# Patient Record
Sex: FEMALE | Race: OTHER | HISPANIC OR LATINO | Employment: UNEMPLOYED | URBAN - METROPOLITAN AREA
[De-identification: names, ages, dates, MRNs, and addresses within clinical notes are randomized per-mention and may not be internally consistent; named-entity substitution may affect disease eponyms.]

---

## 2017-10-31 ENCOUNTER — ALLSCRIPTS OFFICE VISIT (OUTPATIENT)
Dept: OTHER | Facility: OTHER | Age: 52
End: 2017-10-31

## 2017-10-31 DIAGNOSIS — Z12.31 ENCOUNTER FOR SCREENING MAMMOGRAM FOR MALIGNANT NEOPLASM OF BREAST: ICD-10-CM

## 2017-10-31 DIAGNOSIS — R10.13 EPIGASTRIC PAIN: ICD-10-CM

## 2017-10-31 DIAGNOSIS — R19.00 INTRA-ABDOMINAL AND PELVIC SWELLING, MASS AND LUMP, UNSPECIFIED SITE: ICD-10-CM

## 2017-11-08 ENCOUNTER — GENERIC CONVERSION - ENCOUNTER (OUTPATIENT)
Dept: OTHER | Facility: OTHER | Age: 52
End: 2017-11-08

## 2017-11-08 LAB
ADEQUACY: (HISTORICAL): NORMAL
CLINICIAN PROVIDIED ICD 9 OR 10 (HISTORICAL): NORMAL
COMMENT (HISTORICAL): NORMAL
DIAGNOSIS (HISTORICAL): NORMAL
HPV HIGH RISK (HISTORICAL): NORMAL
HPV,LOW VOLUME (HISTORICAL): NEGATIVE
PERFORMED BY (HISTORICAL): NORMAL
TEST INFORMATION (HISTORICAL): NORMAL

## 2017-11-14 ENCOUNTER — GENERIC CONVERSION - ENCOUNTER (OUTPATIENT)
Dept: OTHER | Facility: OTHER | Age: 52
End: 2017-11-14

## 2017-11-21 ENCOUNTER — GENERIC CONVERSION - ENCOUNTER (OUTPATIENT)
Dept: OTHER | Facility: OTHER | Age: 52
End: 2017-11-21

## 2017-11-21 ENCOUNTER — TRANSCRIBE ORDERS (OUTPATIENT)
Dept: ADMINISTRATIVE | Facility: HOSPITAL | Age: 52
End: 2017-11-21

## 2017-11-21 ENCOUNTER — APPOINTMENT (OUTPATIENT)
Dept: LAB | Facility: HOSPITAL | Age: 52
End: 2017-11-21
Payer: COMMERCIAL

## 2017-11-21 DIAGNOSIS — L20.9 ATOPIC DERMATITIS, UNSPECIFIED TYPE: ICD-10-CM

## 2017-11-21 DIAGNOSIS — R10.13 ABDOMINAL PAIN, EPIGASTRIC: Primary | ICD-10-CM

## 2017-11-21 DIAGNOSIS — M54.9 DORSALGIA: ICD-10-CM

## 2017-11-21 DIAGNOSIS — R10.13 ABDOMINAL PAIN, EPIGASTRIC: ICD-10-CM

## 2017-11-21 DIAGNOSIS — I10 ESSENTIAL HYPERTENSION, MALIGNANT: ICD-10-CM

## 2017-11-21 DIAGNOSIS — R19.00 ABDOMINAL MASS, UNSPECIFIED ABDOMINAL LOCATION: ICD-10-CM

## 2017-11-21 DIAGNOSIS — I10 ESSENTIAL HYPERTENSION, MALIGNANT: Primary | ICD-10-CM

## 2017-11-21 LAB
ALBUMIN SERPL BCP-MCNC: 3.6 G/DL (ref 3.5–5)
ALP SERPL-CCNC: 97 U/L (ref 46–116)
ALT SERPL W P-5'-P-CCNC: 40 U/L (ref 12–78)
ANION GAP SERPL CALCULATED.3IONS-SCNC: 9 MMOL/L (ref 4–13)
AST SERPL W P-5'-P-CCNC: 26 U/L (ref 5–45)
BASOPHILS # BLD AUTO: 0.01 THOUSANDS/ΜL (ref 0–0.1)
BASOPHILS NFR BLD AUTO: 0 % (ref 0–1)
BILIRUB SERPL-MCNC: 0.34 MG/DL (ref 0.2–1)
BILIRUB UR QL STRIP: NEGATIVE
BUN SERPL-MCNC: 12 MG/DL (ref 5–25)
CALCIUM SERPL-MCNC: 8.4 MG/DL (ref 8.3–10.1)
CHLORIDE SERPL-SCNC: 105 MMOL/L (ref 100–108)
CHOLEST SERPL-MCNC: 163 MG/DL (ref 50–200)
CLARITY UR: CLEAR
CO2 SERPL-SCNC: 26 MMOL/L (ref 21–32)
COLOR UR: YELLOW
CREAT SERPL-MCNC: 0.69 MG/DL (ref 0.6–1.3)
EOSINOPHIL # BLD AUTO: 0.12 THOUSAND/ΜL (ref 0–0.61)
EOSINOPHIL NFR BLD AUTO: 2 % (ref 0–6)
ERYTHROCYTE [DISTWIDTH] IN BLOOD BY AUTOMATED COUNT: 13 % (ref 11.6–15.1)
EST. AVERAGE GLUCOSE BLD GHB EST-MCNC: 114 MG/DL
GFR SERPL CREATININE-BSD FRML MDRD: 100 ML/MIN/1.73SQ M
GGT SERPL-CCNC: 26 U/L (ref 5–85)
GLUCOSE P FAST SERPL-MCNC: 93 MG/DL (ref 65–99)
GLUCOSE UR STRIP-MCNC: NEGATIVE MG/DL
HBA1C MFR BLD: 5.6 % (ref 4.2–6.3)
HCT VFR BLD AUTO: 43.8 % (ref 34.8–46.1)
HDLC SERPL-MCNC: 51 MG/DL (ref 40–60)
HGB BLD-MCNC: 15 G/DL (ref 11.5–15.4)
HGB UR QL STRIP.AUTO: NEGATIVE
KETONES UR STRIP-MCNC: NEGATIVE MG/DL
LDLC SERPL CALC-MCNC: 78 MG/DL (ref 0–100)
LEUKOCYTE ESTERASE UR QL STRIP: NEGATIVE
LYMPHOCYTES # BLD AUTO: 1.66 THOUSANDS/ΜL (ref 0.6–4.47)
LYMPHOCYTES NFR BLD AUTO: 24 % (ref 14–44)
MAGNESIUM SERPL-MCNC: 2 MG/DL (ref 1.6–2.6)
MCH RBC QN AUTO: 31.8 PG (ref 26.8–34.3)
MCHC RBC AUTO-ENTMCNC: 34.2 G/DL (ref 31.4–37.4)
MCV RBC AUTO: 93 FL (ref 82–98)
MONOCYTES # BLD AUTO: 0.49 THOUSAND/ΜL (ref 0.17–1.22)
MONOCYTES NFR BLD AUTO: 7 % (ref 4–12)
NEUTROPHILS # BLD AUTO: 4.79 THOUSANDS/ΜL (ref 1.85–7.62)
NEUTS SEG NFR BLD AUTO: 67 % (ref 43–75)
NITRITE UR QL STRIP: NEGATIVE
NRBC BLD AUTO-RTO: 0 /100 WBCS
PH UR STRIP.AUTO: 6 [PH] (ref 4.5–8)
PLATELET # BLD AUTO: 212 THOUSANDS/UL (ref 149–390)
PMV BLD AUTO: 11.8 FL (ref 8.9–12.7)
POTASSIUM SERPL-SCNC: 4.1 MMOL/L (ref 3.5–5.3)
PROT SERPL-MCNC: 7.5 G/DL (ref 6.4–8.2)
PROT UR STRIP-MCNC: NEGATIVE MG/DL
RBC # BLD AUTO: 4.71 MILLION/UL (ref 3.81–5.12)
SODIUM SERPL-SCNC: 140 MMOL/L (ref 136–145)
SP GR UR STRIP.AUTO: <=1.005 (ref 1–1.03)
T4 FREE SERPL-MCNC: 1.09 NG/DL (ref 0.76–1.46)
TRIGL SERPL-MCNC: 168 MG/DL
TSH SERPL DL<=0.05 MIU/L-ACNC: 2.63 UIU/ML (ref 0.36–3.74)
UROBILINOGEN UR QL STRIP.AUTO: 0.2 E.U./DL
WBC # BLD AUTO: 7.07 THOUSAND/UL (ref 4.31–10.16)

## 2017-11-21 PROCEDURE — 80053 COMPREHEN METABOLIC PANEL: CPT

## 2017-11-21 PROCEDURE — 82977 ASSAY OF GGT: CPT

## 2017-11-21 PROCEDURE — 81003 URINALYSIS AUTO W/O SCOPE: CPT | Performed by: INTERNAL MEDICINE

## 2017-11-21 PROCEDURE — 84443 ASSAY THYROID STIM HORMONE: CPT

## 2017-11-21 PROCEDURE — 85025 COMPLETE CBC W/AUTO DIFF WBC: CPT

## 2017-11-21 PROCEDURE — 84439 ASSAY OF FREE THYROXINE: CPT

## 2017-11-21 PROCEDURE — 36415 COLL VENOUS BLD VENIPUNCTURE: CPT

## 2017-11-21 PROCEDURE — 80061 LIPID PANEL: CPT

## 2017-11-21 PROCEDURE — 83735 ASSAY OF MAGNESIUM: CPT

## 2017-11-21 PROCEDURE — 83036 HEMOGLOBIN GLYCOSYLATED A1C: CPT

## 2017-11-22 ENCOUNTER — GENERIC CONVERSION - ENCOUNTER (OUTPATIENT)
Dept: OTHER | Facility: OTHER | Age: 52
End: 2017-11-22

## 2017-11-28 ENCOUNTER — HOSPITAL ENCOUNTER (OUTPATIENT)
Dept: CT IMAGING | Facility: HOSPITAL | Age: 52
Discharge: HOME | End: 2017-11-28
Payer: COMMERCIAL

## 2017-11-28 DIAGNOSIS — R19.00 INTRA-ABDOMINAL AND PELVIC SWELLING, MASS AND LUMP, UNSPECIFIED SITE: ICD-10-CM

## 2017-11-28 DIAGNOSIS — R10.13 EPIGASTRIC PAIN: ICD-10-CM

## 2017-11-28 PROCEDURE — 74177 CT ABD & PELVIS W/CONTRAST: CPT

## 2017-11-28 RX ADMIN — IOHEXOL 100 ML: 350 INJECTION, SOLUTION INTRAVENOUS at 20:22

## 2017-12-01 ENCOUNTER — APPOINTMENT (OUTPATIENT)
Dept: LAB | Facility: HOSPITAL | Age: 52
End: 2017-12-01
Payer: COMMERCIAL

## 2017-12-01 ENCOUNTER — TRANSCRIBE ORDERS (OUTPATIENT)
Dept: ADMINISTRATIVE | Facility: HOSPITAL | Age: 52
End: 2017-12-01

## 2017-12-01 ENCOUNTER — ALLSCRIPTS OFFICE VISIT (OUTPATIENT)
Dept: OTHER | Facility: OTHER | Age: 52
End: 2017-12-01

## 2017-12-01 ENCOUNTER — GENERIC CONVERSION - ENCOUNTER (OUTPATIENT)
Dept: OTHER | Facility: OTHER | Age: 52
End: 2017-12-01

## 2017-12-01 DIAGNOSIS — I15.2 ADRENAL HYPERTENSION (HCC): Primary | ICD-10-CM

## 2017-12-01 DIAGNOSIS — I15.2 ADRENAL HYPERTENSION (HCC): ICD-10-CM

## 2017-12-01 DIAGNOSIS — M54.17 RADICULOPATHY OF LUMBOSACRAL REGION: ICD-10-CM

## 2017-12-01 DIAGNOSIS — E27.9 DISORDER OF ADRENAL GLAND (HCC): ICD-10-CM

## 2017-12-01 DIAGNOSIS — E27.9 ADRENAL HYPERTENSION (HCC): ICD-10-CM

## 2017-12-01 DIAGNOSIS — N94.9: Primary | ICD-10-CM

## 2017-12-01 DIAGNOSIS — N94.9 UNSPECIFIED CONDITION ASSOCIATED WITH FEMALE GENITAL ORGANS AND MENSTRUAL CYCLE (CODE): ICD-10-CM

## 2017-12-01 DIAGNOSIS — E27.9 ADRENAL HYPERTENSION (HCC): Primary | ICD-10-CM

## 2017-12-01 PROCEDURE — 36415 COLL VENOUS BLD VENIPUNCTURE: CPT

## 2017-12-01 PROCEDURE — 82626 DEHYDROEPIANDROSTERONE: CPT

## 2017-12-01 PROCEDURE — 82088 ASSAY OF ALDOSTERONE: CPT

## 2017-12-05 ENCOUNTER — APPOINTMENT (OUTPATIENT)
Dept: LAB | Facility: HOSPITAL | Age: 52
End: 2017-12-05
Payer: COMMERCIAL

## 2017-12-05 ENCOUNTER — TRANSCRIBE ORDERS (OUTPATIENT)
Dept: ADMINISTRATIVE | Facility: HOSPITAL | Age: 52
End: 2017-12-05

## 2017-12-05 DIAGNOSIS — E27.9 ADRENAL HYPERTENSION (HCC): ICD-10-CM

## 2017-12-05 DIAGNOSIS — I15.2 ADRENAL HYPERTENSION (HCC): Primary | ICD-10-CM

## 2017-12-05 DIAGNOSIS — I15.2 ADRENAL HYPERTENSION (HCC): ICD-10-CM

## 2017-12-05 DIAGNOSIS — E27.9 ADRENAL HYPERTENSION (HCC): Primary | ICD-10-CM

## 2017-12-05 LAB — DHEA SERPL-MCNC: 42 NG/DL (ref 31–701)

## 2017-12-05 PROCEDURE — 82530 CORTISOL FREE: CPT

## 2017-12-06 ENCOUNTER — HOSPITAL ENCOUNTER (OUTPATIENT)
Dept: ULTRASOUND IMAGING | Facility: HOSPITAL | Age: 52
Discharge: HOME | End: 2017-12-06
Payer: COMMERCIAL

## 2017-12-06 DIAGNOSIS — N94.9 UNSPECIFIED CONDITION ASSOCIATED WITH FEMALE GENITAL ORGANS AND MENSTRUAL CYCLE (CODE): ICD-10-CM

## 2017-12-06 LAB — ALDOST SERPL-MCNC: 2.2 NG/DL (ref 0–30)

## 2017-12-06 PROCEDURE — 76856 US EXAM PELVIC COMPLETE: CPT

## 2017-12-06 PROCEDURE — 76830 TRANSVAGINAL US NON-OB: CPT

## 2017-12-07 ENCOUNTER — HOSPITAL ENCOUNTER (OUTPATIENT)
Dept: CT IMAGING | Facility: HOSPITAL | Age: 52
Discharge: HOME | End: 2017-12-07
Payer: COMMERCIAL

## 2017-12-07 ENCOUNTER — HOSPITAL ENCOUNTER (OUTPATIENT)
Dept: MAMMOGRAPHY | Facility: MEDICAL CENTER | Age: 52
Discharge: HOME | End: 2017-12-07
Payer: COMMERCIAL

## 2017-12-07 DIAGNOSIS — Z12.31 ENCOUNTER FOR SCREENING MAMMOGRAM FOR MALIGNANT NEOPLASM OF BREAST: ICD-10-CM

## 2017-12-07 DIAGNOSIS — N94.9 UNSPECIFIED CONDITION ASSOCIATED WITH FEMALE GENITAL ORGANS AND MENSTRUAL CYCLE (CODE): ICD-10-CM

## 2017-12-07 DIAGNOSIS — E27.9 DISORDER OF ADRENAL GLAND (HCC): ICD-10-CM

## 2017-12-07 PROCEDURE — G0202 SCR MAMMO BI INCL CAD: HCPCS

## 2017-12-07 PROCEDURE — 74170 CT ABD WO CNTRST FLWD CNTRST: CPT

## 2017-12-07 PROCEDURE — 77063 BREAST TOMOSYNTHESIS BI: CPT

## 2017-12-07 RX ADMIN — IOHEXOL 100 ML: 350 INJECTION, SOLUTION INTRAVENOUS at 20:34

## 2017-12-09 LAB
CORTIS F 24H UR-MRATE: 19 UG/24 HR (ref 0–50)
CORTIS F UR-MCNC: 19 UG/L

## 2017-12-11 ENCOUNTER — GENERIC CONVERSION - ENCOUNTER (OUTPATIENT)
Dept: OTHER | Facility: OTHER | Age: 52
End: 2017-12-11

## 2017-12-13 ENCOUNTER — GENERIC CONVERSION - ENCOUNTER (OUTPATIENT)
Dept: OTHER | Facility: OTHER | Age: 52
End: 2017-12-13

## 2017-12-15 ENCOUNTER — ALLSCRIPTS OFFICE VISIT (OUTPATIENT)
Dept: OTHER | Facility: OTHER | Age: 52
End: 2017-12-15

## 2017-12-15 ENCOUNTER — TRANSCRIBE ORDERS (OUTPATIENT)
Dept: ADMINISTRATIVE | Facility: HOSPITAL | Age: 52
End: 2017-12-15

## 2017-12-15 ENCOUNTER — GENERIC CONVERSION - ENCOUNTER (OUTPATIENT)
Dept: OTHER | Facility: OTHER | Age: 52
End: 2017-12-15

## 2017-12-15 DIAGNOSIS — I15.2 ADRENAL HYPERTENSION (HCC): Primary | ICD-10-CM

## 2017-12-15 DIAGNOSIS — E27.9 ADRENAL HYPERTENSION (HCC): Primary | ICD-10-CM

## 2017-12-16 NOTE — CONSULTS
Assessment  1  Adrenal mass, left (255 9) (E27 9)    Plan  Adrenal mass, left    · (1) BUN; Status:Active; Requested PCA:[id number]; Perform:[facility]; HHA:[id number]; Last Updated By:Ann-Marie Hill; 12/15/2017 1:31:01 PM;Ordered; For:Adrenal mass, left; Ordered By:Lazaro Rivera;   · (1) CREATININE; Status:Active; Requested AGW:[id number]; Perform:[facility]; Due:15Jun2019;Ordered; For:Adrenal mass, left; Ordered By:Lazaro Rivera;   · * CT ABDOMEN PELVIS W CONTRAST; Status:Need Information - Financial Authorization; Requested [facility]:[id number]; Perform:[facility] Radiology; Order Comments:adrenal protocol ; Due:11Jun2019; Last Updated By:Ann-Marie Hill; 12/15/2017 1:30:27 PM;Ordered; For:Adrenal mass, left; Ordered By:Lazaro Rivera;   · Follow-up visit in 6 months Evaluation and Treatment  Follow-up  Status: Hold For -Scheduling  Requested for: [id number]   Ordered; For: Adrenal mass, left; Ordered By: Ema Krause Performed:  Due: 61RHC8246    Discussion/Summary  Discussion Summary:   34-year-old female with a 3 8 x 3 8 cm left a drain no mass  I did review the films with Dr Yamilex Quiñonez in Radiology  Outside of the slightly indeterminate Hounsfield unit, the mass has completely benign characteristics  I suspect based on its appearance that this is benign  This is under 5 cm in size  The risk of malignancy is low at under 4 cm  I am comfortable observing this  We will try to obtain the results of her metanephrines  If these are normal we will repeat her CT in 6 months  If these were not drawn, we will repeat her 24 hour urine for metanephrines and catecholamines  If this would be a functioning tumor then I would proceed with laparoscopic left adrenalectomy  If they are normal we will repeat her CT in 6 months  She and her  are agreeable to this  All their questions were answered  Goals and Barriers: The patient has the current Goals: Observation  The patent has the current Barriers: None     Patient's Capacity to Self-Care: Patient is able to Self-Care  Medication SE Review and Pt Understands Tx: The treatment plan was reviewed with the patient/guardian  The patient/guardian understands and agrees with the treatment plan   Self Referrals:   Self Referrals: No      Chief Complaint  Chief Complaint Free Text Note Form: Pt here for consult for left adrenal nodule  History of Present Illness  HPI: 79-year-old female recently had some epigastric pain and there was a question of a hernia  Patient had extensive right upper quadrant surgery after transection of a bile duct and what sounds to be a hepaticojejunostomy  This was apparently a very complex operation when this occurred initially in [facility] and then her 2nd operation in [facility]  CT from November 28, 2000 seventeen revealed a 3 8 x 3 6 cm left adrenal mass  There was a left adnexal cyst  Follow-up CT within adrenal protocol revealed a 3 8 x 3 8 cm left adrenal mass with indeterminate Hounsfield units  I personally reviewed the films  Patient denies any problems with hypertension  She is on lisinopril and her blood pressure is easily controlled  Her cortisol, DHEA, aldosterone, and potassium are all normal  She tells me metanephrines were taken as well, but we are still awaiting those results  She comes in for an opinion regarding further therapy since the mass could not be classified as benign  Review of Systems  Complete Female ROS SurgOnc:  Constitutional: The patient denies new or recent history of general fatigue, no recent weight loss, no change in appetite  Eyes: No complaints of visual problems, no scleral icterus  ENT: no complaints of ear pain, no hoarseness, no difficulty swallowing,-- no tinnitus-- and-- no new masses in head, oral cavity, or neck  Cardiovascular: No complaints of chest pain, no palpitations, no ankle edema  Respiratory: No complaints of shortness of breath, no cough    Gastrointestinal: No complaints of jaundice, no bloody stools, no pale stools  Genitourinary: No complaints of dysuria, no hematuria, no nocturia, no frequent urination, no urethral discharge  Musculoskeletal: No complaints of weakness, paralysis, joint stiffness or arthralgias,  Integumentary: No complaints of rash, no new lesions  Neurological: No complaints of convulsions, no seizures, no dizziness  Hematologic/Lymphatic: No complaints of easy bruising  ROS Reviewed:   ROS reviewed  Active Problems  1  Abdominal pain, epigastric (789 06) (R10 13)   2  Acid reflux (530 81) (K21 9)   3  Acute anxiety (300 00) (F41 9)   4  Adnexal cyst (625 8) (N94 9)   5  Adrenal mass, left (255 9) (E27 9)   6  Atopic dermatitis (691 8) (L20 9)   7  Encounter for routine gynecological examination with Papanicolaou smear of cervix (V72 31,V76 2) (Z01 419)   8  Encounter for screening mammogram for malignant neoplasm of breast (V76 12) (Z12 31)   9  Hypertension (401 9) (I10)   10  Need for prophylactic vaccination and inoculation against influenza (V04 81) (Z23)   11  Obesity (BMI 30-39 9) (278 00) (E66 9)   12  Radicular pain of lumbosacral region (724 4) (M54 17)   13  Vaginal irritation (623 9) (N89 8)    Past Medical History  1  History of Palpable abdominal mass (789 30) (R19 00)  Active Problems And Past Medical History Reviewed: The active problems and past medical history were reviewed and updated today  Surgical History  1  History of Abdominal Surgery   2  History of Breast Surgery Reduction Procedure   3  History of Cholecystectomy Laparoscopic   4  History of Liver Surgery  Surgical History Reviewed: The surgical history was reviewed and updated today  Family History  Mother    1  Family history of pancreatic cancer (V16 0) (Z80 0)  Father    2  Family history of malignant neoplasm of prostate (V16 42) (Z80 45)  Family History Reviewed: The family history was reviewed and updated today         Social History   · Former smoker (N15 84) (X00 979) · No alcohol use   · Sexually active  Social History Reviewed: The social history was reviewed and updated today  Current Meds   1  ALPRAZolam 0 25 MG Oral Tablet; TAKE 1 TABLET EVERY 12 HOURS AS NEEDED; Therapy: 30XAB0126 to (Evaluate:28Dec2017); Last Rx:13Dec2017 Ordered   2  Lansoprazole 30 MG Oral Capsule Delayed Release; TAKE 1 CAPSULE EVERY MORNING DAILY; Therapy: 76YQR7556 to (Evaluate:20Jan2018)  Requested for: 21Nov2017; Last Rx:21Nov2017 Ordered   3  Lisinopril 10 MG Oral Tablet; TAKE 1 TABLET DAILY  Requested for: 27BGO6593; Last Rx:71Rks1898 Ordered  Medication List Reviewed: The medication list was reviewed and updated today  Allergies  1  Latex Gloves MISC    Vitals  Vital Signs    Recorded: 90RDF2252 12:17PM   Temperature 98 4 F, Oral   Heart Rate 92, L Radial   Pulse Quality Normal, L Radial   Respiration Quality Normal   Respiration 16   Systolic 587, LUE, Sitting   Diastolic 80, LUE, Sitting   Height 5 ft 5 in   Weight 230 lb 6 oz   BMI Calculated 38 34   BSA Calculated 2 1     Physical Exam   Constitutional: General appearance: The Patient is well-developed, well-nourished female who appears her stated age in no acute distress  She is pleasant and talkative  HEENT: OSWALD, EOMI and the sclerae are anicteric  -- There is no oral pathology noted  -- There is no nasal pathology noted  -- The thyroid is normal to inspection and palpation  -- There is no appreciable cervical adenopathy   -- There are no carotid bruits  -- The Cranial Nerves II - XII are grossly intact  -- Neck is supple without adenopathy  Chest: The chest is normal to inspection  -- The lungs are clear to auscultation  -- There are bilaterally symmetrical breath sounds  -- There is a RRR, normal S1 and S2, without murmurs, rub or gallop  -- The pulses are normal at the radial and dorsalis pedis and symmetrical    Abdomen: The abdomen is normal to inspection and percussion  It is soft, non-tender   There are normal bowel sounds  There are no abnormal masses  -- There is no evidence of hepatosplenomegaly  -- She does not have an umbilical hernia  Extremities: There is no clubbing or cyanosis  -- There is no edema  -- Sensation is intact to light touch  Neuro: Grossly nonfocal    Lymphatic: no evidence of cervical adenopathy bilaterally  -- no evidence of axillary adenopathy bilaterally  -- no evidence of inguinal adenopathy bilaterally  Skin: Warm, anicteric  Results/Data  CT ABDOMEN W WO CONTRAST [id number] 07:34PM Jeffery Median   TW Order Number: [id number]  Performing Comments: ADRENAL PROTOCOL   - Patient Instructions: To schedule this appointment, please contact Central Scheduling at [phone]  Test Name Result Flag Reference   CT ABDOMEN W WO CONTRAST (Report)     CT ABDOMEN - ADRENAL PROTOCOL - WITH AND WITHOUT IV CONTRAST   INDICATION: Evaluate left adrenal lesion  COMPARISON: 11/28/2017   TECHNIQUE: Thin section noncontrast CT examination of the abdomen was performed per adrenal mass protocol  Scan were performed prior to IV contrast administration, in postcontrast portal venous phase, and at 10 minute delay  This examination, like all   CT scans performed in the [facility], was performed utilizing techniques to minimize radiation dose exposure, including the use of iterative reconstruction and automated exposure control  Axial, sagittal, and coronal reformatted images  were submitted for interpretation  Rad dose 1944 mGy-cm    IV Contrast: 100 mL of iohexol (OMNIPAQUE)    Enteric Contrast: Enteric contrast was administered  FINDINGS:   ADRENAL GLANDS:   There is a left adrenal nodule measuring 3 8 x 3 8 cm  The lesion demonstrates homogeneous attenuation and circumscribed margins  Unenhanced density is 17 Hounsfield units  Parenchymal phase density measures 57 Hounsfield units  Delayed phase density measures 31 Hounsfield units      Findings are indeterminate and do not represent benign adenoma  ABDOMEN   LOWER CHEST: No significant abnormality in the lung bases  LIVER/BILIARY TREE: Unremarkable  GALLBLADDER: Removed  SPLEEN: Unremarkable  PANCREAS: Unremarkable  KIDNEYS/URETERS: Unremarkable  No hydronephrosis  VISUALIZED STOMACH AND BOWEL: Unremarkable  ABDOMINAL CAVITY: No free fluid or free air  VESSELS:   No aneurysm  OSSEOUS STRUCTURES: No destructive osseous lesion  IMPRESSION:   Left adrenal nodule measuring 3 8 x 3 8 cm with indeterminate Hounsfield unit measurements both on the unenhanced as well as postcontrast imaging  Because this lesion cannot be characterized as a benign adenoma, surgical consultation recommended  Workstation performed: [id number]   Signed by: Samina Napoles MD  12/11/17     (1) CORTISOL FREE, URINE [id number] 08:19AM Sammy Rapp     Test Name Result Flag Reference   CORTISOL,F,UG/L,U 19 ug/L  Undefined   Total Volume: 0975 mL   CORTISOL 24H URINARY FREE 19 ug/24 hr  0 - 50   This test was developed and its performance characteristics determined by [facility]  It has not been cleared or approved by the Food and Drug Administration  This is a patient instruction: Obtain collection container from provider's office or a Laboratory  Patient needs to void at 8 AM and discard the specimen  Collect all urine for 24 hours including the final specimen voided at the end of the 24-hour collection period (ie, 8 AM the next morning)    Performed at:  [address]  [id number] : Marija Brown MD, Phone:  [phone] (1) [address] [facility] [id number] 03:29PM Sammy Rapp     Test Name Result Flag Reference   DHEA 42 ng/dL  31 - 701   Age                               1 -  5 years    0 -  79                               6 -  7 years    0 - 110                               6 - 10 years    0 - 80                              10 - 12 years    0 - 46                              13 - 15 years 0 - 318                              15 - 16 years   44 - 481                              16 - 19 years   36 - 491                                  >19 years   32 - 701 This test was developed and its performance characteristics determined by [facility]  It has not been cleared or approved by the Food and Drug Administration  Performed at:  [address]  [id number] : Ad Dumont MD, Phone:  [phone]     (1) ALDOSTERONE, BLOOD [id number] 03:29PM [facility]     Test Name Result Flag Reference   ALDOSTER, BLOOD 2 2 ng/dL  0 0 - 30 0   This test was developed and its performance characteristics determined by [facility]  It has not been cleared or approved by the Food and Drug Administration  Performed at:  [address]  [id number] : Ad Dumont MD, Phone:  [phone]     (1) COMPREHENSIVE METABOLIC PANEL [id number] 66:04RV [facility]     Test Name Result Flag Reference   SODIUM 140 mmol/L  136-145   POTASSIUM 4 1 mmol/L  3 5-5 3   CHLORIDE 105 mmol/L  100-108   CARBON DIOXIDE 26 mmol/L  21-32   ANION GAP (CALC) 9 mmol/L  4-13   BLOOD UREA NITROGEN 12 mg/dL  5-25   CREATININE 0 69 mg/dL  0 60-1 30   Standardized to IDMS reference method   CALCIUM 8 4 mg/dL  8 3-10 1   BILI, TOTAL 0 34 mg/dL  0 20-1 00   ALK PHOSPHATAS 97 U/L     ALT (SGPT) 40 U/L  12-78   Specimen collection should occur prior to Sulfasalazine administration due to the potential for falsely depressed results  AST(SGOT) 26 U/L  5-45   Specimen collection should occur prior to Sulfasalazine administration due to the potential for falsely depressed results     ALBUMIN 3 6 g/dL  3 5-5 0   TOTAL PROTEIN 7 5 g/dL  6 4-8 2   eGFR 100 ml/min/1 73sq m     National Kidney Disease Education Program recommendations are as follows: GFR calculation is accurate only with a steady state creatinine Chronic Kidney disease less than 60 ml/min/1 73 sq  meters Kidney failure less than 15 ml/min/1 73 sq  meters  GLUCOSE FASTING 93 mg/dL  65-99   Specimen collection should occur prior to Sulfasalazine administration due to the potential for falsely depressed results  Specimen collection should occur prior to Sulfapyridine administration due to the potential for falsely elevated results  Future Appointments    Date/Time Provider Specialty Site   02/09/2018 01:30 PM NABIL Marquis   Internal Medicine [facility]   12/22/2017 08:45 AM Tonio Smith DO Pain Management [address]     Signatures   Electronically signed by : NABIL Goddard ; Dec 15 2017  1:31PM EST                       (Author)

## 2017-12-18 ENCOUNTER — GENERIC CONVERSION - ENCOUNTER (OUTPATIENT)
Dept: OTHER | Facility: OTHER | Age: 52
End: 2017-12-18

## 2017-12-22 ENCOUNTER — TRANSCRIBE ORDERS (OUTPATIENT)
Dept: ADMINISTRATIVE | Facility: HOSPITAL | Age: 52
End: 2017-12-22

## 2017-12-22 ENCOUNTER — APPOINTMENT (OUTPATIENT)
Dept: LAB | Facility: HOSPITAL | Age: 52
End: 2017-12-22
Attending: SURGERY
Payer: COMMERCIAL

## 2017-12-22 DIAGNOSIS — I15.2 ADRENAL HYPERTENSION (HCC): Primary | ICD-10-CM

## 2017-12-22 DIAGNOSIS — I15.2 ADRENAL HYPERTENSION (HCC): ICD-10-CM

## 2017-12-22 DIAGNOSIS — E27.9 ADRENAL HYPERTENSION (HCC): ICD-10-CM

## 2017-12-22 DIAGNOSIS — E27.9 ADRENAL HYPERTENSION (HCC): Primary | ICD-10-CM

## 2017-12-22 PROCEDURE — 83835 ASSAY OF METANEPHRINES: CPT

## 2017-12-28 LAB
METANEPH 24H UR-MRATE: 70 UG/24 HR (ref 45–290)
METANEPHS 24H UR-MCNC: 28 UG/L
NORMETANEPHRINE 24H UR-MCNC: 186 UG/L
NORMETANEPHRINE 24H UR-MRATE: 465 UG/24 HR (ref 82–500)

## 2018-01-08 ENCOUNTER — EVALUATION (OUTPATIENT)
Dept: PHYSICAL THERAPY | Facility: MEDICAL CENTER | Age: 53
End: 2018-01-08

## 2018-01-11 NOTE — RESULT NOTES
Message   Recorded as Task   Date: 11/22/2017 09:55 AM, Created By: Valjean Fly   Task Name: Follow Up   Assigned To: Jc Chavez   Regarding Patient: EZEQUIEL Ervin, Status: Active   CommentLesvia Sol - 22 Nov 2017 9:55 AM     TASK CREATED  Bloodwork was all without any major abnormality   Myra Doss - 22 Nov 2017 10:34 AM     TASK EDITED  I spoke with Mary Olivares and she is aware of his results  Verified Results  (1) CBC/PLT/DIFF [id number] 12:58PM Valjean Fly     Test Name Result Flag Reference   WBC COUNT 7 07 Thousand/uL  4 31-10 16   RBC COUNT 4 71 Million/uL  3 81-5 12   HEMOGLOBIN 15 0 g/dL  11 5-15 4   HEMATOCRIT 43 8 %  34 8-46  1   MCV 93 fL  82-98   MCH 31 8 pg  26 8-34 3   MCHC 34 2 g/dL  31 4-37 4   RDW 13 0 %  11 6-15 1   MPV 11 8 fL  8 9-12 7   PLATELET COUNT 628 Thousands/uL  149-390   nRBC AUTOMATED 0 /100 WBCs     NEUTROPHILS RELATIVE PERCENT 67 %  43-75   LYMPHOCYTES RELATIVE PERCENT 24 %  14-44   MONOCYTES RELATIVE PERCENT 7 %  4-12   EOSINOPHILS RELATIVE PERCENT 2 %  0-6   BASOPHILS RELATIVE PERCENT 0 %  0-1   NEUTROPHILS ABSOLUTE COUNT 4 79 Thousands/? ??L  1 85-7 62   LYMPHOCYTES ABSOLUTE COUNT 1 66 Thousands/? ??L  0 60-4 47   MONOCYTES ABSOLUTE COUNT 0 49 Thousand/? ??L  0 17-1 22   EOSINOPHILS ABSOLUTE COUNT 0 12 Thousand/? ??L  0 00-0 61   BASOPHILS ABSOLUTE COUNT 0 01 Thousands/? ??L  0 00-0 10   This is a patient instruction: This test is non-fasting  Please drink two glasses of water morning of bloodwork       (1) MAGNESIUM [id number] 12:58PM Valjean Fly     Test Name Result Flag Reference   MAGNESIUM 2 0 mg/dL  1 6-2 6     (1) COMPREHENSIVE METABOLIC PANEL [id number] 24:84YD Valjean Fly     Test Name Result Flag Reference   SODIUM 140 mmol/L  136-145   POTASSIUM 4 1 mmol/L  3 5-5 3   CHLORIDE 105 mmol/L  100-108   CARBON DIOXIDE 26 mmol/L  21-32   ANION GAP (CALC) 9 mmol/L  4-13   BLOOD UREA NITROGEN 12 mg/dL  5-25   CREATININE 0 69 mg/dL  0 60-1 30 Standardized to IDMS reference method   CALCIUM 8 4 mg/dL  8 3-10 1   BILI, TOTAL 0 34 mg/dL  0 20-1 00   ALK PHOSPHATAS 97 U/L     ALT (SGPT) 40 U/L  12-78   Specimen collection should occur prior to Sulfasalazine administration due to the potential for falsely depressed results  AST(SGOT) 26 U/L  5-45   Specimen collection should occur prior to Sulfasalazine administration due to the potential for falsely depressed results  ALBUMIN 3 6 g/dL  3 5-5 0   TOTAL PROTEIN 7 5 g/dL  6 4-8 2   eGFR 100 ml/min/1 73sq m     National Kidney Disease Education Program recommendations are as follows:  GFR calculation is accurate only with a steady state creatinine  Chronic Kidney disease less than 60 ml/min/1 73 sq  meters  Kidney failure less than 15 ml/min/1 73 sq  meters  GLUCOSE FASTING 93 mg/dL  65-99   Specimen collection should occur prior to Sulfasalazine administration due to the potential for falsely depressed results  Specimen collection should occur prior to Sulfapyridine administration due to the potential for falsely elevated results  (1) LIPID PANEL, FASTING 21Nov2017 12:58PM Alexia Banegas     Test Name Result Flag Reference   CHOLESTEROL 163 mg/dL     HDL,DIRECT 51 mg/dL  40-60   Specimen collection should occur prior to Metamizole administration due to the potential for falsley depressed results  LDL CHOLESTEROL CALCULATED 78 mg/dL  0-100   This is a patient instruction: This is a fasting test  Water, black tea or black coffee only after 9:00pm the night before the test  Drink 2 glasses of water the morning of the test         Triglyceride:        Normal <150 mg/dl   Borderline High 150-199 mg/dl   High 200-499 mg/dl   Very High >499 mg/dl      Cholesterol:       Desirable <200 mg/dl    Borderline High 200-239 mg/dl    High >239 mg/dl      HDL Cholesterol:       High>59 mg/dL    Low <41 mg/dL      This screening LDL is a calculated result     It does not have the accuracy of the Direct Measured LDL in the monitoring of patients with hyperlipidemia and/or statin therapy  Direct Measure LDL (OBU725) must be ordered separately in these patients  TRIGLYCERIDES 168 mg/dL H <=150   Specimen collection should occur prior to N-Acetylcysteine or Metamizole administration due to the potential for falsely depressed results  (1) TSH [id number] 12:58PM [employer]     Test Name Result Flag Reference   TSH 2 632 uIU/mL  0 358-3 740   This is a patient instruction: This test is non-fasting  Please drink two glasses of water morning of bloodwork  Patients undergoing fluorescein dye angiography may retain small amounts of fluorescein in the body for 48-72 hours post procedure  Samples containing fluorescein can produce falsely depressed TSH values  If the patient had this procedure,a specimen should be resubmitted post fluorescein clearance  The recommended reference ranges for TSH during pregnancy are as follows:  First trimester 0 1 to 2 5 uIU/mL  Second trimester  0 2 to 3 0 uIU/mL  Third trimester 0 3 to 3 0 uIU/m     (1) URINALYSIS w URINE C/S REFLEX (will reflex a microscopy if leukocytes, occult blood, or nitrites are not within normal limits) [id number] 12:58PM [employer]     Test Name Result Flag Reference   COLOR Yellow     CLARITY Clear     PH UA 6 0  4 5-8 0   LEUKOCYTE ESTERASE UA Negative  Negative   NITRITE UA Negative  Negative   PROTEIN UA Negative mg/dl  Negative   GLUCOSE UA Negative mg/dl  Negative   KETONES UA Negative mg/dl  Negative   UROBILINOGEN UA 0 2 E U /dl  0 2, 1 0 E U /dl   BILIRUBIN UA Negative  Negative   BLOOD UA Negative  Negative, Trace-Intact   SPECIFIC GRAVITY UA <=1 005  1 003-1 030     (1) T4, FREE 21Nov2017 12:58PM [employer]     Test Name Result Flag Reference   T4,FREE 1 09 ng/dL  0 76-1 46   Specimen collection should occur prior to Sulfasalazine administration due to the potential for falsely elevated results       (1) GGT [id number] 12: 58PM [facility]SYLOBan Fly     Test Name Result Flag Reference   GGT 26 U/L  5-85     (1) HEMOGLOBIN A1C [id number] 12:58PM [facility]LO[facility]     Test Name Result Flag Reference   HEMOGLOBIN A1C 5 6 %  4 2-6 3   EST  AVG   GLUCOSE 114 mg/dl

## 2018-01-14 VITALS
BODY MASS INDEX: 37.73 KG/M2 | DIASTOLIC BLOOD PRESSURE: 82 MMHG | WEIGHT: 226.44 LBS | HEIGHT: 65 IN | SYSTOLIC BLOOD PRESSURE: 118 MMHG

## 2018-01-17 NOTE — RESULT NOTES
Verified Results  (LC) PapIG, HPV, rfx 16/18 [id number] 12:00AM Rich Avers     Test Name Result Flag Reference   DIAGNOSIS:      NEGATIVE FOR INTRAEPITHELIAL LESION AND MALIGNANCY  THE CYTOLOGY PROCESSING WAS PERFORMED AT THE [facility] LOCATED AT  [facility] [address] , [address] [place] [phone]  NEGATIVE FOR INTRAEPITHELIAL LESION AND MALIGNANCY  THE CYTOLOGY PROCESSING WAS PERFORMED AT THE [facility] LOCATED AT  [facility] [address] , [address] [place] [phone]  Specimen adequacy:      Satisfactory for evaluation  Endocervical and/or squamous metaplastic  cells (endocervical component) are present  Satisfactory for evaluation  Endocervical and/or squamous metaplastic  cells (endocervical component) are present  Clinician provided ICD10: U72 443     Z01 419   Performed by:      Mack Miguel Cytotechnologist (ASCP)   Mack Miguel Cytotechnologist (ASCP)   Comm   Note: (Report)     The Pap smear is a screening test designed to aid in the detection of  premalignant and malignant conditions of the uterine cervix  It is not a  diagnostic procedure and should not be used as the sole means of detecting  cervical cancer  Both false-positive and false-negative reports do occur  The Pap smear is a screening test designed to aid in the detection of  premalignant and malignant conditions of the uterine cervix  It is not a  diagnostic procedure and should not be used as the sole means of detecting  cervical cancer  Both false-positive and false-negative reports do occur  Test Methodology: This liquid based ThinPrep(R) pap test was screened with the  use of an image guided system  This liquid based ThinPrep(R) pap test was screened with the  use of an image guided system  HPV, high-risk SEELVR     The quantity of specimen remaining in the vial after Pap slide  preparation was less than the 4 mL minimum cell suspension required  Low sample cellularity may be the cause    See HPV, low volume rfx  test result  This high-risk HPV test detects thirteen high-risk types  (16/18/31/33/35/39/45/51/52/56/58/59/68) without differentiation  HPV, low volume rfx Negative  Negative   This test detects fourteen high-risk HPV types (16,18,31,33,35,39,45,  51,52,56,58,59,66,68) without differentiation

## 2018-01-22 VITALS
SYSTOLIC BLOOD PRESSURE: 122 MMHG | RESPIRATION RATE: 16 BRPM | HEIGHT: 65 IN | BODY MASS INDEX: 38.38 KG/M2 | HEART RATE: 92 BPM | TEMPERATURE: 98.4 F | DIASTOLIC BLOOD PRESSURE: 80 MMHG | WEIGHT: 230.38 LBS

## 2018-01-22 VITALS
WEIGHT: 229.13 LBS | TEMPERATURE: 98.1 F | DIASTOLIC BLOOD PRESSURE: 80 MMHG | SYSTOLIC BLOOD PRESSURE: 132 MMHG | BODY MASS INDEX: 38.17 KG/M2 | HEIGHT: 65 IN | RESPIRATION RATE: 12 BRPM | HEART RATE: 68 BPM

## 2018-01-23 NOTE — MISCELLANEOUS
Message   Recorded as Task   Date: 12/18/2017 08:31 AM, Created By: Isaiah Hernández   Task Name: Miscellaneous   Assigned To: Isaiah Hernández   Regarding Patient: EZEQUIEL Omalley, Status: Active   CommentPleas Trout Run - 18 Dec 2017 8:31 AM     TASK CREATED  Patient called the answering service to cancel her appt  12/22/17 @ 8:45 AM with Dr Brendon Martinez  No reason was given for the cancellation  I called the patient & her  stated that she will just take ibuprofen and does not want to reschedule  I told him to let the referring doctor know she will not be coming to [facility]  Active Problems    1  Abdominal pain, epigastric (789 06) (R10 13)   2  Acid reflux (530 81) (K21 9)   3  Acute anxiety (300 00) (F41 9)   4  Adnexal cyst (625 8) (N94 9)   5  Adrenal mass, left (255 9) (E27 9)   6  Atopic dermatitis (691 8) (L20 9)   7  Encounter for routine gynecological examination with Papanicolaou smear of cervix   (V72 31,V76 2) (Z01 419)   8  Encounter for screening mammogram for malignant neoplasm of breast (V76 12)   (Z12 31)   9  Hypertension (401 9) (I10)   10  Need for prophylactic vaccination and inoculation against influenza (V04 81) (Z23)   11  Obesity (BMI 30-39 9) (278 00) (E66 9)   12  Radicular pain of lumbosacral region (724 4) (M54 17)   13  Vaginal irritation (623 9) (N89 8)    Current Meds   1  ALPRAZolam 0 25 MG Oral Tablet; TAKE 1 TABLET EVERY 12 HOURS AS NEEDED; Therapy: 77GIL3634 to (Evaluate:82Bws4362); Last Rx:05Yep0921 Ordered   2  Lansoprazole 30 MG Oral Capsule Delayed Release; TAKE 1 CAPSULE EVERY   MORNING DAILY; Therapy: 98RZI5661 to (Evaluate:20Jan2018)  Requested for: 21Nov2017; Last   Rx:21Nov2017 Ordered   3  Lisinopril 10 MG Oral Tablet; TAKE 1 TABLET DAILY  Requested for: 45VMO2938; Last   Rx:35Cvf0513 Ordered    Allergies    1  Latex Gloves MISC    Signatures   Electronically signed by :  Suyapa Harris, ; Dec 18 2017  9:12AM EST                       (Author)

## 2018-01-23 NOTE — RESULT NOTES
Message   Recorded as Task   Date: 11/30/2017 03:16 PM, Created By: Silvano Reese   Task Name: Follow Up   Assigned To: Jesus Perry   Regarding Patient: EZEQUIEL Fay, Status: In Progress   Comment:    Crystal Dallas - 30 Nov 2017 3:16 PM     TASK CREATED  Her CT scan has some findings that need further follow up  Can you have her come in a bit sooner than the scheduled appt so i can explain everything and set up the tests   Val Raines - 01 Dec 2017 10:00 AM     TASK EDITED  I left a message for Guerda Renner to call back  Melissa Watson - 01 Dec 2017 10:00 AM     TASK IN PROGRESS   Melissa Watson - 01 Dec 2017 2:11 PM     TASK EDITED  I called Guerda Renner to schedule her for an earlier appointment, she didnt schedule she showed up in the office and was seen today

## 2018-01-23 NOTE — MISCELLANEOUS
Message   Recorded as Task   Date: 12/13/2017 10:46 AM, Created By: Dayami Ndiaye   Task Name: Follow Up   Assigned To: Dayami Ndiaye   Regarding Patient: EZEQUIEL Malaogn, Status: Active   CommentBuena Washburn - 13 Dec 2017 10:46 AM     TASK CREATED  Micheal Sole would like to continue taking the Alprazolam  Can we refill it? If so, how many days would you like to give her? Crystal Dallas - 13 Dec 2017 11:15 AM     TASK REPLIED TO: Previously Assigned To Crystal Dallas  You can refil the same prescription as before  Can you please put her in a month instead of 2, she will have her other appointments by then  if she continues to feel really anxious at that time, then we can discuss about something safer   Dayami Ndiaye - 13 Dec 2017 2:16 PM     TASK EDITED  I phoned in script to [facility] [employer]  Micheal Sole will call back on Friday to schedule a sooner appt, per Dr Sherly Austin request         Active Problems    1  Abdominal pain, epigastric (789 06) (R10 13)   2  Acid reflux (530 81) (K21 9)   3  Acute anxiety (300 00) (F41 9)   4  Adnexal cyst (625 8) (N94 9)   5  Adrenal mass (255 9) (E27 9)   6  Atopic dermatitis (691 8) (L20 9)   7  Encounter for routine gynecological examination with Papanicolaou smear of cervix   (V72 31,V76 2) (Z01 419)   8  Encounter for screening mammogram for malignant neoplasm of breast (V76 12)   (Z12 31)   9  Hypertension (401 9) (I10)   10  Need for prophylactic vaccination and inoculation against influenza (V04 81) (Z23)   11  Obesity (BMI 30-39 9) (278 00) (E66 9)   12  Radicular pain of lumbosacral region (724 4) (M54 17)   13  Vaginal irritation (623 9) (N89 8)    Current Meds   1  ALPRAZolam 0 25 MG Oral Tablet; TAKE 1 TABLET EVERY 12 HOURS AS NEEDED; Therapy: 44RRZ7689 to (Evaluate:41Yvl2271); Last Rx:38Yyt5434 Ordered   2  Clobetasol Propionate 0 05 % External Ointment; Apply as needed to affected area;    Therapy: 48NUS3725 to (Last Rx:38Opt6128)  Requested for: 06NYU9491 Ordered   3  Lansoprazole 30 MG Oral Capsule Delayed Release; TAKE 1 CAPSULE EVERY   MORNING DAILY; Therapy: 93ZGQ7590 to (Evaluate:20Jan2018)  Requested for: 21Nov2017; Last   Rx:21Nov2017 Ordered   4  Lisinopril 10 MG Oral Tablet; TAKE 1 TABLET DAILY  Requested for: 53PVT3727; Last   Rx:51Als5875 Ordered   5  Triamcinolone Acetonide 0 5 % External Cream; APPLY 1 APPLICATION TOPICALLY 2   (TWO) TIMES A DAY FOR 15 DAYS; Therapy: 72VPQ4536 to (Last Rx:26Ock5286)  Requested for: 16Kev1364 Ordered    Allergies    1   No Known Drug Allergies    Plan  Acute anxiety    · ALPRAZolam 0 25 MG Oral Tablet; TAKE 1 TABLET EVERY 12 HOURS AS  NEEDED    Signatures   Electronically signed by : NABIL Fishman ; Dec 13 2017  2:42PM EST                       (Author)

## 2018-01-23 NOTE — RESULT NOTES
Verified Results  MAMMO SCREENING BILATERAL W 3D & CAD 68Vsr3496 03:19PM Devon Mortimer Order Number: [id number]    - Patient Instructions: To schedule this appointment, please contact Central Scheduling at [phone]  Do not wear any perfume, powder, lotion or deodorant on breast or underarm area  Please bring your doctors order, referral (if needed) and insurance information with you on the day of the test  Failure to bring this information may result in this test being rescheduled  Arrive 15 minutes prior to your appointment time to register  On the day of your test, please bring any prior mammogram or breast studies with you that were not performed at a [facility]  Failure to bring prior exams may result in your test needing to be rescheduled  Test Name Result Flag Reference   MAMMO SCREENING BILATERAL W 3D & CAD (Report)     Patient History:   Patient is postmenopausal    Family history of pancreatic cancer at age 48 or over in mother,    prostate cancer at age 48 or over in father  Retro-pectoral saline implants in both breasts, 2007  No Hormone Replacement Therapy   Patient has never smoked  Patient's BMI is 36 5  Reason for exam: screening, asymptomatic  Mammo Screening Bilateral W DBT and CAD: December 7, 2017 - Check   In #: [de-identified]   2D/3D Procedure   3D views: Bilateral MLOID view(s) were taken  2D views: Bilateral MLO, CC, and CCID view(s) were taken  Technologist: AMANDA Sims (R)(M)   Prior study comparison: October 12, 2016, bilateral 3D sanjiv    mammo, performed at [facility]  There are scattered fibroglandular densities  A combination of mediolateral oblique 3-D tomographic slices as    well as standard two-dimensional orthogonal images were obtained  No dominant soft tissue mass, architectural distortion or    suspicious calcifications are noted  The skin and nipple    structures are within normal limits   Scattered benign appearing    calcifications are noted  No mammographic evidence of malignancy  No    significant changes when compared with prior studies  ACR BI-RADSï¾® Assessments: BiRad:2 - Benign     Recommendation:   Routine screening mammogram of both breasts in 1 year  A    reminder letter will be sent  The patient is scheduled in a reminder system for screening    mammography  8-10% of cancers will be missed on mammography  Management of a    palpable abnormality must be based on clinical grounds  Patients    will be notified of their results via letter from our facility  Accredited by [employer] of [employer] and FDA  Transcription Location: [facility] 98: [id number]     Risk Value(s):   Tyrer-Cuzick 10 Year: 3 300%, Tyrer-Cuzick Lifetime: 12 000%,    Myriad Table: 1 5%, LEVI 5 Year: 0 7%, NCI Lifetime: 5 9%   Signed by:    Myron Patterson MD   12/14/17

## 2018-01-24 VITALS
SYSTOLIC BLOOD PRESSURE: 118 MMHG | RESPIRATION RATE: 15 BRPM | WEIGHT: 227.25 LBS | DIASTOLIC BLOOD PRESSURE: 70 MMHG | BODY MASS INDEX: 37.86 KG/M2 | HEIGHT: 65 IN | TEMPERATURE: 98.1 F | HEART RATE: 72 BPM

## 2018-01-24 VITALS
SYSTOLIC BLOOD PRESSURE: 160 MMHG | HEART RATE: 72 BPM | TEMPERATURE: 96.5 F | RESPIRATION RATE: 12 BRPM | HEIGHT: 65 IN | DIASTOLIC BLOOD PRESSURE: 104 MMHG | WEIGHT: 225.38 LBS | BODY MASS INDEX: 37.55 KG/M2

## 2018-02-07 PROBLEM — E27.8 ADRENAL MASS, LEFT (HCC): Status: ACTIVE | Noted: 2017-12-01

## 2018-02-07 PROBLEM — M54.9 ACUTE BACK PAIN: Status: ACTIVE | Noted: 2017-11-21

## 2018-02-07 PROBLEM — E66.9 OBESITY (BMI 30-39.9): Status: ACTIVE | Noted: 2017-11-21

## 2018-02-07 PROBLEM — M54.17 RADICULAR PAIN OF LUMBOSACRAL REGION: Status: ACTIVE | Noted: 2017-12-11

## 2018-02-07 PROBLEM — I10 HYPERTENSION: Status: ACTIVE | Noted: 2017-10-31

## 2018-02-07 PROBLEM — L20.9 ATOPIC DERMATITIS: Status: ACTIVE | Noted: 2017-11-21

## 2018-02-12 DIAGNOSIS — K21.9 GASTROESOPHAGEAL REFLUX DISEASE, ESOPHAGITIS PRESENCE NOT SPECIFIED: Primary | ICD-10-CM

## 2018-02-12 RX ORDER — LANSOPRAZOLE 30 MG/1
1 CAPSULE, DELAYED RELEASE ORAL DAILY
COMMUNITY
Start: 2017-11-21 | End: 2018-02-12 | Stop reason: SDUPTHER

## 2018-02-12 RX ORDER — LANSOPRAZOLE 30 MG/1
30 CAPSULE, DELAYED RELEASE ORAL DAILY
Qty: 90 CAPSULE | Refills: 0 | Status: SHIPPED | OUTPATIENT
Start: 2018-02-12 | End: 2018-05-02 | Stop reason: SDUPTHER

## 2018-04-03 DIAGNOSIS — I10 HYPERTENSION, UNSPECIFIED TYPE: Primary | ICD-10-CM

## 2018-04-03 RX ORDER — LISINOPRIL 10 MG/1
10 TABLET ORAL DAILY
Qty: 30 TABLET | Refills: 0 | Status: SHIPPED | OUTPATIENT
Start: 2018-04-03 | End: 2018-05-02 | Stop reason: SDUPTHER

## 2018-04-16 DIAGNOSIS — I10 HYPERTENSION, UNSPECIFIED TYPE: ICD-10-CM

## 2018-04-16 DIAGNOSIS — K21.9 GASTROESOPHAGEAL REFLUX DISEASE, ESOPHAGITIS PRESENCE NOT SPECIFIED: ICD-10-CM

## 2018-04-16 RX ORDER — LISINOPRIL 10 MG/1
10 TABLET ORAL DAILY
Qty: 90 TABLET | Refills: 1 | OUTPATIENT
Start: 2018-04-16

## 2018-04-16 RX ORDER — LANSOPRAZOLE 30 MG/1
CAPSULE, DELAYED RELEASE ORAL
Qty: 90 CAPSULE | Refills: 1 | OUTPATIENT
Start: 2018-04-16

## 2018-04-16 NOTE — TELEPHONE ENCOUNTER
Patient was contacted last time when medication refils were requested, no contact by patient so far  Missed appointment with me and surgical oncology  Needs follow up appointment before further refils can be authorized

## 2018-04-24 DIAGNOSIS — E27.8 ADRENAL MASS, LEFT (HCC): Primary | ICD-10-CM

## 2018-05-02 ENCOUNTER — OFFICE VISIT (OUTPATIENT)
Dept: INTERNAL MEDICINE CLINIC | Facility: CLINIC | Age: 53
End: 2018-05-02
Payer: COMMERCIAL

## 2018-05-02 VITALS
WEIGHT: 230.8 LBS | HEART RATE: 82 BPM | RESPIRATION RATE: 14 BRPM | BODY MASS INDEX: 38.41 KG/M2 | TEMPERATURE: 98.2 F | DIASTOLIC BLOOD PRESSURE: 78 MMHG | SYSTOLIC BLOOD PRESSURE: 122 MMHG

## 2018-05-02 DIAGNOSIS — I10 ESSENTIAL HYPERTENSION: Primary | ICD-10-CM

## 2018-05-02 DIAGNOSIS — I10 HYPERTENSION, UNSPECIFIED TYPE: ICD-10-CM

## 2018-05-02 DIAGNOSIS — F41.1 GENERALIZED ANXIETY DISORDER: ICD-10-CM

## 2018-05-02 DIAGNOSIS — E27.8 ADRENAL MASS, LEFT (HCC): ICD-10-CM

## 2018-05-02 DIAGNOSIS — K21.9 GASTROESOPHAGEAL REFLUX DISEASE, ESOPHAGITIS PRESENCE NOT SPECIFIED: ICD-10-CM

## 2018-05-02 DIAGNOSIS — M25.561 ACUTE PAIN OF RIGHT KNEE: ICD-10-CM

## 2018-05-02 DIAGNOSIS — E66.9 OBESITY (BMI 30-39.9): ICD-10-CM

## 2018-05-02 PROCEDURE — 99214 OFFICE O/P EST MOD 30 MIN: CPT | Performed by: INTERNAL MEDICINE

## 2018-05-02 RX ORDER — LANSOPRAZOLE 30 MG/1
30 CAPSULE, DELAYED RELEASE ORAL DAILY
Qty: 90 CAPSULE | Refills: 0 | Status: SHIPPED | OUTPATIENT
Start: 2018-05-02 | End: 2018-07-28 | Stop reason: SDUPTHER

## 2018-05-02 RX ORDER — DULOXETIN HYDROCHLORIDE 30 MG/1
30 CAPSULE, DELAYED RELEASE ORAL DAILY
Qty: 30 CAPSULE | Refills: 1 | Status: SHIPPED | OUTPATIENT
Start: 2018-05-02 | End: 2018-06-01 | Stop reason: SDUPTHER

## 2018-05-02 RX ORDER — LISINOPRIL 10 MG/1
10 TABLET ORAL DAILY
Qty: 90 TABLET | Refills: 0 | Status: SHIPPED | OUTPATIENT
Start: 2018-05-02 | End: 2018-07-20 | Stop reason: SDUPTHER

## 2018-05-02 NOTE — PROGRESS NOTES
Assessment/Plan:    Essential hypertension  Well control, continue current dose of lisinopril  Adrenal mass, left ([address] )  Explained to her again that adrenal mass was incidental finding  At this time the imaging characteristics on the prior CT scan did not show any concerns of malignancy, blood work and urine test did not show any signs of hormonal abnormalities, follow-up CT scan in 6 months from the prior CT scan would help ensure stability in size and radiologic characteristics  At this time no indication for surgery which would be an invasive procedure  Gastroesophageal reflux disease  Continue current dose of Prevacid  Avoid oral NSAIDs    Generalized anxiety disorder  Start Cymbalta  Slowly increase the dose as needed       Diagnoses and all orders for this visit:    Essential hypertension  -     CBC and differential  -     Comprehensive metabolic panel  -     TSH, 3rd generation with T4 reflex    Adrenal mass, left (HCC)  -     CBC and differential  -     Comprehensive metabolic panel    Obesity (BMI 30-39 9)  -     CBC and differential  -     Comprehensive metabolic panel  -     TSH, 3rd generation with T4 reflex    Acute pain of right knee  -     XR knee 3 vw right non injury; Future  -     diclofenac sodium (VOLTAREN) 1 %; Apply 2 g topically 2 (two) times a day as needed (pain)    Generalized anxiety disorder  -     DULoxetine (CYMBALTA) 30 mg delayed release capsule; Take 1 capsule (30 mg total) by mouth daily    Hypertension, unspecified type  -     lisinopril (ZESTRIL) 10 mg tablet; Take 1 tablet (10 mg total) by mouth daily    Gastroesophageal reflux disease, esophagitis presence not specified  -     lansoprazole (PREVACID) 30 mg capsule; Take 1 capsule (30 mg total) by mouth daily          Subjective:   Chief Complaint   Patient presents with    Follow-up     Overdue follow up    Knee Pain     right        Patient ID: Van Lion is a 46 y o  female      She comes in for follow-up of hypertension, gastric good, generalized anxiety disorder, adrenal mass  Blood pressure is well control, abdominal symptoms are stable  She has occasional abdominal pain but acid reflux is well controlled  She has been really worried about the adrenal mass  Wants surgery to get it removed  He constantly worries about her health    She was seen at [facility] about a month and a half ago for knee pain  She was given prednisone which she took for 3 days but she felt like she was going to die because of shortness of breath  She notes that she saw an orthopedic surgeon in [facility] about 2 years ago gave her a steroid injection  She notes that the pain is only worse during the jones and is better during the summer time  She is concerned about constant weight gain and is worried about it being related to the adrenal mass  Knee Pain          The following portions of the patient's history were reviewed and updated as appropriate: current medications, past medical history, past social history and past surgical history  PHQ-9 Depression Screening    PHQ-9:    Frequency of the following problems over the past two weeks:                Current Outpatient Prescriptions:     lansoprazole (PREVACID) 30 mg capsule, Take 1 capsule (30 mg total) by mouth daily, Disp: 90 capsule, Rfl: 0    lisinopril (ZESTRIL) 10 mg tablet, Take 1 tablet (10 mg total) by mouth daily, Disp: 90 tablet, Rfl: 0    diclofenac sodium (VOLTAREN) 1 %, Apply 2 g topically 2 (two) times a day as needed (pain), Disp: 1 Tube, Rfl: 0    DULoxetine (CYMBALTA) 30 mg delayed release capsule, Take 1 capsule (30 mg total) by mouth daily, Disp: 30 capsule, Rfl: 1    Review of Systems   Constitutional: Negative for fatigue, fever and unexpected weight change  HENT: Negative for ear pain, hearing loss and sore throat  Eyes: Negative for pain and discharge  Respiratory: Negative for cough, chest tightness and shortness of breath  Cardiovascular: Negative for chest pain and palpitations  Gastrointestinal: Positive for abdominal pain  Negative for blood in stool, constipation, diarrhea and nausea  Genitourinary: Negative for dysuria, frequency and hematuria  Musculoskeletal: Positive for arthralgias  Negative for joint swelling  Skin: Negative for rash  Allergic/Immunologic: Negative for immunocompromised state  Neurological: Negative for dizziness and headaches  Hematological: Negative for adenopathy  Psychiatric/Behavioral: Negative for confusion and sleep disturbance  The patient is nervous/anxious  Objective:  /78 (BP Location: Left arm, Patient Position: Sitting, Cuff Size: Standard)   Pulse 82   Temp 98 2 °F (36 8 °C)   Resp 14   Wt 105 kg (230 lb 12 8 oz)   BMI 38 41 kg/m²      Physical Exam   Constitutional: She appears well-developed and well-nourished  HENT:   Head: Normocephalic and atraumatic  Right Ear: Tympanic membrane normal    Left Ear: Tympanic membrane normal    Nose: Nose normal    Mouth/Throat: Oropharynx is clear and moist  No posterior oropharyngeal edema or posterior oropharyngeal erythema  Eyes: Conjunctivae are normal  Pupils are equal, round, and reactive to light  Right eye exhibits no discharge  Neck: Normal range of motion  Neck supple  No thyromegaly present  Cardiovascular: Normal rate, regular rhythm, S1 normal, S2 normal and normal heart sounds  PMI is not displaced  No murmur heard  Pulmonary/Chest: Effort normal and breath sounds normal  No accessory muscle usage  No apnea  No respiratory distress  She has no rhonchi  She has no rales  Abdominal: Soft  Normal appearance and bowel sounds are normal  She exhibits no shifting dullness  There is no hepatosplenomegaly  There is no tenderness  There is no rebound and no CVA tenderness  Musculoskeletal: Normal range of motion  She exhibits no edema          Right knee: She exhibits no swelling, no erythema and no bony tenderness  Tenderness found  Lateral joint line tenderness noted  Lymphadenopathy:     She has no cervical adenopathy  Neurological: She is alert  Skin: Skin is warm and intact  No rash noted  Psychiatric: She has a normal mood and affect  Her speech is normal    Nursing note and vitals reviewed  No results found for this or any previous visit (from the past 1008 hour(s))  ]    No results found

## 2018-05-02 NOTE — ASSESSMENT & PLAN NOTE
Explained to her again that adrenal mass was incidental finding  At this time the imaging characteristics on the prior CT scan did not show any concerns of malignancy, blood work and urine test did not show any signs of hormonal abnormalities, follow-up CT scan in 6 months from the prior CT scan would help ensure stability in size and radiologic characteristics  At this time no indication for surgery which would be an invasive procedure

## 2018-05-31 ENCOUNTER — APPOINTMENT (OUTPATIENT)
Dept: LAB | Facility: HOSPITAL | Age: 53
End: 2018-05-31
Attending: SURGERY
Payer: COMMERCIAL

## 2018-05-31 LAB
ALBUMIN SERPL BCP-MCNC: 3.9 G/DL (ref 3.5–5)
ALP SERPL-CCNC: 113 U/L (ref 46–116)
ALT SERPL W P-5'-P-CCNC: 118 U/L (ref 12–78)
ANION GAP SERPL CALCULATED.3IONS-SCNC: 11 MMOL/L (ref 4–13)
AST SERPL W P-5'-P-CCNC: 52 U/L (ref 5–45)
BASOPHILS # BLD AUTO: 0.02 THOUSANDS/ΜL (ref 0–0.1)
BASOPHILS NFR BLD AUTO: 0 % (ref 0–1)
BILIRUB SERPL-MCNC: 0.49 MG/DL (ref 0.2–1)
BUN SERPL-MCNC: 19 MG/DL (ref 5–25)
CALCIUM SERPL-MCNC: 9.6 MG/DL (ref 8.3–10.1)
CHLORIDE SERPL-SCNC: 100 MMOL/L (ref 100–108)
CO2 SERPL-SCNC: 29 MMOL/L (ref 21–32)
CREAT SERPL-MCNC: 0.87 MG/DL (ref 0.6–1.3)
EOSINOPHIL # BLD AUTO: 0.18 THOUSAND/ΜL (ref 0–0.61)
EOSINOPHIL NFR BLD AUTO: 2 % (ref 0–6)
ERYTHROCYTE [DISTWIDTH] IN BLOOD BY AUTOMATED COUNT: 13.2 % (ref 11.6–15.1)
GFR SERPL CREATININE-BSD FRML MDRD: 76 ML/MIN/1.73SQ M
GLUCOSE P FAST SERPL-MCNC: 120 MG/DL (ref 65–99)
HCT VFR BLD AUTO: 45 % (ref 34.8–46.1)
HGB BLD-MCNC: 15.4 G/DL (ref 11.5–15.4)
LYMPHOCYTES # BLD AUTO: 2.12 THOUSANDS/ΜL (ref 0.6–4.47)
LYMPHOCYTES NFR BLD AUTO: 25 % (ref 14–44)
MCH RBC QN AUTO: 31.4 PG (ref 26.8–34.3)
MCHC RBC AUTO-ENTMCNC: 34.2 G/DL (ref 31.4–37.4)
MCV RBC AUTO: 92 FL (ref 82–98)
MONOCYTES # BLD AUTO: 0.47 THOUSAND/ΜL (ref 0.17–1.22)
MONOCYTES NFR BLD AUTO: 6 % (ref 4–12)
NEUTROPHILS # BLD AUTO: 5.78 THOUSANDS/ΜL (ref 1.85–7.62)
NEUTS SEG NFR BLD AUTO: 68 % (ref 43–75)
NRBC BLD AUTO-RTO: 0 /100 WBCS
PLATELET # BLD AUTO: 204 THOUSANDS/UL (ref 149–390)
PMV BLD AUTO: 11.9 FL (ref 8.9–12.7)
POTASSIUM SERPL-SCNC: 4 MMOL/L (ref 3.5–5.3)
PROT SERPL-MCNC: 8.1 G/DL (ref 6.4–8.2)
RBC # BLD AUTO: 4.9 MILLION/UL (ref 3.81–5.12)
SODIUM SERPL-SCNC: 140 MMOL/L (ref 136–145)
TSH SERPL DL<=0.05 MIU/L-ACNC: 2.6 UIU/ML (ref 0.36–3.74)
WBC # BLD AUTO: 8.57 THOUSAND/UL (ref 4.31–10.16)

## 2018-05-31 PROCEDURE — 85025 COMPLETE CBC W/AUTO DIFF WBC: CPT | Performed by: INTERNAL MEDICINE

## 2018-05-31 PROCEDURE — 36415 COLL VENOUS BLD VENIPUNCTURE: CPT | Performed by: INTERNAL MEDICINE

## 2018-05-31 PROCEDURE — 80053 COMPREHEN METABOLIC PANEL: CPT | Performed by: INTERNAL MEDICINE

## 2018-05-31 PROCEDURE — 84443 ASSAY THYROID STIM HORMONE: CPT | Performed by: INTERNAL MEDICINE

## 2018-06-01 ENCOUNTER — OFFICE VISIT (OUTPATIENT)
Dept: INTERNAL MEDICINE CLINIC | Facility: CLINIC | Age: 53
End: 2018-06-01
Payer: COMMERCIAL

## 2018-06-01 VITALS
BODY MASS INDEX: 37.94 KG/M2 | TEMPERATURE: 97.3 F | WEIGHT: 228 LBS | SYSTOLIC BLOOD PRESSURE: 124 MMHG | RESPIRATION RATE: 16 BRPM | DIASTOLIC BLOOD PRESSURE: 80 MMHG | HEART RATE: 79 BPM

## 2018-06-01 DIAGNOSIS — F41.1 GENERALIZED ANXIETY DISORDER: Primary | ICD-10-CM

## 2018-06-01 DIAGNOSIS — E27.8 ADRENAL MASS, LEFT (HCC): ICD-10-CM

## 2018-06-01 DIAGNOSIS — R74.8 ABNORMAL LIVER ENZYMES: ICD-10-CM

## 2018-06-01 DIAGNOSIS — I10 ESSENTIAL HYPERTENSION: ICD-10-CM

## 2018-06-01 DIAGNOSIS — K21.9 GASTROESOPHAGEAL REFLUX DISEASE, ESOPHAGITIS PRESENCE NOT SPECIFIED: ICD-10-CM

## 2018-06-01 PROCEDURE — 3074F SYST BP LT 130 MM HG: CPT | Performed by: INTERNAL MEDICINE

## 2018-06-01 PROCEDURE — 99214 OFFICE O/P EST MOD 30 MIN: CPT | Performed by: INTERNAL MEDICINE

## 2018-06-01 PROCEDURE — 3079F DIAST BP 80-89 MM HG: CPT | Performed by: INTERNAL MEDICINE

## 2018-06-01 RX ORDER — DULOXETIN HYDROCHLORIDE 60 MG/1
60 CAPSULE, DELAYED RELEASE ORAL DAILY
Qty: 30 CAPSULE | Refills: 2 | Status: SHIPPED | OUTPATIENT
Start: 2018-06-01 | End: 2018-07-09 | Stop reason: SDUPTHER

## 2018-06-01 NOTE — PROGRESS NOTES
Assessment/Plan:    Essential hypertension  Well controlled  Continue lisinopril      Adrenal mass, left (HCC)  Await repeat CT scan, if stable no further follow up needed  Generalized anxiety disorder  Improved, increase cymbalta dose for better therapeutic effect    Gastroesophageal reflux disease  Well control, continue current regimen  Up-to-date on mammography done in December 2017, once CT scan is stable, will refer to see Gastroenterology for a screening colonoscopy  Advised to take Claritin or Benadryl for allergic symptoms  Recheck liver function test with next blood work, CT scan should help as well  Diagnoses and all orders for this visit:    Generalized anxiety disorder  -     DULoxetine (CYMBALTA) 60 mg delayed release capsule; Take 1 capsule (60 mg total) by mouth daily    Adrenal mass, left (HCC)    Essential hypertension  -     Comprehensive metabolic panel    Abnormal liver enzymes  -     Comprehensive metabolic panel  -     Gamma GT  -     Chronic Hepatitis Panel    Gastroesophageal reflux disease, esophagitis presence not specified          Subjective:   Chief Complaint   Patient presents with    Follow-up     1 month        Patient ID: Ligia Velasquez is a 46 y o  female  She comes in for follow up of HTN, adrenal mass, knee pain, anxiety    Her knee feels a bit better  BP well controlled  Acid reflux is well controlled  Anxiety is improved as well,  mentions thusly she is sleeping better and feels less anxious  She had a rash few days ago which she thinks was secondary to room freshner that was used that day  The following portions of the patient's history were reviewed and updated as appropriate: current medications, past medical history, past social history and past surgical history      PHQ-9 Depression Screening    PHQ-9:    Frequency of the following problems over the past two weeks:                Current Outpatient Prescriptions:     diclofenac sodium (VOLTAREN) 1 %, Apply 2 g topically 2 (two) times a day as needed (pain), Disp: 1 Tube, Rfl: 0    DULoxetine (CYMBALTA) 60 mg delayed release capsule, Take 1 capsule (60 mg total) by mouth daily, Disp: 30 capsule, Rfl: 2    lansoprazole (PREVACID) 30 mg capsule, Take 1 capsule (30 mg total) by mouth daily, Disp: 90 capsule, Rfl: 0    lisinopril (ZESTRIL) 10 mg tablet, Take 1 tablet (10 mg total) by mouth daily, Disp: 90 tablet, Rfl: 0    Review of Systems   Constitutional: Negative for fatigue, fever and unexpected weight change  HENT: Negative for ear pain, hearing loss and sore throat  Eyes: Negative for pain and discharge  Respiratory: Negative for cough, chest tightness and shortness of breath  Cardiovascular: Negative for chest pain and palpitations  Gastrointestinal: Negative for abdominal pain, blood in stool, constipation, diarrhea and nausea  Genitourinary: Negative for dysuria, frequency and hematuria  Musculoskeletal: Negative for arthralgias and joint swelling  Skin: Negative for rash  Allergic/Immunologic: Negative for immunocompromised state  Neurological: Negative for dizziness and headaches  Hematological: Negative for adenopathy  Psychiatric/Behavioral: Negative for confusion and sleep disturbance  Objective:  /80 (BP Location: Left arm, Patient Position: Sitting, Cuff Size: Standard)   Pulse 79   Temp (!) 97 3 °F (36 3 °C)   Resp 16   Wt 103 kg (228 lb)   BMI 37 94 kg/m²      Physical Exam   Constitutional: She appears well-developed and well-nourished  HENT:   Head: Normocephalic and atraumatic  Right Ear: Tympanic membrane normal    Left Ear: Tympanic membrane normal    Nose: Nose normal    Mouth/Throat: Oropharynx is clear and moist  No posterior oropharyngeal edema or posterior oropharyngeal erythema  Eyes: Conjunctivae are normal  Pupils are equal, round, and reactive to light  Right eye exhibits no discharge     Neck: Normal range of motion  Neck supple  No thyromegaly present  Cardiovascular: Normal rate, regular rhythm, S1 normal, S2 normal and normal heart sounds  PMI is not displaced  No murmur heard  Pulmonary/Chest: Effort normal and breath sounds normal  No accessory muscle usage  No apnea  No respiratory distress  She has no rhonchi  She has no rales  Abdominal: Soft  Normal appearance and bowel sounds are normal  She exhibits no shifting dullness  There is no hepatosplenomegaly  There is no tenderness  There is no rebound and no CVA tenderness  Musculoskeletal: Normal range of motion  She exhibits no edema or tenderness  Lymphadenopathy:     She has no cervical adenopathy  Neurological: She is alert  Skin: Skin is warm and intact  No rash noted  Psychiatric: She has a normal mood and affect  Her speech is normal    Nursing note and vitals reviewed          Recent Results (from the past 1008 hour(s))   CBC and differential    Collection Time: 05/31/18  9:01 AM   Result Value Ref Range    WBC 8 57 4 31 - 10 16 Thousand/uL    RBC 4 90 3 81 - 5 12 Million/uL    Hemoglobin 15 4 11 5 - 15 4 g/dL    Hematocrit 45 0 34 8 - 46 1 %    MCV 92 82 - 98 fL    MCH 31 4 26 8 - 34 3 pg    MCHC 34 2 31 4 - 37 4 g/dL    RDW 13 2 11 6 - 15 1 %    MPV 11 9 8 9 - 12 7 fL    Platelets 541 400 - 888 Thousands/uL    nRBC 0 /100 WBCs    Neutrophils Relative 68 43 - 75 %    Lymphocytes Relative 25 14 - 44 %    Monocytes Relative 6 4 - 12 %    Eosinophils Relative 2 0 - 6 %    Basophils Relative 0 0 - 1 %    Neutrophils Absolute 5 78 1 85 - 7 62 Thousands/µL    Lymphocytes Absolute 2 12 0 60 - 4 47 Thousands/µL    Monocytes Absolute 0 47 0 17 - 1 22 Thousand/µL    Eosinophils Absolute 0 18 0 00 - 0 61 Thousand/µL    Basophils Absolute 0 02 0 00 - 0 10 Thousands/µL   Comprehensive metabolic panel    Collection Time: 05/31/18  9:01 AM   Result Value Ref Range    Sodium 140 136 - 145 mmol/L    Potassium 4 0 3 5 - 5 3 mmol/L    Chloride 100 100 - 108 mmol/L    CO2 29 21 - 32 mmol/L    Anion Gap 11 4 - 13 mmol/L    BUN 19 5 - 25 mg/dL    Creatinine 0 87 0 60 - 1 30 mg/dL    Glucose, Fasting 120 (H) 65 - 99 mg/dL    Calcium 9 6 8 3 - 10 1 mg/dL    AST 52 (H) 5 - 45 U/L     (H) 12 - 78 U/L    Alkaline Phosphatase 113 46 - 116 U/L    Total Protein 8 1 6 4 - 8 2 g/dL    Albumin 3 9 3 5 - 5 0 g/dL    Total Bilirubin 0 49 0 20 - 1 00 mg/dL    eGFR 76 ml/min/1 73sq m   TSH, 3rd generation with T4 reflex    Collection Time: 05/31/18  9:01 AM   Result Value Ref Range    TSH 3RD GENERATON 2 597 0 358 - 3 740 uIU/mL   ]    No results found

## 2018-06-11 ENCOUNTER — HOSPITAL ENCOUNTER (OUTPATIENT)
Dept: RADIOLOGY | Facility: HOSPITAL | Age: 53
Discharge: HOME | End: 2018-06-11
Payer: COMMERCIAL

## 2018-06-11 ENCOUNTER — HOSPITAL ENCOUNTER (OUTPATIENT)
Dept: CT IMAGING | Facility: HOSPITAL | Age: 53
Discharge: HOME | End: 2018-06-11
Attending: SURGERY
Payer: COMMERCIAL

## 2018-06-11 DIAGNOSIS — E27.9 ADRENAL HYPERTENSION (HCC): ICD-10-CM

## 2018-06-11 DIAGNOSIS — I15.2 ADRENAL HYPERTENSION (HCC): ICD-10-CM

## 2018-06-11 DIAGNOSIS — M25.561 ACUTE PAIN OF RIGHT KNEE: ICD-10-CM

## 2018-06-11 PROCEDURE — 74178 CT ABD&PLV WO CNTR FLWD CNTR: CPT

## 2018-06-11 PROCEDURE — 73562 X-RAY EXAM OF KNEE 3: CPT

## 2018-06-11 RX ADMIN — IOHEXOL 100 ML: 350 INJECTION, SOLUTION INTRAVENOUS at 09:28

## 2018-06-13 ENCOUNTER — TELEPHONE (OUTPATIENT)
Dept: SURGICAL ONCOLOGY | Facility: CLINIC | Age: 53
End: 2018-06-13

## 2018-06-13 ENCOUNTER — TELEPHONE (OUTPATIENT)
Dept: INTERNAL MEDICINE CLINIC | Facility: CLINIC | Age: 53
End: 2018-06-13

## 2018-06-13 NOTE — TELEPHONE ENCOUNTER
----- Message from Sherley Terrazas MD sent at 6/13/2018 12:47 PM EDT -----  X-ray showed arthritis, no acute problems

## 2018-06-28 RX ORDER — DULOXETIN HYDROCHLORIDE 30 MG/1
CAPSULE, DELAYED RELEASE ORAL
Qty: 30 CAPSULE | Refills: 1 | OUTPATIENT
Start: 2018-06-28

## 2018-07-09 DIAGNOSIS — F41.1 GENERALIZED ANXIETY DISORDER: ICD-10-CM

## 2018-07-09 RX ORDER — DULOXETIN HYDROCHLORIDE 60 MG/1
60 CAPSULE, DELAYED RELEASE ORAL DAILY
Qty: 90 CAPSULE | Refills: 0 | Status: SHIPPED | OUTPATIENT
Start: 2018-07-09 | End: 2018-11-01 | Stop reason: SDUPTHER

## 2018-07-20 ENCOUNTER — OFFICE VISIT (OUTPATIENT)
Dept: SURGICAL ONCOLOGY | Facility: CLINIC | Age: 53
End: 2018-07-20
Payer: COMMERCIAL

## 2018-07-20 VITALS
RESPIRATION RATE: 16 BRPM | SYSTOLIC BLOOD PRESSURE: 120 MMHG | BODY MASS INDEX: 38.32 KG/M2 | HEIGHT: 65 IN | DIASTOLIC BLOOD PRESSURE: 80 MMHG | WEIGHT: 230 LBS | HEART RATE: 104 BPM | TEMPERATURE: 98.6 F

## 2018-07-20 DIAGNOSIS — I10 HYPERTENSION, UNSPECIFIED TYPE: ICD-10-CM

## 2018-07-20 DIAGNOSIS — E27.8 ADRENAL MASS, LEFT (HCC): Primary | ICD-10-CM

## 2018-07-20 PROCEDURE — 99213 OFFICE O/P EST LOW 20 MIN: CPT | Performed by: NURSE PRACTITIONER

## 2018-07-20 NOTE — PROGRESS NOTES
Surgical Oncology Follow Up       [facility] SURGICAL ONCOLOGY [facility]    Tu Velez [facility]  1965  [id number]  [facility] SURGICAL ONCOLOGY [place] [address]    Chief Complaint   Patient presents with    Follow-up     Patient is here for a 6 month follow up with CT scan review       Assessment/Plan:  1  Adrenal mass, left (HCC)  - BUN; Future  - Creatinine, serum; Future  - CT abdomen pelvis w contrast; Future  - 6 mo f/u visit with Dr Miguel Angel Crane    Discussion/Summary: Patient is a 46year old female who presented to our office in December 2017 with a newly diagnosed left adrenal mass  Patient had extensive right upper quadrant surgery after transection of a bile duct and what sounds to be a hepaticojejunostomy  This was apparently a very complex operation when this occurred initially in [facility] and then her 2nd operation in [facility]  The mass has benign characteristics and Dr Miguel Angel Crane has recommended observation  Labs confirmed that this is nonfunctioning  She had a CT scan performed on June 11, 2018 which revealed no significant change in the left adrenal nodule, measuring 3 4 x 3 6 x 4 1, previously 3 6 x 3 6 x 4 3 cm  Pt with no complaints today, aside from weight gain of about 30 kg over a 4 year period  She also reports a more sedentary lifestyle, increased appetite  She states she feels well otherwise and lives a normal life  Since this remains stable in size, I've recommended she have a repeat CT scan in 6 mo and we will see her back at that time  She and her  are in agreement with this plan  All of their questions were answered  History of Present Illness:     -Interval History:  Patient presents today for a six-month follow-up visit for an adrenal mass    She had a CT scan performed on June 11, 2018 which revealed no significant change in the left adrenal nodule, measuring 3 4 x 3 6 x 4 1, previously 3 6 x 3 6 x 4 3 cm  Labs performed in December 2017 confirmed this is nonfunctioning  BP normal     Review of Systems:  Review of Systems   Constitutional: Negative for activity change, appetite change, chills, fatigue, fever and unexpected weight change (reports 30 kg weight gain over 4 years)  HENT: Negative for trouble swallowing  Eyes: Negative for pain, redness and visual disturbance  Respiratory: Negative for cough, shortness of breath and wheezing  Cardiovascular: Negative for chest pain, palpitations and leg swelling  Gastrointestinal: Negative for abdominal pain, constipation, diarrhea, nausea and vomiting  Endocrine: Negative for cold intolerance and heat intolerance  Musculoskeletal: Negative for arthralgias, back pain, gait problem and myalgias  Skin: Negative for color change and rash  Neurological: Negative for dizziness, syncope, light-headedness, numbness and headaches  Hematological: Negative for adenopathy  Psychiatric/Behavioral: Negative for agitation and confusion  All other systems reviewed and are negative  Patient Active Problem List   Diagnosis    Acute back pain    Adrenal mass, left (HCC)    Essential hypertension    Atopic dermatitis    Obesity (BMI 30-39  9)    Radicular pain of lumbosacral region    Gastroesophageal reflux disease    Generalized anxiety disorder     Past Medical History:   Diagnosis Date    Palpable abdominal mass     LAST ASSESSED: 12/11/17     Past Surgical History:   Procedure Laterality Date    ABDOMINAL SURGERY      BREAST SURGERY      REDUCTION PROCEDURE    CHOLECYSTECTOMY LAPAROSCOPIC      LIVER SURGERY       Family History   Problem Relation Age of Onset    Pancreatic cancer Mother     Prostate cancer Father      Social History     Social History    Marital status: /Civil Union     Spouse name: N/A    Number of children: N/A    Years of education: N/A     Occupational History    Not on file  Social History Main Topics    Smoking status: Former Smoker    Smokeless tobacco: Never Used    Alcohol use No    Drug use: No    Sexual activity: Yes     Other Topics Concern    Not on file     Social History Narrative    No narrative on file       Current Outpatient Prescriptions:     Biotin w/ Vitamins C & E (HAIR/SKIN/NAILS PO), Take by mouth, Disp: , Rfl:     DULoxetine (CYMBALTA) 60 mg delayed release capsule, Take 1 capsule (60 mg total) by mouth daily, Disp: 90 capsule, Rfl: 0    lansoprazole (PREVACID) 30 mg capsule, Take 1 capsule (30 mg total) by mouth daily, Disp: 90 capsule, Rfl: 0    lisinopril (ZESTRIL) 10 mg tablet, Take 1 tablet (10 mg total) by mouth daily, Disp: 90 tablet, Rfl: 0    Multiple Vitamins-Minerals (WOMENS MULTIVITAMIN) TABS, Take by mouth, Disp: , Rfl:     Omega-3 Fatty Acids (OMEGA 3 500 PO), Take by mouth, Disp: , Rfl:   Allergies   Allergen Reactions    Prednisone Shortness Of Breath    Latex Rash    Shrimp Flavor [Flavoring Agent] Rash     Vitals:    07/20/18 1404   BP: 120/80   Pulse: 104   Resp: 16   Temp: 98 6 °F (37 °C)       Physical Exam   Constitutional: She is oriented to person, place, and time  Vital signs are normal  She appears well-developed and well-nourished  No distress  HENT:   Head: Normocephalic and atraumatic  Neck: Normal range of motion  Cardiovascular: Normal rate, regular rhythm and normal heart sounds  Pulmonary/Chest: Effort normal and breath sounds normal    Abdominal: Soft  Normal appearance  She exhibits no mass  There is no hepatosplenomegaly  There is no tenderness  Multiple well healed incisions   Musculoskeletal: Normal range of motion  Lymphadenopathy:     She has no axillary adenopathy  Right: No supraclavicular adenopathy present  Left: No supraclavicular adenopathy present  Neurological: She is alert and oriented to person, place, and time     Skin: Skin is warm, dry and intact  No rash noted  She is not diaphoretic  Psychiatric: She has a normal mood and affect  Her speech is normal    Vitals reviewed  Results:    Imaging  CT abdomen pelvis w wo contrast   Status: Final result   PACS Images     Show images for CT abdomen pelvis w wo contrast   Order Report      Order Details   Study Result     CT ABDOMEN AND PELVIS WITH AND WITHOUT IV CONTRAST     INDICATION:   I15 2: Hypertension secondary to endocrine disorders  E27 9: Disorder of adrenal gland, unspecified      COMPARISON:  12/7/2017     TECHNIQUE: CT of the adrenal glands was performed without intravenous contrast   Dynamic postcontrast CT evaluation of the abdomen and pelvis was performed in both portal venous and 10 minute delayed  Phases after the administration of intravenous   contrast   Axial, sagittal, and coronal 2D reformatted images were created from the source data and submitted for interpretation       Radiation dose length product (DLP) for this visit:  1878 mGy-cm   This examination, like all CT scans performed in the [facility], was performed utilizing techniques to minimize radiation dose exposure, including the use of iterative   reconstruction and automated exposure control      IV Contrast:  100 mL of iohexol (OMNIPAQUE)  Enteric Contrast:  Enteric contrast was not administered      FINDINGS:     ABDOMEN     ADRENAL GLANDS:   There is a left adrenal nodule measuring 3 4 x 3 6 x 4 1 cm, previously 3 6 x 3 6 x 4 3 cm  The lesion demonstrates homogeneous attenuation and circumscribed margins  Unenhanced density is 21 Hounsfield units  Parenchymal phase density measures 51 Hounsfield units  Delayed phase density measures 33 Hounsfield units    This does not exceed 60% enhancement washout and is therefore indeterminate      LOWER CHEST:  No clinically significant abnormality identified in the visualized lower chest      LIVER/BILIARY TREE:  There is diffuse hepatic steatosis      GALLBLADDER:  Gallbladder is surgically absent      SPLEEN:  Unremarkable      PANCREAS:  Unremarkable      ADRENAL GLANDS:  Unremarkable      KIDNEYS: THERE are small nonobstructing left renal calculi  No hydronephrosis      STOMACH AND BOWEL:  Unremarkable      ABDOMINOPELVIC CAVITY:  No ascites  No free intraperitoneal air  No lymphadenopathy      VESSELS:  Unremarkable for patient's age      PELVIS     REPRODUCTIVE ORGANS:  Unremarkable for patient's age      BLADDER: Unremarkable      APPENDIX: No findings to suggest appendicitis      ABDOMINAL WALL/INGUINAL REGIONS:  Unremarkable      OSSEOUS STRUCTURES:  No acute fracture or destructive osseous lesion      IMPRESSION:     1  No significant change in left adrenal nodule measuring greater than 4 cm in craniocaudal dimension  Enhancement characteristics are borderline with approximately 60% enhancement washout though enhancement is heterogeneous  This lesion remains   indeterminate and while the lack of growth is reassuring of a benign etiology, lesions of this size may warrant surgical resection      2  Diffuse hepatic steatosis      3   Small nonobstructing left renal calculi               Advance Care Planning/Advance Directives:  Discussed disease status, cancer treatment plans and/or cancer treatment goals with the patient

## 2018-07-23 RX ORDER — LISINOPRIL 10 MG/1
TABLET ORAL
Qty: 90 TABLET | Refills: 1 | Status: SHIPPED | OUTPATIENT
Start: 2018-07-23 | End: 2019-01-30 | Stop reason: SDUPTHER

## 2018-07-28 DIAGNOSIS — K21.9 GASTROESOPHAGEAL REFLUX DISEASE, ESOPHAGITIS PRESENCE NOT SPECIFIED: ICD-10-CM

## 2018-07-30 RX ORDER — LANSOPRAZOLE 30 MG/1
CAPSULE, DELAYED RELEASE ORAL
Qty: 90 CAPSULE | Refills: 0 | Status: SHIPPED | OUTPATIENT
Start: 2018-07-30 | End: 2018-11-01 | Stop reason: SDUPTHER

## 2018-08-03 ENCOUNTER — APPOINTMENT (OUTPATIENT)
Dept: LAB | Facility: HOSPITAL | Age: 53
End: 2018-08-03
Payer: COMMERCIAL

## 2018-08-03 LAB
ALBUMIN SERPL BCP-MCNC: 3.7 G/DL (ref 3.5–5)
ALP SERPL-CCNC: 113 U/L (ref 46–116)
ALT SERPL W P-5'-P-CCNC: 77 U/L (ref 12–78)
ANION GAP SERPL CALCULATED.3IONS-SCNC: 6 MMOL/L (ref 4–13)
AST SERPL W P-5'-P-CCNC: 42 U/L (ref 5–45)
BILIRUB SERPL-MCNC: 0.21 MG/DL (ref 0.2–1)
BUN SERPL-MCNC: 21 MG/DL (ref 5–25)
CALCIUM SERPL-MCNC: 9.2 MG/DL (ref 8.3–10.1)
CHLORIDE SERPL-SCNC: 102 MMOL/L (ref 100–108)
CO2 SERPL-SCNC: 27 MMOL/L (ref 21–32)
CREAT SERPL-MCNC: 0.76 MG/DL (ref 0.6–1.3)
GFR SERPL CREATININE-BSD FRML MDRD: 89 ML/MIN/1.73SQ M
GGT SERPL-CCNC: 55 U/L (ref 5–85)
GLUCOSE P FAST SERPL-MCNC: 110 MG/DL (ref 65–99)
HBV CORE AB SER QL: NORMAL
HBV CORE IGM SER QL: NORMAL
HBV SURFACE AG SER QL: NORMAL
HCV AB SER QL: NORMAL
POTASSIUM SERPL-SCNC: 4 MMOL/L (ref 3.5–5.3)
PROT SERPL-MCNC: 7.7 G/DL (ref 6.4–8.2)
SODIUM SERPL-SCNC: 135 MMOL/L (ref 136–145)

## 2018-08-03 PROCEDURE — 86705 HEP B CORE ANTIBODY IGM: CPT | Performed by: INTERNAL MEDICINE

## 2018-08-03 PROCEDURE — 82977 ASSAY OF GGT: CPT | Performed by: INTERNAL MEDICINE

## 2018-08-03 PROCEDURE — 86704 HEP B CORE ANTIBODY TOTAL: CPT | Performed by: INTERNAL MEDICINE

## 2018-08-03 PROCEDURE — 87340 HEPATITIS B SURFACE AG IA: CPT | Performed by: INTERNAL MEDICINE

## 2018-08-03 PROCEDURE — 80053 COMPREHEN METABOLIC PANEL: CPT | Performed by: INTERNAL MEDICINE

## 2018-08-03 PROCEDURE — 36415 COLL VENOUS BLD VENIPUNCTURE: CPT | Performed by: INTERNAL MEDICINE

## 2018-08-03 PROCEDURE — 86803 HEPATITIS C AB TEST: CPT | Performed by: INTERNAL MEDICINE

## 2018-08-06 ENCOUNTER — OFFICE VISIT (OUTPATIENT)
Dept: INTERNAL MEDICINE CLINIC | Facility: CLINIC | Age: 53
End: 2018-08-06
Payer: COMMERCIAL

## 2018-08-06 VITALS
RESPIRATION RATE: 14 BRPM | WEIGHT: 229.8 LBS | HEART RATE: 92 BPM | DIASTOLIC BLOOD PRESSURE: 92 MMHG | HEIGHT: 65 IN | SYSTOLIC BLOOD PRESSURE: 138 MMHG | BODY MASS INDEX: 38.29 KG/M2 | TEMPERATURE: 98.9 F

## 2018-08-06 DIAGNOSIS — E27.8 ADRENAL MASS, LEFT (HCC): ICD-10-CM

## 2018-08-06 DIAGNOSIS — E66.9 OBESITY (BMI 30-39.9): ICD-10-CM

## 2018-08-06 DIAGNOSIS — K21.9 GASTROESOPHAGEAL REFLUX DISEASE, ESOPHAGITIS PRESENCE NOT SPECIFIED: ICD-10-CM

## 2018-08-06 DIAGNOSIS — K76.0 HEPATIC STEATOSIS: ICD-10-CM

## 2018-08-06 DIAGNOSIS — I10 ESSENTIAL HYPERTENSION: Primary | ICD-10-CM

## 2018-08-06 DIAGNOSIS — J30.9 ALLERGIC RHINITIS, UNSPECIFIED SEASONALITY, UNSPECIFIED TRIGGER: ICD-10-CM

## 2018-08-06 DIAGNOSIS — F41.1 GENERALIZED ANXIETY DISORDER: ICD-10-CM

## 2018-08-06 PROCEDURE — 99214 OFFICE O/P EST MOD 30 MIN: CPT | Performed by: INTERNAL MEDICINE

## 2018-08-06 RX ORDER — FLUTICASONE PROPIONATE 50 MCG
1 SPRAY, SUSPENSION (ML) NASAL DAILY
Qty: 1 BOTTLE | Refills: 0 | Status: SHIPPED | OUTPATIENT
Start: 2018-08-06 | End: 2018-09-04 | Stop reason: SDUPTHER

## 2018-08-06 NOTE — PROGRESS NOTES
Assessment/Plan:    Essential hypertension  Generally well control, diastolics 92 today, continue lisinopril  Gastroesophageal reflux disease  Well control on current dose of Prevacid    Adrenal mass, left (HCC)  Radiologically stable, blood work did not show any significant abnormalities on hormonal assessment    Obesity (BMI 30-39  9)  Discussed the importance of lifestyle modification  Cut down carbohydrate intake, start a regular exercise regimen  Generalized anxiety disorder  Improved with the addition of Cymbalta  Hepatic steatosis  Improved LFTs, no significant abnormal features on CT scan  Continue monitoring, monitor lipid panel and treat as needed  Discussed about lifestyle modification again    Up-to-date on mammo, will refer for colonoscopy at next visit  Diagnoses and all orders for this visit:    Essential hypertension  -     Comprehensive metabolic panel  -     CBC and differential    Adrenal mass, left (HCC)  -     Comprehensive metabolic panel  -     Lipid Panel with Direct LDL reflex  -     CBC and differential  -     Hemoglobin A1C    Obesity (BMI 30-39 9)  -     Lipid Panel with Direct LDL reflex  -     Hemoglobin A1C    Gastroesophageal reflux disease, esophagitis presence not specified    Generalized anxiety disorder    Allergic rhinitis, unspecified seasonality, unspecified trigger  -     fluticasone (FLONASE) 50 mcg/act nasal spray; 1 spray into each nostril daily    Hepatic steatosis          Subjective:   Chief Complaint   Patient presents with    Follow-up     2 month        Patient ID: Jay Koenig is a 46 y o  female  She comes in for follow-up of hypertension, acid reflux, hepatic steatosis, adrenal mass, anxiety    Blood pressure is well controlled, anxiety stable as well  Acid reflux is stable as well  Unable to lose weight  She accompanies her  about 2 or 3 times a week when they go out to eat, unable to exercise because it has been too hot    She has a rash on her body  She is allergic to the cleaning chemicals used and swimming pools and so cannot go swimming  Tries to cook fresh but recently has been eating more sweets since her  likes it  The following portions of the patient's history were reviewed and updated as appropriate: current medications, past medical history, past social history and past surgical history  PHQ-9 Depression Screening    PHQ-9:    Frequency of the following problems over the past two weeks:                Current Outpatient Prescriptions:     Biotin w/ Vitamins C & E (HAIR/SKIN/NAILS PO), Take by mouth, Disp: , Rfl:     DULoxetine (CYMBALTA) 60 mg delayed release capsule, Take 1 capsule (60 mg total) by mouth daily, Disp: 90 capsule, Rfl: 0    lansoprazole (PREVACID) 30 mg capsule, TAKE 1 CAPSULE BY MOUTH EVERY DAY, Disp: 90 capsule, Rfl: 0    lisinopril (ZESTRIL) 10 mg tablet, TAKE 1 TABLET BY MOUTH EVERY DAY, Disp: 90 tablet, Rfl: 1    Multiple Vitamins-Minerals (WOMENS MULTIVITAMIN) TABS, Take by mouth, Disp: , Rfl:     Omega-3 Fatty Acids (OMEGA 3 500 PO), Take by mouth, Disp: , Rfl:     fluticasone (FLONASE) 50 mcg/act nasal spray, 1 spray into each nostril daily, Disp: 1 Bottle, Rfl: 0    Review of Systems   Constitutional: Negative for fatigue, fever and unexpected weight change  HENT: Negative for ear pain, hearing loss and sore throat  Eyes: Negative for pain and discharge  Respiratory: Negative for cough, chest tightness and shortness of breath  Cardiovascular: Negative for chest pain and palpitations  Gastrointestinal: Negative for abdominal pain, blood in stool, constipation, diarrhea and nausea  Genitourinary: Negative for dysuria, frequency and hematuria  Musculoskeletal: Positive for arthralgias  Negative for joint swelling  Skin: Positive for rash  Allergic/Immunologic: Negative for immunocompromised state  Neurological: Negative for dizziness and headaches  Hematological: Negative for adenopathy  Psychiatric/Behavioral: Negative for confusion and sleep disturbance  Objective:  /92 (BP Location: Left arm, Patient Position: Sitting, Cuff Size: Standard)   Pulse 92   Temp 98 9 °F (37 2 °C)   Resp 14   Ht 5' 5" (1 651 m)   Wt 104 kg (229 lb 12 8 oz)   BMI 38 24 kg/m²      Physical Exam   Constitutional: She appears well-developed and well-nourished  HENT:   Head: Normocephalic and atraumatic  Right Ear: Tympanic membrane normal    Left Ear: Tympanic membrane normal    Nose: Nose normal    Mouth/Throat: Oropharynx is clear and moist  No posterior oropharyngeal edema or posterior oropharyngeal erythema  Eyes: Conjunctivae are normal  Pupils are equal, round, and reactive to light  Right eye exhibits no discharge  Neck: Normal range of motion  Neck supple  No thyromegaly present  Cardiovascular: Normal rate, regular rhythm, S1 normal, S2 normal and normal heart sounds  PMI is not displaced  No murmur heard  Pulmonary/Chest: Effort normal and breath sounds normal  No accessory muscle usage  No apnea  No respiratory distress  She has no rhonchi  She has no rales  Abdominal: Soft  Normal appearance and bowel sounds are normal  She exhibits no shifting dullness  There is no hepatosplenomegaly  There is no tenderness  There is no rebound and no CVA tenderness  Musculoskeletal: Normal range of motion  She exhibits no edema or tenderness  Lymphadenopathy:     She has no cervical adenopathy  Neurological: She is alert  Skin: Skin is warm and intact  Rash noted  Psychiatric: She has a normal mood and affect  Her speech is normal    Nursing note and vitals reviewed          Recent Results (from the past 1008 hour(s))   Comprehensive metabolic panel    Collection Time: 08/03/18  8:50 AM   Result Value Ref Range    Sodium 135 (L) 136 - 145 mmol/L    Potassium 4 0 3 5 - 5 3 mmol/L    Chloride 102 100 - 108 mmol/L    CO2 27 21 - 32 mmol/L    Anion Gap 6 4 - 13 mmol/L    BUN 21 5 - 25 mg/dL    Creatinine 0 76 0 60 - 1 30 mg/dL    Glucose, Fasting 110 (H) 65 - 99 mg/dL    Calcium 9 2 8 3 - 10 1 mg/dL    AST 42 5 - 45 U/L    ALT 77 12 - 78 U/L    Alkaline Phosphatase 113 46 - 116 U/L    Total Protein 7 7 6 4 - 8 2 g/dL    Albumin 3 7 3 5 - 5 0 g/dL    Total Bilirubin 0 21 0 20 - 1 00 mg/dL    eGFR 89 ml/min/1 73sq m   Gamma GT    Collection Time: 08/03/18  8:50 AM   Result Value Ref Range    GGT 55 5 - 85 U/L   Chronic Hepatitis Panel    Collection Time: 08/03/18  8:50 AM   Result Value Ref Range    Hepatitis B Surface Ag Non-reactive Non-reactive, NonReactive - Confirmed    Hepatitis C Ab Non-reactive Non-reactive    Hep B C IgM Non-reactive Non-reactive    Hep B Core Total Ab Non-reactive Non-reactive   ]    Procedure: Ct Abdomen Pelvis W Wo Contrast    Result Date: 6/13/2018  Narrative: CT ABDOMEN AND PELVIS WITH AND WITHOUT IV CONTRAST INDICATION:   I15 2: Hypertension secondary to endocrine disorders E27 9: Disorder of adrenal gland, unspecified  COMPARISON:  12/7/2017 TECHNIQUE: CT of the adrenal glands was performed without intravenous contrast   Dynamic postcontrast CT evaluation of the abdomen and pelvis was performed in both portal venous and 10 minute delayed  Phases after the administration of intravenous contrast   Axial, sagittal, and coronal 2D reformatted images were created from the source data and submitted for interpretation  Radiation dose length product (DLP) for this visit:  1878 mGy-cm   This examination, like all CT scans performed in the [facility], was performed utilizing techniques to minimize radiation dose exposure, including the use of iterative reconstruction and automated exposure control  IV Contrast:  100 mL of iohexol (OMNIPAQUE) Enteric Contrast:  Enteric contrast was not administered   FINDINGS: ABDOMEN ADRENAL GLANDS: There is a left adrenal nodule measuring 3 4 x 3 6 x 4 1 cm, previously 3 6 x 3 6 x 4 3 cm  The lesion demonstrates homogeneous attenuation and circumscribed margins  Unenhanced density is 21 Hounsfield units  Parenchymal phase density measures 51 Hounsfield units  Delayed phase density measures 33 Hounsfield units  This does not exceed 60% enhancement washout and is therefore indeterminate  LOWER CHEST:  No clinically significant abnormality identified in the visualized lower chest  LIVER/BILIARY TREE:  There is diffuse hepatic steatosis  GALLBLADDER:  Gallbladder is surgically absent  SPLEEN:  Unremarkable  PANCREAS:  Unremarkable  ADRENAL GLANDS:  Unremarkable  KIDNEYS: THERE are small nonobstructing left renal calculi  No hydronephrosis  STOMACH AND BOWEL:  Unremarkable  ABDOMINOPELVIC CAVITY:  No ascites  No free intraperitoneal air  No lymphadenopathy  VESSELS:  Unremarkable for patient's age  PELVIS REPRODUCTIVE ORGANS:  Unremarkable for patient's age  BLADDER: Unremarkable  APPENDIX: No findings to suggest appendicitis  ABDOMINAL WALL/INGUINAL REGIONS:  Unremarkable  OSSEOUS STRUCTURES:  No acute fracture or destructive osseous lesion  Impression: 1  No significant change in left adrenal nodule measuring greater than 4 cm in craniocaudal dimension  Enhancement characteristics are borderline with approximately 60% enhancement washout though enhancement is heterogeneous  This lesion remains indeterminate and while the lack of growth is reassuring of a benign etiology, lesions of this size may warrant surgical resection  2   Diffuse hepatic steatosis  3   Small nonobstructing left renal calculi  Workstation performed: [id number]     Procedure: Xr Knee 3 Vw Right Non Injury    Result Date: 6/13/2018  Narrative: RIGHT KNEE INDICATION:   M25 561: Pain in right knee  COMPARISON:  None VIEWS:  XR KNEE 3 VW RIGHT NON INJURY FINDINGS: There is no acute fracture or dislocation  There is no joint effusion   Slight narrowing of the medial and lateral tibiofemoral joint spaces suggested  Tiny osteophyte formation seen along the superior patella  No lytic or blastic lesions are seen  Soft tissues are unremarkable  Impression: No acute osseous abnormality  Mild degenerative changes   Workstation performed: [id number]

## 2018-08-06 NOTE — ASSESSMENT & PLAN NOTE
Discussed the importance of lifestyle modification  Cut down carbohydrate intake, start a regular exercise regimen

## 2018-08-06 NOTE — ASSESSMENT & PLAN NOTE
Improved LFTs, no significant abnormal features on CT scan  Continue monitoring, monitor lipid panel and treat as needed    Discussed about lifestyle modification again

## 2018-09-04 DIAGNOSIS — J30.9 ALLERGIC RHINITIS, UNSPECIFIED SEASONALITY, UNSPECIFIED TRIGGER: ICD-10-CM

## 2018-09-04 RX ORDER — FLUTICASONE PROPIONATE 50 MCG
1 SPRAY, SUSPENSION (ML) NASAL DAILY
Qty: 1 BOTTLE | Refills: 1 | Status: SHIPPED | OUTPATIENT
Start: 2018-09-04 | End: 2019-05-09

## 2018-11-01 DIAGNOSIS — F41.1 GENERALIZED ANXIETY DISORDER: ICD-10-CM

## 2018-11-01 DIAGNOSIS — K21.9 GASTROESOPHAGEAL REFLUX DISEASE, ESOPHAGITIS PRESENCE NOT SPECIFIED: ICD-10-CM

## 2018-11-01 RX ORDER — DULOXETIN HYDROCHLORIDE 60 MG/1
60 CAPSULE, DELAYED RELEASE ORAL DAILY
Qty: 90 CAPSULE | Refills: 0 | Status: SHIPPED | OUTPATIENT
Start: 2018-11-01 | End: 2019-01-11

## 2018-11-01 RX ORDER — LANSOPRAZOLE 30 MG/1
30 CAPSULE, DELAYED RELEASE ORAL DAILY
Qty: 90 CAPSULE | Refills: 0 | Status: SHIPPED | OUTPATIENT
Start: 2018-11-01 | End: 2019-02-04

## 2018-11-02 ENCOUNTER — APPOINTMENT (OUTPATIENT)
Dept: LAB | Facility: HOSPITAL | Age: 53
End: 2018-11-02
Payer: COMMERCIAL

## 2018-11-02 LAB
ALBUMIN SERPL BCP-MCNC: 3.7 G/DL (ref 3.5–5)
ALP SERPL-CCNC: 115 U/L (ref 46–116)
ALT SERPL W P-5'-P-CCNC: 101 U/L (ref 12–78)
ANION GAP SERPL CALCULATED.3IONS-SCNC: 7 MMOL/L (ref 4–13)
AST SERPL W P-5'-P-CCNC: 47 U/L (ref 5–45)
BASOPHILS # BLD AUTO: 0.02 THOUSANDS/ΜL (ref 0–0.1)
BASOPHILS NFR BLD AUTO: 0 % (ref 0–1)
BILIRUB SERPL-MCNC: 0.35 MG/DL (ref 0.2–1)
BUN SERPL-MCNC: 17 MG/DL (ref 5–25)
CALCIUM SERPL-MCNC: 8.8 MG/DL (ref 8.3–10.1)
CHLORIDE SERPL-SCNC: 102 MMOL/L (ref 100–108)
CHOLEST SERPL-MCNC: 201 MG/DL (ref 50–200)
CO2 SERPL-SCNC: 30 MMOL/L (ref 21–32)
CREAT SERPL-MCNC: 0.88 MG/DL (ref 0.6–1.3)
EOSINOPHIL # BLD AUTO: 0.2 THOUSAND/ΜL (ref 0–0.61)
EOSINOPHIL NFR BLD AUTO: 2 % (ref 0–6)
ERYTHROCYTE [DISTWIDTH] IN BLOOD BY AUTOMATED COUNT: 12.5 % (ref 11.6–15.1)
EST. AVERAGE GLUCOSE BLD GHB EST-MCNC: 128 MG/DL
GFR SERPL CREATININE-BSD FRML MDRD: 75 ML/MIN/1.73SQ M
GLUCOSE P FAST SERPL-MCNC: 100 MG/DL (ref 65–99)
HBA1C MFR BLD: 6.1 % (ref 4.2–6.3)
HCT VFR BLD AUTO: 45.9 % (ref 34.8–46.1)
HDLC SERPL-MCNC: 48 MG/DL (ref 40–60)
HGB BLD-MCNC: 15.2 G/DL (ref 11.5–15.4)
IMM GRANULOCYTES # BLD AUTO: 0.05 THOUSAND/UL (ref 0–0.2)
IMM GRANULOCYTES NFR BLD AUTO: 1 % (ref 0–2)
LDLC SERPL CALC-MCNC: 109 MG/DL (ref 0–100)
LYMPHOCYTES # BLD AUTO: 2.16 THOUSANDS/ΜL (ref 0.6–4.47)
LYMPHOCYTES NFR BLD AUTO: 23 % (ref 14–44)
MCH RBC QN AUTO: 31 PG (ref 26.8–34.3)
MCHC RBC AUTO-ENTMCNC: 33.1 G/DL (ref 31.4–37.4)
MCV RBC AUTO: 94 FL (ref 82–98)
MONOCYTES # BLD AUTO: 0.49 THOUSAND/ΜL (ref 0.17–1.22)
MONOCYTES NFR BLD AUTO: 5 % (ref 4–12)
NEUTROPHILS # BLD AUTO: 6.34 THOUSANDS/ΜL (ref 1.85–7.62)
NEUTS SEG NFR BLD AUTO: 69 % (ref 43–75)
NRBC BLD AUTO-RTO: 0 /100 WBCS
PLATELET # BLD AUTO: 234 THOUSANDS/UL (ref 149–390)
PMV BLD AUTO: 11.6 FL (ref 8.9–12.7)
POTASSIUM SERPL-SCNC: 4.2 MMOL/L (ref 3.5–5.3)
PROT SERPL-MCNC: 7.9 G/DL (ref 6.4–8.2)
RBC # BLD AUTO: 4.9 MILLION/UL (ref 3.81–5.12)
SODIUM SERPL-SCNC: 139 MMOL/L (ref 136–145)
TRIGL SERPL-MCNC: 222 MG/DL
WBC # BLD AUTO: 9.26 THOUSAND/UL (ref 4.31–10.16)

## 2018-11-02 PROCEDURE — 80053 COMPREHEN METABOLIC PANEL: CPT | Performed by: INTERNAL MEDICINE

## 2018-11-02 PROCEDURE — 36415 COLL VENOUS BLD VENIPUNCTURE: CPT | Performed by: INTERNAL MEDICINE

## 2018-11-02 PROCEDURE — 85025 COMPLETE CBC W/AUTO DIFF WBC: CPT | Performed by: INTERNAL MEDICINE

## 2018-11-02 PROCEDURE — 83036 HEMOGLOBIN GLYCOSYLATED A1C: CPT | Performed by: INTERNAL MEDICINE

## 2018-11-02 PROCEDURE — 80061 LIPID PANEL: CPT | Performed by: INTERNAL MEDICINE

## 2018-11-09 ENCOUNTER — OFFICE VISIT (OUTPATIENT)
Dept: INTERNAL MEDICINE CLINIC | Facility: CLINIC | Age: 53
End: 2018-11-09
Payer: COMMERCIAL

## 2018-11-09 VITALS
HEART RATE: 109 BPM | TEMPERATURE: 98.5 F | SYSTOLIC BLOOD PRESSURE: 147 MMHG | RESPIRATION RATE: 16 BRPM | BODY MASS INDEX: 38.79 KG/M2 | WEIGHT: 232.8 LBS | DIASTOLIC BLOOD PRESSURE: 89 MMHG | HEIGHT: 65 IN

## 2018-11-09 DIAGNOSIS — Z23 NEED FOR INFLUENZA VACCINATION: ICD-10-CM

## 2018-11-09 DIAGNOSIS — K76.0 HEPATIC STEATOSIS: ICD-10-CM

## 2018-11-09 DIAGNOSIS — R51.9 FREQUENT HEADACHES: ICD-10-CM

## 2018-11-09 DIAGNOSIS — Z12.11 SCREENING FOR COLON CANCER: ICD-10-CM

## 2018-11-09 DIAGNOSIS — Z12.39 SCREENING FOR MALIGNANT NEOPLASM OF BREAST: ICD-10-CM

## 2018-11-09 DIAGNOSIS — F41.1 GENERALIZED ANXIETY DISORDER: ICD-10-CM

## 2018-11-09 DIAGNOSIS — E27.8 ADRENAL MASS, LEFT (HCC): ICD-10-CM

## 2018-11-09 DIAGNOSIS — E66.9 OBESITY (BMI 30-39.9): ICD-10-CM

## 2018-11-09 DIAGNOSIS — I10 ESSENTIAL HYPERTENSION: Primary | ICD-10-CM

## 2018-11-09 PROCEDURE — 90682 RIV4 VACC RECOMBINANT DNA IM: CPT | Performed by: INTERNAL MEDICINE

## 2018-11-09 PROCEDURE — 90471 IMMUNIZATION ADMIN: CPT | Performed by: INTERNAL MEDICINE

## 2018-11-09 PROCEDURE — 99214 OFFICE O/P EST MOD 30 MIN: CPT | Performed by: INTERNAL MEDICINE

## 2018-11-09 PROCEDURE — 3008F BODY MASS INDEX DOCD: CPT | Performed by: INTERNAL MEDICINE

## 2018-11-09 NOTE — ASSESSMENT & PLAN NOTE
Better controlled, headaches can be due to erratic sleeping schedule, discussed about sleep hygeine  Try to improve daily schedule, trying to get a job  If headaches continue, call the office for sooner follow up

## 2018-11-09 NOTE — ASSESSMENT & PLAN NOTE
Elevated enzymes again, will refer to see GI, needs to lose weight  Refer to see weight management, start a more regular routine, start a exercise routine   Eating is very erratic,

## 2018-11-09 NOTE — PROGRESS NOTES
Assessment/Plan:    Hepatic steatosis  Elevated enzymes again, will refer to see GI, needs to lose weight  Refer to see weight management, start a more regular routine, start a exercise routine  Eating is very erratic,     Essential hypertension  Generally well controlled  Generalized anxiety disorder  Better controlled, headaches can be due to erratic sleeping schedule, discussed about sleep hygeine  Try to improve daily schedule, trying to get a job  If headaches continue, call the office for sooner follow up  Adrenal mass, left ([address] )    Will be getting her repeat CT scan in December  She received influenza vaccine today, screening mammography in December  Last Pap smears in October of 2017  GI should be able to discuss about a screening colonoscopy as well  Diagnoses and all orders for this visit:    Essential hypertension    Hepatic steatosis  -     Ambulatory referral to Weight Management; Future  -     Ambulatory referral to Gastroenterology; Future    Generalized anxiety disorder    Frequent headaches    Obesity (BMI 30-39 9)  -     Ambulatory referral to Weight Management; Future    Screening for colon cancer  -     Ambulatory referral to Gastroenterology; Future    Screening for malignant neoplasm of breast  -     Cancel: Mammo screening bilateral w cad; Future  -     Mammo screening bilateral w 3d & cad; Future    Need for influenza vaccination  -     influenza vaccine, 7208-1524, quadrivalent, recombinant, PF, 0 5 mL, for patients 18 yr+ (FLUBLOK)    Adrenal mass, left (HCC)          Subjective:   Chief Complaint   Patient presents with    Follow-up     3 month        Patient ID: Conrado Spence is a 46 y o  female  She comes in for follow up of hepatic steatosis, HTN, adrenal mass, obesity, GERD    Has been having headaches recently, feels like an electric shock on her body, then feels panicky  BP well controlled   Acid reflux is well controlled unless she eats something spicy or acidic  Unable to lose weight  Notes that her schedule is very erratic  She generally sleeps during the daytime since her  works night shift and while he is at work, she stays of and is working on the computer to  She  Generally does not go out or do any kind of exercise   notes that she likes to eat cheese a lot but sometimes eats only once a day and sometimes eats quite a bit  In the whole day  The following portions of the patient's history were reviewed and updated as appropriate: current medications, past medical history, past social history and past surgical history  PHQ-9 Depression Screening    PHQ-9:    Frequency of the following problems over the past two weeks:                Current Outpatient Prescriptions:     Biotin w/ Vitamins C & E (HAIR/SKIN/NAILS PO), Take by mouth, Disp: , Rfl:     DULoxetine (CYMBALTA) 60 mg delayed release capsule, Take 1 capsule (60 mg total) by mouth daily, Disp: 90 capsule, Rfl: 0    fluticasone (FLONASE) 50 mcg/act nasal spray, 1 spray into each nostril daily, Disp: 1 Bottle, Rfl: 1    lansoprazole (PREVACID) 30 mg capsule, Take 1 capsule (30 mg total) by mouth daily, Disp: 90 capsule, Rfl: 0    lisinopril (ZESTRIL) 10 mg tablet, TAKE 1 TABLET BY MOUTH EVERY DAY, Disp: 90 tablet, Rfl: 1    Multiple Vitamins-Minerals (WOMENS MULTIVITAMIN) TABS, Take by mouth, Disp: , Rfl:     Omega-3 Fatty Acids (OMEGA 3 500 PO), Take by mouth, Disp: , Rfl:     Review of Systems   Constitutional: Negative for fatigue, fever and unexpected weight change  HENT: Negative for ear pain, hearing loss and sore throat  Eyes: Negative for pain and discharge  Respiratory: Negative for cough, chest tightness and shortness of breath  Cardiovascular: Negative for chest pain and palpitations  Gastrointestinal: Positive for abdominal pain  Negative for blood in stool, constipation, diarrhea and nausea     Genitourinary: Negative for dysuria, frequency and hematuria  Musculoskeletal: Positive for arthralgias  Negative for joint swelling  Skin: Negative for rash  Allergic/Immunologic: Negative for immunocompromised state  Neurological: Positive for headaches  Negative for dizziness  Hematological: Negative for adenopathy  Psychiatric/Behavioral: Positive for sleep disturbance  Negative for confusion  Objective:  /89 (BP Location: Left arm, Patient Position: Sitting, Cuff Size: Standard)   Pulse (!) 109   Temp 98 5 °F (36 9 °C)   Resp 16   Ht 5' 5" (1 651 m)   Wt 106 kg (232 lb 12 8 oz)   BMI 38 74 kg/m²      Physical Exam   Constitutional: She appears well-developed and well-nourished  HENT:   Head: Normocephalic and atraumatic  Right Ear: Tympanic membrane normal    Left Ear: Tympanic membrane normal    Nose: Nose normal    Mouth/Throat: Oropharynx is clear and moist  No posterior oropharyngeal edema or posterior oropharyngeal erythema  Eyes: Pupils are equal, round, and reactive to light  Conjunctivae are normal  Right eye exhibits no discharge  Neck: Normal range of motion  Neck supple  No thyromegaly present  Cardiovascular: Normal rate, regular rhythm, S1 normal, S2 normal and normal heart sounds  PMI is not displaced  No murmur heard  Pulmonary/Chest: Effort normal and breath sounds normal  No accessory muscle usage  No apnea  No respiratory distress  She has no rhonchi  She has no rales  Abdominal: Soft  Normal appearance and bowel sounds are normal  She exhibits no shifting dullness  There is no hepatosplenomegaly  There is no tenderness  There is no rebound and no CVA tenderness  Musculoskeletal: Normal range of motion  She exhibits no edema or tenderness  Lymphadenopathy:     She has no cervical adenopathy  Neurological: She is alert  She has normal strength  No cranial nerve deficit  Skin: Skin is warm and intact  No rash noted  Psychiatric: Her speech is normal  Her mood appears anxious     Nursing note and vitals reviewed          Recent Results (from the past 1008 hour(s))   Comprehensive metabolic panel    Collection Time: 11/02/18  1:58 PM   Result Value Ref Range    Sodium 139 136 - 145 mmol/L    Potassium 4 2 3 5 - 5 3 mmol/L    Chloride 102 100 - 108 mmol/L    CO2 30 21 - 32 mmol/L    ANION GAP 7 4 - 13 mmol/L    BUN 17 5 - 25 mg/dL    Creatinine 0 88 0 60 - 1 30 mg/dL    Glucose, Fasting 100 (H) 65 - 99 mg/dL    Calcium 8 8 8 3 - 10 1 mg/dL    AST 47 (H) 5 - 45 U/L     (H) 12 - 78 U/L    Alkaline Phosphatase 115 46 - 116 U/L    Total Protein 7 9 6 4 - 8 2 g/dL    Albumin 3 7 3 5 - 5 0 g/dL    Total Bilirubin 0 35 0 20 - 1 00 mg/dL    eGFR 75 ml/min/1 73sq m   Lipid Panel with Direct LDL reflex    Collection Time: 11/02/18  1:58 PM   Result Value Ref Range    Cholesterol 201 (H) 50 - 200 mg/dL    Triglycerides 222 (H) <=150 mg/dL    HDL, Direct 48 40 - 60 mg/dL    LDL Calculated 109 (H) 0 - 100 mg/dL   CBC and differential    Collection Time: 11/02/18  1:58 PM   Result Value Ref Range    WBC 9 26 4 31 - 10 16 Thousand/uL    RBC 4 90 3 81 - 5 12 Million/uL    Hemoglobin 15 2 11 5 - 15 4 g/dL    Hematocrit 45 9 34 8 - 46 1 %    MCV 94 82 - 98 fL    MCH 31 0 26 8 - 34 3 pg    MCHC 33 1 31 4 - 37 4 g/dL    RDW 12 5 11 6 - 15 1 %    MPV 11 6 8 9 - 12 7 fL    Platelets 012 762 - 629 Thousands/uL    nRBC 0 /100 WBCs    Neutrophils Relative 69 43 - 75 %    Immat GRANS % 1 0 - 2 %    Lymphocytes Relative 23 14 - 44 %    Monocytes Relative 5 4 - 12 %    Eosinophils Relative 2 0 - 6 %    Basophils Relative 0 0 - 1 %    Neutrophils Absolute 6 34 1 85 - 7 62 Thousands/µL    Immature Grans Absolute 0 05 0 00 - 0 20 Thousand/uL    Lymphocytes Absolute 2 16 0 60 - 4 47 Thousands/µL    Monocytes Absolute 0 49 0 17 - 1 22 Thousand/µL    Eosinophils Absolute 0 20 0 00 - 0 61 Thousand/µL    Basophils Absolute 0 02 0 00 - 0 10 Thousands/µL   Hemoglobin A1C    Collection Time: 11/02/18  1:58 PM   Result Value Ref Range Hemoglobin A1C 6 1 4 2 - 6 3 %     mg/dl   ]    No results found

## 2018-12-03 ENCOUNTER — APPOINTMENT (OUTPATIENT)
Dept: LAB | Facility: HOSPITAL | Age: 53
End: 2018-12-03
Payer: COMMERCIAL

## 2018-12-03 DIAGNOSIS — E27.8 ADRENAL MASS, LEFT (HCC): ICD-10-CM

## 2018-12-03 LAB
BUN SERPL-MCNC: 14 MG/DL (ref 5–25)
CREAT SERPL-MCNC: 0.97 MG/DL (ref 0.6–1.3)
GFR SERPL CREATININE-BSD FRML MDRD: 66 ML/MIN/1.73SQ M

## 2018-12-03 PROCEDURE — 82565 ASSAY OF CREATININE: CPT

## 2018-12-03 PROCEDURE — 36415 COLL VENOUS BLD VENIPUNCTURE: CPT

## 2018-12-03 PROCEDURE — 84520 ASSAY OF UREA NITROGEN: CPT

## 2018-12-10 ENCOUNTER — HOSPITAL ENCOUNTER (OUTPATIENT)
Dept: CT IMAGING | Facility: HOSPITAL | Age: 53
Discharge: HOME | End: 2018-12-10
Payer: COMMERCIAL

## 2018-12-10 DIAGNOSIS — E27.8 ADRENAL MASS, LEFT (HCC): ICD-10-CM

## 2018-12-10 PROCEDURE — 74178 CT ABD&PLV WO CNTR FLWD CNTR: CPT

## 2018-12-10 RX ADMIN — IOHEXOL 100 ML: 350 INJECTION, SOLUTION INTRAVENOUS at 08:34

## 2019-01-03 ENCOUNTER — HOSPITAL ENCOUNTER (OUTPATIENT)
Dept: MAMMOGRAPHY | Facility: MEDICAL CENTER | Age: 54
Discharge: HOME | End: 2019-01-03
Payer: COMMERCIAL

## 2019-01-03 VITALS — HEIGHT: 65 IN | WEIGHT: 232 LBS | BODY MASS INDEX: 38.65 KG/M2

## 2019-01-03 DIAGNOSIS — Z12.39 SCREENING FOR MALIGNANT NEOPLASM OF BREAST: ICD-10-CM

## 2019-01-03 PROCEDURE — 77067 SCR MAMMO BI INCL CAD: CPT

## 2019-01-03 PROCEDURE — 77063 BREAST TOMOSYNTHESIS BI: CPT

## 2019-01-04 ENCOUNTER — OFFICE VISIT (OUTPATIENT)
Dept: SURGICAL ONCOLOGY | Facility: CLINIC | Age: 54
End: 2019-01-04
Payer: COMMERCIAL

## 2019-01-04 VITALS
HEIGHT: 65 IN | RESPIRATION RATE: 16 BRPM | HEART RATE: 105 BPM | WEIGHT: 232 LBS | BODY MASS INDEX: 38.65 KG/M2 | DIASTOLIC BLOOD PRESSURE: 84 MMHG | SYSTOLIC BLOOD PRESSURE: 132 MMHG | TEMPERATURE: 98.5 F

## 2019-01-04 DIAGNOSIS — E27.8 ADRENAL MASS, LEFT (HCC): Primary | ICD-10-CM

## 2019-01-04 PROCEDURE — 99213 OFFICE O/P EST LOW 20 MIN: CPT | Performed by: SURGERY

## 2019-01-04 NOTE — LETTER
January 4, 2019     Danny De Leon, [address] [facility]    Patient: Vanessa Verde   YOB: 1965   Date of Visit: 1/4/2019       Dear Dr Dixon Daughters:    Thank you for referring Vanessa Verde to me for evaluation  Below are my notes for this consultation  If you have questions, please do not hesitate to call me  I look forward to following your patient along with you  Sincerely,        Felicitas Hess MD        CC: No Recipients  Felicitas Hess MD  1/4/2019  2:22 PM  Sign at close encounter               Surgical Oncology Follow Up       [address]  [facility] [address] [facility] [address]  [address] [place] [address] Drive  1965  [id number]  [address] [facility] Joanie Records  [address]  [address] [address] [phone]    Diagnoses and all orders for this visit:    Adrenal mass, left ([address] )  -     CT abdomen pelvis w contrast; Future  -     BUN; Future  -     Creatinine, serum; Future        Chief Complaint   Patient presents with    Follow-up     Patient here for a 6 month follow up Adrenal Mass        Return in about 6 months (around 7/4/2019) for Office Visit, Imaging - See orders  No history exists  History of Present Illness: 62-year-old female who had extensive right upper quadrant surgery after transection of a bile duct and what sounds to be a hepaticojejunostomy  This was apparently a very complex operation when this occurred initially in [facility] and then her 2nd operation in [place]  A left adrenal mass has been followed  Her biochemical workup was negative  Follow-up CT from December 10, 2018 revealed a stable 3 9 x 3 7 x 3 3 cm mass  The washout characteristics show this to fall within the range of a adrenal adenoma  I personally reviewed the films    She is otherwise feeling well  No abdominal pain, nausea or vomiting  Her blood pressure is easily controlled  Review of Systems  Complete ROS Surg Onc:   Complete ROS Surg Onc:   Constitutional: The patient denies new or recent history of general fatigue, no recent weight loss, no change in appetite  Eyes: No complaints of visual problems, no scleral icterus  ENT: no complaints of ear pain, no hoarseness, no difficulty swallowing,  no tinnitus and no new masses in head, oral cavity, or neck  Cardiovascular: No complaints of chest pain, no palpitations, no ankle edema  Respiratory: No complaints of shortness of breath, no cough  Gastrointestinal: No complaints of jaundice, no bloody stools, no pale stools  Genitourinary: No complaints of dysuria, no hematuria, no nocturia, no frequent urination, no urethral discharge  Musculoskeletal: No complaints of weakness, paralysis, joint stiffness or arthralgias  Integumentary: No complaints of rash, no new lesions  Neurological: No complaints of convulsions, no seizures, no dizziness  Hematologic/Lymphatic: No complaints of easy bruising  Endocrine:  No hot or cold intolerance  No polydipsia, polyphagia, or polyuria  Allergy/immunology:  No environmental allergies  No food allergies  Not immunocompromised  Skin:  No pallor or rash  No wound  Patient Active Problem List   Diagnosis    Acute back pain    Adrenal mass, left (HCC)    Essential hypertension    Atopic dermatitis    Obesity (BMI 30-39  9)    Radicular pain of lumbosacral region    Gastroesophageal reflux disease    Generalized anxiety disorder    Hepatic steatosis     Past Medical History:   Diagnosis Date    Palpable abdominal mass     LAST ASSESSED: 12/11/17     Past Surgical History:   Procedure Laterality Date    ABDOMINAL SURGERY      AUGMENTATION MAMMAPLASTY Bilateral 2007    Bi retro pectoral saline    BREAST SURGERY      REDUCTION PROCEDURE    CHOLECYSTECTOMY LAPAROSCOPIC [employer] LIVER SURGERY       Family History   Problem Relation Age of Onset    Pancreatic cancer Mother     Prostate cancer Father      Social History     Social History    Marital status: /Civil Union     Spouse name: N/A    Number of children: N/A    Years of education: N/A     Occupational History    Not on file  Social History Main Topics    Smoking status: Former Smoker    Smokeless tobacco: Never Used    Alcohol use No    Drug use: No    Sexual activity: Yes     Other Topics Concern    Not on file     Social History Narrative    No narrative on file       Current Outpatient Prescriptions:     Biotin w/ Vitamins C & E (HAIR/SKIN/NAILS PO), Take by mouth, Disp: , Rfl:     DULoxetine (CYMBALTA) 60 mg delayed release capsule, Take 1 capsule (60 mg total) by mouth daily, Disp: 90 capsule, Rfl: 0    fluticasone (FLONASE) 50 mcg/act nasal spray, 1 spray into each nostril daily, Disp: 1 Bottle, Rfl: 1    lansoprazole (PREVACID) 30 mg capsule, Take 1 capsule (30 mg total) by mouth daily, Disp: 90 capsule, Rfl: 0    lisinopril (ZESTRIL) 10 mg tablet, TAKE 1 TABLET BY MOUTH EVERY DAY, Disp: 90 tablet, Rfl: 1    Multiple Vitamins-Minerals (WOMENS MULTIVITAMIN) TABS, Take by mouth, Disp: , Rfl:     Omega-3 Fatty Acids (OMEGA 3 500 PO), Take by mouth, Disp: , Rfl:   Allergies   Allergen Reactions    Prednisone Shortness Of Breath    Latex Rash    Shrimp Flavor [Flavoring Agent] Rash     Vitals:    01/04/19 1407   BP: 132/84   Pulse: 105   Resp: 16   Temp: 98 5 °F (36 9 °C)       Physical Exam  Constitutional: General appearance: The Patient is well-developed and well-nourished who appears the stated age in no acute distress  Patient is pleasant and talkative  HEENT:  Normocephalic  Sclerae are anicteric  Mucous membranes are moist  Neck is supple without adenopathy  No JVD  Chest: The lungs are clear to auscultation  Cardiac: Heart is regular rate       Abdomen: Abdomen is soft, non-tender, non-distended and without masses  Extremities: There is no clubbing or cyanosis  There is no edema  Symmetric  Neuro: Grossly nonfocal  Gait is normal      Lymphatic: No evidence of cervical adenopathy bilaterally  No evidence of axillary adenopathy bilaterally  No evidence of inguinal adenopathy bilaterally  Skin: Warm, anicteric  Psych:  Patient is pleasant and talkative  Breasts:        Pathology:  [unfilled]    Labs:      Imaging  Ct Abdomen Pelvis W Wo Contrast    Result Date: 12/12/2018  Narrative: CT ABDOMEN AND PELVIS WITH AND WITHOUT IV CONTRAST INDICATION:   E27 9: Disorder of adrenal gland, unspecified  Reassessment of left adrenal nodule COMPARISON:  6/11/2018 and prior studies dating back to 11/28/2017 TECHNIQUE: Initial CT of the abdomen and pelvis was performed without intravenous contrast   Subsequent dynamic CT evaluation of the abdomen and pelvis was performed after the administration of intravenous contrast in both initial and delayed phases after the administration of intravenous contrast   Axial, sagittal, and coronal 2D reformatted images were created from the source data and submitted for interpretation  Radiation dose length product (DLP) for this visit:  2243 mGy-cm   This examination, like all CT scans performed in the [facility], was performed utilizing techniques to minimize radiation dose exposure, including the use of iterative reconstruction and automated exposure control  IV Contrast:  100 mL of iohexol (OMNIPAQUE) Enteric Contrast:  Enteric contrast was administered  FINDINGS: ABDOMEN RIGHT KIDNEY AND URETER: Bilateral nephrolithiasis identified  No hydronephrosis or hydroureter  No mass identified  LEFT KIDNEY AND URETER: No solid renal mass  No detectable urothelial mass  Bilateral nephrolithiasis, individual largest calculus present within the left kidney interpolar cortex and measuring about 4 mm  No urinary tract calculi   No perinephric collection  URINARY BLADDER: No bladder wall mass  No calculi  LOWER CHEST:  No clinically significant abnormality identified in the visualized lower chest  LIVER/BILIARY TREE:  Hepatic steatosis without focal abnormality  GALLBLADDER:  Gallbladder is surgically absent  SPLEEN:  Unremarkable  PANCREAS:  Unremarkable  ADRENAL GLANDS:  Right adrenal gland remains unremarkable  Again seen is a heterogeneous left adrenal mass  Current size is 3 9 x 3 7 x 3 3 cm, therefore demonstrating no interval growth from the prior  In addition, on today's examination, the washout  images demonstrate a greater than 50% drop in density   (Unenhanced density 15 6, after contrast 73 1, on delayed imaging 31 7) STOMACH AND BOWEL:  Unremarkable  ABDOMINOPELVIC CAVITY:  No ascites  No free intraperitoneal air  No lymphadenopathy  VESSELS:  Unremarkable for patient's age  PELVIS REPRODUCTIVE ORGANS:  Unremarkable for patient's age  APPENDIX: No findings to suggest appendicitis  ABDOMINAL WALL/INGUINAL REGIONS:  Unremarkable  OSSEOUS STRUCTURES:  No acute fracture or destructive osseous lesion  Impression: 1  The left adrenal nodule has not increased in size since the previous examination  In addition, the washout characteristics of the lesion currently fall just within the diagnostic range of an adrenal adenoma  Given that the lesion is somewhat heterogeneous and demonstrates heterogeneous enhancement, intermittent surveillance may be considered  As it is unchanged in size since 11/28/2017, reassessment in approximately one year's time would be appropriate 2  Hepatic steatosis 3  Bilateral nephrolithiasis without hydronephrosis 4  No acute inflammatory changes within the abdomen or pelvis The examination demonstrates a finding requiring imaging follow-up and was logged as such in [address] [place] [address] [employer]   Workstation performed: [id number]     Mammo Screening Bilateral W 3d & Cad    Result Date: 1/4/2019  Narrative: DIAGNOSIS: Screening for malignant neoplasm of breast RELEVANT HISTORY: Family Breast Cancer History: No known family history of breast cancer  Family Medical history: No relevant family history has been documented for this patient  Personal History: No relevant hormone history has been documented for this patient  Surgical history includes breast enhancement  No relevant medical history has been documented for this patient  COMPARISONS: Compared to: 12/07/2017 Mammo screening bilateral w 3d & cad, and 10/12/2016 MAMMO SCREENING BILATERAL W CAD INDICATION: Dania Esteves is a 46 y o  female presenting for screening mammo  TECHNOLOGIST: Davion Velez VIEWS: 2D views: CC, CCID, MLO views of left, right breast(s) were taken  2D synth views: MLOID views of left, right breast(s) were taken  3D views: MLOID views of left, right breast(s) were taken  TECHNIQUE: The current study was evaluated with Computer Aided Detection  TISSUE DENSITY: There are scattered areas of fibroglandular density  The patient is scheduled in a reminder system for screening mammography  8-10% of cancers will be missed on mammography  Management of a palpable abnormality must be based on clinical grounds  Patients will be notified of their results via letter from our facility  Accredited by [employer] of Radiology and FDA  RISK ASSESSMENT: 5 Year Tyrer-Cuzick: 1 8 % 10 Year Tyrer-Cuzick: 3 92 % Lifetime Tyrer-Cuzick: 13 49 % FINDINGS: There are no suspicious masses, grouped microcalcifications or areas of architectural distortion  Scattered benign-appearing calcifications are seen in both breasts  Retropectoral  implants are in place  Impression: No mammographic evidence of malignancy  ASSESSMENT/BI-RADS CATEGORY: Left: 1 - Negative Right: 1 - Negative Overall: 1 - Negative RECOMMENDATION:      - Routine Screening Mammogram in 1 year for both breasts   Workstation ID: [id number]     I reviewed the above laboratory and imaging data     Discussion/Summary:  43-year-old female with a left adrenal mass  This is essentially stable  This is not functioning  I have recommended continued observation  I have recommended repeating her CT in 6 months  If this continues to be stable we could potentially go out to 1 year imaging  I will see her back in 6 months  She is agreeable to this  All her questions were answered

## 2019-01-04 NOTE — PROGRESS NOTES
Surgical Oncology Follow Up       [address] [facility]  [address]  [place] [address] [facility] [address]  [facility] [address]  1965  [phone]  [facility] Berenice Acosta  [address]  [facility] [address] [facility] [id number] [phone]    Diagnoses and all orders for this visit:    Adrenal mass, left ([address] )  -     CT abdomen pelvis w contrast; Future  -     BUN; Future  -     Creatinine, serum; Future        Chief Complaint   Patient presents with    Follow-up     Patient here for a 6 month follow up Adrenal Mass        Return in about 6 months (around 7/4/2019) for Office Visit, Imaging - See orders  No history exists  History of Present Illness: 54-year-old female who had extensive right upper quadrant surgery after transection of a bile duct and what sounds to be a hepaticojejunostomy  This was apparently a very complex operation when this occurred initially in [facility] and then her 2nd operation in [place]  A left adrenal mass has been followed  Her biochemical workup was negative  Follow-up CT from December 10, 2018 revealed a stable 3 9 x 3 7 x 3 3 cm mass  The washout characteristics show this to fall within the range of a adrenal adenoma  I personally reviewed the films  She is otherwise feeling well  No abdominal pain, nausea or vomiting  Her blood pressure is easily controlled  Review of Systems  Complete ROS Surg Onc:   Complete ROS Surg Onc:   Constitutional: The patient denies new or recent history of general fatigue, no recent weight loss, no change in appetite  Eyes: No complaints of visual problems, no scleral icterus  ENT: no complaints of ear pain, no hoarseness, no difficulty swallowing,  no tinnitus and no new masses in head, oral cavity, or neck     Cardiovascular: No complaints of chest pain, no palpitations, no ankle edema  Respiratory: No complaints of shortness of breath, no cough  Gastrointestinal: No complaints of jaundice, no bloody stools, no pale stools  Genitourinary: No complaints of dysuria, no hematuria, no nocturia, no frequent urination, no urethral discharge  Musculoskeletal: No complaints of weakness, paralysis, joint stiffness or arthralgias  Integumentary: No complaints of rash, no new lesions  Neurological: No complaints of convulsions, no seizures, no dizziness  Hematologic/Lymphatic: No complaints of easy bruising  Endocrine:  No hot or cold intolerance  No polydipsia, polyphagia, or polyuria  Allergy/immunology:  No environmental allergies  No food allergies  Not immunocompromised  Skin:  No pallor or rash  No wound  Patient Active Problem List   Diagnosis    Acute back pain    Adrenal mass, left (HCC)    Essential hypertension    Atopic dermatitis    Obesity (BMI 30-39  9)    Radicular pain of lumbosacral region    Gastroesophageal reflux disease    Generalized anxiety disorder    Hepatic steatosis     Past Medical History:   Diagnosis Date    Palpable abdominal mass     LAST ASSESSED: 12/11/17     Past Surgical History:   Procedure Laterality Date    ABDOMINAL SURGERY      AUGMENTATION MAMMAPLASTY Bilateral 2007    Bi retro pectoral saline    BREAST SURGERY      REDUCTION PROCEDURE    CHOLECYSTECTOMY LAPAROSCOPIC      LIVER SURGERY       Family History   Problem Relation Age of Onset    Pancreatic cancer Mother     Prostate cancer Father      Social History     Social History    Marital status: /Civil Union     Spouse name: N/A    Number of children: N/A    Years of education: N/A     Occupational History    Not on file       Social History Main Topics    Smoking status: Former Smoker    Smokeless tobacco: Never Used    Alcohol use No    Drug use: No    Sexual activity: Yes     Other Topics Concern    Not on file     Social History Narrative  No narrative on file       Current Outpatient Prescriptions:     Biotin w/ Vitamins C & E (HAIR/SKIN/NAILS PO), Take by mouth, Disp: , Rfl:     DULoxetine (CYMBALTA) 60 mg delayed release capsule, Take 1 capsule (60 mg total) by mouth daily, Disp: 90 capsule, Rfl: 0    fluticasone (FLONASE) 50 mcg/act nasal spray, 1 spray into each nostril daily, Disp: 1 Bottle, Rfl: 1    lansoprazole (PREVACID) 30 mg capsule, Take 1 capsule (30 mg total) by mouth daily, Disp: 90 capsule, Rfl: 0    lisinopril (ZESTRIL) 10 mg tablet, TAKE 1 TABLET BY MOUTH EVERY DAY, Disp: 90 tablet, Rfl: 1    Multiple Vitamins-Minerals (WOMENS MULTIVITAMIN) TABS, Take by mouth, Disp: , Rfl:     Omega-3 Fatty Acids (OMEGA 3 500 PO), Take by mouth, Disp: , Rfl:   Allergies   Allergen Reactions    Prednisone Shortness Of Breath    Latex Rash    Shrimp Flavor [Flavoring Agent] Rash     Vitals:    01/04/19 1407   BP: 132/84   Pulse: 105   Resp: 16   Temp: 98 5 °F (36 9 °C)       Physical Exam  Constitutional: General appearance: The Patient is well-developed and well-nourished who appears the stated age in no acute distress  Patient is pleasant and talkative  HEENT:  Normocephalic  Sclerae are anicteric  Mucous membranes are moist  Neck is supple without adenopathy  No JVD  Chest: The lungs are clear to auscultation  Cardiac: Heart is regular rate  Abdomen: Abdomen is soft, non-tender, non-distended and without masses  Extremities: There is no clubbing or cyanosis  There is no edema  Symmetric  Neuro: Grossly nonfocal  Gait is normal      Lymphatic: No evidence of cervical adenopathy bilaterally  No evidence of axillary adenopathy bilaterally  No evidence of inguinal adenopathy bilaterally  Skin: Warm, anicteric  Psych:  Patient is pleasant and talkative    Breasts:        Pathology:  [unfilled]    Labs:      Imaging  Ct Abdomen Pelvis W Wo Contrast    Result Date: 12/12/2018  Narrative: CT ABDOMEN AND PELVIS WITH AND WITHOUT IV CONTRAST INDICATION:   E27 9: Disorder of adrenal gland, unspecified  Reassessment of left adrenal nodule COMPARISON:  6/11/2018 and prior studies dating back to 11/28/2017 TECHNIQUE: Initial CT of the abdomen and pelvis was performed without intravenous contrast   Subsequent dynamic CT evaluation of the abdomen and pelvis was performed after the administration of intravenous contrast in both initial and delayed phases after the administration of intravenous contrast   Axial, sagittal, and coronal 2D reformatted images were created from the source data and submitted for interpretation  Radiation dose length product (DLP) for this visit:  2243 mGy-cm   This examination, like all CT scans performed in the [facility], was performed utilizing techniques to minimize radiation dose exposure, including the use of iterative reconstruction and automated exposure control  IV Contrast:  100 mL of iohexol (OMNIPAQUE) Enteric Contrast:  Enteric contrast was administered  FINDINGS: ABDOMEN RIGHT KIDNEY AND URETER: Bilateral nephrolithiasis identified  No hydronephrosis or hydroureter  No mass identified  LEFT KIDNEY AND URETER: No solid renal mass  No detectable urothelial mass  Bilateral nephrolithiasis, individual largest calculus present within the left kidney interpolar cortex and measuring about 4 mm  No urinary tract calculi  No perinephric collection  URINARY BLADDER: No bladder wall mass  No calculi  LOWER CHEST:  No clinically significant abnormality identified in the visualized lower chest  LIVER/BILIARY TREE:  Hepatic steatosis without focal abnormality  GALLBLADDER:  Gallbladder is surgically absent  SPLEEN:  Unremarkable  PANCREAS:  Unremarkable  ADRENAL GLANDS:  Right adrenal gland remains unremarkable  Again seen is a heterogeneous left adrenal mass  Current size is 3 9 x 3 7 x 3 3 cm, therefore demonstrating no interval growth from the prior    In addition, on today's examination, the washout  images demonstrate a greater than 50% drop in density   (Unenhanced density 15 6, after contrast 73 1, on delayed imaging 31 7) STOMACH AND BOWEL:  Unremarkable  ABDOMINOPELVIC CAVITY:  No ascites  No free intraperitoneal air  No lymphadenopathy  VESSELS:  Unremarkable for patient's age  PELVIS REPRODUCTIVE ORGANS:  Unremarkable for patient's age  APPENDIX: No findings to suggest appendicitis  ABDOMINAL WALL/INGUINAL REGIONS:  Unremarkable  OSSEOUS STRUCTURES:  No acute fracture or destructive osseous lesion  Impression: 1  The left adrenal nodule has not increased in size since the previous examination  In addition, the washout characteristics of the lesion currently fall just within the diagnostic range of an adrenal adenoma  Given that the lesion is somewhat heterogeneous and demonstrates heterogeneous enhancement, intermittent surveillance may be considered  As it is unchanged in size since 11/28/2017, reassessment in approximately one year's time would be appropriate 2  Hepatic steatosis 3  Bilateral nephrolithiasis without hydronephrosis 4  No acute inflammatory changes within the abdomen or pelvis The examination demonstrates a finding requiring imaging follow-up and was logged as such in [address] [place] [address] [employer]  Workstation performed: [id number]     Mammo Screening Bilateral W 3d & Cad    Result Date: 1/4/2019  Narrative: DIAGNOSIS: Screening for malignant neoplasm of breast RELEVANT HISTORY: Family Breast Cancer History: No known family history of breast cancer  Family Medical history: No relevant family history has been documented for this patient  Personal History: No relevant hormone history has been documented for this patient  Surgical history includes breast enhancement  No relevant medical history has been documented for this patient   COMPARISONS: Compared to: 12/07/2017 Mammo screening bilateral w 3d & cad, and 10/12/2016 MAMMO SCREENING BILATERAL W CAD INDICATION: Paramjit Mask Ciaran Zaman is a 46 y o  female presenting for screening mammo  TECHNOLOGIST: Criss Gustafson VIEWS: 2D views: CC, CCID, MLO views of left, right breast(s) were taken  2D synth views: MLOID views of left, right breast(s) were taken  3D views: MLOID views of left, right breast(s) were taken  TECHNIQUE: The current study was evaluated with Computer Aided Detection  TISSUE DENSITY: There are scattered areas of fibroglandular density  The patient is scheduled in a reminder system for screening mammography  8-10% of cancers will be missed on mammography  Management of a palpable abnormality must be based on clinical grounds  Patients will be notified of their results via letter from our facility  Accredited by [employer] and FDA  RISK ASSESSMENT: 5 Year Tyrer-Cuzick: 1 8 % 10 Year Tyrer-Cuzick: 3 92 % Lifetime Tyrer-Cuzick: 13 49 % FINDINGS: There are no suspicious masses, grouped microcalcifications or areas of architectural distortion  Scattered benign-appearing calcifications are seen in both breasts  Retropectoral  implants are in place  Impression: No mammographic evidence of malignancy  ASSESSMENT/BI-RADS CATEGORY: Left: 1 - Negative Right: 1 - Negative Overall: 1 - Negative RECOMMENDATION:      - Routine Screening Mammogram in 1 year for both breasts  Workstation ID: [id number]     I reviewed the above laboratory and imaging data  Discussion/Summary:  53-year-old female with a left adrenal mass  This is essentially stable  This is not functioning  I have recommended continued observation  I have recommended repeating her CT in 6 months  If this continues to be stable we could potentially go out to 1 year imaging  I will see her back in 6 months  She is agreeable to this  All her questions were answered

## 2019-01-11 ENCOUNTER — OFFICE VISIT (OUTPATIENT)
Dept: INTERNAL MEDICINE CLINIC | Facility: CLINIC | Age: 54
End: 2019-01-11
Payer: COMMERCIAL

## 2019-01-11 VITALS
BODY MASS INDEX: 38.49 KG/M2 | TEMPERATURE: 98.2 F | RESPIRATION RATE: 16 BRPM | SYSTOLIC BLOOD PRESSURE: 140 MMHG | HEART RATE: 96 BPM | DIASTOLIC BLOOD PRESSURE: 80 MMHG | HEIGHT: 65 IN | WEIGHT: 231 LBS

## 2019-01-11 DIAGNOSIS — K76.0 HEPATIC STEATOSIS: ICD-10-CM

## 2019-01-11 DIAGNOSIS — F41.1 GENERALIZED ANXIETY DISORDER: Primary | ICD-10-CM

## 2019-01-11 DIAGNOSIS — K21.9 GASTROESOPHAGEAL REFLUX DISEASE, ESOPHAGITIS PRESENCE NOT SPECIFIED: ICD-10-CM

## 2019-01-11 DIAGNOSIS — I10 ESSENTIAL HYPERTENSION: ICD-10-CM

## 2019-01-11 PROCEDURE — 99214 OFFICE O/P EST MOD 30 MIN: CPT | Performed by: INTERNAL MEDICINE

## 2019-01-11 RX ORDER — DULOXETIN HYDROCHLORIDE 30 MG/1
30 CAPSULE, DELAYED RELEASE ORAL DAILY
Qty: 30 CAPSULE | Refills: 0 | Status: SHIPPED | OUTPATIENT
Start: 2019-01-11 | End: 2019-03-11

## 2019-01-11 NOTE — PATIENT INSTRUCTIONS
Take 60mg cymbalta for a week  Then take 30mg cymbalta for 2 weeks  The take 30mg cymbalta every other day for 2 weeks, then stop

## 2019-01-11 NOTE — ASSESSMENT & PLAN NOTE
Acute symptoms are due to withdrawal, Discussed a wean off plan in next 1 mo, recheck after she is off cymbakta

## 2019-01-11 NOTE — PROGRESS NOTES
Assessment/Plan:    Essential hypertension  Well controlled    Hepatic steatosis  Await GI appt    Generalized anxiety disorder  Acute symptoms are due to withdrawal, Discussed a wean off plan in next 1 mo, recheck after she is off cymbakta       Diagnoses and all orders for this visit:    Generalized anxiety disorder  -     DULoxetine (CYMBALTA) 30 mg delayed release capsule; Take 1 capsule (30 mg total) by mouth daily For 2 weeks then every alternate day for 2 weeks, then stop    Essential hypertension    Gastroesophageal reflux disease, esophagitis presence not specified    Hepatic steatosis          Subjective:   Chief Complaint   Patient presents with    Follow-up     2 months        Patient ID: Asiya Marroquin is a 46 y o  female  She comes in for follow up of hepatic steatosis, HTN, adrenal mass, obesity, GERD     mentions that she had a panic attack in the waiting room  She notes that she has been worried about the side effects of Cymbalta and worries if her back pain is because of that  For the last 3 days she has not taken Cymbalta  She has been scared about her mammo results     Before that she was scared about her CT scan results  Acid reflux is well controlled  Her headaches have improved as well  The following portions of the patient's history were reviewed and updated as appropriate: current medications, past medical history, past social history and past surgical history      PHQ-9 Depression Screening    PHQ-9:    Frequency of the following problems over the past two weeks:                Current Outpatient Prescriptions:     Biotin w/ Vitamins C & E (HAIR/SKIN/NAILS PO), Take by mouth, Disp: , Rfl:     DULoxetine (CYMBALTA) 30 mg delayed release capsule, Take 1 capsule (30 mg total) by mouth daily For 2 weeks then every alternate day for 2 weeks, then stop, Disp: 30 capsule, Rfl: 0    fluticasone (FLONASE) 50 mcg/act nasal spray, 1 spray into each nostril daily, Disp: 1 Bottle, Rfl: 1    lansoprazole (PREVACID) 30 mg capsule, Take 1 capsule (30 mg total) by mouth daily, Disp: 90 capsule, Rfl: 0    lisinopril (ZESTRIL) 10 mg tablet, TAKE 1 TABLET BY MOUTH EVERY DAY, Disp: 90 tablet, Rfl: 1    Multiple Vitamins-Minerals (WOMENS MULTIVITAMIN) TABS, Take by mouth, Disp: , Rfl:     Omega-3 Fatty Acids (OMEGA 3 500 PO), Take by mouth, Disp: , Rfl:     Review of Systems   Constitutional: Positive for fatigue  Negative for fever and unexpected weight change  HENT: Negative for ear pain, hearing loss and sore throat  Eyes: Negative for pain and discharge  Respiratory: Negative for cough, chest tightness and shortness of breath  Cardiovascular: Negative for chest pain and palpitations  Gastrointestinal: Negative for abdominal pain, blood in stool, constipation, diarrhea and nausea  Genitourinary: Negative for dysuria, frequency and hematuria  Musculoskeletal: Negative for arthralgias and joint swelling  Skin: Negative for rash  Allergic/Immunologic: Negative for immunocompromised state  Neurological: Negative for dizziness and headaches  Hematological: Negative for adenopathy  Psychiatric/Behavioral: Positive for sleep disturbance  Negative for confusion  The patient is nervous/anxious  Objective:  /80 (BP Location: Left arm, Patient Position: Sitting, Cuff Size: Standard)   Pulse 96   Temp 98 2 °F (36 8 °C) (Tympanic)   Resp 16   Ht 5' 5" (1 651 m)   Wt 105 kg (231 lb)   BMI 38 44 kg/m²      Physical Exam   Constitutional: She appears well-developed and well-nourished  HENT:   Head: Normocephalic and atraumatic  Right Ear: Tympanic membrane normal    Left Ear: Tympanic membrane normal    Nose: Nose normal    Mouth/Throat: Oropharynx is clear and moist  No posterior oropharyngeal edema or posterior oropharyngeal erythema  Eyes: Pupils are equal, round, and reactive to light  Conjunctivae are normal  Right eye exhibits no discharge  Neck: Normal range of motion  Neck supple  No thyromegaly present  Cardiovascular: Normal rate, regular rhythm, S1 normal, S2 normal and normal heart sounds  PMI is not displaced  No murmur heard  Pulmonary/Chest: Effort normal and breath sounds normal  No accessory muscle usage  No apnea  No respiratory distress  She has no rhonchi  She has no rales  Abdominal: Soft  Normal appearance and bowel sounds are normal  She exhibits no shifting dullness  There is no hepatosplenomegaly  There is no tenderness  There is no rebound and no CVA tenderness  Musculoskeletal: Normal range of motion  She exhibits no edema or tenderness  Lymphadenopathy:     She has no cervical adenopathy  Neurological: She is alert  Skin: Skin is warm and intact  No rash noted  Psychiatric: Her mood appears anxious  Her speech is rapid and/or pressured  Nursing note and vitals reviewed  Recent Results (from the past 1008 hour(s))   BUN    Collection Time: 12/03/18  2:28 PM   Result Value Ref Range    BUN 14 5 - 25 mg/dL   Creatinine, serum    Collection Time: 12/03/18  2:28 PM   Result Value Ref Range    Creatinine 0 97 0 60 - 1 30 mg/dL    eGFR 66 ml/min/1 73sq m   ]    Procedure: Ct Abdomen Pelvis W Wo Contrast    Result Date: 12/12/2018  Narrative: CT ABDOMEN AND PELVIS WITH AND WITHOUT IV CONTRAST INDICATION:   E27 9: Disorder of adrenal gland, unspecified  Reassessment of left adrenal nodule COMPARISON:  6/11/2018 and prior studies dating back to 11/28/2017 TECHNIQUE: Initial CT of the abdomen and pelvis was performed without intravenous contrast   Subsequent dynamic CT evaluation of the abdomen and pelvis was performed after the administration of intravenous contrast in both initial and delayed phases after the administration of intravenous contrast   Axial, sagittal, and coronal 2D reformatted images were created from the source data and submitted for interpretation   Radiation dose length product (DLP) for this visit:  2243 mGy-cm   This examination, like all CT scans performed in the [facility], was performed utilizing techniques to minimize radiation dose exposure, including the use of iterative reconstruction and automated exposure control  IV Contrast:  100 mL of iohexol (OMNIPAQUE) Enteric Contrast:  Enteric contrast was administered  FINDINGS: ABDOMEN RIGHT KIDNEY AND URETER: Bilateral nephrolithiasis identified  No hydronephrosis or hydroureter  No mass identified  LEFT KIDNEY AND URETER: No solid renal mass  No detectable urothelial mass  Bilateral nephrolithiasis, individual largest calculus present within the left kidney interpolar cortex and measuring about 4 mm  No urinary tract calculi  No perinephric collection  URINARY BLADDER: No bladder wall mass  No calculi  LOWER CHEST:  No clinically significant abnormality identified in the visualized lower chest  LIVER/BILIARY TREE:  Hepatic steatosis without focal abnormality  GALLBLADDER:  Gallbladder is surgically absent  SPLEEN:  Unremarkable  PANCREAS:  Unremarkable  ADRENAL GLANDS:  Right adrenal gland remains unremarkable  Again seen is a heterogeneous left adrenal mass  Current size is 3 9 x 3 7 x 3 3 cm, therefore demonstrating no interval growth from the prior  In addition, on today's examination, the washout  images demonstrate a greater than 50% drop in density   (Unenhanced density 15 6, after contrast 73 1, on delayed imaging 31 7) STOMACH AND BOWEL:  Unremarkable  ABDOMINOPELVIC CAVITY:  No ascites  No free intraperitoneal air  No lymphadenopathy  VESSELS:  Unremarkable for patient's age  PELVIS REPRODUCTIVE ORGANS:  Unremarkable for patient's age  APPENDIX: No findings to suggest appendicitis  ABDOMINAL WALL/INGUINAL REGIONS:  Unremarkable  OSSEOUS STRUCTURES:  No acute fracture or destructive osseous lesion  Impression: 1  The left adrenal nodule has not increased in size since the previous examination    In addition, the washout characteristics of the lesion currently fall just within the diagnostic range of an adrenal adenoma  Given that the lesion is somewhat heterogeneous and demonstrates heterogeneous enhancement, intermittent surveillance may be considered  As it is unchanged in size since 11/28/2017, reassessment in approximately one year's time would be appropriate 2  Hepatic steatosis 3  Bilateral nephrolithiasis without hydronephrosis 4  No acute inflammatory changes within the abdomen or pelvis The examination demonstrates a finding requiring imaging follow-up and was logged as such in [address] [place] [address] Rd   Workstation performed: [id number]

## 2019-01-30 DIAGNOSIS — I10 HYPERTENSION, UNSPECIFIED TYPE: ICD-10-CM

## 2019-01-30 RX ORDER — LISINOPRIL 10 MG/1
10 TABLET ORAL DAILY
Qty: 90 TABLET | Refills: 1 | Status: SHIPPED | OUTPATIENT
Start: 2019-01-30 | End: 2019-07-11 | Stop reason: DRUGHIGH

## 2019-02-04 DIAGNOSIS — K21.9 GASTROESOPHAGEAL REFLUX DISEASE, ESOPHAGITIS PRESENCE NOT SPECIFIED: Primary | ICD-10-CM

## 2019-02-04 DIAGNOSIS — F41.1 GENERALIZED ANXIETY DISORDER: ICD-10-CM

## 2019-02-04 RX ORDER — OMEPRAZOLE 40 MG/1
40 CAPSULE, DELAYED RELEASE ORAL DAILY
Qty: 90 CAPSULE | Refills: 0 | Status: SHIPPED | OUTPATIENT
Start: 2019-02-04 | End: 2019-05-09

## 2019-02-04 RX ORDER — DULOXETIN HYDROCHLORIDE 30 MG/1
30 CAPSULE, DELAYED RELEASE ORAL DAILY
Qty: 90 CAPSULE | Refills: 0 | OUTPATIENT
Start: 2019-02-04

## 2019-03-11 ENCOUNTER — OFFICE VISIT (OUTPATIENT)
Dept: INTERNAL MEDICINE CLINIC | Facility: CLINIC | Age: 54
End: 2019-03-11
Payer: COMMERCIAL

## 2019-03-11 VITALS
RESPIRATION RATE: 16 BRPM | WEIGHT: 232 LBS | DIASTOLIC BLOOD PRESSURE: 100 MMHG | HEIGHT: 65 IN | HEART RATE: 83 BPM | BODY MASS INDEX: 38.65 KG/M2 | SYSTOLIC BLOOD PRESSURE: 140 MMHG | TEMPERATURE: 98.2 F

## 2019-03-11 DIAGNOSIS — K76.0 HEPATIC STEATOSIS: Primary | ICD-10-CM

## 2019-03-11 DIAGNOSIS — K21.9 GASTROESOPHAGEAL REFLUX DISEASE, ESOPHAGITIS PRESENCE NOT SPECIFIED: ICD-10-CM

## 2019-03-11 DIAGNOSIS — F41.1 GENERALIZED ANXIETY DISORDER: ICD-10-CM

## 2019-03-11 DIAGNOSIS — I10 ESSENTIAL HYPERTENSION: ICD-10-CM

## 2019-03-11 DIAGNOSIS — L20.9 ATOPIC DERMATITIS, UNSPECIFIED TYPE: ICD-10-CM

## 2019-03-11 PROCEDURE — 99214 OFFICE O/P EST MOD 30 MIN: CPT | Performed by: INTERNAL MEDICINE

## 2019-03-11 PROCEDURE — 3008F BODY MASS INDEX DOCD: CPT | Performed by: INTERNAL MEDICINE

## 2019-03-11 RX ORDER — ESCITALOPRAM OXALATE 5 MG/1
5 TABLET ORAL DAILY
Qty: 30 TABLET | Refills: 5 | Status: SHIPPED | OUTPATIENT
Start: 2019-03-11 | End: 2019-05-09

## 2019-03-11 RX ORDER — TRIAMCINOLONE ACETONIDE 5 MG/G
OINTMENT TOPICAL 2 TIMES DAILY
Qty: 30 G | Refills: 2 | Status: SHIPPED | OUTPATIENT
Start: 2019-03-11 | End: 2019-07-11

## 2019-03-11 NOTE — PROGRESS NOTES
Assessment/Plan:    Gastroesophageal reflux disease  Stable on current dose of omeprazole    Hepatic steatosis  Will be seeing Gastroenterology soon, discussed the importance of weight loss    Essential hypertension  Elevated today but generally well control, looks anxious, continue current dose of lisinopril  Generalized anxiety disorder  Did not tolerate Cymbalta, that was weaned off  Will plan to start low-dose of Lexapro  Atopic dermatitis behind the ears, continue topical steroid only as needed     Diagnoses and all orders for this visit:    Hepatic steatosis    Essential hypertension    Generalized anxiety disorder  -     escitalopram (LEXAPRO) 5 mg tablet; Take 1 tablet (5 mg total) by mouth daily    Atopic dermatitis, unspecified type  -     triamcinolone (KENALOG) 0 5 % ointment; Apply topically 2 (two) times a day    Gastroesophageal reflux disease, esophagitis presence not specified          Subjective:   Chief Complaint   Patient presents with    Follow-up     lump on right finger that has been there for a month  wants to stop Duloxetine, made her feel down  also dry skin on neck  Patient ID: Tanisha Carter is a 46 y o  female  She comes in for follow-up of hypertension, anxiety, acid reflux, hepatic steatosis    She is taking her blood pressure medications  She has been increasingly stressed due to the political situation when as well up  She worries about her family  She has had some panic episodes  The side effects that she contributed to Cymbalta have gone  Acid reflux is better  Unable to lose weight despite eating the same thing that she did in when as well and her BMI was much lower      The following portions of the patient's history were reviewed and updated as appropriate: current medications, past medical history, past social history and past surgical history      PHQ-9 Depression Screening    PHQ-9:    Frequency of the following problems over the past two weeks: Current Outpatient Medications:     Biotin w/ Vitamins C & E (HAIR/SKIN/NAILS PO), Take by mouth, Disp: , Rfl:     fluticasone (FLONASE) 50 mcg/act nasal spray, 1 spray into each nostril daily, Disp: 1 Bottle, Rfl: 1    lisinopril (ZESTRIL) 10 mg tablet, Take 1 tablet (10 mg total) by mouth daily, Disp: 90 tablet, Rfl: 1    Multiple Vitamins-Minerals (WOMENS MULTIVITAMIN) TABS, Take by mouth, Disp: , Rfl:     Omega-3 Fatty Acids (OMEGA 3 500 PO), Take by mouth, Disp: , Rfl:     omeprazole (PriLOSEC) 40 MG capsule, Take 1 capsule (40 mg total) by mouth daily, Disp: 90 capsule, Rfl: 0    escitalopram (LEXAPRO) 5 mg tablet, Take 1 tablet (5 mg total) by mouth daily, Disp: 30 tablet, Rfl: 5    triamcinolone (KENALOG) 0 5 % ointment, Apply topically 2 (two) times a day, Disp: 30 g, Rfl: 2    Review of Systems   Constitutional: Negative for fatigue, fever and unexpected weight change  HENT: Negative for ear pain, hearing loss and sore throat  Eyes: Negative for pain and discharge  Respiratory: Negative for cough, chest tightness and shortness of breath  Cardiovascular: Negative for chest pain and palpitations  Gastrointestinal: Negative for abdominal pain, blood in stool, constipation, diarrhea and nausea  Genitourinary: Negative for dysuria, frequency and hematuria  Musculoskeletal: Negative for arthralgias and joint swelling  Skin: Negative for rash  Allergic/Immunologic: Negative for immunocompromised state  Neurological: Negative for dizziness and headaches  Hematological: Negative for adenopathy  Psychiatric/Behavioral: Positive for dysphoric mood and sleep disturbance  Negative for confusion  The patient is nervous/anxious            Objective:  /100 (BP Location: Left arm, Patient Position: Sitting, Cuff Size: Large)   Pulse 83   Temp 98 2 °F (36 8 °C) (Tympanic)   Resp 16   Ht 5' 5" (1 651 m)   Wt 105 kg (232 lb)   BMI 38 61 kg/m²      Physical Exam Constitutional: She appears well-developed and well-nourished  HENT:   Head: Normocephalic and atraumatic  Right Ear: Tympanic membrane normal    Left Ear: Tympanic membrane normal    Nose: Nose normal    Mouth/Throat: Oropharynx is clear and moist  No posterior oropharyngeal edema or posterior oropharyngeal erythema  Eyes: Pupils are equal, round, and reactive to light  Conjunctivae are normal  Right eye exhibits no discharge  Neck: Normal range of motion  Neck supple  No thyromegaly present  Cardiovascular: Normal rate, regular rhythm, S1 normal, S2 normal and normal heart sounds  PMI is not displaced  No murmur heard  Pulmonary/Chest: Effort normal and breath sounds normal  No accessory muscle usage  No apnea  No respiratory distress  She has no rhonchi  She has no rales  Abdominal: Soft  Normal appearance and bowel sounds are normal  She exhibits no shifting dullness  There is no hepatosplenomegaly  There is no tenderness  There is no rebound and no CVA tenderness  Musculoskeletal: Normal range of motion  She exhibits no edema or tenderness  Lymphadenopathy:     She has no cervical adenopathy  Neurological: She is alert  Skin: Skin is warm and intact  No rash noted  Psychiatric: She has a normal mood and affect  Her speech is normal    Nursing note and vitals reviewed  No results found for this or any previous visit (from the past 1008 hour(s))  ]    No results found  BMI Counseling: Body mass index is 38 61 kg/m²  Discussed the patient's BMI with her  The BMI is above average  BMI counseling and education was provided to the patient  Nutrition recommendations include reducing portion sizes and increasing intake of lean protein  Exercise recommendations include exercising 3-5 times per week

## 2019-03-12 ENCOUNTER — TELEPHONE (OUTPATIENT)
Dept: INTERNAL MEDICINE CLINIC | Facility: CLINIC | Age: 54
End: 2019-03-12

## 2019-03-12 NOTE — TELEPHONE ENCOUNTER
Patients  called stated patient took Escitalopram 5mg 1 tablet at 3pm on 3/11/19 and he stated when he got him from work she was shaking and had hot and cold sweats  He stated that she is not going to take the medicine again

## 2019-04-11 ENCOUNTER — OFFICE VISIT (OUTPATIENT)
Dept: GASTROENTEROLOGY | Facility: MEDICAL CENTER | Age: 54
End: 2019-04-11
Payer: COMMERCIAL

## 2019-04-11 VITALS
TEMPERATURE: 97.8 F | WEIGHT: 229 LBS | DIASTOLIC BLOOD PRESSURE: 70 MMHG | HEIGHT: 65 IN | SYSTOLIC BLOOD PRESSURE: 120 MMHG | BODY MASS INDEX: 38.15 KG/M2 | HEART RATE: 68 BPM

## 2019-04-11 DIAGNOSIS — K21.9 GASTROESOPHAGEAL REFLUX DISEASE, ESOPHAGITIS PRESENCE NOT SPECIFIED: Primary | ICD-10-CM

## 2019-04-11 DIAGNOSIS — Z12.11 SCREENING FOR COLON CANCER: ICD-10-CM

## 2019-04-11 DIAGNOSIS — R79.89 LFT ELEVATION: ICD-10-CM

## 2019-04-11 DIAGNOSIS — K76.0 HEPATIC STEATOSIS: ICD-10-CM

## 2019-04-11 PROCEDURE — 99204 OFFICE O/P NEW MOD 45 MIN: CPT | Performed by: INTERNAL MEDICINE

## 2019-04-11 RX ORDER — FAMOTIDINE 40 MG/1
40 TABLET, FILM COATED ORAL DAILY
Qty: 30 TABLET | Refills: 5 | Status: SHIPPED | OUTPATIENT
Start: 2019-04-11 | End: 2019-07-11

## 2019-04-22 ENCOUNTER — APPOINTMENT (OUTPATIENT)
Dept: LAB | Facility: HOSPITAL | Age: 54
End: 2019-04-22
Attending: INTERNAL MEDICINE
Payer: COMMERCIAL

## 2019-04-22 DIAGNOSIS — K76.0 HEPATIC STEATOSIS: ICD-10-CM

## 2019-04-22 PROCEDURE — 86704 HEP B CORE ANTIBODY TOTAL: CPT

## 2019-04-22 PROCEDURE — 87340 HEPATITIS B SURFACE AG IA: CPT

## 2019-04-22 PROCEDURE — 86705 HEP B CORE ANTIBODY IGM: CPT

## 2019-04-22 PROCEDURE — 36415 COLL VENOUS BLD VENIPUNCTURE: CPT

## 2019-04-22 PROCEDURE — 86256 FLUORESCENT ANTIBODY TITER: CPT

## 2019-04-22 PROCEDURE — 86235 NUCLEAR ANTIGEN ANTIBODY: CPT

## 2019-04-22 PROCEDURE — 86803 HEPATITIS C AB TEST: CPT

## 2019-04-23 LAB
ACTIN IGG SERPL-ACNC: 7 UNITS (ref 0–19)
HBV CORE AB SER QL: NORMAL
HBV CORE IGM SER QL: NORMAL
HBV SURFACE AG SER QL: NORMAL
HCV AB SER QL: NORMAL
MITOCHONDRIA M2 IGG SER-ACNC: <20 UNITS (ref 0–20)

## 2019-05-09 ENCOUNTER — OFFICE VISIT (OUTPATIENT)
Dept: FAMILY MEDICINE CLINIC | Facility: CLINIC | Age: 54
End: 2019-05-09
Payer: COMMERCIAL

## 2019-05-09 ENCOUNTER — TELEPHONE (OUTPATIENT)
Dept: GASTROENTEROLOGY | Facility: CLINIC | Age: 54
End: 2019-05-09

## 2019-05-09 VITALS
WEIGHT: 226.2 LBS | SYSTOLIC BLOOD PRESSURE: 148 MMHG | HEIGHT: 63 IN | TEMPERATURE: 98.1 F | OXYGEN SATURATION: 98 % | DIASTOLIC BLOOD PRESSURE: 90 MMHG | HEART RATE: 102 BPM | BODY MASS INDEX: 40.08 KG/M2

## 2019-05-09 DIAGNOSIS — E66.9 OBESITY (BMI 30-39.9): ICD-10-CM

## 2019-05-09 DIAGNOSIS — Z12.11 SCREENING FOR COLON CANCER: ICD-10-CM

## 2019-05-09 DIAGNOSIS — K21.9 GASTROESOPHAGEAL REFLUX DISEASE, ESOPHAGITIS PRESENCE NOT SPECIFIED: ICD-10-CM

## 2019-05-09 DIAGNOSIS — I10 ESSENTIAL HYPERTENSION: Primary | ICD-10-CM

## 2019-05-09 DIAGNOSIS — E27.8 ADRENAL MASS, LEFT (HCC): ICD-10-CM

## 2019-05-09 PROBLEM — L98.9 SCALP LESION: Status: ACTIVE | Noted: 2019-05-09

## 2019-05-09 PROCEDURE — 99204 OFFICE O/P NEW MOD 45 MIN: CPT | Performed by: FAMILY MEDICINE

## 2019-05-09 PROCEDURE — 3008F BODY MASS INDEX DOCD: CPT | Performed by: FAMILY MEDICINE

## 2019-05-18 ENCOUNTER — APPOINTMENT (OUTPATIENT)
Dept: LAB | Facility: HOSPITAL | Age: 54
End: 2019-05-18
Attending: FAMILY MEDICINE
Payer: COMMERCIAL

## 2019-05-18 DIAGNOSIS — Z12.11 SCREENING FOR COLON CANCER: ICD-10-CM

## 2019-05-18 LAB — HEMOCCULT STL QL IA: POSITIVE

## 2019-05-18 PROCEDURE — G0328 FECAL BLOOD SCRN IMMUNOASSAY: HCPCS

## 2019-05-22 ENCOUNTER — TELEPHONE (OUTPATIENT)
Dept: FAMILY MEDICINE CLINIC | Facility: CLINIC | Age: 54
End: 2019-05-22

## 2019-05-22 DIAGNOSIS — R19.5 OCCULT BLOOD IN STOOLS: Primary | ICD-10-CM

## 2019-06-18 PROCEDURE — 88305 TISSUE EXAM BY PATHOLOGIST: CPT | Performed by: PATHOLOGY

## 2019-06-19 ENCOUNTER — LAB REQUISITION (OUTPATIENT)
Dept: LAB | Facility: HOSPITAL | Age: 54
End: 2019-06-19
Payer: COMMERCIAL

## 2019-06-19 DIAGNOSIS — Z12.11 ENCOUNTER FOR SCREENING FOR MALIGNANT NEOPLASM OF COLON: ICD-10-CM

## 2019-07-01 ENCOUNTER — APPOINTMENT (OUTPATIENT)
Dept: LAB | Age: 54
End: 2019-07-01
Payer: COMMERCIAL

## 2019-07-01 DIAGNOSIS — E27.8 ADRENAL MASS, LEFT (HCC): ICD-10-CM

## 2019-07-01 LAB
BUN SERPL-MCNC: 19 MG/DL (ref 5–25)
CREAT SERPL-MCNC: 0.75 MG/DL (ref 0.6–1.3)
GFR SERPL CREATININE-BSD FRML MDRD: 90 ML/MIN/1.73SQ M

## 2019-07-01 PROCEDURE — 36415 COLL VENOUS BLD VENIPUNCTURE: CPT

## 2019-07-01 PROCEDURE — 84520 ASSAY OF UREA NITROGEN: CPT

## 2019-07-01 PROCEDURE — 82565 ASSAY OF CREATININE: CPT

## 2019-07-05 ENCOUNTER — HOSPITAL ENCOUNTER (OUTPATIENT)
Dept: CT IMAGING | Facility: HOSPITAL | Age: 54
Discharge: HOME | End: 2019-07-05
Attending: SURGERY
Payer: COMMERCIAL

## 2019-07-05 DIAGNOSIS — E27.8 ADRENAL MASS, LEFT (HCC): ICD-10-CM

## 2019-07-05 PROCEDURE — 74178 CT ABD&PLV WO CNTR FLWD CNTR: CPT

## 2019-07-05 RX ADMIN — IOHEXOL 100 ML: 350 INJECTION, SOLUTION INTRAVENOUS at 13:10

## 2019-07-11 ENCOUNTER — OFFICE VISIT (OUTPATIENT)
Dept: FAMILY MEDICINE CLINIC | Facility: CLINIC | Age: 54
End: 2019-07-11
Payer: COMMERCIAL

## 2019-07-11 ENCOUNTER — TELEPHONE (OUTPATIENT)
Dept: FAMILY MEDICINE CLINIC | Facility: CLINIC | Age: 54
End: 2019-07-11

## 2019-07-11 VITALS
WEIGHT: 227.8 LBS | TEMPERATURE: 98.4 F | HEIGHT: 63 IN | HEART RATE: 112 BPM | DIASTOLIC BLOOD PRESSURE: 88 MMHG | SYSTOLIC BLOOD PRESSURE: 130 MMHG | OXYGEN SATURATION: 95 % | BODY MASS INDEX: 40.36 KG/M2

## 2019-07-11 DIAGNOSIS — Z12.39 BREAST CANCER SCREENING: Primary | ICD-10-CM

## 2019-07-11 DIAGNOSIS — L20.9 ATOPIC DERMATITIS, UNSPECIFIED TYPE: ICD-10-CM

## 2019-07-11 DIAGNOSIS — Z12.4 CERVICAL CANCER SCREENING: ICD-10-CM

## 2019-07-11 DIAGNOSIS — F41.1 GENERALIZED ANXIETY DISORDER: ICD-10-CM

## 2019-07-11 DIAGNOSIS — I10 HYPERTENSION, UNSPECIFIED TYPE: ICD-10-CM

## 2019-07-11 PROCEDURE — 99214 OFFICE O/P EST MOD 30 MIN: CPT | Performed by: FAMILY MEDICINE

## 2019-07-11 PROCEDURE — 3008F BODY MASS INDEX DOCD: CPT | Performed by: FAMILY MEDICINE

## 2019-07-11 PROCEDURE — 3075F SYST BP GE 130 - 139MM HG: CPT | Performed by: FAMILY MEDICINE

## 2019-07-11 RX ORDER — TRIAMCINOLONE ACETONIDE 5 MG/G
CREAM TOPICAL
Qty: 30 G | Refills: 0 | Status: SHIPPED | OUTPATIENT
Start: 2019-07-11 | End: 2021-04-16 | Stop reason: ALTCHOICE

## 2019-07-11 RX ORDER — CLOBETASOL PROPIONATE 0.5 MG/G
OINTMENT TOPICAL
COMMUNITY
Start: 2017-11-30 | End: 2019-08-26 | Stop reason: SDUPTHER

## 2019-07-11 RX ORDER — LISINOPRIL 10 MG/1
10 TABLET ORAL DAILY
Qty: 90 TABLET | Refills: 1 | Status: CANCELLED | OUTPATIENT
Start: 2019-07-11

## 2019-07-11 RX ORDER — LISINOPRIL 20 MG/1
20 TABLET ORAL DAILY
Qty: 90 TABLET | Refills: 3 | Status: SHIPPED | OUTPATIENT
Start: 2019-07-11 | End: 2019-10-22 | Stop reason: DRUGHIGH

## 2019-07-11 RX ORDER — TRIAMCINOLONE ACETONIDE 5 MG/G
CREAM TOPICAL
COMMUNITY
Start: 2017-11-21 | End: 2019-07-11

## 2019-07-11 NOTE — PROGRESS NOTES
Assessment/Plan:    Hypertension  BP is elevated  Will increase to lisinopril 20 mg daily    Generalized anxiety disorder  Gave some non medication therapies for anxiety  Diagnoses and all orders for this visit:    Breast cancer screening  -     Mammo screening bilateral w 3d & cad; Future    Cervical cancer screening  -     Ambulatory referral to Gynecology; Future    Hypertension, unspecified type  -     lisinopril (ZESTRIL) 10 mg tablet; Take 1 tablet (10 mg total) by mouth daily    Atopic dermatitis, unspecified type  -     triamcinolone (KENALOG) 0 5 % cream; Apply 1 application topically 2 times a day for 15 days    Generalized anxiety disorder    Other orders  -     clobetasol (TEMOVATE) 0 05 % ointment; Clobetasol Propionate 0 05 % External Ointment  Apply as needed to affected area  Quantity: 1;  Refills: 0      Cancel-Jerod Mendez ;  Start 30-Nov-2017  Active  30 GM Tube  -     triamcinolone (KENALOG) 0 5 % cream; Triamcinolone Acetonide 0 5 % External Cream  APPLY 1 APPLICATION TOPICALLY 2 (TWO) TIMES A DAY FOR 15 DAYS  Quantity: 1;  Refills: 1      Burt MAX [facility] 21-Nov-2017  Active  15 GM Tube          Subjective:      Patient ID: Rajni Mtz is a 53 y o  female  She is here for follow-up and she has some questions today  Her health maintenance colonoscopy was done in June and she had 3 polyps removed  She will be due again in 3 years  She was given the famotidine by GI for reflux, but it is not really helping so she went back to omeprazole  She has had trouble with weight gain ever since she quit smoking about 5 years ago  Vaginal dryness recently  Lots of anxiety recently  She had tried alprazolam in the past but it caused allergic problems        The following portions of the patient's history were reviewed and updated as appropriate: allergies, current medications, past family history, past medical history, past social history, past surgical history and problem list     Review of Systems   Constitutional: Negative for activity change, chills, fatigue and fever  HENT: Negative for congestion, ear pain, sinus pressure and sore throat  Eyes: Negative for pain and visual disturbance  Respiratory: Negative for cough, chest tightness, shortness of breath and wheezing  Cardiovascular: Negative for chest pain, palpitations and leg swelling  Gastrointestinal: Negative for abdominal pain, blood in stool, constipation, diarrhea, nausea and vomiting  Endocrine: Negative for polydipsia and polyuria  Genitourinary: Negative for difficulty urinating, dysuria, frequency and urgency  Musculoskeletal: Negative for arthralgias, joint swelling and myalgias  Skin: Negative for rash  Neurological: Negative for dizziness, weakness, numbness and headaches  Hematological: Negative for adenopathy  Does not bruise/bleed easily  Psychiatric/Behavioral: Negative for dysphoric mood  The patient is nervous/anxious  Objective:      /88   Pulse (!) 112   Temp 98 4 °F (36 9 °C) (Tympanic)   Ht 5' 3 2" (1 605 m)   Wt 103 kg (227 lb 12 8 oz)   SpO2 95%   BMI 40 10 kg/m²          Physical Exam   Constitutional: She is oriented to person, place, and time  She appears well-developed and well-nourished  obese   HENT:   Head: Normocephalic and atraumatic  Neck: Normal range of motion  Neck supple  Cardiovascular: Normal rate and regular rhythm  Pulmonary/Chest: Effort normal and breath sounds normal    Neurological: She is alert and oriented to person, place, and time  Skin: Skin is dry  Psychiatric: She has a normal mood and affect  Her behavior is normal    Nursing note and vitals reviewed

## 2019-07-11 NOTE — TELEPHONE ENCOUNTER
Pt called stating that her rx's that were prescribed today (Lisinopril and topical cream) were sent to wrong pharmacy  They would like them sent to [facility] on [address]  I called [facility] on [facility] and had them transferred from [facility] @ [facility]  Pt's  requested that [facility] be made primary as they are open 24 hrs  Change made in computer

## 2019-07-11 NOTE — PATIENT INSTRUCTIONS
FREE GUIDED MEDITATIONS  stressremedCloudShare/audio  [facility]/mindful-meditations      EMOTIONAL FREEDOM TECHNIQUES  Youtube videos demonstrate EFT tapping techniques      BOOKS  Relaxation on the Run by Dread Banuelos  The Anxiety and Phobia Workbook by Kaylin Vazquez and Help for Your Nerves by Alvarez Biswas  Don't Panic by Daniel Smith

## 2019-07-19 ENCOUNTER — OFFICE VISIT (OUTPATIENT)
Dept: SURGICAL ONCOLOGY | Facility: CLINIC | Age: 54
End: 2019-07-19
Payer: COMMERCIAL

## 2019-07-19 VITALS
HEART RATE: 118 BPM | TEMPERATURE: 98.3 F | BODY MASS INDEX: 40.75 KG/M2 | SYSTOLIC BLOOD PRESSURE: 126 MMHG | WEIGHT: 230 LBS | DIASTOLIC BLOOD PRESSURE: 78 MMHG | RESPIRATION RATE: 16 BRPM | HEIGHT: 63 IN

## 2019-07-19 DIAGNOSIS — E27.8 ADRENAL MASS (HCC): Primary | ICD-10-CM

## 2019-07-19 PROCEDURE — 99213 OFFICE O/P EST LOW 20 MIN: CPT | Performed by: SURGERY

## 2019-07-19 NOTE — PROGRESS NOTES
Surgical Oncology Follow Up       [address] [facility] CANCER [facility] SURGICAL ONCOLOGY [facility]  [id number] [place] [id number]  [address]  1965  [id number]  [address] [facility] SURGICAL ONCOLOGY [facility]  [id number] [place] [id number] [phone]    Diagnoses and all orders for this visit:    Adrenal mass ([address] )  -     CT abdomen pelvis w contrast; Future  -     BUN; Future  -     Creatinine, serum; Future        Chief Complaint   Patient presents with    Follow-up     6 mo Adrenal mass, left  f/u        Return in about 1 year (around 7/19/2020) for Office Visit, Imaging - See orders  No history exists  History of Present Illness:  Patient returns in follow-up of her right adrenal mass  She is status post hepaticojejunostomy for bile duct injury  Her biochemical workup for the adrenal mass was negative  CT from July 5, 2019 revealed a stable 4 x 3 8 x 3 6 cm left adrenal mass  This is unchanged going back to June 2018  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  There were stable nephrolithiasis  I personally reviewed the films  She denies any abdominal pain, nausea or vomiting  She did have a rash and hives after her CT  Review of Systems  Complete ROS Surg Onc:   Complete ROS Surg Onc:   Constitutional: The patient denies new or recent history of general fatigue, no recent weight loss, no change in appetite  Eyes: No complaints of visual problems, no scleral icterus  ENT: no complaints of ear pain, no hoarseness, no difficulty swallowing,  no tinnitus and no new masses in head, oral cavity, or neck  Cardiovascular: No complaints of chest pain, no palpitations, no ankle edema  Respiratory: No complaints of shortness of breath, no cough  Gastrointestinal: No complaints of jaundice, no bloody stools, no pale stools     Genitourinary: No complaints of dysuria, no hematuria, no nocturia, no frequent urination, no urethral discharge  Musculoskeletal: No complaints of weakness, paralysis, joint stiffness or arthralgias  Integumentary: No complaints of rash, no new lesions  Neurological: No complaints of convulsions, no seizures, no dizziness  Hematologic/Lymphatic: No complaints of easy bruising  Endocrine:  No hot or cold intolerance  No polydipsia, polyphagia, or polyuria  Allergy/immunology:  No environmental allergies  No food allergies  Not immunocompromised  Skin:  No pallor or rash  No wound  Patient Active Problem List   Diagnosis    Acute back pain    Adrenal mass (HCC)    Hypertension    Atopic dermatitis    Obesity (BMI 30-39  9)    Radicular pain of lumbosacral region    Acid reflux    Generalized anxiety disorder    Hepatic steatosis    Screening for colon cancer    LFT elevation    Scalp lesion     Past Medical History:   Diagnosis Date    Palpable abdominal mass     LAST ASSESSED: 17     Past Surgical History:   Procedure Laterality Date    ABDOMINAL SURGERY      AUGMENTATION MAMMAPLASTY Bilateral     Bi retro pectoral saline    BREAST SURGERY      REDUCTION PROCEDURE    CHOLECYSTECTOMY LAPAROSCOPIC      LIVER SURGERY       Family History   Problem Relation Age of Onset    Pancreatic cancer Mother     Prostate cancer Father      Social History     Socioeconomic History    Marital status: /Civil Union     Spouse name: Not on file    Number of children: Not on file    Years of education: Not on file    Highest education level: Not on file   Occupational History    Not on file   Social Needs    Financial resource strain: Not on file    Food insecurity:     Worry: Not on file     Inability: Not on file    Transportation needs:     Medical: Not on file     Non-medical: Not on file   Tobacco Use    Smoking status: Former Smoker     Last attempt to quit: 2014     Years since quittin 5    Smokeless tobacco: Never Used   Substance and Sexual Activity    Alcohol use: No    Drug use: No    Sexual activity: Yes   Lifestyle    Physical activity:     Days per week: Not on file     Minutes per session: Not on file    Stress: Not on file   Relationships    Social connections:     Talks on phone: Not on file     Gets together: Not on file     Attends [employer] service: Not on file     Active member of club or organization: Not on file     Attends meetings of clubs or organizations: Not on file     Relationship status: Not on file    Intimate partner violence:     Fear of current or ex partner: Not on file     Emotionally abused: Not on file     Physically abused: Not on file     Forced sexual activity: Not on file   Other Topics Concern    Not on file   Social History Narrative    Not on file       Current Outpatient Medications:     Biotin w/ Vitamins C & E (HAIR/SKIN/NAILS PO), Take by mouth, Disp: , Rfl:     clobetasol (TEMOVATE) 0 05 % ointment, Clobetasol Propionate 0 05 % External Ointment Apply as needed to affected area    Quantity: 1;  Refills: 0   Moni Remy ;  Start 30-Nov-2017 Active 30 GM Tube, Disp: , Rfl:     lisinopril (ZESTRIL) 20 mg tablet, Take 1 tablet (20 mg total) by mouth daily, Disp: 90 tablet, Rfl: 3    Multiple Vitamins-Minerals (WOMENS MULTIVITAMIN) TABS, Take by mouth, Disp: , Rfl:     Na Sulfate-K Sulfate-Mg Sulf (SUPREP BOWEL PREP KIT) 17 5-3 13-1 6 GM/177ML SOLN, Take 177 mL by mouth once for 1 dose, Disp: 2 Bottle, Rfl: 0    Omega-3 Fatty Acids (OMEGA 3 500 PO), Take by mouth, Disp: , Rfl:     triamcinolone (KENALOG) 0 5 % cream, Apply 1 application topically 2 times a day for 15 days, Disp: 30 g, Rfl: 0  Allergies   Allergen Reactions    Prednisone Shortness Of Breath    Alprazolam     Duloxetine     Escitalopram     Latex Rash    Omnipaque [Iohexol]      Pt erupted with rash all over body    Shrimp Flavor [Flavoring Agent] Rash Vitals:    07/19/19 1102   BP: 126/78   Pulse: (!) 118   Resp: 16   Temp: 98 3 °F (36 8 °C)       Physical Exam  Constitutional: General appearance: The Patient is well-developed and well-nourished who appears the stated age in no acute distress  Patient is pleasant and talkative  HEENT:  Normocephalic  Sclerae are anicteric  Mucous membranes are moist  Neck is supple without adenopathy  No JVD  Chest: The lungs are clear to auscultation  Cardiac: Heart is regular rate  Abdomen: Abdomen is soft, non-tender, non-distended and without masses  Extremities: There is no clubbing or cyanosis  There is no edema  Symmetric  Neuro: Grossly nonfocal  Gait is normal      Lymphatic: No evidence of cervical adenopathy bilaterally  No evidence of axillary adenopathy bilaterally  Skin: Warm, anicteric  Psych:  Patient is pleasant and talkative  Breasts:        Pathology:  [unfilled]    Labs:      Imaging  Ct Abdomen Pelvis W Wo Contrast    Result Date: 7/9/2019  Narrative: CT ABDOMEN AND PELVIS WITH AND WITHOUT IV CONTRAST INDICATION:   E27 9: Disorder of adrenal gland, unspecified  COMPARISON:  CT abdomen and pelvis 12/10/2018, 6/11/2018, 11/28/2017 TECHNIQUE: Initial CT of the abdomen performed without intravenous contrast   Subsequent dynamic CT evaluation of the abdomen and pelvis was performed after the administration of intravenous contrast in the nephrographic phase after the administration of  intravenous contrast   Pre and postcontrast delayed phase imaging obtained through the abdomen  Axial, sagittal, and coronal 2D reformatted images were created from the source data and submitted for interpretation  Radiation dose length product (DLP) for this visit:  2097 mGy-cm     This examination, like all CT scans performed in the [facility], was performed utilizing techniques to minimize radiation dose exposure, including the use of iterative reconstruction and automated exposure control  IV Contrast:  100 mL of iohexol (OMNIPAQUE)  350 Enteric Contrast:  Enteric contrast was administered  FINDINGS: ABDOMEN RIGHT KIDNEY AND URETER: No solid renal mass  No detectable urothelial mass  No hydronephrosis or hydroureter  2 mm renal calculus  No ureteral radiopaque calculi  No perinephric collection  LEFT KIDNEY AND URETER: No solid renal mass  No detectable urothelial mass  No hydronephrosis or hydroureter  4 mm renal calculi  No ureteral radiopaque calculi  No perinephric collection  URINARY BLADDER: No bladder wall mass  No calculi  LOWER CHEST:  No clinically significant abnormality identified in the visualized lower chest  LIVER/BILIARY TREE:  Mild stable hepatomegaly measuring up to 21 5 cm craniocaudal with slightly lobulated contour diffusely  Trace pneumobilia attributed to prior biliary intervention  Otherwise unremarkable  GALLBLADDER:  Gallbladder is surgically absent  SPLEEN:  Unremarkable  PANCREAS:  Unremarkable  ADRENAL GLANDS:  Left adrenal gland nodule measures 4 x 3 8 x 3 6 cm  No significant interval change when measured in the same fashion as far back as June 2018  Average precontrast attenuation of 20 Hounsfield units  Average postcontrast nephrographic  and delayed phase attenuation of 63 Hounsfield units and 36 Hounsfield units respectively  63% absolute enhancement washout  Unremarkable right adrenal gland  STOMACH AND BOWEL:  Proximal small bowel in close proximity to the cholecystomy clips again noted possibly due to enterobiliary anastomosis  Otherwise unremarkable  ABDOMINOPELVIC CAVITY:  No ascites  No free intraperitoneal air  No lymphadenopathy  VESSELS:  Aortoiliac calcification  No aneurysm  PELVIS REPRODUCTIVE ORGANS:  Unremarkable for patient's age  APPENDIX: A normal appendix was visualized  ABDOMINAL WALL/INGUINAL REGIONS:  Mild scarring in the ventral periumbilical abdominal wall    Punctate dystrophic subcutaneous calcification right lower quadrant abdominal wall  No bowel containing hernia  OSSEOUS STRUCTURES:  No acute fracture or destructive osseous lesion  Impression: Stable left adrenal gland measuring 4 x 3 8 x 3 6 cm  Washout characteristics and stability over at least 1 5 years attributed to lipid poor adenoma  Stable bilateral nephrolithiasis, measuring up to 4 mm on the left  Overall, no significant interval change  Workstation performed: [id number]     I reviewed the above laboratory and imaging data  Discussion/Summary: 76-year-old female with a left adrenal mass  This is essentially stable  This is not functioning  I have recommended continued observation  Since this has been stable for 1 and half years, we will repeat her imaging in 1 year  I will see her again at that time with a repeat CT  We will give her a steroid prep prior to the CT  She is agreeable to this  All her questions were answered

## 2019-07-19 NOTE — LETTER
July 19, 2019     Benoit Davila MD  [address]    Patient: Freya Blackwell   YOB: 1965   Date of Visit: 7/19/2019       Dear Dr Cory Yang: Thank you for referring Freya Blackwell to me for evaluation  Below are my notes for this consultation  If you have questions, please do not hesitate to call me  I look forward to following your patient along with you  Sincerely,        Lalo Avendaño MD        CC: No Recipients  Lalo Avendaño MD  7/19/2019 11:22 AM  Sign at close encounter               Surgical Oncology Follow Up       [address] [address] [address]  [address] [place] [address] Drive  1965  [id number]  [address] [facility] [address] [address]  [phone]    Diagnoses and all orders for this visit:    Adrenal mass ([address] )  -     CT abdomen pelvis w contrast; Future  -     BUN; Future  -     Creatinine, serum; Future        Chief Complaint   Patient presents with    Follow-up     6 mo Adrenal mass, left  f/u        Return in about 1 year (around 7/19/2020) for Office Visit, Imaging - See orders  No history exists  History of Present Illness:  Patient returns in follow-up of her right adrenal mass  She is status post hepaticojejunostomy for bile duct injury  Her biochemical workup for the adrenal mass was negative  CT from July 5, 2019 revealed a stable 4 x 3 8 x 3 6 cm left adrenal mass  This is unchanged going back to June 2018  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  There were stable nephrolithiasis  I personally reviewed the films  She denies any abdominal pain, nausea or vomiting  She did have a rash and hives after her CT      Review of Systems  Complete ROS Surg Onc:   Complete ROS Surg Onc:   Constitutional: The patient denies new or recent history of general fatigue, no recent weight loss, no change in appetite  Eyes: No complaints of visual problems, no scleral icterus  ENT: no complaints of ear pain, no hoarseness, no difficulty swallowing,  no tinnitus and no new masses in head, oral cavity, or neck  Cardiovascular: No complaints of chest pain, no palpitations, no ankle edema  Respiratory: No complaints of shortness of breath, no cough  Gastrointestinal: No complaints of jaundice, no bloody stools, no pale stools  Genitourinary: No complaints of dysuria, no hematuria, no nocturia, no frequent urination, no urethral discharge  Musculoskeletal: No complaints of weakness, paralysis, joint stiffness or arthralgias  Integumentary: No complaints of rash, no new lesions  Neurological: No complaints of convulsions, no seizures, no dizziness  Hematologic/Lymphatic: No complaints of easy bruising  Endocrine:  No hot or cold intolerance  No polydipsia, polyphagia, or polyuria  Allergy/immunology:  No environmental allergies  No food allergies  Not immunocompromised  Skin:  No pallor or rash  No wound  Patient Active Problem List   Diagnosis    Acute back pain    Adrenal mass (HCC)    Hypertension    Atopic dermatitis    Obesity (BMI 30-39  9)    Radicular pain of lumbosacral region    Acid reflux    Generalized anxiety disorder    Hepatic steatosis    Screening for colon cancer    LFT elevation    Scalp lesion     Past Medical History:   Diagnosis Date    Palpable abdominal mass     LAST ASSESSED: 12/11/17     Past Surgical History:   Procedure Laterality Date    ABDOMINAL SURGERY      AUGMENTATION MAMMAPLASTY Bilateral 2007    Bi retro pectoral saline    BREAST SURGERY      REDUCTION PROCEDURE    CHOLECYSTECTOMY LAPAROSCOPIC      LIVER SURGERY       Family History   Problem Relation Age of Onset    Pancreatic cancer Mother     Prostate cancer Father      Social History Socioeconomic History    Marital status: /Civil Union     Spouse name: Not on file    Number of children: Not on file    Years of education: Not on file    Highest education level: Not on file   Occupational History    Not on file   Social Needs    Financial resource strain: Not on file    Food insecurity:     Worry: Not on file     Inability: Not on file    Transportation needs:     Medical: Not on file     Non-medical: Not on file   Tobacco Use    Smoking status: Former Smoker     Last attempt to quit: 2014     Years since quittin 5    Smokeless tobacco: Never Used   Substance and Sexual Activity    Alcohol use: No    Drug use: No    Sexual activity: Yes   Lifestyle    Physical activity:     Days per week: Not on file     Minutes per session: Not on file    Stress: Not on file   Relationships    Social connections:     Talks on phone: Not on file     Gets together: Not on file     Attends [employer] service: Not on file     Active member of club or organization: Not on file     Attends meetings of clubs or organizations: Not on file     Relationship status: Not on file    Intimate partner violence:     Fear of current or ex partner: Not on file     Emotionally abused: Not on file     Physically abused: Not on file     Forced sexual activity: Not on file   Other Topics Concern    Not on file   Social History Narrative    Not on file       Current Outpatient Medications:     Biotin w/ Vitamins C & E (HAIR/SKIN/NAILS PO), Take by mouth, Disp: , Rfl:     clobetasol (TEMOVATE) 0 05 % ointment, Clobetasol Propionate 0 05 % External Ointment Apply as needed to affected area    Quantity: 1;  Refills: 0   Royal Mike Remy ;  Start 2017 Active 30 GM Tube, Disp: , Rfl:     lisinopril (ZESTRIL) 20 mg tablet, Take 1 tablet (20 mg total) by mouth daily, Disp: 90 tablet, Rfl: 3    Multiple Vitamins-Minerals (WOMENS MULTIVITAMIN) TABS, Take by mouth, Disp: , Rfl:     Na Sulfate-K Sulfate-Mg Sulf (SUPREP BOWEL PREP KIT) 17 5-3 13-1 6 GM/177ML SOLN, Take 177 mL by mouth once for 1 dose, Disp: 2 Bottle, Rfl: 0    Omega-3 Fatty Acids (OMEGA 3 500 PO), Take by mouth, Disp: , Rfl:     triamcinolone (KENALOG) 0 5 % cream, Apply 1 application topically 2 times a day for 15 days, Disp: 30 g, Rfl: 0  Allergies   Allergen Reactions    Prednisone Shortness Of Breath    Alprazolam     Duloxetine     Escitalopram     Latex Rash    Omnipaque [Iohexol]      Pt erupted with rash all over body    Shrimp Flavor [Flavoring Agent] Rash     Vitals:    07/19/19 1102   BP: 126/78   Pulse: (!) 118   Resp: 16   Temp: 98 3 °F (36 8 °C)       Physical Exam  Constitutional: General appearance: The Patient is well-developed and well-nourished who appears the stated age in no acute distress  Patient is pleasant and talkative  HEENT:  Normocephalic  Sclerae are anicteric  Mucous membranes are moist  Neck is supple without adenopathy  No JVD  Chest: The lungs are clear to auscultation  Cardiac: Heart is regular rate  Abdomen: Abdomen is soft, non-tender, non-distended and without masses  Extremities: There is no clubbing or cyanosis  There is no edema  Symmetric  Neuro: Grossly nonfocal  Gait is normal      Lymphatic: No evidence of cervical adenopathy bilaterally  No evidence of axillary adenopathy bilaterally  Skin: Warm, anicteric  Psych:  Patient is pleasant and talkative  Breasts:        Pathology:  [unfilled]    Labs:      Imaging  Ct Abdomen Pelvis W Wo Contrast    Result Date: 7/9/2019  Narrative: CT ABDOMEN AND PELVIS WITH AND WITHOUT IV CONTRAST INDICATION:   E27 9: Disorder of adrenal gland, unspecified   COMPARISON:  CT abdomen and pelvis 12/10/2018, 6/11/2018, 11/28/2017 TECHNIQUE: Initial CT of the abdomen performed without intravenous contrast   Subsequent dynamic CT evaluation of the abdomen and pelvis was performed after the administration of intravenous contrast in the nephrographic phase after the administration of  intravenous contrast   Pre and postcontrast delayed phase imaging obtained through the abdomen  Axial, sagittal, and coronal 2D reformatted images were created from the source data and submitted for interpretation  Radiation dose length product (DLP) for this visit:  2097 mGy-cm   This examination, like all CT scans performed in the [facility], was performed utilizing techniques to minimize radiation dose exposure, including the use of iterative reconstruction and automated exposure control  IV Contrast:  100 mL of iohexol (OMNIPAQUE)  350 Enteric Contrast:  Enteric contrast was administered  FINDINGS: ABDOMEN RIGHT KIDNEY AND URETER: No solid renal mass  No detectable urothelial mass  No hydronephrosis or hydroureter  2 mm renal calculus  No ureteral radiopaque calculi  No perinephric collection  LEFT KIDNEY AND URETER: No solid renal mass  No detectable urothelial mass  No hydronephrosis or hydroureter  4 mm renal calculi  No ureteral radiopaque calculi  No perinephric collection  URINARY BLADDER: No bladder wall mass  No calculi  LOWER CHEST:  No clinically significant abnormality identified in the visualized lower chest  LIVER/BILIARY TREE:  Mild stable hepatomegaly measuring up to 21 5 cm craniocaudal with slightly lobulated contour diffusely  Trace pneumobilia attributed to prior biliary intervention  Otherwise unremarkable  GALLBLADDER:  Gallbladder is surgically absent  SPLEEN:  Unremarkable  PANCREAS:  Unremarkable  ADRENAL GLANDS:  Left adrenal gland nodule measures 4 x 3 8 x 3 6 cm  No significant interval change when measured in the same fashion as far back as June 2018  Average precontrast attenuation of 20 Hounsfield units  Average postcontrast nephrographic  and delayed phase attenuation of 63 Hounsfield units and 36 Hounsfield units respectively  63% absolute enhancement washout   Unremarkable right adrenal gland  STOMACH AND BOWEL:  Proximal small bowel in close proximity to the cholecystomy clips again noted possibly due to enterobiliary anastomosis  Otherwise unremarkable  ABDOMINOPELVIC CAVITY:  No ascites  No free intraperitoneal air  No lymphadenopathy  VESSELS:  Aortoiliac calcification  No aneurysm  PELVIS REPRODUCTIVE ORGANS:  Unremarkable for patient's age  APPENDIX: A normal appendix was visualized  ABDOMINAL WALL/INGUINAL REGIONS:  Mild scarring in the ventral periumbilical abdominal wall  Punctate dystrophic subcutaneous calcification right lower quadrant abdominal wall  No bowel containing hernia  OSSEOUS STRUCTURES:  No acute fracture or destructive osseous lesion  Impression: Stable left adrenal gland measuring 4 x 3 8 x 3 6 cm  Washout characteristics and stability over at least 1 5 years attributed to lipid poor adenoma  Stable bilateral nephrolithiasis, measuring up to 4 mm on the left  Overall, no significant interval change  Workstation performed: [id number]     I reviewed the above laboratory and imaging data  Discussion/Summary: 76-year-old female with a left adrenal mass  This is essentially stable  This is not functioning  I have recommended continued observation  Since this has been stable for 1 and half years, we will repeat her imaging in 1 year  I will see her again at that time with a repeat CT  We will give her a steroid prep prior to the CT  She is agreeable to this  All her questions were answered

## 2019-08-09 ENCOUNTER — TELEPHONE (OUTPATIENT)
Dept: OTHER | Facility: OTHER | Age: 54
End: 2019-08-09

## 2019-08-09 NOTE — TELEPHONE ENCOUNTER
Alba Nuris 1965  CONFIDENTIALTY NOTICE: This fax transmission is intended only for the addressee  It contains information that is legally privileged,  confidential or otherwise protected from use or disclosure  If you are not the intended recipient, you are strictly prohibited from reviewing,  disclosing, copying using or disseminating any of this information or taking any action in reliance on or regarding this information  If you have  received this fax in error, please notify us immediately by telephone so that we can arrange for its return to us  Page: 1 of 3  Call Id: [id number]  Health Call  Standard Call Report  Health Call  Patient Name: Alba Lawler  Gender: Female  : 1965  Age: 53 Y 3 M 21 D  Return Phone  Number: [phone] (Cell)  Address: [address] [place] [address]  City/State/Zip: [facility] [address]  Practice Name: [facility] Charged:  Physician:  Carol Machado Name:  Relationship To  Patient: Self  Return Phone Number: [phone] (Cell)  Presenting Problem: "I am having a lot of back pain "  Service Type: Triage  Charged Service 1: Opal Sanchez U  38  Name and  Number:  Nurse Assessment  Nurse: Farrah Lewis RN, Valentín Foster Date/Time: 2019 3:00:11 AM  Type of assessment required:  ---General (Adult or Child)  Duration of Current S/S  ---About a week and a half  Location/Radiation  ---Lower R side of back  Temperature (F) and route:  ---None  Symptom Specific Meds (Dose/Time):  Uses a heating pads for about an hour each morning   ---None  Other S/S  ---"I have pain in my lower R back that is very severe in the morning when I wake up "  It takes a long time to be able to stand up straight  Pain Scale on scale of 1-10, 10 being the worst:  ---10 /10  Symptom progression:  ---same  Intake and Output  ---WNL / WNL  Alba Nuris 1965  CONFIDENTIALTY NOTICE: This fax transmission is intended only for the addressee   It contains information that is legally privileged,  confidential or otherwise protected from use or disclosure  If you are not the intended recipient, you are strictly prohibited from reviewing,  disclosing, copying using or disseminating any of this information or taking any action in reliance on or regarding this information  If you have  received this fax in error, please notify us immediately by telephone so that we can arrange for its return to us  Page: 2 of 3  Call Id: [id number]  Nurse Assessment  LMP/ Pregnancy:  ---Not discussed  Breastfeeding  ---No  Last Exam/Treatment:  ---May / Routine Evaluation  Protocols  Protocol Title Nurse Date/Time  Back Pain Rosario Johnson 8/9/2019 3:05:53 AM  Question Caller Affirmed  Disp  Time Disposition Final User  8/9/2019 3:19:55 AM See PCP When Office is Open (within 3  days)  Yes Rosario Johnson  8/9/2019 3:21:45 AM RN Triaged Katelyn Chaudhary, REINALDO, Jt 57 Advice Given Per Protocol  SEE PCP WITHIN 3 DAYS: * You need to be seen within 2 or 3 days  Call your doctor during regular office hours and make an  appointment  An urgent care center is often the best source of care if your doctor's office is closed or you can't get an appointment  NOTE: If office will be open tomorrow, tell caller to call then, not in 3 days  LOCAL HEAT: Apply a heating pad or hot water bottle  to the most painful area for 20 minutes whenever the pain flares up  (Caution about burns ) SLEEP: Sleep on your side with a pillow  between your knees  If you sleep on your back, place a pillow under your knees  Avoid sleeping on the abdomen  The mattress should be  firm or reinforced with a board  Avoid waterbeds  ACTIVITY: * Continue ordinary activities as much as your pain permits  Continued  activity is more healing for the back than rest  * Avoid any activities that cause severe pain  * Use caution and commonsense when  performing heavy lifting  Similarly, be careful during strenuous exercise   * Note: complete bed rest is unnecessary  PAIN MEDICINES:  * For pain relief, take acetaminophen, ibuprofen, or naproxen  * Use the lowest amount that makes your pain feel better  IBUPROFEN  (E G , MOTRIN, ADVIL): * Take 400 mg (two 200 mg pills) by mouth every 6 hours as needed  * Another choice is to take 600 mg  (three 200 mg pills) by mouth every 8 hours as needed  * The most you should take each day is 1,200 mg (six 200 mg pills a day),  unless your doctor has told you to take more  NAPROXEN (E G , ALEVE): * Take 220 mg (one 220 mg pill) by mouth every 8 hours  as needed  You may take 440 mg (two 220 mg pills) for your first dose  * The most you should take each day is 660 mg (three 220 mg  pills a day), unless your doctor has told you to take more  EXTRA NOTES: * Acetaminophen is thought to be safer than ibuprofen  or naproxen for people over 72years old  Acetaminophen is in many OTC and prescription medicines  It might be in more than one  medicine that you are taking  You need to be careful and not take an overdose  An acetaminophen overdose can hurt the liver  [employer],  the company that makes Tylenol, has different dosage instructions for Tylenol in [place] and the [place]  In [place] the maximum  recommended dose per day is 4,000 mg or twelve (12) Regular-Strength (325 mg) pills  In the [place], Pam recommends a  maximum dose of ten (10) Regular-Strength (325 mg) pills  * Before taking any medicine, read all the instructions on the package  CALL  BACK IF: * Numbness or weakness occurs, or bowel/bladder problems * There are any urine symptoms or fever * You become worse  CARE ADVICE given per Back Pain (Adult) guideline  Caller Understands: Yes  Caller Disagree/Comply: Jeremiah Martinez 1965  CONFIDENTIALTY NOTICE: This fax transmission is intended only for the addressee  It contains information that is legally privileged,  confidential or otherwise protected from use or disclosure   If you are not the intended recipient, you are strictly prohibited from reviewing,  disclosing, copying using or disseminating any of this information or taking any action in reliance on or regarding this information  If you have  received this fax in error, please notify us immediately by telephone so that we can arrange for its return to us  Page: 3 of 3  Call Id: [id number]  PreDisposition: Unsure  Comments  User: Genny Baker RN Date/Time: 8/9/2019 3:21:39 AM  An appointment was scheduled for -13  1030  The patient wants to be seen by Dr Becca Wyatt

## 2019-08-13 ENCOUNTER — OFFICE VISIT (OUTPATIENT)
Dept: FAMILY MEDICINE CLINIC | Facility: CLINIC | Age: 54
End: 2019-08-13
Payer: COMMERCIAL

## 2019-08-13 VITALS
HEART RATE: 90 BPM | SYSTOLIC BLOOD PRESSURE: 140 MMHG | OXYGEN SATURATION: 97 % | WEIGHT: 229 LBS | DIASTOLIC BLOOD PRESSURE: 84 MMHG | BODY MASS INDEX: 40.57 KG/M2 | TEMPERATURE: 98.2 F

## 2019-08-13 DIAGNOSIS — M54.50 ACUTE RIGHT-SIDED LOW BACK PAIN WITHOUT SCIATICA: Primary | ICD-10-CM

## 2019-08-13 PROBLEM — M54.41 ACUTE RIGHT-SIDED LOW BACK PAIN WITH RIGHT-SIDED SCIATICA: Status: ACTIVE | Noted: 2017-11-21

## 2019-08-13 PROCEDURE — 99213 OFFICE O/P EST LOW 20 MIN: CPT | Performed by: FAMILY MEDICINE

## 2019-08-13 NOTE — PROGRESS NOTES
Assessment/Plan:    She is having acute onset of right-sided low back pain over the past 2 weeks  I recommended continuation of heating pad and use of topical Fam-Malik patches  Also recommended Tylenol alternating with ibuprofen if needed for pain  Gave her a handout describing back exercises  She should return for follow-up if she is not improving  Discussed possibility physical therapy, but she does not have transportation  No problem-specific Assessment & Plan notes found for this encounter  Diagnoses and all orders for this visit:    Acute right-sided low back pain without sciatica          Subjective:      Patient ID: Ninfa Paniagua is a 53 y o  female  She is here with right sided low back pain which began about 2 weeks ago  The pain is localized right-sided and sometimes wraps around to the front of the thigh to the knee  No bladder or bowel incontinence  She gets help from heat and BenGay patches  No pain at night but pain starts up as soon as she wakes up and worsens with activity      The following portions of the patient's history were reviewed and updated as appropriate: allergies, current medications, past family history, past medical history, past social history, past surgical history and problem list     Review of Systems   Constitutional: Positive for activity change  Respiratory: Negative for shortness of breath  Cardiovascular: Negative for chest pain  Genitourinary: Negative for difficulty urinating  Musculoskeletal: Positive for back pain  Neurological: Negative for dizziness, weakness, numbness and headaches  Objective:      /84   Pulse 90   Temp 98 2 °F (36 8 °C) (Tympanic)   Wt 104 kg (229 lb)   SpO2 97%   BMI 40 57 kg/m²          Physical Exam   Constitutional: She appears well-developed and well-nourished  obesity   Neck: Normal range of motion  Cardiovascular: Normal rate, regular rhythm and normal heart sounds     Pulmonary/Chest: Effort normal and breath sounds normal    Musculoskeletal: She exhibits tenderness  She exhibits no edema  Tender LS spine and right paraspinal muscles  There was full range of motion  Increased pain with extension  Normal heel and toe walk  Neurological: She is alert  She displays normal reflexes  No cranial nerve deficit  Skin: Skin is warm and dry  Psychiatric: She has a normal mood and affect  Her behavior is normal    Nursing note and vitals reviewed

## 2019-08-13 NOTE — PATIENT INSTRUCTIONS
Ejercicios para la espalda baja   LO QUE NECESITA SABER:   Los ejercicios de la espalda baja ayudan a sanar y a fortalecer los músculos de ordaz espalda para evitar otra lesión  Pregúntele a ordaz médico si usted necesita acudir con un fisioterapeuta para que le indique ejercicios más avanzado  INSTRUCCIONES SOBRE EL [facility] HOSPITALARIA:   Regrese a la ino de emergencias si:   · Usted tiene dolor severo que le impide moverse  Pregúntele a ordaz Marilyn Byes vitaminas y minerales son adecuados para usted  · Ordaz dolor empeora  · Usted tiene un dolor nuevo  · Usted tiene preguntas o inquietudes acerca de ordaz condición o cuidado  Realice los ejercicios para la espalda baja de manera voss:   · Aixa michael ejercicios sobre amie colchoneta o superficie firme  (no en la cama) para alice soporte a la columna y evitar dolor en la parte baja de la espalda  · Muévase lenta y suavemente  Evite movimientos rápidos o bruscos  · Respire normalmente  No contenga la respiración  · Deténgase si siente dolor  Es normal que sienta cierta molestia al principio  Practicar los ejercicios con regularidad ayudará a disminuir ordaz incomodidad con el paso del Sarthak  Ejercicios para la espalda baja: Ordaz médico podría recomendarle que realice ejercicios para la espalda de 10 a 30 minutos cada día  También podría recomendarle que aixa ejercicios 1 a 3 veces cada día  Pregunte a ordaz médico cuáles ejercicios son los mejores para usted y con qué frecuencia hacerlos  · Bombeo del tobillo:  Acuéstese boca arriba  Levante ordaz pie (con michael dedos apuntando hacia ordaz geovanni)  Luego, baje ordaz pie (con los dedos apuntando lejos de usted)  Repita donna ejercicio 10 veces en cada lado  · Deslizamiento de talón:  Acuéstese boca arriba  Muy despacio doble amie pierna y luego enderécela  Luego, doble la otra pierna y enderécela  Repita 10 veces en cada lado             · Inclinación pélvica:  Acuéstese boca arriba con michael rodillas dobladas y michael pies planos sobre el piso  Coloque michael brazos en amie posición relajada junto a ordaz cuerpo  Contraiga los músculos de ordaz abdomen y aplane ordaz espalda contra el piso  Sostenga está posición por 5 segundos  Repita 5 veces  · Estiramiento de la espalda:  Acuéstese boca arriba con michael ankur detrás de ordaz geovanni  Doble michael rodillas y gire la mitad de ordaz cuerpo hacia un lado  Mantenga esta posición por 10 segundos  Repita 3 veces en cada lado  · Levantamiento de la pierna estirada:  Acuéstese boca [place] con amie pierna estirada  Doble la otra rodilla  Contraiga ordaz abdomen y luego levante lentamente la pierna estirada entre 6 a 12 pulgadas del piso  Mantenga esta posición por 1 a 5 segundos  Baje ordaz pierna lentamente  Repita 10 veces en cada pierna  · Rodillas al pecho:  Acuéstese boca arriba con michael rodillas dobladas y michael pies planos sobre el piso  [employer] [place] amie de las rodillas hacia ordaz pecho y sosténgala por 5 segundos  Regrese ordaz pierna a la posición inicial  Levante la otra rodilla hacia el pecho y sosténgala por 5 segundos  Jasmina esto 5 veces en cada lado  · Posición nicci camello:  Coloque michael ankur y [employer]  Arquee ordaz espalda Pecola Rashida, hacia el techo y [place]  Arquee ordaz christin dorsal lo más posible  Sostenga está posición por 5 segundos  Levante ordaz geovanni hacia arriba y baje ordaz pecho hacia el piso  Sostenga está posición por 5 segundos  Jasmina 3 series o raciel se le indique  · Posición de cuclillas contra la pared:  Párese con ordaz espalda contra la pared  Contraiga los músculos de ordaz abdomen  Lentamente deslice ordaz cuerpo hasta que michael rodillas queden dobladas en un ángulo de 45 grados  Mantenga esta posición por 5 segundos  Deslice lentamente ordaz espalda hacia arriba hasta quedar de pie  Repita 10 veces  · Posición de acurrucarse:  Acuéstese boca arriba con michael rodillas dobladas y michael pies planos sobre el piso   Coloque michael ankur con las sandra hacia abajo debajo de la curva de la parte baja de hair espalda  Después, con michael codos [employer], levante michael hombros y [place] 2 a 3 pulgadas  Mantenga hair geovanni a la misma altura de michael hombros  Mantenga esta posición por 5 segundos  Cuando usted pueda hacer donna ejercicio sin sentir dolor por 10 a 15 segundos, puede entonces añadir amie rotación  Mientras michael hombros y hair pecho estén levantados del piso, voltee levemente hacia la izquierda y [place]  Repita en el otro lado  · Ejercicio pájaro kelvin:  Coloque michael ankur y rodillas sobre el piso  Mantenga michael muñecas directamente debajo de michael hombros y michael rodillas directamente debajo de michael caderas  Contraiga hair ombligo hacia adentro en dirección a hair columna  No estire ni arquee hair espalda  Ponga tensos michael músculos abdominales  Levante un brazo extendido para que se alinee con hair geovanni  Luego, levante la pierna opuesta a hair brazo  Mantenga esta posición por 15 segundos  Baje hair Tanvir Gelineau y pierna lentamente y Ronny de lado  Jasmina 5 series  © 2017 [address]  Information is for End User's use only and may not be sold, redistributed or otherwise used for commercial purposes  All illustrations and images included in CareNotes® are the copyrighted property of A D A M , Inc  or Leonardo Crouch  Esta información es sólo para uso en educación  Hair intención no es darle un consejo médico sobre enfermedades o tratamientos  Colsulte con hair Bandar Sujatha farmacéutico antes de seguir cualquier régimen médico para saber si es seguro y efectivo para usted

## 2019-08-26 ENCOUNTER — ANNUAL EXAM (OUTPATIENT)
Dept: OBGYN CLINIC | Facility: MEDICAL CENTER | Age: 54
End: 2019-08-26
Payer: COMMERCIAL

## 2019-08-26 VITALS — BODY MASS INDEX: 40.65 KG/M2 | SYSTOLIC BLOOD PRESSURE: 132 MMHG | DIASTOLIC BLOOD PRESSURE: 90 MMHG | WEIGHT: 229.5 LBS

## 2019-08-26 DIAGNOSIS — Z01.419 ENCOUNTER FOR ANNUAL ROUTINE GYNECOLOGICAL EXAMINATION: Primary | ICD-10-CM

## 2019-08-26 DIAGNOSIS — Z12.31 ENCOUNTER FOR SCREENING MAMMOGRAM FOR MALIGNANT NEOPLASM OF BREAST: ICD-10-CM

## 2019-08-26 DIAGNOSIS — L29.2 VULVAR ITCHING: ICD-10-CM

## 2019-08-26 PROCEDURE — 99396 PREV VISIT EST AGE 40-64: CPT | Performed by: OBSTETRICS & GYNECOLOGY

## 2019-08-26 RX ORDER — CLOBETASOL PROPIONATE 0.5 MG/G
OINTMENT TOPICAL
Qty: 30 G | Refills: 2 | Status: SHIPPED | OUTPATIENT
Start: 2019-08-26 | End: 2021-04-16 | Stop reason: ALTCHOICE

## 2019-08-26 NOTE — PROGRESS NOTES
ASSESSMENT & PLAN: George Jackson is a 53 y o  [id number] with normal gynecologic exam     1   Routine well woman exam done today  2  Pap and HPV:  The patient's last pap and hpv was   It was normal     Pap and cotesting was not done today  Current ASCCP Guidelines reviewed  3   Mammogram ordered  4  Colonoscopy up to date  11  The following were reviewed in today's visit: breast self exam, mammography screening ordered, use and side effects of HRT, menopause, osteoporosis, adequate intake of calcium and vitamin D, exercise and healthy diet      CC:  Annual Gynecologic Examination    HPI: George Jackson is a 53 y o  [id number] who presents for annual gynecologic examination  She has the following concerns:  Vaginal dryness       Health Maintenance:    She wears her seatbelt routinely  She does perform regular monthly self breast exams  She feels safe at home  Past Medical History:   Diagnosis Date    Palpable abdominal mass     LAST ASSESSED: 17       Past Surgical History:   Procedure Laterality Date    ABDOMINAL SURGERY      AUGMENTATION MAMMAPLASTY Bilateral     Bi retro pectoral saline    BREAST SURGERY      REDUCTION PROCEDURE    CHOLECYSTECTOMY LAPAROSCOPIC      LIVER SURGERY         Past OB/Gyn History:  OB History        2    Para   2    Term   2            AB        Living   2       SAB        TAB        Ectopic        Multiple        Live Births                   Pt does not have menstrual issues  - postmenopausal    History of sexually transmitted infection: No   History of abnormal pap smears: No      Patient is currently sexually active  heterosexual   The current method of family planning is none      Family History   Problem Relation Age of Onset    Pancreatic cancer Mother     Prostate cancer Father        Social History:  Social History     Socioeconomic History    Marital status: /Civil Union     Spouse name: Not on file    Number of children: Not on file    Years of education: Not on file    Highest education level: Not on file   Occupational History    Not on file   Social Needs    Financial resource strain: Not on file    Food insecurity:     Worry: Not on file     Inability: Not on file    Transportation needs:     Medical: Not on file     Non-medical: Not on file   Tobacco Use    Smoking status: Former Smoker     Last attempt to quit: 2013     Years since quittin 6    Smokeless tobacco: Never Used   Substance and Sexual Activity    Alcohol use: No    Drug use: No    Sexual activity: Yes     Partners: Male     Birth control/protection: Post-menopausal   Lifestyle    Physical activity:     Days per week: Not on file     Minutes per session: Not on file    Stress: Not on file   Relationships    Social connections:     Talks on phone: Not on file     Gets together: Not on file     Attends Sikh service: Not on file     Active member of club or organization: Not on file     Attends meetings of clubs or organizations: Not on file     Relationship status: Not on file    Intimate partner violence:     Fear of current or ex partner: Not on file     Emotionally abused: Not on file     Physically abused: Not on file     Forced sexual activity: Not on file   Other Topics Concern    Not on file   Social History Narrative    Not on file       Allergies   Allergen Reactions    Prednisone Shortness Of Breath    Alprazolam     Duloxetine     Escitalopram     Latex Rash    Omnipaque [Iohexol]      Pt erupted with rash all over body    Shrimp Flavor [Flavoring Agent] Rash       Current Outpatient Medications:     Biotin w/ Vitamins C & E (HAIR/SKIN/NAILS PO), Take by mouth, Disp: , Rfl:     clobetasol (TEMOVATE) 0 05 % ointment, Apply as needed to affected area , Disp: 30 g, Rfl: 2    lisinopril (ZESTRIL) 20 mg tablet, Take 1 tablet (20 mg total) by mouth daily, Disp: 90 tablet, Rfl: 3    Multiple Vitamins-Minerals (WOMENS MULTIVITAMIN) TABS, Take by mouth, Disp: , Rfl:     Omega-3 Fatty Acids (OMEGA 3 500 PO), Take by mouth, Disp: , Rfl:     Na Sulfate-K Sulfate-Mg Sulf (SUPREP BOWEL PREP KIT) 17 5-3 13-1 6 GM/177ML SOLN, Take 177 mL by mouth once for 1 dose, Disp: 2 Bottle, Rfl: 0    triamcinolone (KENALOG) 0 5 % cream, Apply 1 application topically 2 times a day for 15 days (Patient not taking: Reported on 8/26/2019), Disp: 30 g, Rfl: 0      Review of Systems  Constitutional :no fever, feels well, no tiredness, no recent weight gain or loss  ENT: no ear ache, no loss of hearing, no nosebleeds or nasal discharge, no sore throat or hoarseness  Cardiovascular: no complaints of slow or fast heart beat, no chest pain, no palpitations, no leg claudication or lower extremity edema  Respiratory: no complaints of shortness of shortness of breath, no MARTINEZ  Breasts:no complaints of breast pain, breast lump, or nipple discharge  Gastrointestinal: no complaints of abdominal pain, constipation, nausea, vomiting, or diarrhea or bloody stools  Genitourinary : no complaints of dysuria, incontinence, pelvic pain, no dysmenorrhea, vaginal discharge or abnormal vaginal bleeding and as noted in HPI  Musculoskeletal: no complaints of arthralgia, no myalgia, no joint swelling or stiffness, no limb pain or swelling    Integumentary: no complaints of skin rash or lesion, itching or dry skin  Neurological: no complaints of headache, no confusion, no numbness or tingling, no dizziness or fainting    Objective      /90   Wt 104 kg (229 lb 8 oz)   LMP 08/03/2018 (Approximate)   BMI 40 65 kg/m²     General:   appears stated age, cooperative, alert normal mood and affect   Neck: normal, supple,trachea midline, no masses   Heart: regular rate and rhythm, S1, S2 normal, no murmur, click, rub or gallop   Lungs: clear to auscultation bilaterally   Breasts: normal appearance, no masses or tenderness   Abdomen: soft, non-tender, without masses or organomegaly   Vulva: normal   Vagina: normal vagina, no discharge, exudate, lesion, or erythema   Urethra: normal   Cervix: Normal, no discharge  Nontender  Uterus: normal size, contour, position, consistency, mobility, non-tender   Adnexa: no mass, fullness, tenderness   Lymphatic palpation of lymph nodes in neck, axilla, groin and/or other locations: no lymphadenopathy or masses noted   Skin normal skin turgor and no rashes     Psychiatric orientation to person, place, and time: normal  mood and affect: normal

## 2019-09-04 ENCOUNTER — TELEPHONE (OUTPATIENT)
Dept: OTHER | Facility: OTHER | Age: 54
End: 2019-09-04

## 2019-09-05 ENCOUNTER — TELEPHONE (OUTPATIENT)
Dept: FAMILY MEDICINE CLINIC | Facility: CLINIC | Age: 54
End: 2019-09-05

## 2019-09-05 DIAGNOSIS — K21.9 GASTROESOPHAGEAL REFLUX DISEASE, ESOPHAGITIS PRESENCE NOT SPECIFIED: Primary | ICD-10-CM

## 2019-09-05 RX ORDER — OMEPRAZOLE 40 MG/1
40 CAPSULE, DELAYED RELEASE ORAL DAILY
Qty: 90 CAPSULE | Refills: 3 | Status: SHIPPED | OUTPATIENT
Start: 2019-09-05 | End: 2020-09-08

## 2019-09-05 NOTE — TELEPHONE ENCOUNTER
Spoke with pt, and yes she is taking Omeprazole 40mg, Can you please sent a new refill  Note Paste   Subjective:       Patient ID: Mazin Cassidy is a 53 y o  female      She is here for follow-up and she has some questions today  Her health maintenance colonoscopy was done in June and she had 3 polyps removed  She will be due again in 3 years  She was given the famotidine by GI for reflux, but it is not really helping so she went back to omeprazole      Also pt would like to know if she should keep her appointment 09/12/2019 to recheck her blood pressure, pt has an appointment on 08/13/2019 and her blood pressure was good, please advise

## 2019-09-05 NOTE — TELEPHONE ENCOUNTER
Called pt back to verify this request , pt is requesting a refill for her Omeprazole and to reschedule her Appointment

## 2019-09-05 NOTE — TELEPHONE ENCOUNTER
----- Message from Waldo Jones sent at 9/5/2019  1:59 AM EDT -----  Regarding: Prescription Question  Contact: [phone]  I need to write in Ukrainian, saludos doctora, se love olvidado volver a recetarme el Omeprazol 40mg y el cambio de mi linn proxima pues ya fui la semana pasada, favor colocarmela para el mes de  [place] y [address]

## 2019-09-06 NOTE — TELEPHONE ENCOUNTER
Benoit Davila MD  Sent  9/5/2019  8:00 PM   ----- Message from Benoit Davila MD sent at 9/5/2019  8:00 PM EDT -----   I refilled her omeprazole 40 mg  She does not have to come back September 13th   She could make the appointment in a couple months in the future

## 2019-09-06 NOTE — TELEPHONE ENCOUNTER
Called pt, left message, to call us back to reschedule her appointment  I did cancel her appointment for September

## 2019-10-22 ENCOUNTER — OFFICE VISIT (OUTPATIENT)
Dept: FAMILY MEDICINE CLINIC | Facility: CLINIC | Age: 54
End: 2019-10-22
Payer: COMMERCIAL

## 2019-10-22 VITALS
BODY MASS INDEX: 40.92 KG/M2 | SYSTOLIC BLOOD PRESSURE: 134 MMHG | DIASTOLIC BLOOD PRESSURE: 100 MMHG | TEMPERATURE: 98.3 F | HEART RATE: 80 BPM | OXYGEN SATURATION: 98 % | WEIGHT: 231 LBS

## 2019-10-22 DIAGNOSIS — I10 ESSENTIAL HYPERTENSION: ICD-10-CM

## 2019-10-22 DIAGNOSIS — L20.9 ATOPIC DERMATITIS, UNSPECIFIED TYPE: ICD-10-CM

## 2019-10-22 DIAGNOSIS — Z23 FLU VACCINE NEED: Primary | ICD-10-CM

## 2019-10-22 PROCEDURE — 90682 RIV4 VACC RECOMBINANT DNA IM: CPT

## 2019-10-22 PROCEDURE — 99213 OFFICE O/P EST LOW 20 MIN: CPT | Performed by: FAMILY MEDICINE

## 2019-10-22 PROCEDURE — 90471 IMMUNIZATION ADMIN: CPT

## 2019-10-22 RX ORDER — LISINOPRIL 40 MG/1
40 TABLET ORAL DAILY
Qty: 30 TABLET | Refills: 3 | Status: SHIPPED | OUTPATIENT
Start: 2019-10-22 | End: 2020-02-23

## 2019-10-22 NOTE — ASSESSMENT & PLAN NOTE
Blood pressure remains elevated  We will increase her dose of lisinopril to 40 mg daily  She will finish up what she has by taking 2 per day

## 2019-10-22 NOTE — ASSESSMENT & PLAN NOTE
I recommended using triamcinolone cream to the left inner thigh and given her a referral to Dermatology

## 2019-10-22 NOTE — PROGRESS NOTES
Assessment/Plan:    Hypertension  Blood pressure remains elevated  We will increase her dose of lisinopril to 40 mg daily  She will finish up what she has by taking 2 per day  Atopic dermatitis  I recommended using triamcinolone cream to the left inner thigh and given her a referral to Dermatology  Diagnoses and all orders for this visit:    Essential hypertension  -     lisinopril (ZESTRIL) 40 mg tablet; Take 1 tablet (40 mg total) by mouth daily    Atopic dermatitis, unspecified type  -     Ambulatory referral to Dermatology; Future          Subjective:      Patient ID: Petra Gómez is a 53 y o  female  She is here for follow-up of blood pressure and she noticed some growths on the skin  She notes some little rash especially inner aspect of the thighs  Rash is mildly itchy, present for 4-5 months  BP has been better  Back has been better, LBP with occasional right sciatic        The following portions of the patient's history were reviewed and updated as appropriate: allergies, current medications, past family history, past medical history, past social history, past surgical history and problem list     Review of Systems   Constitutional: Negative for activity change, chills, fatigue and fever  Respiratory: Negative for cough and chest tightness  Cardiovascular: Negative for chest pain and palpitations  Skin: Positive for rash  Neurological: Negative for dizziness and headaches  Objective:      /100 (BP Location: Left arm, Patient Position: Sitting, Cuff Size: Standard)   Pulse 80   Temp 98 3 °F (36 8 °C) (Tympanic)   Wt 105 kg (231 lb)   LMP 08/03/2018 (Approximate)   SpO2 98%   BMI 40 92 kg/m²          Physical Exam   Constitutional: She appears well-developed and well-nourished  obese   HENT:   Head: Normocephalic and atraumatic  Neck: Normal range of motion  Neck supple  No thyromegaly present     Cardiovascular: Normal rate, regular rhythm and normal heart sounds  Pulmonary/Chest: Effort normal and breath sounds normal    Musculoskeletal: Normal range of motion  She exhibits no edema  Lymphadenopathy:     She has no cervical adenopathy  Skin: Skin is warm and dry  Rash noted  There is fine maculopapular eruption left inner thigh  Psychiatric: She has a normal mood and affect  Her behavior is normal    Nursing note and vitals reviewed

## 2020-01-22 ENCOUNTER — OFFICE VISIT (OUTPATIENT)
Dept: FAMILY MEDICINE CLINIC | Facility: CLINIC | Age: 55
End: 2020-01-22
Payer: COMMERCIAL

## 2020-01-22 VITALS
BODY MASS INDEX: 41.11 KG/M2 | SYSTOLIC BLOOD PRESSURE: 140 MMHG | TEMPERATURE: 97.6 F | OXYGEN SATURATION: 96 % | HEART RATE: 103 BPM | HEIGHT: 63 IN | DIASTOLIC BLOOD PRESSURE: 88 MMHG | WEIGHT: 232 LBS

## 2020-01-22 DIAGNOSIS — E66.9 OBESITY (BMI 30-39.9): ICD-10-CM

## 2020-01-22 DIAGNOSIS — L20.9 ATOPIC DERMATITIS, UNSPECIFIED TYPE: ICD-10-CM

## 2020-01-22 DIAGNOSIS — I10 ESSENTIAL HYPERTENSION: Primary | ICD-10-CM

## 2020-01-22 PROCEDURE — 99214 OFFICE O/P EST MOD 30 MIN: CPT | Performed by: FAMILY MEDICINE

## 2020-01-22 NOTE — ASSESSMENT & PLAN NOTE
BMI counseling given  Urged her to add some exercise to lose weight  Gave referral for medical weight management although she may have troubles with transportation

## 2020-01-22 NOTE — ASSESSMENT & PLAN NOTE
She is managing facial rash with hydrocortisone 2 and 0 5% cream and abdominal rash with triamcinolone

## 2020-01-22 NOTE — PROGRESS NOTES
Assessment/Plan:    Hypertension   Will continue the lisinopril 40 mg daily  She will continue to monitor her blood pressure at home  Her home readings have been [phone] systolic and 03B to low 19H for diastolic  Obesity (BMI 30-39  9)  BMI counseling given  Urged her to add some exercise to lose weight  Gave referral for medical weight management although she may have troubles with transportation  Atopic dermatitis  She is managing facial rash with hydrocortisone 2 and 0 5% cream and abdominal rash with triamcinolone  Diagnoses and all orders for this visit:    Essential hypertension    Obesity (BMI 30-39 9)  -     Ambulatory referral to Weight Management; Future    Atopic dermatitis, unspecified type    Other orders  -     hydrocortisone 2 5 % cream          Subjective:      Patient ID: Ismael Cortes is a 53 y o  female  She is here for blood pressure check  Unfortunately the last few weeks have been very stressful for her  Her mother in law was in the hospital over the holidays and then she    Then her father in [address] [place] [address] brother  the week after  Now she will be caring for her father in law  She denies CP, SOB or palpitations  Itchy skin rashes from eczema  The following portions of the patient's history were reviewed and updated as appropriate: allergies, current medications, past family history, past medical history, past social history, past surgical history and problem list     Review of Systems   Constitutional: Negative for chills, fatigue and fever  Respiratory: Negative for chest tightness and shortness of breath  Cardiovascular: Negative for chest pain  Neurological: Negative for dizziness and headaches           Objective:      /88   Pulse 103   Temp 97 6 °F (36 4 °C) (Tympanic)   Ht 5' 3 2" (1 605 m)   Wt 105 kg (232 lb)   LMP 2018 (Approximate)   SpO2 96%   BMI 40 84 kg/m²          Physical Exam   Constitutional: She is oriented to person, place, and time  She appears well-developed and well-nourished  HENT:   Head: Normocephalic and atraumatic  Cardiovascular: Normal rate and regular rhythm  Pulmonary/Chest: Effort normal and breath sounds normal    Musculoskeletal: Normal range of motion  She exhibits no edema  Neurological: She is alert and oriented to person, place, and time  Skin: Skin is warm and dry  Mildly erythematous rash periumbilical region  Nursing note and vitals reviewed

## 2020-01-22 NOTE — ASSESSMENT & PLAN NOTE
Will continue the lisinopril 40 mg daily  She will continue to monitor her blood pressure at home  Her home readings have been 212-315 systolic and 68K to low 71N for diastolic

## 2020-02-04 ENCOUNTER — TELEPHONE (OUTPATIENT)
Dept: GASTROENTEROLOGY | Facility: MEDICAL CENTER | Age: 55
End: 2020-02-04

## 2020-02-04 NOTE — TELEPHONE ENCOUNTER
Call dg to see if she would like to reschedule her colonoscopy  Patient states she had one done with another doctor

## 2020-02-13 ENCOUNTER — HOSPITAL ENCOUNTER (OUTPATIENT)
Dept: MAMMOGRAPHY | Facility: MEDICAL CENTER | Age: 55
Discharge: HOME | End: 2020-02-13
Payer: COMMERCIAL

## 2020-02-13 VITALS — BODY MASS INDEX: 41.11 KG/M2 | WEIGHT: 232 LBS | HEIGHT: 63 IN

## 2020-02-13 DIAGNOSIS — Z12.31 ENCOUNTER FOR SCREENING MAMMOGRAM FOR MALIGNANT NEOPLASM OF BREAST: ICD-10-CM

## 2020-02-13 PROCEDURE — 77067 SCR MAMMO BI INCL CAD: CPT

## 2020-02-13 PROCEDURE — 77063 BREAST TOMOSYNTHESIS BI: CPT

## 2020-02-14 NOTE — RESULT ENCOUNTER NOTE
Please inform patient  Mammogram showing area of focal assymetry of left breast   Make sure has further imaging scheduled thanks

## 2020-02-22 DIAGNOSIS — I10 ESSENTIAL HYPERTENSION: ICD-10-CM

## 2020-02-23 RX ORDER — LISINOPRIL 40 MG/1
TABLET ORAL
Qty: 90 TABLET | Refills: 2 | Status: SHIPPED | OUTPATIENT
Start: 2020-02-23 | End: 2020-12-14

## 2020-02-26 ENCOUNTER — HOSPITAL ENCOUNTER (OUTPATIENT)
Dept: ULTRASOUND IMAGING | Facility: CLINIC | Age: 55
Discharge: HOME | End: 2020-02-26
Payer: COMMERCIAL

## 2020-02-26 ENCOUNTER — HOSPITAL ENCOUNTER (OUTPATIENT)
Dept: MAMMOGRAPHY | Facility: CLINIC | Age: 55
Discharge: HOME | End: 2020-02-26
Payer: COMMERCIAL

## 2020-02-26 VITALS — HEIGHT: 63 IN | WEIGHT: 232 LBS | BODY MASS INDEX: 41.11 KG/M2

## 2020-02-26 DIAGNOSIS — R92.8 ABNORMAL SCREENING MAMMOGRAM: ICD-10-CM

## 2020-02-26 PROCEDURE — 76642 ULTRASOUND BREAST LIMITED: CPT

## 2020-02-26 PROCEDURE — 77065 DX MAMMO INCL CAD UNI: CPT

## 2020-02-26 PROCEDURE — G0279 TOMOSYNTHESIS, MAMMO: HCPCS

## 2020-02-28 ENCOUNTER — TELEPHONE (OUTPATIENT)
Dept: FAMILY MEDICINE CLINIC | Facility: CLINIC | Age: 55
End: 2020-02-28

## 2020-02-28 DIAGNOSIS — I10 ESSENTIAL HYPERTENSION: ICD-10-CM

## 2020-02-28 NOTE — TELEPHONE ENCOUNTER
----- Message from Vita Zhou sent at 2/27/2020  9:46 PM EST -----  Regarding: Prescription Question  Contact: [phone]  Good afternoon, I need to refilis the Lisinopril medicine of 40mg for three months, Qty 90, since the [facility] pharmacy in [facility] can not give me more medication without your approval, and thank you    Linda Orozco

## 2020-04-22 ENCOUNTER — OFFICE VISIT (OUTPATIENT)
Dept: FAMILY MEDICINE CLINIC | Facility: CLINIC | Age: 55
End: 2020-04-22
Payer: COMMERCIAL

## 2020-04-22 VITALS
DIASTOLIC BLOOD PRESSURE: 94 MMHG | OXYGEN SATURATION: 98 % | HEIGHT: 63 IN | SYSTOLIC BLOOD PRESSURE: 158 MMHG | HEART RATE: 88 BPM | BODY MASS INDEX: 40.93 KG/M2 | RESPIRATION RATE: 18 BRPM | WEIGHT: 231 LBS | TEMPERATURE: 98.5 F

## 2020-04-22 DIAGNOSIS — E66.9 OBESITY (BMI 30-39.9): ICD-10-CM

## 2020-04-22 DIAGNOSIS — I10 ESSENTIAL HYPERTENSION: Primary | ICD-10-CM

## 2020-04-22 DIAGNOSIS — F41.1 GENERALIZED ANXIETY DISORDER: ICD-10-CM

## 2020-04-22 PROCEDURE — 3008F BODY MASS INDEX DOCD: CPT | Performed by: FAMILY MEDICINE

## 2020-04-22 PROCEDURE — 3077F SYST BP >= 140 MM HG: CPT | Performed by: FAMILY MEDICINE

## 2020-04-22 PROCEDURE — 1036F TOBACCO NON-USER: CPT | Performed by: FAMILY MEDICINE

## 2020-04-22 PROCEDURE — 99214 OFFICE O/P EST MOD 30 MIN: CPT | Performed by: FAMILY MEDICINE

## 2020-04-22 PROCEDURE — 3080F DIAST BP >= 90 MM HG: CPT | Performed by: FAMILY MEDICINE

## 2020-04-22 RX ORDER — AMLODIPINE BESYLATE 5 MG/1
5 TABLET ORAL DAILY
Qty: 30 TABLET | Refills: 2 | Status: SHIPPED | OUTPATIENT
Start: 2020-04-22 | End: 2020-07-17

## 2020-05-26 ENCOUNTER — APPOINTMENT (EMERGENCY)
Dept: RADIOLOGY | Facility: HOSPITAL | Age: 55
End: 2020-05-26
Payer: COMMERCIAL

## 2020-05-26 ENCOUNTER — HOSPITAL ENCOUNTER (EMERGENCY)
Facility: HOSPITAL | Age: 55
Discharge: HOME | End: 2020-05-26
Attending: EMERGENCY MEDICINE | Admitting: EMERGENCY MEDICINE
Payer: COMMERCIAL

## 2020-05-26 VITALS
DIASTOLIC BLOOD PRESSURE: 82 MMHG | OXYGEN SATURATION: 96 % | TEMPERATURE: 98.2 F | HEART RATE: 67 BPM | SYSTOLIC BLOOD PRESSURE: 135 MMHG | RESPIRATION RATE: 20 BRPM

## 2020-05-26 DIAGNOSIS — N20.0 KIDNEY STONE: Primary | ICD-10-CM

## 2020-05-26 DIAGNOSIS — R10.9 FLANK PAIN: ICD-10-CM

## 2020-05-26 LAB
ALBUMIN SERPL BCP-MCNC: 4.3 G/DL (ref 3.5–5)
ALP SERPL-CCNC: 140 U/L (ref 46–116)
ALT SERPL W P-5'-P-CCNC: 123 U/L (ref 12–78)
ANION GAP SERPL CALCULATED.3IONS-SCNC: 6 MMOL/L (ref 4–13)
AST SERPL W P-5'-P-CCNC: 58 U/L (ref 5–45)
BACTERIA UR QL AUTO: ABNORMAL /HPF
BASOPHILS # BLD AUTO: 0.03 THOUSANDS/ΜL (ref 0–0.1)
BASOPHILS NFR BLD AUTO: 0 % (ref 0–1)
BILIRUB SERPL-MCNC: 0.59 MG/DL (ref 0.2–1)
BILIRUB UR QL STRIP: NEGATIVE
BUN SERPL-MCNC: 20 MG/DL (ref 5–25)
CALCIUM SERPL-MCNC: 9.6 MG/DL (ref 8.3–10.1)
CHLORIDE SERPL-SCNC: 102 MMOL/L (ref 100–108)
CLARITY UR: ABNORMAL
CO2 SERPL-SCNC: 28 MMOL/L (ref 21–32)
COLOR UR: ABNORMAL
COLOR, POC: NORMAL
CREAT SERPL-MCNC: 0.93 MG/DL (ref 0.6–1.3)
EOSINOPHIL # BLD AUTO: 0.01 THOUSAND/ΜL (ref 0–0.61)
EOSINOPHIL NFR BLD AUTO: 0 % (ref 0–6)
ERYTHROCYTE [DISTWIDTH] IN BLOOD BY AUTOMATED COUNT: 12.4 % (ref 11.6–15.1)
GFR SERPL CREATININE-BSD FRML MDRD: 69 ML/MIN/1.73SQ M
GLUCOSE SERPL-MCNC: 116 MG/DL (ref 65–140)
GLUCOSE UR STRIP-MCNC: NEGATIVE MG/DL
HCT VFR BLD AUTO: 46.4 % (ref 34.8–46.1)
HGB BLD-MCNC: 15.6 G/DL (ref 11.5–15.4)
HGB UR QL STRIP.AUTO: ABNORMAL
HYALINE CASTS #/AREA URNS LPF: ABNORMAL /LPF
IMM GRANULOCYTES # BLD AUTO: 0.08 THOUSAND/UL (ref 0–0.2)
IMM GRANULOCYTES NFR BLD AUTO: 1 % (ref 0–2)
KETONES UR STRIP-MCNC: NEGATIVE MG/DL
LEUKOCYTE ESTERASE UR QL STRIP: NEGATIVE
LYMPHOCYTES # BLD AUTO: 1.18 THOUSANDS/ΜL (ref 0.6–4.47)
LYMPHOCYTES NFR BLD AUTO: 9 % (ref 14–44)
MCH RBC QN AUTO: 30.4 PG (ref 26.8–34.3)
MCHC RBC AUTO-ENTMCNC: 33.6 G/DL (ref 31.4–37.4)
MCV RBC AUTO: 90 FL (ref 82–98)
MONOCYTES # BLD AUTO: 0.44 THOUSAND/ΜL (ref 0.17–1.22)
MONOCYTES NFR BLD AUTO: 3 % (ref 4–12)
NEUTROPHILS # BLD AUTO: 11.49 THOUSANDS/ΜL (ref 1.85–7.62)
NEUTS SEG NFR BLD AUTO: 87 % (ref 43–75)
NITRITE UR QL STRIP: NEGATIVE
NON-SQ EPI CELLS URNS QL MICRO: ABNORMAL /HPF
NRBC BLD AUTO-RTO: 0 /100 WBCS
PH UR STRIP.AUTO: 5.5 [PH] (ref 4.5–8)
PLATELET # BLD AUTO: 240 THOUSANDS/UL (ref 149–390)
PMV BLD AUTO: 11.5 FL (ref 8.9–12.7)
POTASSIUM SERPL-SCNC: 4.3 MMOL/L (ref 3.5–5.3)
PROT SERPL-MCNC: 8.8 G/DL (ref 6.4–8.2)
PROT UR STRIP-MCNC: ABNORMAL MG/DL
RBC # BLD AUTO: 5.13 MILLION/UL (ref 3.81–5.12)
RBC #/AREA URNS AUTO: ABNORMAL /HPF
SODIUM SERPL-SCNC: 136 MMOL/L (ref 136–145)
SP GR UR STRIP.AUTO: 1.02 (ref 1–1.03)
UROBILINOGEN UR QL STRIP.AUTO: 0.2 E.U./DL
WBC # BLD AUTO: 13.23 THOUSAND/UL (ref 4.31–10.16)
WBC #/AREA URNS AUTO: ABNORMAL /HPF

## 2020-05-26 PROCEDURE — 80053 COMPREHEN METABOLIC PANEL: CPT | Performed by: EMERGENCY MEDICINE

## 2020-05-26 PROCEDURE — 74176 CT ABD & PELVIS W/O CONTRAST: CPT

## 2020-05-26 PROCEDURE — 85025 COMPLETE CBC W/AUTO DIFF WBC: CPT | Performed by: EMERGENCY MEDICINE

## 2020-05-26 PROCEDURE — 96374 THER/PROPH/DIAG INJ IV PUSH: CPT

## 2020-05-26 PROCEDURE — 99284 EMERGENCY DEPT VISIT MOD MDM: CPT | Performed by: EMERGENCY MEDICINE

## 2020-05-26 PROCEDURE — 96361 HYDRATE IV INFUSION ADD-ON: CPT

## 2020-05-26 PROCEDURE — 81001 URINALYSIS AUTO W/SCOPE: CPT

## 2020-05-26 PROCEDURE — 99284 EMERGENCY DEPT VISIT MOD MDM: CPT

## 2020-05-26 PROCEDURE — 36415 COLL VENOUS BLD VENIPUNCTURE: CPT | Performed by: EMERGENCY MEDICINE

## 2020-05-26 RX ORDER — TAMSULOSIN HYDROCHLORIDE 0.4 MG/1
0.4 CAPSULE ORAL
Qty: 7 CAPSULE | Refills: 0 | Status: SHIPPED | OUTPATIENT
Start: 2020-05-26 | End: 2021-04-16 | Stop reason: ALTCHOICE

## 2020-05-26 RX ORDER — KETOROLAC TROMETHAMINE 30 MG/ML
15 INJECTION, SOLUTION INTRAMUSCULAR; INTRAVENOUS ONCE
Status: COMPLETED | OUTPATIENT
Start: 2020-05-26 | End: 2020-05-26

## 2020-05-26 RX ORDER — OXYCODONE HYDROCHLORIDE 5 MG/1
5 TABLET ORAL EVERY 4 HOURS PRN
Qty: 5 TABLET | Refills: 0 | Status: SHIPPED | OUTPATIENT
Start: 2020-05-26 | End: 2021-04-16 | Stop reason: ALTCHOICE

## 2020-05-26 RX ADMIN — KETOROLAC TROMETHAMINE 15 MG: 30 INJECTION, SOLUTION INTRAMUSCULAR at 21:45

## 2020-05-26 RX ADMIN — SODIUM CHLORIDE 500 ML: 0.9 INJECTION, SOLUTION INTRAVENOUS at 20:41

## 2020-05-27 ENCOUNTER — TELEPHONE (OUTPATIENT)
Dept: OTHER | Facility: OTHER | Age: 55
End: 2020-05-27

## 2020-07-17 DIAGNOSIS — I10 ESSENTIAL HYPERTENSION: ICD-10-CM

## 2020-07-17 RX ORDER — AMLODIPINE BESYLATE 5 MG/1
TABLET ORAL
Qty: 90 TABLET | Refills: 0 | Status: SHIPPED | OUTPATIENT
Start: 2020-07-17 | End: 2020-10-07 | Stop reason: SDUPTHER

## 2020-07-31 ENCOUNTER — OFFICE VISIT (OUTPATIENT)
Dept: SURGICAL ONCOLOGY | Facility: CLINIC | Age: 55
End: 2020-07-31
Payer: COMMERCIAL

## 2020-07-31 VITALS
HEIGHT: 63 IN | DIASTOLIC BLOOD PRESSURE: 96 MMHG | SYSTOLIC BLOOD PRESSURE: 138 MMHG | BODY MASS INDEX: 42.35 KG/M2 | HEART RATE: 90 BPM | WEIGHT: 239 LBS | TEMPERATURE: 98.3 F

## 2020-07-31 DIAGNOSIS — E27.8 ADRENAL MASS (HCC): Primary | ICD-10-CM

## 2020-07-31 PROCEDURE — 3075F SYST BP GE 130 - 139MM HG: CPT | Performed by: SURGERY

## 2020-07-31 PROCEDURE — 99213 OFFICE O/P EST LOW 20 MIN: CPT | Performed by: SURGERY

## 2020-07-31 PROCEDURE — 3008F BODY MASS INDEX DOCD: CPT | Performed by: SURGERY

## 2020-07-31 PROCEDURE — 1036F TOBACCO NON-USER: CPT | Performed by: SURGERY

## 2020-07-31 PROCEDURE — 3080F DIAST BP >= 90 MM HG: CPT | Performed by: SURGERY

## 2020-07-31 NOTE — PROGRESS NOTES
Surgical Oncology Follow Up       [address] [facility] [place]  [address] [facility] [address]  [place] [address]  [id number]  1965  [id number]  [address] [facility] [place] [address] [place] [id number] [phone]    Diagnoses and all orders for this visit:    Adrenal mass [facility])  -     CT abdomen wo contrast; Future        Chief Complaint   Patient presents with    Follow-up       Return in about 1 year (around 7/31/2021) for Office Visit, Imaging - See orders  No history exists  History of Present Illness:  Patient returns in follow-up of her adrenal mass  Her biochemical workup has been negative  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  CT from May 26, 2020 revealed a stable adrenal lesion  This was measured 4 3 cm  On my review this was unchanged from last year  I personally reviewed the films  Review of Systems  Complete ROS Surg Onc:   Complete ROS Surg Onc:   Constitutional: The patient denies new or recent history of general fatigue, no recent weight loss, no change in appetite  Eyes: No complaints of visual problems, no scleral icterus  ENT: no complaints of ear pain, no hoarseness, no difficulty swallowing,  no tinnitus and no new masses in head, oral cavity, or neck  Cardiovascular: No complaints of chest pain, no palpitations, no ankle edema  Respiratory: No complaints of shortness of breath, no cough  Gastrointestinal: No complaints of jaundice, no bloody stools, no pale stools  Genitourinary: No complaints of dysuria, no hematuria, no nocturia, no frequent urination, no urethral discharge  Musculoskeletal: No complaints of weakness, paralysis, joint stiffness or arthralgias  Integumentary: No complaints of rash, no new lesions     Neurological: No complaints of convulsions, no seizures, no dizziness  Hematologic/Lymphatic: No complaints of easy bruising  Endocrine:  No hot or cold intolerance  No polydipsia, polyphagia, or polyuria  Allergy/immunology:  No environmental allergies  No food allergies  Not immunocompromised  Skin:  No pallor or rash  No wound  Patient Active Problem List   Diagnosis    Acute right-sided low back pain with right-sided sciatica    Adrenal mass ([address] )    Hypertension    Atopic dermatitis    Obesity (BMI 30-39  9)    Radicular pain of lumbosacral region    Acid reflux    Generalized anxiety disorder    Hepatic steatosis    Screening for colon cancer    LFT elevation    Scalp lesion     Past Medical History:   Diagnosis Date    Palpable abdominal mass     LAST ASSESSED: 12/11/17     Past Surgical History:   Procedure Laterality Date    ABDOMINAL SURGERY      AUGMENTATION MAMMAPLASTY Bilateral 2007    Bi retro pectoral saline    BREAST SURGERY      REDUCTION PROCEDURE    CHOLECYSTECTOMY LAPAROSCOPIC      LIVER SURGERY       Family History   Problem Relation Age of Onset    Pancreatic cancer Mother 67    Prostate cancer Father 61   Aminata Mata No Known Problems Sister     No Known Problems Daughter     No Known Problems Maternal Grandmother     No Known Problems Maternal Grandfather     No Known Problems Paternal Grandmother     No Known Problems Paternal Grandfather     No Known Problems Sister     No Known Problems Daughter     No Known Problems Maternal Aunt     No Known Problems Maternal Aunt     No Known Problems Paternal Aunt     No Known Problems Paternal Aunt     No Known Problems Paternal Aunt      Social History     Socioeconomic History    Marital status: /Civil Union     Spouse name: Not on file    Number of children: Not on file    Years of education: Not on file    Highest education level: Not on file   Occupational History    Not on file   Social Needs    Financial resource strain: Not on file   Aminata Mata Food insecurity:     Worry: Not on file     Inability: Not on file    Transportation needs:     Medical: Not on file     Non-medical: Not on file   Tobacco Use    Smoking status: Former Smoker     Last attempt to quit: 2013     Years since quittin 5    Smokeless tobacco: Never Used   Substance and Sexual Activity    Alcohol use: No    Drug use: No    Sexual activity: Yes     Partners: Male     Birth control/protection: Post-menopausal   Lifestyle    Physical activity:     Days per week: Not on file     Minutes per session: Not on file    Stress: Not on file   Relationships    Social connections:     Talks on phone: Not on file     Gets together: Not on file     Attends Buddhism service: Not on file     Active member of club or organization: Not on file     Attends meetings of clubs or organizations: Not on file     Relationship status: Not on file    Intimate partner violence:     Fear of current or ex partner: Not on file     Emotionally abused: Not on file     Physically abused: Not on file     Forced sexual activity: Not on file   Other Topics Concern    Not on file   Social History Narrative    Not on file       Current Outpatient Medications:     amLODIPine (NORVASC) 5 mg tablet, TAKE 1 TABLET BY MOUTH EVERY DAY, Disp: 90 tablet, Rfl: 0    Biotin w/ Vitamins C & E (HAIR/SKIN/NAILS PO), Take by mouth, Disp: , Rfl:     Cholecalciferol (VITAMIN D3) 50 MCG (2000 UT) CHEW, , Disp: , Rfl:     clobetasol (TEMOVATE) 0 05 % ointment, Apply as needed to affected area , Disp: 30 g, Rfl: 2    hydrocortisone 2 5 % cream, , Disp: , Rfl:     lisinopril (ZESTRIL) 40 mg tablet, TAKE 1 TABLET BY MOUTH EVERY DAY, Disp: 90 tablet, Rfl: 2    Multiple Vitamins-Minerals (WOMENS MULTIVITAMIN) TABS, Take by mouth, Disp: , Rfl:     Omega-3 Fatty Acids (OMEGA 3 500 PO), Take by mouth, Disp: , Rfl:     omeprazole (PriLOSEC) 40 MG capsule, Take 1 capsule (40 mg total) by mouth daily, Disp: 90 capsule, Rfl: 3    oxyCODONE (ROXICODONE) 5 mg immediate release tablet, Take 1 tablet (5 mg total) by mouth every 4 (four) hours as needed for moderate pain for up to 5 dosesMax Daily Amount: 30 mg, Disp: 5 tablet, Rfl: 0    Pediatric Multiple Vitamins (CHEWABLE MULTIPLE VITAMINS/C PO), , Disp: , Rfl:     triamcinolone (KENALOG) 0 5 % cream, Apply 1 application topically 2 times a day for 15 days, Disp: 30 g, Rfl: 0    tamsulosin (FLOMAX) 0 4 mg, Take 1 capsule (0 4 mg total) by mouth daily with dinner for 7 doses, Disp: 7 capsule, Rfl: 0  Allergies   Allergen Reactions    Prednisone Shortness Of Breath    Alprazolam     Duloxetine     Escitalopram     Latex Rash    Omnipaque [Iohexol]      Pt erupted with rash all over body    Shrimp Flavor [Flavoring Agent] Rash     Vitals:    07/31/20 1249   BP: 138/96   Pulse: 90   Temp: 98 3 °F (36 8 °C)       Physical Exam  Constitutional: General appearance: The Patient is well-developed and well-nourished who appears the stated age in no acute distress  Patient is pleasant and talkative  HEENT:  Normocephalic  Sclerae are anicteric  Mucous membranes are moist  Neck is supple without adenopathy  No JVD  Chest: The lungs are clear to auscultation  Cardiac: Heart is regular rate  Abdomen: Abdomen is soft, non-tender, non-distended and without masses  Extremities: There is no clubbing or cyanosis  There is no edema  Symmetric  Neuro: Grossly nonfocal  Gait is normal      Lymphatic: No evidence of cervical adenopathy bilaterally  No evidence of axillary adenopathy bilaterally  No evidence of inguinal adenopathy bilaterally  Skin: Warm, anicteric  Psych:  Patient is pleasant and talkative  Breasts:        Pathology:  [unfilled]    Labs:      Imaging  No results found  I reviewed the above laboratory and imaging data      Discussion/Summary: 58-year-old female with a left adrenal mass   This is essentially stable   This is not functioning   I have recommended continued observation  Since this has been stable for over 2 years, we will repeat her imaging in 1 year  I will see her again at that time with a repeat CT  She is agreeable to this   All her questions were answered

## 2020-09-06 DIAGNOSIS — K21.9 GASTROESOPHAGEAL REFLUX DISEASE, ESOPHAGITIS PRESENCE NOT SPECIFIED: ICD-10-CM

## 2020-09-08 RX ORDER — OMEPRAZOLE 40 MG/1
CAPSULE, DELAYED RELEASE ORAL
Qty: 90 CAPSULE | Refills: 0 | Status: SHIPPED | OUTPATIENT
Start: 2020-09-08 | End: 2020-12-14

## 2020-10-07 ENCOUNTER — OFFICE VISIT (OUTPATIENT)
Dept: FAMILY MEDICINE CLINIC | Facility: CLINIC | Age: 55
End: 2020-10-07
Payer: COMMERCIAL

## 2020-10-07 VITALS
RESPIRATION RATE: 18 BRPM | HEART RATE: 83 BPM | SYSTOLIC BLOOD PRESSURE: 120 MMHG | HEIGHT: 63 IN | BODY MASS INDEX: 41.11 KG/M2 | OXYGEN SATURATION: 96 % | TEMPERATURE: 98 F | WEIGHT: 232 LBS | DIASTOLIC BLOOD PRESSURE: 90 MMHG

## 2020-10-07 DIAGNOSIS — I10 ESSENTIAL HYPERTENSION: ICD-10-CM

## 2020-10-07 DIAGNOSIS — E66.9 OBESITY (BMI 30-39.9): ICD-10-CM

## 2020-10-07 DIAGNOSIS — E78.5 HYPERLIPIDEMIA, UNSPECIFIED HYPERLIPIDEMIA TYPE: ICD-10-CM

## 2020-10-07 DIAGNOSIS — Z11.4 SCREENING FOR HIV (HUMAN IMMUNODEFICIENCY VIRUS): ICD-10-CM

## 2020-10-07 DIAGNOSIS — R53.83 FATIGUE, UNSPECIFIED TYPE: ICD-10-CM

## 2020-10-07 DIAGNOSIS — Z00.00 ANNUAL PHYSICAL EXAM: Primary | ICD-10-CM

## 2020-10-07 DIAGNOSIS — Z23 NEED FOR IMMUNIZATION AGAINST INFLUENZA: ICD-10-CM

## 2020-10-07 DIAGNOSIS — E27.8 ADRENAL MASS (HCC): ICD-10-CM

## 2020-10-07 DIAGNOSIS — R79.89 LFT ELEVATION: ICD-10-CM

## 2020-10-07 PROCEDURE — 99396 PREV VISIT EST AGE 40-64: CPT | Performed by: FAMILY MEDICINE

## 2020-10-07 PROCEDURE — 90471 IMMUNIZATION ADMIN: CPT

## 2020-10-07 PROCEDURE — 90682 RIV4 VACC RECOMBINANT DNA IM: CPT

## 2020-10-07 RX ORDER — AMLODIPINE BESYLATE 5 MG/1
5 TABLET ORAL DAILY
Qty: 90 TABLET | Refills: 1 | Status: SHIPPED | OUTPATIENT
Start: 2020-10-07 | End: 2021-03-28

## 2020-10-07 RX ORDER — UREA 10 %
10 LOTION (ML) TOPICAL
COMMUNITY
Start: 2020-04-06

## 2020-10-22 ENCOUNTER — TELEPHONE (OUTPATIENT)
Dept: BARIATRICS | Facility: CLINIC | Age: 55
End: 2020-10-22

## 2020-10-27 ENCOUNTER — LAB (OUTPATIENT)
Dept: LAB | Facility: HOSPITAL | Age: 55
End: 2020-10-27
Attending: FAMILY MEDICINE
Payer: COMMERCIAL

## 2020-10-27 DIAGNOSIS — Z11.4 SCREENING FOR HIV (HUMAN IMMUNODEFICIENCY VIRUS): ICD-10-CM

## 2020-10-27 DIAGNOSIS — I10 ESSENTIAL HYPERTENSION: ICD-10-CM

## 2020-10-27 DIAGNOSIS — E78.5 HYPERLIPIDEMIA, UNSPECIFIED HYPERLIPIDEMIA TYPE: ICD-10-CM

## 2020-10-27 DIAGNOSIS — R53.83 FATIGUE, UNSPECIFIED TYPE: ICD-10-CM

## 2020-10-27 DIAGNOSIS — R79.89 LFT ELEVATION: ICD-10-CM

## 2020-10-27 LAB
ALBUMIN SERPL BCP-MCNC: 3.9 G/DL (ref 3.5–5)
ALP SERPL-CCNC: 139 U/L (ref 46–116)
ALT SERPL W P-5'-P-CCNC: 65 U/L (ref 12–78)
ANION GAP SERPL CALCULATED.3IONS-SCNC: 8 MMOL/L (ref 4–13)
AST SERPL W P-5'-P-CCNC: 34 U/L (ref 5–45)
BASOPHILS # BLD AUTO: 0.03 THOUSANDS/ΜL (ref 0–0.1)
BASOPHILS NFR BLD AUTO: 0 % (ref 0–1)
BILIRUB SERPL-MCNC: 0.47 MG/DL (ref 0.2–1)
BUN SERPL-MCNC: 23 MG/DL (ref 5–25)
CALCIUM SERPL-MCNC: 9.6 MG/DL (ref 8.3–10.1)
CHLORIDE SERPL-SCNC: 101 MMOL/L (ref 100–108)
CHOLEST SERPL-MCNC: 214 MG/DL (ref 50–200)
CO2 SERPL-SCNC: 28 MMOL/L (ref 21–32)
CREAT SERPL-MCNC: 0.97 MG/DL (ref 0.6–1.3)
EOSINOPHIL # BLD AUTO: 0.23 THOUSAND/ΜL (ref 0–0.61)
EOSINOPHIL NFR BLD AUTO: 2 % (ref 0–6)
ERYTHROCYTE [DISTWIDTH] IN BLOOD BY AUTOMATED COUNT: 12.1 % (ref 11.6–15.1)
GFR SERPL CREATININE-BSD FRML MDRD: 65 ML/MIN/1.73SQ M
GLUCOSE P FAST SERPL-MCNC: 113 MG/DL (ref 65–99)
HCT VFR BLD AUTO: 46 % (ref 34.8–46.1)
HDLC SERPL-MCNC: 51 MG/DL
HGB BLD-MCNC: 14.9 G/DL (ref 11.5–15.4)
IMM GRANULOCYTES # BLD AUTO: 0.05 THOUSAND/UL (ref 0–0.2)
IMM GRANULOCYTES NFR BLD AUTO: 1 % (ref 0–2)
LDLC SERPL CALC-MCNC: 113 MG/DL (ref 0–100)
LYMPHOCYTES # BLD AUTO: 2.45 THOUSANDS/ΜL (ref 0.6–4.47)
LYMPHOCYTES NFR BLD AUTO: 26 % (ref 14–44)
MCH RBC QN AUTO: 30 PG (ref 26.8–34.3)
MCHC RBC AUTO-ENTMCNC: 32.4 G/DL (ref 31.4–37.4)
MCV RBC AUTO: 93 FL (ref 82–98)
MONOCYTES # BLD AUTO: 0.62 THOUSAND/ΜL (ref 0.17–1.22)
MONOCYTES NFR BLD AUTO: 7 % (ref 4–12)
NEUTROPHILS # BLD AUTO: 6.07 THOUSANDS/ΜL (ref 1.85–7.62)
NEUTS SEG NFR BLD AUTO: 64 % (ref 43–75)
NONHDLC SERPL-MCNC: 163 MG/DL
NRBC BLD AUTO-RTO: 0 /100 WBCS
PLATELET # BLD AUTO: 222 THOUSANDS/UL (ref 149–390)
PMV BLD AUTO: 11.8 FL (ref 8.9–12.7)
POTASSIUM SERPL-SCNC: 4.3 MMOL/L (ref 3.5–5.3)
PROT SERPL-MCNC: 8.3 G/DL (ref 6.4–8.2)
RBC # BLD AUTO: 4.96 MILLION/UL (ref 3.81–5.12)
SODIUM SERPL-SCNC: 137 MMOL/L (ref 136–145)
TRIGL SERPL-MCNC: 251 MG/DL
TSH SERPL DL<=0.05 MIU/L-ACNC: 2.19 UIU/ML (ref 0.36–3.74)
WBC # BLD AUTO: 9.45 THOUSAND/UL (ref 4.31–10.16)

## 2020-10-27 PROCEDURE — 80061 LIPID PANEL: CPT

## 2020-10-27 PROCEDURE — 85025 COMPLETE CBC W/AUTO DIFF WBC: CPT

## 2020-10-27 PROCEDURE — 87389 HIV-1 AG W/HIV-1&-2 AB AG IA: CPT

## 2020-10-27 PROCEDURE — 84443 ASSAY THYROID STIM HORMONE: CPT

## 2020-10-27 PROCEDURE — 80053 COMPREHEN METABOLIC PANEL: CPT

## 2020-10-27 PROCEDURE — 36415 COLL VENOUS BLD VENIPUNCTURE: CPT

## 2020-10-28 DIAGNOSIS — R73.9 HYPERGLYCEMIA: Primary | ICD-10-CM

## 2020-10-28 LAB — HIV 1+2 AB+HIV1 P24 AG SERPL QL IA: NORMAL

## 2020-11-04 ENCOUNTER — OFFICE VISIT (OUTPATIENT)
Dept: BARIATRICS | Facility: CLINIC | Age: 55
End: 2020-11-04

## 2020-11-04 VITALS
DIASTOLIC BLOOD PRESSURE: 84 MMHG | RESPIRATION RATE: 16 BRPM | HEART RATE: 86 BPM | TEMPERATURE: 97.9 F | BODY MASS INDEX: 40.36 KG/M2 | HEIGHT: 64 IN | WEIGHT: 236.4 LBS | SYSTOLIC BLOOD PRESSURE: 132 MMHG

## 2020-11-04 DIAGNOSIS — E66.9 OBESITY (BMI 30-39.9): Primary | ICD-10-CM

## 2020-11-04 DIAGNOSIS — E66.01 OBESITY, CLASS III, BMI 40-49.9 (MORBID OBESITY) (HCC): Primary | ICD-10-CM

## 2020-11-04 DIAGNOSIS — Z01.810 PREOP CARDIOVASCULAR EXAM: ICD-10-CM

## 2020-11-04 PROCEDURE — RECHECK

## 2020-11-06 ENCOUNTER — ANNUAL EXAM (OUTPATIENT)
Dept: OBGYN CLINIC | Facility: MEDICAL CENTER | Age: 55
End: 2020-11-06
Payer: COMMERCIAL

## 2020-11-06 VITALS — SYSTOLIC BLOOD PRESSURE: 125 MMHG | BODY MASS INDEX: 41.9 KG/M2 | DIASTOLIC BLOOD PRESSURE: 80 MMHG | WEIGHT: 240.3 LBS

## 2020-11-06 DIAGNOSIS — Z01.419 ENCOUNTER FOR GYNECOLOGICAL EXAMINATION: Primary | ICD-10-CM

## 2020-11-06 DIAGNOSIS — Z12.31 ENCOUNTER FOR SCREENING MAMMOGRAM FOR MALIGNANT NEOPLASM OF BREAST: ICD-10-CM

## 2020-11-06 PROCEDURE — S0612 ANNUAL GYNECOLOGICAL EXAMINA: HCPCS | Performed by: OBSTETRICS & GYNECOLOGY

## 2020-11-09 ENCOUNTER — TELEPHONE (OUTPATIENT)
Dept: BARIATRICS | Facility: CLINIC | Age: 55
End: 2020-11-09

## 2020-11-12 ENCOUNTER — OFFICE VISIT (OUTPATIENT)
Dept: BARIATRICS | Facility: CLINIC | Age: 55
End: 2020-11-12

## 2020-11-12 VITALS — WEIGHT: 234.7 LBS | TEMPERATURE: 96.3 F | HEIGHT: 64 IN | BODY MASS INDEX: 40.07 KG/M2

## 2020-11-12 DIAGNOSIS — E66.01 OBESITY, CLASS III, BMI 40-49.9 (MORBID OBESITY) (HCC): Primary | ICD-10-CM

## 2020-11-12 PROCEDURE — RECHECK

## 2020-11-18 ENCOUNTER — TELEPHONE (OUTPATIENT)
Dept: FAMILY MEDICINE CLINIC | Facility: CLINIC | Age: 55
End: 2020-11-18

## 2020-11-19 ENCOUNTER — HOSPITAL ENCOUNTER (EMERGENCY)
Facility: HOSPITAL | Age: 55
Discharge: HOME | End: 2020-11-19
Attending: EMERGENCY MEDICINE | Admitting: EMERGENCY MEDICINE
Payer: COMMERCIAL

## 2020-11-19 ENCOUNTER — OFFICE VISIT (OUTPATIENT)
Dept: FAMILY MEDICINE CLINIC | Facility: CLINIC | Age: 55
End: 2020-11-19
Payer: COMMERCIAL

## 2020-11-19 VITALS
HEART RATE: 86 BPM | TEMPERATURE: 98.8 F | SYSTOLIC BLOOD PRESSURE: 152 MMHG | OXYGEN SATURATION: 96 % | RESPIRATION RATE: 16 BRPM | DIASTOLIC BLOOD PRESSURE: 68 MMHG

## 2020-11-19 VITALS
HEART RATE: 75 BPM | HEIGHT: 64 IN | DIASTOLIC BLOOD PRESSURE: 84 MMHG | OXYGEN SATURATION: 97 % | BODY MASS INDEX: 39.75 KG/M2 | WEIGHT: 232.8 LBS | SYSTOLIC BLOOD PRESSURE: 150 MMHG | TEMPERATURE: 97.3 F | RESPIRATION RATE: 16 BRPM

## 2020-11-19 DIAGNOSIS — R79.89 LFT ELEVATION: ICD-10-CM

## 2020-11-19 DIAGNOSIS — R10.12 LEFT UPPER QUADRANT ABDOMINAL PAIN: Primary | ICD-10-CM

## 2020-11-19 DIAGNOSIS — K21.9 GASTROESOPHAGEAL REFLUX DISEASE, UNSPECIFIED WHETHER ESOPHAGITIS PRESENT: ICD-10-CM

## 2020-11-19 DIAGNOSIS — R10.10 PAIN OF UPPER ABDOMEN: Primary | ICD-10-CM

## 2020-11-19 LAB
ALBUMIN SERPL BCP-MCNC: 4 G/DL (ref 3.5–5)
ALP SERPL-CCNC: 133 U/L (ref 46–116)
ALT SERPL W P-5'-P-CCNC: 74 U/L (ref 12–78)
ANION GAP SERPL CALCULATED.3IONS-SCNC: 11 MMOL/L (ref 4–13)
AST SERPL W P-5'-P-CCNC: 51 U/L (ref 5–45)
BASOPHILS # BLD AUTO: 0.03 THOUSANDS/ΜL (ref 0–0.1)
BASOPHILS NFR BLD AUTO: 0 % (ref 0–1)
BILIRUB SERPL-MCNC: 0.58 MG/DL (ref 0.2–1)
BILIRUB UR QL STRIP: NEGATIVE
BUN SERPL-MCNC: 22 MG/DL (ref 5–25)
CALCIUM SERPL-MCNC: 9.7 MG/DL (ref 8.3–10.1)
CHLORIDE SERPL-SCNC: 100 MMOL/L (ref 100–108)
CLARITY UR: CLEAR
CLARITY, POC: CLEAR
CO2 SERPL-SCNC: 26 MMOL/L (ref 21–32)
COLOR UR: YELLOW
COLOR, POC: YELLOW
CREAT SERPL-MCNC: 0.66 MG/DL (ref 0.6–1.3)
EOSINOPHIL # BLD AUTO: 0.12 THOUSAND/ΜL (ref 0–0.61)
EOSINOPHIL NFR BLD AUTO: 1 % (ref 0–6)
ERYTHROCYTE [DISTWIDTH] IN BLOOD BY AUTOMATED COUNT: 12.2 % (ref 11.6–15.1)
GFR SERPL CREATININE-BSD FRML MDRD: 99 ML/MIN/1.73SQ M
GLUCOSE SERPL-MCNC: 114 MG/DL (ref 65–140)
GLUCOSE UR STRIP-MCNC: NEGATIVE MG/DL
HCT VFR BLD AUTO: 46.3 % (ref 34.8–46.1)
HGB BLD-MCNC: 15.4 G/DL (ref 11.5–15.4)
HGB UR QL STRIP.AUTO: NEGATIVE
IMM GRANULOCYTES # BLD AUTO: 0.05 THOUSAND/UL (ref 0–0.2)
IMM GRANULOCYTES NFR BLD AUTO: 1 % (ref 0–2)
KETONES UR STRIP-MCNC: NEGATIVE MG/DL
LEUKOCYTE ESTERASE UR QL STRIP: NEGATIVE
LIPASE SERPL-CCNC: 183 U/L (ref 73–393)
LYMPHOCYTES # BLD AUTO: 1.42 THOUSANDS/ΜL (ref 0.6–4.47)
LYMPHOCYTES NFR BLD AUTO: 13 % (ref 14–44)
MCH RBC QN AUTO: 30.1 PG (ref 26.8–34.3)
MCHC RBC AUTO-ENTMCNC: 33.3 G/DL (ref 31.4–37.4)
MCV RBC AUTO: 91 FL (ref 82–98)
MONOCYTES # BLD AUTO: 0.56 THOUSAND/ΜL (ref 0.17–1.22)
MONOCYTES NFR BLD AUTO: 5 % (ref 4–12)
NEUTROPHILS # BLD AUTO: 8.55 THOUSANDS/ΜL (ref 1.85–7.62)
NEUTS SEG NFR BLD AUTO: 80 % (ref 43–75)
NITRITE UR QL STRIP: NEGATIVE
NRBC BLD AUTO-RTO: 0 /100 WBCS
PH UR STRIP.AUTO: 5.5 [PH] (ref 4.5–8)
PLATELET # BLD AUTO: 240 THOUSANDS/UL (ref 149–390)
PMV BLD AUTO: 11.3 FL (ref 8.9–12.7)
POTASSIUM SERPL-SCNC: 5.4 MMOL/L (ref 3.5–5.3)
PROT SERPL-MCNC: 8.3 G/DL (ref 6.4–8.2)
PROT UR STRIP-MCNC: NEGATIVE MG/DL
RBC # BLD AUTO: 5.11 MILLION/UL (ref 3.81–5.12)
SODIUM SERPL-SCNC: 137 MMOL/L (ref 136–145)
SP GR UR STRIP.AUTO: >=1.03 (ref 1–1.03)
UROBILINOGEN UR QL STRIP.AUTO: 0.2 E.U./DL
WBC # BLD AUTO: 10.73 THOUSAND/UL (ref 4.31–10.16)

## 2020-11-19 PROCEDURE — 80053 COMPREHEN METABOLIC PANEL: CPT | Performed by: EMERGENCY MEDICINE

## 2020-11-19 PROCEDURE — 99284 EMERGENCY DEPT VISIT MOD MDM: CPT | Performed by: EMERGENCY MEDICINE

## 2020-11-19 PROCEDURE — 99214 OFFICE O/P EST MOD 30 MIN: CPT | Performed by: FAMILY MEDICINE

## 2020-11-19 PROCEDURE — 85025 COMPLETE CBC W/AUTO DIFF WBC: CPT | Performed by: EMERGENCY MEDICINE

## 2020-11-19 PROCEDURE — 36415 COLL VENOUS BLD VENIPUNCTURE: CPT | Performed by: EMERGENCY MEDICINE

## 2020-11-19 PROCEDURE — 81003 URINALYSIS AUTO W/O SCOPE: CPT

## 2020-11-19 PROCEDURE — 83690 ASSAY OF LIPASE: CPT | Performed by: EMERGENCY MEDICINE

## 2020-11-19 PROCEDURE — 96361 HYDRATE IV INFUSION ADD-ON: CPT

## 2020-11-19 PROCEDURE — 96374 THER/PROPH/DIAG INJ IV PUSH: CPT

## 2020-11-19 PROCEDURE — 99284 EMERGENCY DEPT VISIT MOD MDM: CPT

## 2020-11-19 RX ORDER — SUCRALFATE 1 G/1
1 TABLET ORAL ONCE
Status: COMPLETED | OUTPATIENT
Start: 2020-11-19 | End: 2020-11-19

## 2020-11-19 RX ORDER — PANTOPRAZOLE SODIUM 20 MG/1
40 TABLET, DELAYED RELEASE ORAL DAILY
Qty: 20 TABLET | Refills: 0 | Status: SHIPPED | OUTPATIENT
Start: 2020-11-19 | End: 2020-12-15

## 2020-11-19 RX ORDER — SUCRALFATE ORAL 1 G/10ML
1 SUSPENSION ORAL 4 TIMES DAILY
Qty: 420 ML | Refills: 0 | Status: SHIPPED | OUTPATIENT
Start: 2020-11-19 | End: 2020-12-01 | Stop reason: SDUPTHER

## 2020-11-19 RX ORDER — MAGNESIUM HYDROXIDE/ALUMINUM HYDROXICE/SIMETHICONE 120; 1200; 1200 MG/30ML; MG/30ML; MG/30ML
30 SUSPENSION ORAL ONCE
Status: COMPLETED | OUTPATIENT
Start: 2020-11-19 | End: 2020-11-19

## 2020-11-19 RX ADMIN — FAMOTIDINE 20 MG: 10 INJECTION INTRAVENOUS at 04:12

## 2020-11-19 RX ADMIN — SODIUM CHLORIDE 1000 ML: 0.9 INJECTION, SOLUTION INTRAVENOUS at 04:12

## 2020-11-19 RX ADMIN — ALUMINUM HYDROXIDE, MAGNESIUM HYDROXIDE, AND SIMETHICONE 30 ML: 200; 200; 20 SUSPENSION ORAL at 04:13

## 2020-11-19 RX ADMIN — SUCRALFATE 1 G: 1 TABLET ORAL at 04:12

## 2020-11-24 ENCOUNTER — OFFICE VISIT (OUTPATIENT)
Dept: SLEEP CENTER | Facility: CLINIC | Age: 55
End: 2020-11-24
Payer: COMMERCIAL

## 2020-11-24 VITALS
DIASTOLIC BLOOD PRESSURE: 84 MMHG | SYSTOLIC BLOOD PRESSURE: 122 MMHG | BODY MASS INDEX: 39.27 KG/M2 | WEIGHT: 230 LBS | HEIGHT: 64 IN

## 2020-11-24 DIAGNOSIS — K21.9 GASTROESOPHAGEAL REFLUX DISEASE, UNSPECIFIED WHETHER ESOPHAGITIS PRESENT: ICD-10-CM

## 2020-11-24 DIAGNOSIS — F41.1 GENERALIZED ANXIETY DISORDER: ICD-10-CM

## 2020-11-24 DIAGNOSIS — Z01.810 PREOP CARDIOVASCULAR EXAM: ICD-10-CM

## 2020-11-24 DIAGNOSIS — I10 ESSENTIAL HYPERTENSION: ICD-10-CM

## 2020-11-24 DIAGNOSIS — G47.33 OSA (OBSTRUCTIVE SLEEP APNEA): Primary | ICD-10-CM

## 2020-11-24 DIAGNOSIS — E66.2 OBESITY HYPOVENTILATION SYNDROME (HCC): ICD-10-CM

## 2020-11-24 DIAGNOSIS — E66.9 OBESITY (BMI 30-39.9): ICD-10-CM

## 2020-11-24 PROCEDURE — 99244 OFF/OP CNSLTJ NEW/EST MOD 40: CPT | Performed by: INTERNAL MEDICINE

## 2020-12-01 ENCOUNTER — TELEPHONE (OUTPATIENT)
Dept: FAMILY MEDICINE CLINIC | Facility: CLINIC | Age: 55
End: 2020-12-01

## 2020-12-01 DIAGNOSIS — R10.10 PAIN OF UPPER ABDOMEN: ICD-10-CM

## 2020-12-01 RX ORDER — SUCRALFATE ORAL 1 G/10ML
1 SUSPENSION ORAL 4 TIMES DAILY
Qty: 420 ML | Refills: 0 | Status: SHIPPED | OUTPATIENT
Start: 2020-12-01 | End: 2020-12-01 | Stop reason: SDUPTHER

## 2020-12-01 RX ORDER — SUCRALFATE ORAL 1 G/10ML
1 SUSPENSION ORAL 4 TIMES DAILY
Qty: 420 ML | Refills: 0 | Status: SHIPPED | OUTPATIENT
Start: 2020-12-01 | End: 2020-12-15 | Stop reason: SDUPTHER

## 2020-12-13 ENCOUNTER — TELEPHONE (OUTPATIENT)
Dept: OTHER | Facility: OTHER | Age: 55
End: 2020-12-13

## 2020-12-13 DIAGNOSIS — K21.9 GASTROESOPHAGEAL REFLUX DISEASE: ICD-10-CM

## 2020-12-13 DIAGNOSIS — I10 ESSENTIAL HYPERTENSION: ICD-10-CM

## 2020-12-14 ENCOUNTER — TELEPHONE (OUTPATIENT)
Dept: BARIATRICS | Facility: CLINIC | Age: 55
End: 2020-12-14

## 2020-12-14 ENCOUNTER — TELEMEDICINE (OUTPATIENT)
Dept: FAMILY MEDICINE CLINIC | Facility: CLINIC | Age: 55
End: 2020-12-14
Payer: COMMERCIAL

## 2020-12-14 DIAGNOSIS — Z20.822 EXPOSURE TO COVID-19 VIRUS: Primary | ICD-10-CM

## 2020-12-14 DIAGNOSIS — Z20.822 EXPOSURE TO COVID-19 VIRUS: ICD-10-CM

## 2020-12-14 DIAGNOSIS — R10.10 PAIN OF UPPER ABDOMEN: ICD-10-CM

## 2020-12-14 PROCEDURE — U0003 INFECTIOUS AGENT DETECTION BY NUCLEIC ACID (DNA OR RNA); SEVERE ACUTE RESPIRATORY SYNDROME CORONAVIRUS 2 (SARS-COV-2) (CORONAVIRUS DISEASE [COVID-19]), AMPLIFIED PROBE TECHNIQUE, MAKING USE OF HIGH THROUGHPUT TECHNOLOGIES AS DESCRIBED BY CMS-2020-01-R: HCPCS | Performed by: INTERNAL MEDICINE

## 2020-12-14 PROCEDURE — 99212 OFFICE O/P EST SF 10 MIN: CPT | Performed by: INTERNAL MEDICINE

## 2020-12-14 RX ORDER — OMEPRAZOLE 40 MG/1
CAPSULE, DELAYED RELEASE ORAL
Qty: 90 CAPSULE | Refills: 0 | Status: SHIPPED | OUTPATIENT
Start: 2020-12-14 | End: 2021-03-11

## 2020-12-14 RX ORDER — LISINOPRIL 40 MG/1
TABLET ORAL
Qty: 90 TABLET | Refills: 3 | Status: SHIPPED | OUTPATIENT
Start: 2020-12-14 | End: 2021-08-18 | Stop reason: SDUPTHER

## 2020-12-15 ENCOUNTER — TELEPHONE (OUTPATIENT)
Dept: GASTROENTEROLOGY | Facility: MEDICAL CENTER | Age: 55
End: 2020-12-15

## 2020-12-15 ENCOUNTER — TELEMEDICINE (OUTPATIENT)
Dept: GASTROENTEROLOGY | Facility: MEDICAL CENTER | Age: 55
End: 2020-12-15
Attending: FAMILY MEDICINE
Payer: COMMERCIAL

## 2020-12-15 DIAGNOSIS — R79.89 LFT ELEVATION: ICD-10-CM

## 2020-12-15 DIAGNOSIS — R10.10 PAIN OF UPPER ABDOMEN: ICD-10-CM

## 2020-12-15 DIAGNOSIS — R10.12 LEFT UPPER QUADRANT ABDOMINAL PAIN: ICD-10-CM

## 2020-12-15 DIAGNOSIS — K21.9 GASTROESOPHAGEAL REFLUX DISEASE, UNSPECIFIED WHETHER ESOPHAGITIS PRESENT: ICD-10-CM

## 2020-12-15 PROCEDURE — 99214 OFFICE O/P EST MOD 30 MIN: CPT | Performed by: INTERNAL MEDICINE

## 2020-12-15 RX ORDER — SUCRALFATE ORAL 1 G/10ML
1 SUSPENSION ORAL 4 TIMES DAILY
Qty: 420 ML | Refills: 0 | Status: SHIPPED | OUTPATIENT
Start: 2020-12-15 | End: 2020-12-18

## 2020-12-16 ENCOUNTER — DOCUMENTATION (OUTPATIENT)
Dept: GASTROENTEROLOGY | Facility: MEDICAL CENTER | Age: 55
End: 2020-12-16

## 2020-12-16 ENCOUNTER — TELEPHONE (OUTPATIENT)
Dept: OTHER | Facility: OTHER | Age: 55
End: 2020-12-16

## 2020-12-16 LAB — SARS-COV-2 RNA SPEC QL NAA+PROBE: NOT DETECTED

## 2020-12-18 ENCOUNTER — TELEPHONE (OUTPATIENT)
Dept: GASTROENTEROLOGY | Facility: MEDICAL CENTER | Age: 55
End: 2020-12-18

## 2020-12-18 DIAGNOSIS — K21.9 GASTROESOPHAGEAL REFLUX DISEASE, UNSPECIFIED WHETHER ESOPHAGITIS PRESENT: Primary | ICD-10-CM

## 2020-12-18 RX ORDER — SUCRALFATE 1 G/1
1 TABLET ORAL 4 TIMES DAILY
Qty: 120 TABLET | Refills: 2 | Status: SHIPPED | OUTPATIENT
Start: 2020-12-18 | End: 2021-03-05 | Stop reason: SDUPTHER

## 2020-12-22 ENCOUNTER — TELEPHONE (OUTPATIENT)
Dept: GASTROENTEROLOGY | Facility: MEDICAL CENTER | Age: 55
End: 2020-12-22

## 2020-12-23 ENCOUNTER — DOCUMENTATION (OUTPATIENT)
Dept: FAMILY MEDICINE CLINIC | Facility: CLINIC | Age: 55
End: 2020-12-23

## 2020-12-28 ENCOUNTER — TELEPHONE (OUTPATIENT)
Dept: FAMILY MEDICINE CLINIC | Facility: CLINIC | Age: 55
End: 2020-12-28

## 2020-12-28 DIAGNOSIS — Z20.822 CLOSE EXPOSURE TO COVID-19 VIRUS: ICD-10-CM

## 2020-12-28 DIAGNOSIS — Z20.822 CLOSE EXPOSURE TO COVID-19 VIRUS: Primary | ICD-10-CM

## 2020-12-28 PROCEDURE — U0003 INFECTIOUS AGENT DETECTION BY NUCLEIC ACID (DNA OR RNA); SEVERE ACUTE RESPIRATORY SYNDROME CORONAVIRUS 2 (SARS-COV-2) (CORONAVIRUS DISEASE [COVID-19]), AMPLIFIED PROBE TECHNIQUE, MAKING USE OF HIGH THROUGHPUT TECHNOLOGIES AS DESCRIBED BY CMS-2020-01-R: HCPCS | Performed by: FAMILY MEDICINE

## 2020-12-29 ENCOUNTER — TELEPHONE (OUTPATIENT)
Dept: GASTROENTEROLOGY | Facility: MEDICAL CENTER | Age: 55
End: 2020-12-29

## 2020-12-29 LAB — SARS-COV-2 RNA SPEC QL NAA+PROBE: NOT DETECTED

## 2020-12-30 ENCOUNTER — ANESTHESIA EVENT (OUTPATIENT)
Dept: GASTROENTEROLOGY | Facility: HOSPITAL | Age: 55
End: 2020-12-30

## 2020-12-31 ENCOUNTER — TELEPHONE (OUTPATIENT)
Dept: GASTROENTEROLOGY | Facility: MEDICAL CENTER | Age: 55
End: 2020-12-31

## 2020-12-31 ENCOUNTER — HOSPITAL ENCOUNTER (OUTPATIENT)
Dept: GASTROENTEROLOGY | Facility: HOSPITAL | Age: 55
Setting detail: OUTPATIENT SURGERY
Discharge: HOME | End: 2020-12-31
Attending: INTERNAL MEDICINE
Payer: COMMERCIAL

## 2020-12-31 ENCOUNTER — ANESTHESIA (OUTPATIENT)
Dept: GASTROENTEROLOGY | Facility: HOSPITAL | Age: 55
End: 2020-12-31

## 2020-12-31 VITALS
HEIGHT: 64 IN | HEART RATE: 82 BPM | TEMPERATURE: 98.4 F | BODY MASS INDEX: 39.27 KG/M2 | DIASTOLIC BLOOD PRESSURE: 61 MMHG | SYSTOLIC BLOOD PRESSURE: 107 MMHG | RESPIRATION RATE: 22 BRPM | OXYGEN SATURATION: 95 % | WEIGHT: 230 LBS

## 2020-12-31 VITALS — HEART RATE: 88 BPM

## 2020-12-31 DIAGNOSIS — K21.9 GASTROESOPHAGEAL REFLUX DISEASE, UNSPECIFIED WHETHER ESOPHAGITIS PRESENT: ICD-10-CM

## 2020-12-31 DIAGNOSIS — R10.10 PAIN OF UPPER ABDOMEN: ICD-10-CM

## 2020-12-31 PROCEDURE — 88305 TISSUE EXAM BY PATHOLOGIST: CPT | Performed by: PATHOLOGY

## 2020-12-31 PROCEDURE — 43239 EGD BIOPSY SINGLE/MULTIPLE: CPT | Performed by: INTERNAL MEDICINE

## 2020-12-31 PROCEDURE — 43251 EGD REMOVE LESION SNARE: CPT | Performed by: INTERNAL MEDICINE

## 2020-12-31 RX ORDER — PROPOFOL 10 MG/ML
INJECTION, EMULSION INTRAVENOUS AS NEEDED
Status: DISCONTINUED | OUTPATIENT
Start: 2020-12-31 | End: 2020-12-31

## 2020-12-31 RX ORDER — SODIUM CHLORIDE 9 MG/ML
125 INJECTION, SOLUTION INTRAVENOUS CONTINUOUS
Status: DISCONTINUED | OUTPATIENT
Start: 2020-12-31 | End: 2021-01-04 | Stop reason: HOSPADM

## 2020-12-31 RX ORDER — LIDOCAINE HYDROCHLORIDE 20 MG/ML
INJECTION, SOLUTION EPIDURAL; INFILTRATION; INTRACAUDAL; PERINEURAL AS NEEDED
Status: DISCONTINUED | OUTPATIENT
Start: 2020-12-31 | End: 2020-12-31

## 2020-12-31 RX ADMIN — PROPOFOL 50 MG: 10 INJECTION, EMULSION INTRAVENOUS at 10:56

## 2020-12-31 RX ADMIN — PROPOFOL 150 MG: 10 INJECTION, EMULSION INTRAVENOUS at 10:44

## 2020-12-31 RX ADMIN — PROPOFOL 50 MG: 10 INJECTION, EMULSION INTRAVENOUS at 10:46

## 2020-12-31 RX ADMIN — PROPOFOL 50 MG: 10 INJECTION, EMULSION INTRAVENOUS at 10:53

## 2020-12-31 RX ADMIN — PROPOFOL 50 MG: 10 INJECTION, EMULSION INTRAVENOUS at 10:49

## 2020-12-31 RX ADMIN — LIDOCAINE HYDROCHLORIDE 50 MG: 20 INJECTION, SOLUTION EPIDURAL; INFILTRATION; INTRACAUDAL; PERINEURAL at 10:44

## 2020-12-31 RX ADMIN — PROPOFOL 50 MG: 10 INJECTION, EMULSION INTRAVENOUS at 10:58

## 2020-12-31 RX ADMIN — SODIUM CHLORIDE 125 ML/HR: 0.9 INJECTION, SOLUTION INTRAVENOUS at 10:34

## 2020-12-31 NOTE — DISCHARGE INSTRUCTIONS
Endoscopia superior   LO QUE NECESITA SABER:   Grace endoscopia superior también se conoce raciel endoscopia gastrointestinal (GI) superior o esofagogastroduodenoscopia (EGD)  Usted podría sentirse inflamado, con gases o sentir molestia abdominal después de ordaz procedimiento  Ordaz garganta podría estar adolorida por 24 a 36 horas  Usted podría eructar o expulsar gas debido al aire que todavía está en ordaz cuerpo  INSTRUCCIONES SOBRE EL JOSE HOSPITALARIA:   Llame al 911 en manny de presentar lo siguiente:   · Tiene dolor en el pecho o dificultad para respirar de forma repentina  Busque atención médica de inmediato si:   · Está mareado o siente que se va a desmayar  · Usted tiene dificultad para tragar  · Marycruz evacuaciones intestinales son Shirlean Resides o negras  · Ordaz abdomen está elba y firme y usted siente dolor intenso  · Usted vomita jazlyn  Pregúntele a ordaz [place] vitaminas y minerales son adecuados para usted  · Usted se siente lleno o hinchado y no puede eructar o expulsar gas  · Usted no ha tenido grace evacuación intestinal después de 3 días de ordaz procedimiento  · Usted tiene dolor de clarissa  · Usted tiene fiebre o escalofríos  · Usted tiene náuseas o está vomitando  · Usted tiene salpullido o urticaria  · Usted tiene preguntas o inquietudes acerca de ordaz endoscopia  Alivie el dolor de garganta:  Chupe pastillas para la garganta o hielo triturado  Jasmina gárgaras con grace pequeña cantidad de agua tibia con sal  Mezcle 1 cucharadita de sal y 1 taza de agua tibia para hacer agua salada  Alivie el gas y la molestia de la inflamación:  Acuéstese sobre ordaz costado derecho con grace almohada térmica sobre ordaz abdomen  Camine un poco para ayudar a expulsar el gas  Coma comidas pequeñas hasta que se alivie de la inflamación  Descanse después de ordaz procedimiento:  A usted le love administrado medicamento para relajarse   No  maneje o tome decisiones importantes hasta el día siguiente de ordaz procedimiento  Regrese a michael actividades normales según le indiquen  Usted generalmente puede regresar al [place] al día siguiente de ordaz procedimiento  Acuda a michael consultas de control con ordaz médico según le indicaron  Anote michael preguntas para que se acuerde de hacerlas leta michael visitas  © 2017 dick Price  Information is for End User's use only and may not be sold, redistributed or otherwise used for commercial purposes  All illustrations and images included in CareNotes® are the copyrighted property of [employer]  or Leonardo Crouch  Esta información es sólo para uso en educación  Ordaz intención no es darle un consejo médico sobre enfermedades o tratamientos  Colsulte con ordaz Katharine Mauricio farmacéutico antes de seguir cualquier régimen médico para saber si es seguro y efectivo para usted  Pólipos Gástricos   LO QUE NECESITA SABER:   ¿Qué son los pólipos gástricos? Los pólipos gástricos son crecimientos que se laura en el revestimiento de ordaz estómago  No son cancerosos, valentino ciertos tipos de pólipos pueden convertirse en cáncer  ¿Qué me pone en riesgo de pólipos gástricos? · La gastritis crónica a causa del uso de AINEs o de úlceras    · El uso a melvin plazo de los medicamentos inhibidores de la bomba de protones (utilizado para disminuir la acidez estomacal)    · Amie infección en ordaz estómago a causa de la bacteria H  pylori    ¿Cuáles son los síntomas de los pólipos gástricos? Es posible que usted no presente síntomas  Los pólipos grandes podrían provocar lo siguiente:  · Dolor abdominal    · Indigestión    · Vómitos después de las comidas o vómito con jazlyn    · Movimientos intestinales oscuros o con jazlyn    ¿Cómo se diagnostican los pólipos gástricos? Los pólipos gástricos usualmente son encontrados leta amie endoscopía por alguna otra razón  Todos los pólipos o amie parte de ellos se removerán leta el examen   Ordaz médico también podría remover tejido de [employer]  Los pólipos y el tejido se mandan al laboratorio para ordaz examinación  ¿Cómo se tratan los pólipos gástricos? Algunos tipos de pólipos desaparecen por sí solos  Otros tipos podrían ser removidos si son grandes, si tiene síntomas o si se encuentran células anormales  Los pólipos grandes y las células anormales aumentan ordaz riesgo de cáncer  Es posible que también necesite antibióticos si tiene amie infección a causa de la bacteria H pylori  Amie parte de ordaz estómago podría ser removida si los pólipos no pueden ser removidos y si se encuentran células anormales  ¿Cuándo emmanuelle buscar atención inmediata? · Ordaz vómito tiene [place]  · Usted tiene evacuaciones intestinales oscuras o con jazlyn  · Usted tiene dolor severo en ordaz abdomen que no desaparece después de linda medicamento  ¿Cuándo emmanuelle comunicarme con mi médico?  · Usted tiene indigestión que no desaparece con tratamiento  · [place] comidas  · Usted tiene preguntas o inquietudes acerca de ordaz condición o cuidado  ACUERDOS SOBRE ORDAZ CUIDADO:   Usted tiene el derecho de ayudar a planear ordaz cuidado  Aprenda todo lo que pueda sobre ordaz condición y raciel darle tratamiento  Discuta michael opciones de tratamiento con michael médicos para decidir el cuidado que usted desea recibir  Usted siempre tiene el derecho de rechazar el tratamiento  Esta información es sólo para uso en educación  Ordaz intención no es darle un consejo médico sobre enfermedades o tratamientos  Colsulte con ordaz Jef Older farmacéutico antes de seguir cualquier régimen médico para saber si es seguro y efectivo para usted  © Copyright [address] Information is for End User's use only and may not be sold, redistributed or otherwise used for commercial purposes   All illustrations and images included in CareNotes® are the copyrighted property of A D A M , Inc  or [facility]      Hernia hiatal   LO QUE NECESITA SABER:   ¿Qué es amie hernia hiatal? Amie hernia hiatal es amie condición que provoca que parte de ordaz estómago se abulte a través del hiato (apertura pequeña) en ordaz diafragma  La parte del estómago podría moverse hacia arriba y [place], o podría quedarse atrapado en el diafragma  ¿Qué aumenta mi riesgo de tener amie hernia hiatal? La causa exacta de amie hernia hiatal es desconocida  Usted pudo yoselyn nacido con un hiato oleg  Lo siguiente podría aumentar ordaz riesgo de amie hernia hiatal:  · Obesidad    · Edad avanzada    · Condiciones médicas raciel diverticulosis o esofagitis    · Cirugías del esófago o del estómago previas o trauma raciel de un accidente automovilístico    ¿Cuáles son los tipos de hernia hiatal?  · Tipo I (hernia hiatal por deslizamiento): Amie porción del estómago se desliza dentro y fuera del hiato  Sofía tipo es el más común y generalmente provoca la enfermedad por reflujo gastroesofágico ([place])  El ERGE ocurre cuando el esfínter esofágico no dianna apropiadamente y provoca reflujo de ácido  El esfínter esofágico es el músculo más bajo del esófago  · Tipo II (hernia hiatal paraesofágica): El tipo II de hernia hiatal se forma cuando amie parte del estómago pasa por el hiato y se coloca junto al esófago  · Tipo III (combinada): El tipo III de hernia hiatal es amie combinación de amie hernia hiatal por deslizamiento y [place] paraesofágica  · Tipo IV (hernia hiatal compleja paraesofágica): El [address], los intestinos mercedes y grueso, el bazo, el páncreas o el hígado son empujados hacia arriba dentro de ordaz pecho  ¿Cuáles son los signos y síntomas de amie hernia hiatal? La acidez es el síntoma más común de amie hernia hiatal  [place] ocurre generalmente después de las comidas y se extiende al clarissa, a la mandíbula o al hombro   Es posible que no presente ningún signo ni síntoma o que tenga alguno de los siguientes:  · Dolor abdominal, especialmente en el área mat arriba del ombligo    · Sabor amargo o ácido en ordaz boca    · Dificultad para tragar    · Tos o ronquera    · Dolor en el pecho o falta de aliento que ocurre después de comer    · Eructar o tener hipo con frecuencia    · Sensación de incomodidad o de estar lleno después de comer    ¿Cómo se diagnostica amie hernia hiatal?  · Examen de las series gastrointestinales (GI) superiores incluye radiografías de ordaz esófago, estómago y de michael intestinos delgados  También se conoce raciel examen de deglución de bario  A usted le darán bario (líquido calcáreo) para linda antes de que le tomen las imágenes  Sofía líquido [place] a que ordaz estómago e intestinos se vean mejor en las radiografías  Un examen de las series GI superiores puede mostrar si usted tiene Gian, obstrucción en un intestinos u otros problemas  · Amie endoscopia o endoscopia esofagogastroduodenal utiliza un endoscopio para observar en el interior de ordaz tracto digestivo  Un endoscopio es un tubo melvin y flexible con amie dl en el extremo  Es posible que se conecte amie cámara al endoscopio para linda imágenes  ¿Cómo se trata amie hernia hiatal? El tratamiento depende del tipo de hernia hiatal que usted tenga y de michael síntomas  Es posible que no necesite ningún tratamiento  Es posible que usted necesite alguno de los siguientes:  · Los medicamentos podrían administrase para aliviar los síntomas de la [place]  Estos medicamentos ayudan a disminuir u obstruir el ácido estomacal  Es posible que también le den medicamentos ayudan a hacer más estrecho el esfínter esofágico    · La cirugía podría realizarse cuando los medicamentos no puedan controlar michael síntomas u otros problemas están presentes  Ordaz médico también podría sugerir la cirugía dependiendo del tipo de hernia que usted tenga  Ordaz médico puede poner ordaz estómago en la posición normal  Él podría hacer el hiato (agujero) más pequeño y atar ordaz estómago a ordaz abdomen   La funduplicatura es amie cirugía en donde la parte superior del estómago se enrolla alrededor del esfínter esofágico para reforzarlo  ¿Cómo puedo controlar mis síntomas? La siguiente nutrición y cambios en el estilo de kathy podrían se recomendados para aliviar marycruz síntomas de [place]  · Evite los alimentos que empeoran marycruz síntomas  Estos podrían [employer], jugos de fruta, alcohol, cafeína, chocolate y [place]  · Coma porciones pequeñas leta el día  Las porciones [employer] dan a ordaz estómago menos alimentos que digerir  · Evite acostarse e inclinarse después de comer  No consuma alimentos entre 2 y 3 horas antes de [place]  [place] disminuye ordaz riesgo de reflujo  · Mantenga un peso saludable  Si usted tiene sobrepeso, la pérdida de peso podría ayudarle a [employer]  · Duerma con ordaz geovanni elevada al menos 6 pulgadas  · No fume  El fumar puede aumentar marycruz síntomas de [place]  ¿Cuándo emmanuelle buscar atención inmediata? · Usted tiene dolor abdominal intenso  · Usted trata de vomitar valentino no sale nada  · Usted siente dolor deshawn en el pecho y dificultad repentina para respirar  · Marycruz heces son negras o tienen jazlyn  · Ordaz vómito parece raciel café molido o contiene jazlyn  ¿Cuándo emmanuelle comunicarme con mi médico?  · Marycruz síntomas están empeorando  · Usted tiene náusea y está vomitando  · Usted pierde peso sin proponérselo  · Usted tiene preguntas o inquietudes acerca de ordaz condición o cuidado  ACUERDOS SOBRE ORDAZ CUIDADO:   Usted tiene el derecho de ayudar a planear ordaz cuidado  Aprenda todo lo que pueda sobre ordaz condición y raciel darle tratamiento  Discuta marycruz opciones de tratamiento con marycruz médicos para decidir el cuidado que usted desea recibir  Usted siempre tiene el derecho de rechazar el tratamiento  Esta información es sólo para uso en educación  Ordaz intención no es darle un consejo médico sobre enfermedades o tratamientos   Colsulte con ordaz Abbey Adi farmacéutico antes de seguir cualquier régimen médico para saber si es seguro y efectivo para usted  © Copyright [address] Information is for End User's use only and may not be sold, redistributed or otherwise used for commercial purposes   All illustrations and images included in CareNotes® are the copyrighted property of A D A M , Inc  or [address] [place] [address]

## 2020-12-31 NOTE — H&P
H&P EXAM - Outpatient Endoscopy   Gloria Fairbanks 63 y o  female MRN: [id number]    [facility] GI LAB PRE/POST   Encounter: [id number]        History and Physical - [facility]  Gloria Fairbanks 63 y o  female MRN: [id number]                  HPI: Fransisca Foley is a 63y o  year old female who presents for gerd, abdominal pain      REVIEW OF SYSTEMS: Per the HPI, and otherwise unremarkable      Historical Information   Past Medical History:   Diagnosis Date    Acid reflux     Anxiety     Hypertension     Obesity     Palpable abdominal mass     LAST ASSESSED: 17     Past Surgical History:   Procedure Laterality Date    ABDOMINAL SURGERY      AUGMENTATION MAMMAPLASTY Bilateral     Bi retro pectoral saline    BREAST SURGERY      REDUCTION PROCEDURE    CHOLECYSTECTOMY LAPAROSCOPIC      LIVER SURGERY       Social History   Social History     Substance and Sexual Activity   Alcohol Use No     Social History     Substance and Sexual Activity   Drug Use No     Social History     Tobacco Use   Smoking Status Former Smoker    Quit date: 2013    Years since quittin 9   Smokeless Tobacco Never Used     Family History   Problem Relation Age of Onset    Pancreatic cancer Mother 67    Prostate cancer Father 61    No Known Problems Sister     No Known Problems Daughter     No Known Problems Maternal Grandmother     No Known Problems Maternal Grandfather     No Known Problems Paternal Grandmother     No Known Problems Paternal Grandfather     No Known Problems Sister     No Known Problems Daughter     No Known Problems Maternal Aunt     No Known Problems Maternal Aunt     No Known Problems Paternal Aunt     No Known Problems Paternal Aunt     No Known Problems Paternal Aunt        Meds/Allergies     (Not in a hospital admission)      Allergies   Allergen Reactions    Prednisone Shortness Of Breath    Alprazolam     Duloxetine     Escitalopram     Latex Rash    Omnipaque [Iohexol]      Pt erupted with rash all over body    Shrimp Flavor [Flavoring Agent] Rash       Objective     LMP 08/03/2018 (Approximate)       PHYSICAL EXAM    Gen: NAD  CV: RRR  CHEST: Clear  ABD: soft, NT/ND  EXT: no edema      ASSESSMENT/PLAN:  This is a 63y o  year old female here for egd, and she is stable and optimized for her procedure

## 2021-01-06 ENCOUNTER — TELEPHONE (OUTPATIENT)
Dept: GASTROENTEROLOGY | Facility: CLINIC | Age: 56
End: 2021-01-06

## 2021-01-06 NOTE — TELEPHONE ENCOUNTER
----- Message from Cm Woodward MA sent at 1/5/2021  6:14 PM EST -----    ----- Message -----  From: Evander Najjar, MD  Sent: 1/5/2021   2:35 PM EST  To: , #    Results were reviewed and other than mild gastritis the results are normal, the polyps in the stomach are not precancerous, please make patient aware, thanks

## 2021-01-08 ENCOUNTER — OFFICE VISIT (OUTPATIENT)
Dept: BARIATRICS | Facility: CLINIC | Age: 56
End: 2021-01-08
Payer: COMMERCIAL

## 2021-01-08 VITALS
DIASTOLIC BLOOD PRESSURE: 98 MMHG | SYSTOLIC BLOOD PRESSURE: 140 MMHG | WEIGHT: 233.5 LBS | HEART RATE: 72 BPM | TEMPERATURE: 98.4 F | BODY MASS INDEX: 39.86 KG/M2 | RESPIRATION RATE: 20 BRPM | HEIGHT: 64 IN

## 2021-01-08 DIAGNOSIS — K21.9 GASTROESOPHAGEAL REFLUX DISEASE, UNSPECIFIED WHETHER ESOPHAGITIS PRESENT: ICD-10-CM

## 2021-01-08 DIAGNOSIS — K76.0 HEPATIC STEATOSIS: ICD-10-CM

## 2021-01-08 DIAGNOSIS — F41.1 GENERALIZED ANXIETY DISORDER: ICD-10-CM

## 2021-01-08 DIAGNOSIS — I10 ESSENTIAL HYPERTENSION: ICD-10-CM

## 2021-01-08 DIAGNOSIS — M54.17 RADICULAR PAIN OF LUMBOSACRAL REGION: ICD-10-CM

## 2021-01-08 DIAGNOSIS — E66.01 OBESITY, CLASS III, BMI 40-49.9 (MORBID OBESITY) (HCC): Primary | ICD-10-CM

## 2021-01-08 DIAGNOSIS — R79.89 LFT ELEVATION: ICD-10-CM

## 2021-01-08 PROBLEM — E66.813 OBESITY, CLASS III, BMI 40-49.9 (MORBID OBESITY): Status: ACTIVE | Noted: 2021-01-08

## 2021-01-08 PROCEDURE — 99204 OFFICE O/P NEW MOD 45 MIN: CPT | Performed by: SURGERY
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
## 2021-01-18 ENCOUNTER — HOSPITAL ENCOUNTER (OUTPATIENT)
Dept: RADIOLOGY | Facility: HOSPITAL | Age: 56
Discharge: HOME | End: 2021-01-18
Attending: SURGERY
Payer: COMMERCIAL

## 2021-01-18 DIAGNOSIS — R79.89 LFT ELEVATION: ICD-10-CM

## 2021-01-18 DIAGNOSIS — K76.0 HEPATIC STEATOSIS: ICD-10-CM

## 2021-01-18 DIAGNOSIS — I10 ESSENTIAL HYPERTENSION: ICD-10-CM

## 2021-01-18 DIAGNOSIS — K21.9 GASTROESOPHAGEAL REFLUX DISEASE, UNSPECIFIED WHETHER ESOPHAGITIS PRESENT: ICD-10-CM

## 2021-01-18 DIAGNOSIS — E66.01 OBESITY, CLASS III, BMI 40-49.9 (MORBID OBESITY) (HCC): ICD-10-CM

## 2021-01-18 PROCEDURE — 74240 X-RAY XM UPR GI TRC 1CNTRST: CPT

## 2021-01-19 ENCOUNTER — TELEPHONE (OUTPATIENT)
Dept: SLEEP CENTER | Facility: CLINIC | Age: 56
End: 2021-01-19

## 2021-01-19 DIAGNOSIS — G47.33 OSA (OBSTRUCTIVE SLEEP APNEA): Primary | ICD-10-CM

## 2021-01-19 NOTE — TELEPHONE ENCOUNTER
Patient insurance denied the in lab sleep study, do you want a home sleep study or a peer to peer? ? Thank you!

## 2021-01-20 ENCOUNTER — TRANSCRIBE ORDERS (OUTPATIENT)
Dept: SLEEP CENTER | Facility: CLINIC | Age: 56
End: 2021-01-20

## 2021-02-10 ENCOUNTER — TELEPHONE (OUTPATIENT)
Dept: BARIATRICS | Facility: CLINIC | Age: 56
End: 2021-02-10

## 2021-02-10 NOTE — TELEPHONE ENCOUNTER
RIKKIOM in [place], her 's voice was on the voicemail prompt  I advised that Raiza Reddy is set-up for tomorrow's visit as a virtual-visit  If any problems, questions or concerns, he or she may call me back   nw

## 2021-02-11 ENCOUNTER — OFFICE VISIT (OUTPATIENT)
Dept: BARIATRICS | Facility: CLINIC | Age: 56
End: 2021-02-11

## 2021-02-11 VITALS — WEIGHT: 232.3 LBS | HEIGHT: 64 IN | BODY MASS INDEX: 39.66 KG/M2

## 2021-02-11 DIAGNOSIS — E66.9 OBESITY, CLASS I, BMI 30-34.9: Primary | ICD-10-CM

## 2021-02-11 DIAGNOSIS — E66.01 OBESITY, CLASS III, BMI 40-49.9 (MORBID OBESITY) (HCC): ICD-10-CM

## 2021-02-11 PROCEDURE — RECHECK: Performed by: SOCIAL WORKER
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
## 2021-02-12 ENCOUNTER — TELEPHONE (OUTPATIENT)
Dept: BARIATRICS | Facility: CLINIC | Age: 56
End: 2021-02-12

## 2021-02-12 ENCOUNTER — HOSPITAL ENCOUNTER (OUTPATIENT)
Dept: SLEEP CENTER | Facility: CLINIC | Age: 56
Discharge: HOME | End: 2021-02-12
Payer: COMMERCIAL

## 2021-02-12 DIAGNOSIS — G47.33 OSA (OBSTRUCTIVE SLEEP APNEA): ICD-10-CM

## 2021-02-12 PROCEDURE — G0399 HOME SLEEP TEST/TYPE 3 PORTA: HCPCS

## 2021-02-12 NOTE — TELEPHONE ENCOUNTER
I spoke with patient  She was quite concerned that she had not gotten any results to the UGI Dr Janes Marion had ordered and was hoping she could still have the surgery  I gave her the results as listed in the impression of a small hiatal hernia, otherwise unremarkable findings  She wasn't sure why she was coming in again in two weeks to see Macy Patel, I told her I didn't know either but reminded her of the date and told her that would still be weight check #3  I scheduled her for 3-4-20 for 4/6 [place]-Weight Check with Oxana Rodriguez  She goes to [place] March 10-25th  I told her to call me when back from her trip to schedule her next appt    She agreed

## 2021-02-13 NOTE — PROGRESS NOTES
Home Sleep Study Documentation    Pre-Sleep Home Study:    Set-up and instructions performed by: TONA Nelson    Technician performed demonstration for Patient: yes    Return demonstration performed by Patient: yes    Written instructions provided to Patient: yes    Patient signed consent form: yes        Post-Sleep Home Study:    Additional comments by Patient: none    Home Sleep Study Failed:no:    Failure reason: N/A    Reported or Detected: N/A    Scored by: DANIEL Tellez RPSGT

## 2021-02-17 DIAGNOSIS — E66.2 OBESITY HYPOVENTILATION SYNDROME (HCC): Primary | ICD-10-CM

## 2021-02-17 DIAGNOSIS — G47.33 OSA (OBSTRUCTIVE SLEEP APNEA): ICD-10-CM

## 2021-02-18 ENCOUNTER — TELEPHONE (OUTPATIENT)
Dept: SLEEP CENTER | Facility: CLINIC | Age: 56
End: 2021-02-18

## 2021-02-18 DIAGNOSIS — Z20.822 ENCOUNTER FOR PREPROCEDURE SCREENING LABORATORY TESTING FOR COVID-19: Primary | ICD-10-CM

## 2021-02-18 DIAGNOSIS — Z01.812 ENCOUNTER FOR PREPROCEDURE SCREENING LABORATORY TESTING FOR COVID-19: Primary | ICD-10-CM

## 2021-02-18 NOTE — TELEPHONE ENCOUNTER
Via  # [id number]  Message left for the patient  That sleep study results are available and she is scheduled for CPAP titration study on 4/8/2021  Appointment details left for the patient  Letter sent with COVID test instructions    Sleep study shows severe NITISH     Patient is scheduled for CPAP titration study on 4/8/2021  New COVID order placed, to be completed on 3/29/2021 for sleep study on 4/8/2021

## 2021-02-25 ENCOUNTER — TELEMEDICINE (OUTPATIENT)
Dept: BARIATRICS | Facility: CLINIC | Age: 56
End: 2021-02-25

## 2021-02-25 DIAGNOSIS — E66.9 OBESITY, CLASS I, BMI 30-34.9: Primary | ICD-10-CM

## 2021-02-25 PROCEDURE — RECHECK: Performed by: SOCIAL WORKER

## 2021-02-25 NOTE — PROGRESS NOTES
This appointment was done by phone  is a weight check 4 of 6  Pt  Did not weigh herself today  Nuha Murillo  is a 63 y o    female    :  1965   Patient presented for behavioral health follow up for the bariatric program   We reviewed the workflow  Last time patient was coping with helping her spouse understand the benefits and risks of surgery  Patient has been working on making behavioral changes to prepare for surgery  Currently patient is having success with cutting out sweets  Patient is struggling with drinking water  She does not like it  Discussion around drinking more water instead of juices and Gatorade She is going to [facility] on 3/10 and we made plan for eating htere by eating slower  Plan for positive outcomes includes comping back from her trip with no weight gain  Patient is making progress toward surgery and next appointment is scheduled for 3/4/21 with RD  Goals 1  Water with lemon  2 Eat slowly      Macarena Jason, MSW, LCSW  _____________________________

## 2021-03-03 ENCOUNTER — TELEPHONE (OUTPATIENT)
Dept: FAMILY MEDICINE CLINIC | Facility: CLINIC | Age: 56
End: 2021-03-03

## 2021-03-03 DIAGNOSIS — Z11.52 ENCOUNTER FOR SCREENING FOR COVID-19: Primary | ICD-10-CM

## 2021-03-03 NOTE — TELEPHONE ENCOUNTER
Patient need all 3 covid test results  for herself Alexander Clifford 6/27/1948 and for Sasha Robles 8[birth year]1976 send to her email address [email or web address]  on 3/9/2021 before they travel   They leave on 3/10/2021 at 5pm

## 2021-03-04 ENCOUNTER — OFFICE VISIT (OUTPATIENT)
Dept: BARIATRICS | Facility: CLINIC | Age: 56
End: 2021-03-04

## 2021-03-04 VITALS — BODY MASS INDEX: 39.63 KG/M2 | WEIGHT: 232.1 LBS | HEIGHT: 64 IN

## 2021-03-04 DIAGNOSIS — E66.01 OBESITY, CLASS III, BMI 40-49.9 (MORBID OBESITY) (HCC): Primary | ICD-10-CM

## 2021-03-04 PROCEDURE — RECHECK

## 2021-03-05 DIAGNOSIS — K21.9 GASTROESOPHAGEAL REFLUX DISEASE, UNSPECIFIED WHETHER ESOPHAGITIS PRESENT: ICD-10-CM

## 2021-03-05 RX ORDER — SUCRALFATE 1 G/1
1 TABLET ORAL 4 TIMES DAILY
Qty: 120 TABLET | Refills: 2 | Status: SHIPPED | OUTPATIENT
Start: 2021-03-05 | End: 2021-08-03 | Stop reason: ALTCHOICE

## 2021-03-08 ENCOUNTER — CLINICAL SUPPORT (OUTPATIENT)
Dept: FAMILY MEDICINE CLINIC | Facility: CLINIC | Age: 56
End: 2021-03-08

## 2021-03-08 DIAGNOSIS — Z11.52 ENCOUNTER FOR SCREENING FOR COVID-19: ICD-10-CM

## 2021-03-08 PROCEDURE — U0005 INFEC AGEN DETEC AMPLI PROBE: HCPCS | Performed by: FAMILY MEDICINE

## 2021-03-08 PROCEDURE — U0003 INFECTIOUS AGENT DETECTION BY NUCLEIC ACID (DNA OR RNA); SEVERE ACUTE RESPIRATORY SYNDROME CORONAVIRUS 2 (SARS-COV-2) (CORONAVIRUS DISEASE [COVID-19]), AMPLIFIED PROBE TECHNIQUE, MAKING USE OF HIGH THROUGHPUT TECHNOLOGIES AS DESCRIBED BY CMS-2020-01-R: HCPCS | Performed by: FAMILY MEDICINE

## 2021-03-09 LAB — SARS-COV-2 RNA RESP QL NAA+PROBE: NEGATIVE

## 2021-03-10 DIAGNOSIS — Z23 ENCOUNTER FOR IMMUNIZATION: ICD-10-CM

## 2021-03-11 ENCOUNTER — PATIENT MESSAGE (OUTPATIENT)
Dept: BARIATRICS | Facility: CLINIC | Age: 56
End: 2021-03-11

## 2021-03-11 DIAGNOSIS — K21.9 GASTROESOPHAGEAL REFLUX DISEASE: ICD-10-CM

## 2021-03-11 RX ORDER — OMEPRAZOLE 40 MG/1
CAPSULE, DELAYED RELEASE ORAL
Qty: 90 CAPSULE | Refills: 0 | Status: SHIPPED | OUTPATIENT
Start: 2021-03-11 | End: 2021-04-30

## 2021-03-15 DIAGNOSIS — I10 ESSENTIAL HYPERTENSION: ICD-10-CM

## 2021-03-15 DIAGNOSIS — Z01.810 PREOP CARDIOVASCULAR EXAM: ICD-10-CM

## 2021-03-15 DIAGNOSIS — G47.33 OSA (OBSTRUCTIVE SLEEP APNEA): ICD-10-CM

## 2021-03-15 DIAGNOSIS — E66.01 OBESITY, CLASS III, BMI 40-49.9 (MORBID OBESITY) (HCC): Primary | ICD-10-CM

## 2021-03-27 DIAGNOSIS — I10 ESSENTIAL HYPERTENSION: ICD-10-CM

## 2021-03-28 RX ORDER — AMLODIPINE BESYLATE 5 MG/1
TABLET ORAL
Qty: 90 TABLET | Refills: 1 | Status: SHIPPED | OUTPATIENT
Start: 2021-03-28 | End: 2021-04-16 | Stop reason: DRUGHIGH

## 2021-03-29 DIAGNOSIS — Z20.822 ENCOUNTER FOR PREPROCEDURE SCREENING LABORATORY TESTING FOR COVID-19: ICD-10-CM

## 2021-03-29 DIAGNOSIS — Z01.812 ENCOUNTER FOR PREPROCEDURE SCREENING LABORATORY TESTING FOR COVID-19: ICD-10-CM

## 2021-03-29 PROCEDURE — U0005 INFEC AGEN DETEC AMPLI PROBE: HCPCS | Performed by: INTERNAL MEDICINE

## 2021-03-29 PROCEDURE — U0003 INFECTIOUS AGENT DETECTION BY NUCLEIC ACID (DNA OR RNA); SEVERE ACUTE RESPIRATORY SYNDROME CORONAVIRUS 2 (SARS-COV-2) (CORONAVIRUS DISEASE [COVID-19]), AMPLIFIED PROBE TECHNIQUE, MAKING USE OF HIGH THROUGHPUT TECHNOLOGIES AS DESCRIBED BY CMS-2020-01-R: HCPCS | Performed by: INTERNAL MEDICINE

## 2021-03-30 LAB — SARS-COV-2 RNA RESP QL NAA+PROBE: NEGATIVE

## 2021-04-07 ENCOUNTER — OFFICE VISIT (OUTPATIENT)
Dept: BARIATRICS | Facility: CLINIC | Age: 56
End: 2021-04-07

## 2021-04-07 VITALS — WEIGHT: 228.7 LBS | BODY MASS INDEX: 39.88 KG/M2

## 2021-04-07 DIAGNOSIS — E66.01 MORBID (SEVERE) OBESITY DUE TO EXCESS CALORIES (HCC): Primary | ICD-10-CM

## 2021-04-07 PROCEDURE — RECHECK: Performed by: SOCIAL WORKER

## 2021-04-07 NOTE — PROGRESS NOTES
This appointment is a weight check 6 of 6  Andres Ruffin  is a 63 y o    female    :  1965   Patient presented for behavioral health follow up for the bariatric program     Currently patient is having success with reducing portions and changing food choices  We reviewed the workflow      Last time patient was coping with Drinking water  Patient's goal for this session was eating slowly  Patient is struggling with her love of bread  Discussion around work flow and her return from trip to [place]  Plan for positive outcomes includes goal of scheduling surgery before she returns to work in May  Patient is making progress toward surgery and next appointment will be arranged      COREY Sotelo LCSW  _____________________________    COREY Sotelo LCSW  _____________________________

## 2021-04-08 ENCOUNTER — HOSPITAL ENCOUNTER (OUTPATIENT)
Dept: SLEEP CENTER | Facility: CLINIC | Age: 56
Discharge: HOME | End: 2021-04-08
Payer: COMMERCIAL

## 2021-04-08 DIAGNOSIS — G47.33 OSA (OBSTRUCTIVE SLEEP APNEA): ICD-10-CM

## 2021-04-08 DIAGNOSIS — E66.2 OBESITY HYPOVENTILATION SYNDROME (HCC): ICD-10-CM

## 2021-04-08 PROCEDURE — 95811 POLYSOM 6/>YRS CPAP 4/> PARM: CPT

## 2021-04-09 DIAGNOSIS — G47.33 OSA (OBSTRUCTIVE SLEEP APNEA): Primary | ICD-10-CM

## 2021-04-09 NOTE — PROGRESS NOTES
Sleep Study Documentation    Pre-Sleep Study       Sleep testing procedure explained to patient:YES    Patient napped prior to study:NO    Caffeine:Dayshift worker after 12PM   Caffeine use:YES- coffee  6 ounces and chocolate  6 ounces    Alcohol:Dayshift workers after 5PM: Alcohol use:NO    Typical day for patient:YES       Study Documentation    Sleep Study Indications: titration study due to a recent home study showing severe obstructive sleep apnea  Sleep Study: Treatment   Optimal PAP pressure: 12cm  Leak:None  Snore:Eliminated  REM Obtained:yes  Supplemental O2: no    Minimum SaO2 88  Baseline SaO2 93  PAP mask choice small [employer]  PAP mask type:nasal  PAP pressure at which snoring was eliminated 10cm  Mode of Therapy:CPAP  ETCO2:No  CPAP changed to BiPAP:No      EKG abnormalities: no     EEG abnormalities: no    Sleep Study Recorded < 2 hours: N/A    Sleep Study Recorded > 2 hours but incomplete study: N/A    Sleep Study Recorded 6 hours but no sleep obtained: NO        Post-Sleep Study    Medication used at bedtime or during sleep study:YES over the counter sleep aid    Patient reports time it took to fall asleep:greater than 60 minutes    Patient reports waking up during study:3 or more times  Patient reports returning to sleep in greater than 30 minutes  Patient reports sleeping 2 to 4 hours without dreaming  Patient reports sleep during study:worse than usual    Patient rated sleepiness: Not sleepy or tired    PAP treatment:yes: Post PAP treatment patient reports feeling unchanged and would not wear PAP mask at home

## 2021-04-13 ENCOUNTER — TELEPHONE (OUTPATIENT)
Dept: SLEEP CENTER | Facility: CLINIC | Age: 56
End: 2021-04-13

## 2021-04-13 NOTE — TELEPHONE ENCOUNTER
Called patient and advised sleep study resulted  CPAP ordered  Scheduled DME set up 4/27/21 in [facility]  Appointment information emailed to Kristen cain  Scheduled compliance follow up 7/9/21

## 2021-04-16 ENCOUNTER — CONSULT (OUTPATIENT)
Dept: CARDIOLOGY CLINIC | Facility: CLINIC | Age: 56
End: 2021-04-16
Payer: COMMERCIAL

## 2021-04-16 VITALS
BODY MASS INDEX: 39.34 KG/M2 | DIASTOLIC BLOOD PRESSURE: 82 MMHG | SYSTOLIC BLOOD PRESSURE: 118 MMHG | HEART RATE: 92 BPM | HEIGHT: 64 IN | WEIGHT: 230.4 LBS

## 2021-04-16 DIAGNOSIS — E66.9 OBESITY (BMI 30-39.9): Primary | ICD-10-CM

## 2021-04-16 DIAGNOSIS — G47.33 OSA (OBSTRUCTIVE SLEEP APNEA): ICD-10-CM

## 2021-04-16 DIAGNOSIS — E66.01 OBESITY, CLASS III, BMI 40-49.9 (MORBID OBESITY) (HCC): ICD-10-CM

## 2021-04-16 DIAGNOSIS — Z01.810 PREOP CARDIOVASCULAR EXAM: ICD-10-CM

## 2021-04-16 DIAGNOSIS — I10 ESSENTIAL HYPERTENSION: ICD-10-CM

## 2021-04-16 PROCEDURE — 99244 OFF/OP CNSLTJ NEW/EST MOD 40: CPT | Performed by: INTERNAL MEDICINE

## 2021-04-16 PROCEDURE — 93000 ELECTROCARDIOGRAM COMPLETE: CPT | Performed by: INTERNAL MEDICINE

## 2021-04-16 NOTE — PROGRESS NOTES
Patient ID: Sabine Fletcher is a 63 y o  female  Plan:      Preop cardiovascular exam  OK to proceed with surgery at reasonable risk  Hypertension  Too well controlled  Will hold amlodipine  Obesity, Class III, BMI 40-49 9 (morbid obesity) ([address] )  Mainly gained weight after getting re   She is looking forward to bariatric surgery  Follow up Plan/Other summary comments:  As there is mild relative tachycardia and relative low blood pressure I am going to stop amlodipine  Relative low voltage on her EKG is explained by her weight  No evidence for structural heart disease or by symptoms coronary disease  Therefore okay to proceed with surgery as planned without any further testing  HPI:  Patient is seen today in consultation from Dr Saw Ramos regarding preop evaluation  Patient is quite a character  She is from [place] but has lived all over the [place]  Since getting remarried approximately 7 years ago she gained a fair amount of weight and is anxious to lose it  She denies chest pain or chest pressure  No shortness of breath  No diabetes  No prior heart attack or stroke  There has never been syncope or near syncope  Results for orders placed or performed in visit on 04/16/21   POCT ECG    Impression    NSR at 92/minute  Low voltage   Otherwise normal          Most recent or relevant cardiac/vascular testing:    None      Past Surgical History:   Procedure Laterality Date    ABDOMINAL SURGERY      AUGMENTATION MAMMAPLASTY Bilateral 2007    Bi retro pectoral saline    BREAST SURGERY      REDUCTION PROCEDURE    CHOLECYSTECTOMY      CHOLECYSTECTOMY LAPAROSCOPIC      LIVER SURGERY       CMP:   Lab Results   Component Value Date    K 5 4 (H) 11/19/2020     11/19/2020    CO2 26 11/19/2020    BUN 22 11/19/2020    CREATININE 0 66 11/19/2020    EGFR 99 11/19/2020       Lipid Profile:   Lab Results   Component Value Date    TRIG 251 (H) 10/27/2020    HDL 51 10/27/2020 Review of Systems   10  point ROS  was otherwise non pertinent or negative except as per HPI or as below  Gait: Normal          Objective:     /82 (BP Location: Left arm, Patient Position: Sitting, Cuff Size: Large)   Pulse 92   Ht 5' 3 6" (1 615 m)   Wt 105 kg (230 lb 6 4 oz)   LMP 08/03/2018 (Approximate)   BMI 40 05 kg/m²     PHYSICAL EXAM:    General:  Normal appearance in no distress  Eyes:  Anicteric  Oral mucosa:  Moist   Neck:  No JVD  Carotid upstrokes are brisk without bruits  No masses  Chest:  Clear to auscultation and percussion  Cardiac:  No palpable PMI  Normal S1 and S2  No murmur gallop or rub  Abdomen:  Soft and nontender  No palpable organomegaly or aortic enlargement  Extremities:  No peripheral edema  Musculoskeletal:  Symmetric  Vascular:  Femoral pulses are brisk without bruits  Popliteal pulses are intact bilaterally  Pedal pulses are intact  Neuro:  Grossly symmetric  Psych:  Alert and oriented x3          Current Outpatient Medications:     Biotin w/ Vitamins C & E (HAIR/SKIN/NAILS PO), Take by mouth, Disp: , Rfl:     Cholecalciferol (VITAMIN D3) 50 MCG (2000 UT) CHEW, , Disp: , Rfl:     lisinopril (ZESTRIL) 40 mg tablet, TAKE 1 TABLET BY MOUTH EVERY DAY, Disp: 90 tablet, Rfl: 3    melatonin 1 mg, Take 1 tablet by mouth daily at bedtime, Disp: , Rfl:     Multiple Vitamins-Minerals (WOMENS MULTIVITAMIN) TABS, Take by mouth, Disp: , Rfl:     Omega-3 Fatty Acids (OMEGA 3 500 PO), Take by mouth, Disp: , Rfl:     omeprazole (PriLOSEC) 40 MG capsule, TAKE 1 CAPSULE BY MOUTH EVERY DAY, Disp: 90 capsule, Rfl: 0    sucralfate (CARAFATE) 1 g tablet, Take 1 tablet (1 g total) by mouth 4 (four) times a day (Patient not taking: Reported on 4/16/2021), Disp: 120 tablet, Rfl: 2  Allergies   Allergen Reactions    Prednisone Shortness Of Breath    Alprazolam     Duloxetine     Escitalopram     Latex Rash    Omnipaque [Iohexol]      Pt erupted with rash all over body    Shrimp Flavor [Flavoring Agent] Rash     Past Medical History:   Diagnosis Date    Acid reflux     Anxiety     Hypertension     Obesity     Palpable abdominal mass     LAST ASSESSED: 17           Social History     Tobacco Use   Smoking Status Former Smoker    Quit date: 2013    Years since quittin 2   Smokeless Tobacco Never Used

## 2021-04-17 ENCOUNTER — HOSPITAL ENCOUNTER (OUTPATIENT)
Dept: MAMMOGRAPHY | Facility: MEDICAL CENTER | Age: 56
Discharge: HOME | End: 2021-04-17
Payer: COMMERCIAL

## 2021-04-17 VITALS — WEIGHT: 230 LBS | HEIGHT: 64 IN | BODY MASS INDEX: 39.27 KG/M2

## 2021-04-17 DIAGNOSIS — Z12.31 ENCOUNTER FOR SCREENING MAMMOGRAM FOR MALIGNANT NEOPLASM OF BREAST: ICD-10-CM

## 2021-04-17 PROCEDURE — 77067 SCR MAMMO BI INCL CAD: CPT

## 2021-04-17 PROCEDURE — 77063 BREAST TOMOSYNTHESIS BI: CPT

## 2021-04-19 ENCOUNTER — APPOINTMENT (OUTPATIENT)
Dept: LAB | Age: 56
End: 2021-04-19
Payer: COMMERCIAL

## 2021-04-19 DIAGNOSIS — R73.9 HYPERGLYCEMIA: ICD-10-CM

## 2021-04-19 LAB
EST. AVERAGE GLUCOSE BLD GHB EST-MCNC: 114 MG/DL
HBA1C MFR BLD: 5.6 %

## 2021-04-19 PROCEDURE — 83036 HEMOGLOBIN GLYCOSYLATED A1C: CPT

## 2021-04-19 PROCEDURE — 36415 COLL VENOUS BLD VENIPUNCTURE: CPT

## 2021-04-21 ENCOUNTER — PREP FOR PROCEDURE (OUTPATIENT)
Dept: BARIATRICS | Facility: CLINIC | Age: 56
End: 2021-04-21

## 2021-04-21 ENCOUNTER — OFFICE VISIT (OUTPATIENT)
Dept: FAMILY MEDICINE CLINIC | Facility: CLINIC | Age: 56
End: 2021-04-21
Payer: COMMERCIAL

## 2021-04-21 VITALS
BODY MASS INDEX: 39.78 KG/M2 | DIASTOLIC BLOOD PRESSURE: 88 MMHG | WEIGHT: 233 LBS | OXYGEN SATURATION: 97 % | HEART RATE: 108 BPM | SYSTOLIC BLOOD PRESSURE: 120 MMHG | HEIGHT: 64 IN | RESPIRATION RATE: 18 BRPM

## 2021-04-21 DIAGNOSIS — E66.01 OBESITY, CLASS III, BMI 40-49.9 (MORBID OBESITY) (HCC): ICD-10-CM

## 2021-04-21 DIAGNOSIS — Z99.89 OSA ON CPAP: ICD-10-CM

## 2021-04-21 DIAGNOSIS — G47.33 OSA (OBSTRUCTIVE SLEEP APNEA): ICD-10-CM

## 2021-04-21 DIAGNOSIS — I10 ESSENTIAL HYPERTENSION: Primary | ICD-10-CM

## 2021-04-21 DIAGNOSIS — E66.01 MORBID OBESITY WITH BMI OF 40.0-44.9, ADULT (HCC): Primary | ICD-10-CM

## 2021-04-21 DIAGNOSIS — E27.8 ADRENAL MASS (HCC): ICD-10-CM

## 2021-04-21 DIAGNOSIS — K21.9 GASTROESOPHAGEAL REFLUX DISEASE, UNSPECIFIED WHETHER ESOPHAGITIS PRESENT: ICD-10-CM

## 2021-04-21 DIAGNOSIS — I87.8 VENOUS STASIS: ICD-10-CM

## 2021-04-21 DIAGNOSIS — G47.33 OSA ON CPAP: ICD-10-CM

## 2021-04-21 DIAGNOSIS — I10 BENIGN HYPERTENSION: ICD-10-CM

## 2021-04-21 PROCEDURE — 99214 OFFICE O/P EST MOD 30 MIN: CPT | Performed by: FAMILY MEDICINE

## 2021-04-21 NOTE — ASSESSMENT & PLAN NOTE
She had positive sleep study but the CPAP mask causes claustrophobia    Rec discuss other options when she discusses machine

## 2021-04-21 NOTE — PROGRESS NOTES
Assessment/Plan:    Hypertension  BP well controlled on lisinopril 40 mg daily    NITISH (obstructive sleep apnea)  She had positive sleep study but the CPAP mask causes claustrophobia  Rec discuss other options when she discusses machine    Obesity, Class III, BMI 40-49 9 (morbid obesity) ([address] )  She is scheduled for bariatric surgery 5/11/2021    Adrenal mass [facility])  She will continue follow-up with Surgical Oncology  She has CT scan scheduled in July and f/u with Dr Josr Bajwa       Diagnoses and all orders for this visit:    Essential hypertension    Obesity, Class III, BMI 40-49 9 (morbid obesity) (HCC)    NITISH (obstructive sleep apnea)    Venous stasis  -     Compression Stocking    Adrenal mass (HCC)          Subjective:      Patient ID: Jose Robert is a 61 y o  female  She is here for follow up  Lots of stress recently  She had trip to [place] in March to see family  She states there was panic about the coronavirus  She has not gotten vaccine because she is scared of it  She is prepared for bariatric surgery May 11th  She passed all her preop tests  The following portions of the patient's history were reviewed and updated as appropriate: allergies, current medications, past family history, past medical history, past social history, past surgical history and problem list     Review of Systems   Constitutional: Negative for activity change, appetite change, chills, fatigue and fever  HENT: Negative for congestion, ear pain, sinus pressure and sore throat  Eyes: Negative for pain, itching and visual disturbance  Right eye irritated outer lid   Respiratory: Negative for cough, chest tightness, shortness of breath and wheezing  Cardiovascular: Negative for chest pain, palpitations and leg swelling  Gastrointestinal: Negative for abdominal pain, blood in stool, constipation, diarrhea, nausea and vomiting  Endocrine: Negative for polydipsia and polyuria     Genitourinary: Negative for difficulty urinating, dysuria, frequency and urgency  Musculoskeletal: Negative for arthralgias, joint swelling and myalgias  Skin: Negative for rash  Neurological: Negative for dizziness, weakness, numbness and headaches  Hematological: Negative for adenopathy  Does not bruise/bleed easily  Psychiatric/Behavioral: Negative for dysphoric mood  The patient is nervous/anxious  Objective:      /88 (BP Location: Left arm, Patient Position: Sitting, Cuff Size: Large)   Pulse (!) 108   Resp 18   Ht 5' 3 5" (1 613 m)   Wt 106 kg (233 lb)   LMP 08/03/2018 (Approximate)   SpO2 97%   BMI 40 63 kg/m²          Physical Exam  Vitals signs and nursing note reviewed  Constitutional:       Appearance: She is obese  HENT:      Head: Normocephalic and atraumatic  Mouth/Throat:      Mouth: Mucous membranes are moist    Neck:      Musculoskeletal: Normal range of motion and neck supple  Cardiovascular:      Rate and Rhythm: Normal rate and regular rhythm  Pulses: Normal pulses  Heart sounds: Normal heart sounds  Pulmonary:      Effort: Pulmonary effort is normal       Breath sounds: Normal breath sounds  Musculoskeletal:      Right lower leg: No edema  Left lower leg: No edema  Skin:     General: Skin is warm and dry  Neurological:      Mental Status: She is alert     Psychiatric:         Mood and Affect: Mood normal          Behavior: Behavior normal

## 2021-04-21 NOTE — ASSESSMENT & PLAN NOTE
She will continue follow-up with Surgical Oncology    She has CT scan scheduled in July and f/u with Dr Josr Bajwa

## 2021-04-28 ENCOUNTER — TELEPHONE (OUTPATIENT)
Dept: SLEEP CENTER | Facility: CLINIC | Age: 56
End: 2021-04-28

## 2021-04-29 ENCOUNTER — OFFICE VISIT (OUTPATIENT)
Dept: BARIATRICS | Facility: CLINIC | Age: 56
End: 2021-04-29
Payer: COMMERCIAL

## 2021-04-29 ENCOUNTER — OFFICE VISIT (OUTPATIENT)
Dept: BARIATRICS | Facility: CLINIC | Age: 56
End: 2021-04-29

## 2021-04-29 VITALS
SYSTOLIC BLOOD PRESSURE: 138 MMHG | RESPIRATION RATE: 20 BRPM | WEIGHT: 229 LBS | DIASTOLIC BLOOD PRESSURE: 80 MMHG | HEIGHT: 64 IN | TEMPERATURE: 98 F | BODY MASS INDEX: 39.09 KG/M2 | HEART RATE: 93 BPM

## 2021-04-29 DIAGNOSIS — E66.9 OBESITY (BMI 30-39.9): Primary | ICD-10-CM

## 2021-04-29 DIAGNOSIS — G47.33 OSA (OBSTRUCTIVE SLEEP APNEA): ICD-10-CM

## 2021-04-29 DIAGNOSIS — K76.0 HEPATIC STEATOSIS: ICD-10-CM

## 2021-04-29 DIAGNOSIS — F41.1 GENERALIZED ANXIETY DISORDER: ICD-10-CM

## 2021-04-29 DIAGNOSIS — E66.01 OBESITY, CLASS III, BMI 40-49.9 (MORBID OBESITY) (HCC): Primary | ICD-10-CM

## 2021-04-29 DIAGNOSIS — R79.89 LFT ELEVATION: ICD-10-CM

## 2021-04-29 DIAGNOSIS — I10 ESSENTIAL HYPERTENSION: ICD-10-CM

## 2021-04-29 PROCEDURE — RECHECK: Performed by: DIETITIAN, REGISTERED

## 2021-04-29 PROCEDURE — 99213 OFFICE O/P EST LOW 20 MIN: CPT | Performed by: SURGERY

## 2021-04-29 RX ORDER — ACETAMINOPHEN 325 MG/1
975 TABLET ORAL ONCE
Status: CANCELLED | OUTPATIENT
Start: 2021-05-11 | End: 2021-04-29

## 2021-04-29 RX ORDER — CEFAZOLIN SODIUM 2 G/50ML
2000 SOLUTION INTRAVENOUS ONCE
Status: CANCELLED | OUTPATIENT
Start: 2021-05-11 | End: 2021-04-29

## 2021-04-29 RX ORDER — SCOLOPAMINE TRANSDERMAL SYSTEM 1 MG/1
1 PATCH, EXTENDED RELEASE TRANSDERMAL ONCE
Status: CANCELLED | OUTPATIENT
Start: 2021-05-11 | End: 2021-04-29

## 2021-04-29 RX ORDER — GABAPENTIN 300 MG/1
600 CAPSULE ORAL ONCE
Status: CANCELLED | OUTPATIENT
Start: 2021-05-11 | End: 2021-04-29

## 2021-04-29 RX ORDER — CELECOXIB 200 MG/1
200 CAPSULE ORAL ONCE
Status: CANCELLED | OUTPATIENT
Start: 2021-05-11 | End: 2021-04-29

## 2021-04-29 NOTE — H&P
BARIATRIC H&P - BARIATRIC SURGERY  Bernardino  Magdi 61 y o  female MRN: [id number]  Unit/Bed#:  Encounter: [id number]      HPI:  Jose Robert is a 61 y o  female who presents with a long-standing history of morbid obesity  She was found to be a good candidate to undergo a bariatric operation [facility]  She is here today to discuss details of her surgery  Review of Systems   All other systems reviewed and are negative        Historical Information   Past Medical History:   Diagnosis Date    Acid reflux     Anxiety     Hypertension     Obesity     Palpable abdominal mass     LAST ASSESSED: 17     Past Surgical History:   Procedure Laterality Date    ABDOMINAL SURGERY      AUGMENTATION MAMMAPLASTY Bilateral     Bi retro pectoral saline    BREAST SURGERY      REDUCTION PROCEDURE    CHOLECYSTECTOMY      CHOLECYSTECTOMY LAPAROSCOPIC      LIVER SURGERY       Social History   Social History     Substance and Sexual Activity   Alcohol Use No     Social History     Substance and Sexual Activity   Drug Use No     Social History     Tobacco Use   Smoking Status Former Smoker    Quit date: 2013    Years since quittin 3   Smokeless Tobacco Never Used     Family History: non-contributory    Meds/Allergies   all medications and allergies reviewed  Allergies   Allergen Reactions    Prednisone Shortness Of Breath    Alprazolam     Duloxetine     Escitalopram     Latex Rash    Omnipaque [Iohexol]      Pt erupted with rash all over body    Shrimp Flavor [Flavoring Agent] Rash       Objective     Current Vitals:   Blood Pressure: 138/80 (21 1342)  Pulse: 93 (21 1342)  Temperature: 98 °F (36 7 °C) (21 1342)  Temp Source: Tympanic (21 1342)  Respirations: 20 (21 1342)  Height: 5' 3 5" (161 3 cm) (21 1342)  Weight - Scale: 104 kg (229 lb) (21 1342)      Invasive Devices     None Physical Exam  Vitals signs and nursing note reviewed  Constitutional:       General: She is not in acute distress  Appearance: Normal appearance  She is well-developed  She is not diaphoretic  HENT:      Head: Normocephalic and atraumatic  Nose: Nose normal    Eyes:      General: No scleral icterus  Right eye: No discharge  Left eye: No discharge  Conjunctiva/sclera: Conjunctivae normal    Neck:      Musculoskeletal: Normal range of motion and neck supple  Cardiovascular:      Rate and Rhythm: Normal rate and regular rhythm  Heart sounds: Normal heart sounds  Pulmonary:      Effort: Pulmonary effort is normal  No respiratory distress  Breath sounds: Normal breath sounds  No stridor  No wheezing or rales  Chest:      Chest wall: No tenderness  Abdominal:      General: Bowel sounds are normal       Palpations: Abdomen is soft  Tenderness: There is no abdominal tenderness  There is no guarding or rebound  Comments: Abdomen is obese, soft and benign  Well-healed Subcostal Kocher incision from previous operation (most likely a choledoco-jejunostomy)   Musculoskeletal: Normal range of motion  General: No deformity  Lymphadenopathy:      Cervical: No cervical adenopathy  Skin:     General: Skin is warm and dry  Findings: No erythema or rash  Neurological:      Mental Status: She is alert and oriented to person, place, and time  Psychiatric:         Behavior: Behavior normal          Thought Content: Thought content normal          Judgment: Judgment normal          Lab Results: I have personally reviewed pertinent lab results  Imaging: I have personally reviewed pertinent reports  EKG, Pathology, and Other Studies: I have personally reviewed pertinent reports  The endoscopy showed   Small hiatal hernia (Hill 2), no esophagitis seen  3 gastric polyps in the body, removed  Otherwise normal stomach   Random biopsies from the stomach to rule out H pylori  Normal duodenum  random biopsies from the duodenum to rule out celiac    The biopsies revealed  Final Diagnosis   A  Duodenum, biopsy:  - Fragments of duodenal mucosa without significant histopathologic changes  - There is no blunting of the intestinal villi or increased number of intraepithelial lymphocytes to suggest the presence of gluten sensitive enteropathy      B  Stomach, biopsy:  - Fragments of gastric antral mucosa with morphologic features compatible with reactive gastropathy   - Fragments of gastric oxyntic mucosa with mild chronic nonspecific inflammation   - No Helicobacter pylori organisms are identified with routine H&E stain      C  Polyp, Stomach, biopsy x2:  - Gastric fundic gland polyp x 2   - Negative for dysplasia or carcinoma       Assessment/PLAN:    61 y o  female morbidly obese found to be a good candidate to undergo a weight loss operation upon being enrolled here at the [facility]     Patient has a long history of morbid obesity and is presenting to discuss the surgical weight loss options  Despite the patient best efforts patient was unable to lose any meaningful or sustainable weight using nonsurgical means  We had a long discussion regarding all the surgical weight-loss options at our disposal at this point and reviewed the risks and benefits of each procedure in details as it relates to her age, BMI and medical conditions  She has a history of what appears to be a 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 that had to be repaired with a choledoco jejunostomy  She is telling me all this from recollection and I am piecing all this from what I think happened as we have no formal records  This procedure was performed 26 years ago in [facility] and she was transferred to [facility]     She has been pre certified to undergo a Laparoscopic  Sleeve gastrectomy      Given her very extensive surgical history and open procedures I have explained to her that if I find too many adhesions and it is not safe to proceed with the surgery we will abort the operation  We have discussed the 14%-20% incidence of post op heartburn after the sleeve gastrectomy and the fact that if this cannot be controlled with medication or conservative measures it might need to be converted to a Mayra-en-Y gastric bypass  We have also discussed the fact that the patient needs to be followed up postoperatively and potentially get screening endoscopies in the future to rule out any development of pathology as a result of the sleeve gastrectomy  Here today to review her pre op test results  Has been medically cleared for the procedure   ++++++++++++++++++++++++++++  She has NITISH and wears a CPAP machine  She has a history of what appears to be a 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 that had to be repaired with a choledoco jejunostomy  She is telling me all this from recollection and I am piecing all this from what I think happened as we have no formal records  This procedure was performed 26 years ago in [facility] and she was transferred to [facility]   ++++++++++++++++++++++++++++    I have discussed with her at length the risks and benefits of the operation and reiterated the components of our multidisciplinary program and the importance of compliance and follow up in the post operative period  Although there is a great statistical chance of improvement or even resolution of most of her associated comorbidities, the results vary from patient to patient and they largely depend on her commitment  The patient was also instructed with regards to the importance of behavior modification, nutritional counseling, support meeting attendance and lifestyle changes that are important to ensure success  She was given the opportunity to ask questions and I have answered all of them       I have addressed with the patient the level of CODE STATUS for this hospital stay and after explaining the different options currently she wishes to be a Level I  She understands and wishes to proceed  She  still needs to lose weight prior to the surgery      Calvin Bowden MD  4/29/2021  1:58 PM

## 2021-04-29 NOTE — H&P (VIEW-ONLY)
BARIATRIC H&P - BARIATRIC SURGERY  Karuna Fairbanks 61 y o  female MRN: [id number]  Unit/Bed#:  Encounter: [id number]      HPI:  Sita Ordaz is a 61 y o  female who presents with a long-standing history of morbid obesity  She was found to be a good candidate to undergo a bariatric operation upon [facility]  She is here today to discuss details of her surgery  Review of Systems   All other systems reviewed and are negative        Historical Information   Past Medical History:   Diagnosis Date    Acid reflux     Anxiety     Hypertension     Obesity     Palpable abdominal mass     LAST ASSESSED: 17     Past Surgical History:   Procedure Laterality Date    ABDOMINAL SURGERY      AUGMENTATION MAMMAPLASTY Bilateral     Bi retro pectoral saline    BREAST SURGERY      REDUCTION PROCEDURE    CHOLECYSTECTOMY      CHOLECYSTECTOMY LAPAROSCOPIC      LIVER SURGERY       Social History   Social History     Substance and Sexual Activity   Alcohol Use No     Social History     Substance and Sexual Activity   Drug Use No     Social History     Tobacco Use   Smoking Status Former Smoker    Quit date: 2013    Years since quittin 3   Smokeless Tobacco Never Used     Family History: non-contributory    Meds/Allergies   all medications and allergies reviewed  Allergies   Allergen Reactions    Prednisone Shortness Of Breath    Alprazolam     Duloxetine     Escitalopram     Latex Rash    Omnipaque [Iohexol]      Pt erupted with rash all over body    Shrimp Flavor [Flavoring Agent] Rash       Objective     Current Vitals:   Blood Pressure: 138/80 (21 1342)  Pulse: 93 (21 1342)  Temperature: 98 °F (36 7 °C) (21 1342)  Temp Source: Tympanic (21 1342)  Respirations: 20 (21 1342)  Height: 5' 3 5" (161 3 cm) (21 1342)  Weight - Scale: 104 kg (229 lb) (21 1342)      Invasive Devices     None Physical Exam  Vitals signs and nursing note reviewed  Constitutional:       General: She is not in acute distress  Appearance: Normal appearance  She is well-developed  She is not diaphoretic  HENT:      Head: Normocephalic and atraumatic  Nose: Nose normal    Eyes:      General: No scleral icterus  Right eye: No discharge  Left eye: No discharge  Conjunctiva/sclera: Conjunctivae normal    Neck:      Musculoskeletal: Normal range of motion and neck supple  Cardiovascular:      Rate and Rhythm: Normal rate and regular rhythm  Heart sounds: Normal heart sounds  Pulmonary:      Effort: Pulmonary effort is normal  No respiratory distress  Breath sounds: Normal breath sounds  No stridor  No wheezing or rales  Chest:      Chest wall: No tenderness  Abdominal:      General: Bowel sounds are normal       Palpations: Abdomen is soft  Tenderness: There is no abdominal tenderness  There is no guarding or rebound  Comments: Abdomen is obese, soft and benign  Well-healed Subcostal Kocher incision from previous operation (most likely a choledoco-jejunostomy)   Musculoskeletal: Normal range of motion  General: No deformity  Lymphadenopathy:      Cervical: No cervical adenopathy  Skin:     General: Skin is warm and dry  Findings: No erythema or rash  Neurological:      Mental Status: She is alert and oriented to person, place, and time  Psychiatric:         Behavior: Behavior normal          Thought Content: Thought content normal          Judgment: Judgment normal          Lab Results: I have personally reviewed pertinent lab results  Imaging: I have personally reviewed pertinent reports  EKG, Pathology, and Other Studies: I have personally reviewed pertinent reports  The endoscopy showed   Small hiatal hernia (Hill 2), no esophagitis seen  3 gastric polyps in the body, removed  Otherwise normal stomach   Random biopsies from the stomach to rule out H pylori  Normal duodenum  random biopsies from the duodenum to rule out celiac    The biopsies revealed  Final Diagnosis   A  Duodenum, biopsy:  - Fragments of duodenal mucosa without significant histopathologic changes  - There is no blunting of the intestinal villi or increased number of intraepithelial lymphocytes to suggest the presence of gluten sensitive enteropathy      B  Stomach, biopsy:  - Fragments of gastric antral mucosa with morphologic features compatible with reactive gastropathy   - Fragments of gastric oxyntic mucosa with mild chronic nonspecific inflammation   - No Helicobacter pylori organisms are identified with routine H&E stain      C  Polyp, Stomach, biopsy x2:  - Gastric fundic gland polyp x 2   - Negative for dysplasia or carcinoma       Assessment/PLAN:    61 y o  female morbidly obese found to be a good candidate to undergo a weight loss operation upon being enrolled here at the [facility]     Patient has a long history of morbid obesity and is presenting to discuss the surgical weight loss options  Despite the patient best efforts patient was unable to lose any meaningful or sustainable weight using nonsurgical means  We had a long discussion regarding all the surgical weight-loss options at our disposal at this point and reviewed the risks and benefits of each procedure in details as it relates to her age, BMI and medical conditions  She has a history of what appears to be a 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 that had to be repaired with a choledoco jejunostomy  She is telling me all this from recollection and I am piecing all this from what I think happened as we have no formal records  This procedure was performed 26 years ago in [facility] and she was transferred to [facility]     She has been pre certified to undergo a Laparoscopic  Sleeve gastrectomy      Given her very extensive surgical history and open procedures I have explained to her that if I find too many adhesions and it is not safe to proceed with the surgery we will abort the operation  We have discussed the 14%-20% incidence of post op heartburn after the sleeve gastrectomy and the fact that if this cannot be controlled with medication or conservative measures it might need to be converted to a Mayra-en-Y gastric bypass  We have also discussed the fact that the patient needs to be followed up postoperatively and potentially get screening endoscopies in the future to rule out any development of pathology as a result of the sleeve gastrectomy  Here today to review her pre op test results  Has been medically cleared for the procedure   ++++++++++++++++++++++++++++  She has NITISH and wears a CPAP machine  She has a history of what appears to be a 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 that had to be repaired with a choledoco jejunostomy  She is telling me all this from recollection and I am piecing all this from what I think happened as we have no formal records  This procedure was performed 26 years ago in [facility] and she was transferred to [facility]   ++++++++++++++++++++++++++++    I have discussed with her at length the risks and benefits of the operation and reiterated the components of our multidisciplinary program and the importance of compliance and follow up in the post operative period  Although there is a great statistical chance of improvement or even resolution of most of her associated comorbidities, the results vary from patient to patient and they largely depend on her commitment  The patient was also instructed with regards to the importance of behavior modification, nutritional counseling, support meeting attendance and lifestyle changes that are important to ensure success  She was given the opportunity to ask questions and I have answered all of them       I have addressed with the patient the level of CODE STATUS for this hospital stay and after explaining the different options currently she wishes to be a Level I  She understands and wishes to proceed  She  still needs to lose weight prior to the surgery      Nilo Mcpherson MD  4/29/2021  1:58 PM

## 2021-04-29 NOTE — PROGRESS NOTES
Weight Management Nutrition Class         Bariatric Surgeon: Dr Corinne Rudd    Surgery: pre op bypass     Class: Pre Op Class     Topics discussed today include:     Pt attended VIRTUAL pre-op education session  Standardized packet of information for bariatric surgery was sent via email and was reviewed with pt  Importance of lifestyle change and development of regular exercise routine stressed  Pt given the opportunity to ask questions  Ensure pre-surgery drink instructions were given  Questions were answered  Pt verbalized understanding of all information provided  Pt appeared prepared for upcoming surgery  Pt  educated on two-week pre operative liver shrinking diet  Pt understands that the diet needs to be followed for 2 weeks prior to surgery  Handout reviewed  Emphasized the need to drink 80 ounces of fluid per day while on the diet  Reviewed pre-op ERAS drink, post-operative clear liquid, full liquid, and pureed diet, post-operative nutrition rules and facts, and post-operative bariatric multivitamin/mineral recommendations and brand comparison  Contact information provided for any questions/concerns  Patient was able to verbalize basic diet (protein, fluid, vitamin and mineral) recommendations and possible nutrition-related complications   Yes

## 2021-04-30 DIAGNOSIS — E66.9 OBESITY (BMI 30-39.9): Primary | ICD-10-CM

## 2021-04-30 RX ORDER — OMEPRAZOLE 20 MG/1
20 CAPSULE, DELAYED RELEASE ORAL DAILY
Qty: 30 CAPSULE | Refills: 0 | Status: SHIPPED | OUTPATIENT
Start: 2021-04-30 | End: 2021-05-23 | Stop reason: SDUPTHER

## 2021-04-30 RX ORDER — OXYCODONE HYDROCHLORIDE 5 MG/1
5 TABLET ORAL EVERY 4 HOURS PRN
Qty: 10 TABLET | Refills: 0 | Status: SHIPPED | OUTPATIENT
Start: 2021-05-11 | End: 2021-08-03 | Stop reason: ALTCHOICE

## 2021-05-01 ENCOUNTER — PATIENT MESSAGE (OUTPATIENT)
Dept: FAMILY MEDICINE CLINIC | Facility: CLINIC | Age: 56
End: 2021-05-01

## 2021-05-03 NOTE — TELEPHONE ENCOUNTER
From: Linda Honeycutt  To: Jessa Douglas MD  Sent: 5/1/2021 9:58 PM EDT  Subject: Pre-Op/Post-Op Question    Saludos Dr Devin Soulier, el miércoles pasado Charli Rivera mi linn con el cirujano [address] que me hará la Manga Cronin día 11 de [place], en ordaz nota, aquí en Mychat, escribió algo, que a pesar de traducirlo en español, aun no lo entiendo    Podría, por favor, explicármelo y disculpe la Bowers    carlos eduardo el texto: I have addressed with the patient the level of CODE STATUS for this hospital stay and after explaining the different options currently she wishes to be a Level I  She understands and wishes to proceed  Muchas tray por responder  Kandis Gave

## 2021-05-04 DIAGNOSIS — Z99.89 OSA ON CPAP: ICD-10-CM

## 2021-05-04 DIAGNOSIS — K21.9 GASTROESOPHAGEAL REFLUX DISEASE, UNSPECIFIED WHETHER ESOPHAGITIS PRESENT: ICD-10-CM

## 2021-05-04 DIAGNOSIS — I10 BENIGN HYPERTENSION: ICD-10-CM

## 2021-05-04 DIAGNOSIS — G47.33 OSA ON CPAP: ICD-10-CM

## 2021-05-04 DIAGNOSIS — E66.01 MORBID OBESITY WITH BMI OF 40.0-44.9, ADULT (HCC): ICD-10-CM

## 2021-05-04 DIAGNOSIS — U07.1 COVID-19: Primary | ICD-10-CM

## 2021-05-04 PROCEDURE — U0005 INFEC AGEN DETEC AMPLI PROBE: HCPCS

## 2021-05-04 PROCEDURE — U0003 INFECTIOUS AGENT DETECTION BY NUCLEIC ACID (DNA OR RNA); SEVERE ACUTE RESPIRATORY SYNDROME CORONAVIRUS 2 (SARS-COV-2) (CORONAVIRUS DISEASE [COVID-19]), AMPLIFIED PROBE TECHNIQUE, MAKING USE OF HIGH THROUGHPUT TECHNOLOGIES AS DESCRIBED BY CMS-2020-01-R: HCPCS

## 2021-05-05 LAB — SARS-COV-2 RNA RESP QL NAA+PROBE: NEGATIVE

## 2021-05-06 ENCOUNTER — TELEPHONE (OUTPATIENT)
Dept: BARIATRICS | Facility: CLINIC | Age: 56
End: 2021-05-06

## 2021-05-06 NOTE — PRE-PROCEDURE INSTRUCTIONS
Pre-Surgery Instructions:    Have you had / have a sore throat? no  have you had / have a cough less than 1 week? no  Have you had / have a fever greater than 100 0 - 100  4? no  Are you experiencing any shortness of breath?no    Pre Procedure instructions given and verbalized understanding  reviewed on 5/6 meds, bathing and hospital instructions pt confirms that she took the johnny class all questions answered at this time     Medication Instructions    Biotin w/ Vitamins C & E (HAIR/SKIN/NAILS PO) Instructed patient per Anesthesia Guidelines  taking on 5/10    Cholecalciferol (VITAMIN D3) 50 MCG (2000 UT) CHEW Instructed patient per Anesthesia Guidelines  taking on 5/10    lisinopril (ZESTRIL) 40 mg tablet Instructed patient per Anesthesia Guidelines  taking on 5/10    melatonin 1 mg Instructed patient per Anesthesia Guidelines  taking on 5/10    Multiple Vitamins-Minerals (WOMENS MULTIVITAMIN) TABS Instructed patient per Anesthesia Guidelines  taking on 5/10    omeprazole (PriLOSEC) 20 mg delayed release capsule Instructed patient per Anesthesia Guidelines  may take on 5/11

## 2021-05-09 ENCOUNTER — ANESTHESIA EVENT (OUTPATIENT)
Dept: PERIOP | Facility: HOSPITAL | Age: 56
DRG: 621 | End: 2021-05-09
Payer: COMMERCIAL

## 2021-05-11 ENCOUNTER — ANESTHESIA (OUTPATIENT)
Dept: PERIOP | Facility: HOSPITAL | Age: 56
DRG: 621 | End: 2021-05-11
Payer: COMMERCIAL

## 2021-05-11 ENCOUNTER — HOSPITAL ENCOUNTER (INPATIENT)
Facility: HOSPITAL | Age: 56
LOS: 1 days | Discharge: HOME | DRG: 621 | End: 2021-05-12
Attending: SURGERY | Admitting: SURGERY
Payer: COMMERCIAL

## 2021-05-11 DIAGNOSIS — I10 ESSENTIAL HYPERTENSION: Primary | ICD-10-CM

## 2021-05-11 DIAGNOSIS — E66.9 OBESITY (BMI 30-39.9): ICD-10-CM

## 2021-05-11 PROCEDURE — 0DB64Z3 EXCISION OF STOMACH, PERCUTANEOUS ENDOSCOPIC APPROACH, VERTICAL: ICD-10-PCS | Performed by: SURGERY

## 2021-05-11 PROCEDURE — C9290 INJ, BUPIVACAINE LIPOSOME: HCPCS | Performed by: SURGERY

## 2021-05-11 PROCEDURE — 44180 LAP ENTEROLYSIS: CPT | Performed by: STUDENT IN AN ORGANIZED HEALTH CARE EDUCATION/TRAINING PROGRAM

## 2021-05-11 PROCEDURE — 44180 LAP ENTEROLYSIS: CPT | Performed by: SURGERY

## 2021-05-11 PROCEDURE — 0DNW4ZZ RELEASE PERITONEUM, PERCUTANEOUS ENDOSCOPIC APPROACH: ICD-10-PCS | Performed by: SURGERY

## 2021-05-11 RX ORDER — HYDROMORPHONE HCL/PF 1 MG/ML
0.5 SYRINGE (ML) INJECTION
Status: DISCONTINUED | OUTPATIENT
Start: 2021-05-11 | End: 2021-05-11 | Stop reason: HOSPADM

## 2021-05-11 RX ORDER — MAGNESIUM HYDROXIDE 1200 MG/15ML
LIQUID ORAL AS NEEDED
Status: DISCONTINUED | OUTPATIENT
Start: 2021-05-11 | End: 2021-05-11 | Stop reason: HOSPADM

## 2021-05-11 RX ORDER — ONDANSETRON 2 MG/ML
INJECTION INTRAMUSCULAR; INTRAVENOUS AS NEEDED
Status: DISCONTINUED | OUTPATIENT
Start: 2021-05-11 | End: 2021-05-11

## 2021-05-11 RX ORDER — PROMETHAZINE HYDROCHLORIDE 25 MG/ML
25 INJECTION, SOLUTION INTRAMUSCULAR; INTRAVENOUS EVERY 6 HOURS PRN
Status: DISCONTINUED | OUTPATIENT
Start: 2021-05-11 | End: 2021-05-12 | Stop reason: HOSPADM

## 2021-05-11 RX ORDER — PROPOFOL 10 MG/ML
INJECTION, EMULSION INTRAVENOUS AS NEEDED
Status: DISCONTINUED | OUTPATIENT
Start: 2021-05-11 | End: 2021-05-11

## 2021-05-11 RX ORDER — SODIUM CHLORIDE, SODIUM LACTATE, POTASSIUM CHLORIDE, CALCIUM CHLORIDE 600; 310; 30; 20 MG/100ML; MG/100ML; MG/100ML; MG/100ML
100 INJECTION, SOLUTION INTRAVENOUS CONTINUOUS
Status: DISCONTINUED | OUTPATIENT
Start: 2021-05-11 | End: 2021-05-12 | Stop reason: HOSPADM

## 2021-05-11 RX ORDER — MIDAZOLAM HYDROCHLORIDE 2 MG/2ML
INJECTION, SOLUTION INTRAMUSCULAR; INTRAVENOUS AS NEEDED
Status: DISCONTINUED | OUTPATIENT
Start: 2021-05-11 | End: 2021-05-11

## 2021-05-11 RX ORDER — ACETAMINOPHEN 160 MG/5ML
975 SUSPENSION, ORAL (FINAL DOSE FORM) ORAL EVERY 8 HOURS
Status: DISCONTINUED | OUTPATIENT
Start: 2021-05-11 | End: 2021-05-12 | Stop reason: HOSPADM

## 2021-05-11 RX ORDER — BUPIVACAINE HYDROCHLORIDE AND EPINEPHRINE 5; 5 MG/ML; UG/ML
INJECTION, SOLUTION PERINEURAL AS NEEDED
Status: DISCONTINUED | OUTPATIENT
Start: 2021-05-11 | End: 2021-05-11 | Stop reason: HOSPADM

## 2021-05-11 RX ORDER — SODIUM CHLORIDE 9 MG/ML
125 INJECTION, SOLUTION INTRAVENOUS CONTINUOUS
Status: DISCONTINUED | OUTPATIENT
Start: 2021-05-11 | End: 2021-05-12 | Stop reason: HOSPADM

## 2021-05-11 RX ORDER — LIDOCAINE HYDROCHLORIDE 10 MG/ML
INJECTION, SOLUTION EPIDURAL; INFILTRATION; INTRACAUDAL; PERINEURAL AS NEEDED
Status: DISCONTINUED | OUTPATIENT
Start: 2021-05-11 | End: 2021-05-11

## 2021-05-11 RX ORDER — SIMETHICONE 80 MG
80 TABLET,CHEWABLE ORAL 4 TIMES DAILY PRN
Status: DISCONTINUED | OUTPATIENT
Start: 2021-05-11 | End: 2021-05-12 | Stop reason: HOSPADM

## 2021-05-11 RX ORDER — CEFAZOLIN SODIUM 2 G/50ML
2000 SOLUTION INTRAVENOUS ONCE
Status: COMPLETED | OUTPATIENT
Start: 2021-05-11 | End: 2021-05-11

## 2021-05-11 RX ORDER — ONDANSETRON 2 MG/ML
4 INJECTION INTRAMUSCULAR; INTRAVENOUS EVERY 6 HOURS PRN
Status: DISCONTINUED | OUTPATIENT
Start: 2021-05-11 | End: 2021-05-12 | Stop reason: HOSPADM

## 2021-05-11 RX ORDER — OXYCODONE HCL 5 MG/5 ML
5 SOLUTION, ORAL ORAL EVERY 4 HOURS PRN
Status: DISCONTINUED | OUTPATIENT
Start: 2021-05-11 | End: 2021-05-12 | Stop reason: HOSPADM

## 2021-05-11 RX ORDER — GABAPENTIN 300 MG/1
600 CAPSULE ORAL ONCE
Status: COMPLETED | OUTPATIENT
Start: 2021-05-11 | End: 2021-05-11

## 2021-05-11 RX ORDER — HYDROMORPHONE HCL 110MG/55ML
PATIENT CONTROLLED ANALGESIA SYRINGE INTRAVENOUS AS NEEDED
Status: DISCONTINUED | OUTPATIENT
Start: 2021-05-11 | End: 2021-05-11

## 2021-05-11 RX ORDER — SODIUM CHLORIDE 9 MG/ML
INJECTION INTRAVENOUS AS NEEDED
Status: DISCONTINUED | OUTPATIENT
Start: 2021-05-11 | End: 2021-05-11 | Stop reason: HOSPADM

## 2021-05-11 RX ORDER — ONDANSETRON 2 MG/ML
4 INJECTION INTRAMUSCULAR; INTRAVENOUS ONCE AS NEEDED
Status: DISCONTINUED | OUTPATIENT
Start: 2021-05-11 | End: 2021-05-11 | Stop reason: HOSPADM

## 2021-05-11 RX ORDER — NEOSTIGMINE METHYLSULFATE 1 MG/ML
INJECTION INTRAVENOUS AS NEEDED
Status: DISCONTINUED | OUTPATIENT
Start: 2021-05-11 | End: 2021-05-11

## 2021-05-11 RX ORDER — GLYCOPYRROLATE 0.2 MG/ML
INJECTION INTRAMUSCULAR; INTRAVENOUS AS NEEDED
Status: DISCONTINUED | OUTPATIENT
Start: 2021-05-11 | End: 2021-05-11

## 2021-05-11 RX ORDER — DIPHENHYDRAMINE HCL 25 MG
25 TABLET ORAL EVERY 6 HOURS PRN
Status: DISCONTINUED | OUTPATIENT
Start: 2021-05-11 | End: 2021-05-12 | Stop reason: HOSPADM

## 2021-05-11 RX ORDER — FENTANYL CITRATE/PF 50 MCG/ML
25 SYRINGE (ML) INJECTION
Status: DISCONTINUED | OUTPATIENT
Start: 2021-05-11 | End: 2021-05-11 | Stop reason: HOSPADM

## 2021-05-11 RX ORDER — METOCLOPRAMIDE HYDROCHLORIDE 5 MG/ML
10 INJECTION INTRAMUSCULAR; INTRAVENOUS EVERY 6 HOURS PRN
Status: DISCONTINUED | OUTPATIENT
Start: 2021-05-11 | End: 2021-05-12 | Stop reason: HOSPADM

## 2021-05-11 RX ORDER — CELECOXIB 200 MG/1
200 CAPSULE ORAL ONCE
Status: COMPLETED | OUTPATIENT
Start: 2021-05-11 | End: 2021-05-11

## 2021-05-11 RX ORDER — FENTANYL CITRATE 50 UG/ML
INJECTION, SOLUTION INTRAMUSCULAR; INTRAVENOUS AS NEEDED
Status: DISCONTINUED | OUTPATIENT
Start: 2021-05-11 | End: 2021-05-11

## 2021-05-11 RX ORDER — OXYCODONE HCL 5 MG/5 ML
10 SOLUTION, ORAL ORAL EVERY 4 HOURS PRN
Status: DISCONTINUED | OUTPATIENT
Start: 2021-05-11 | End: 2021-05-12 | Stop reason: HOSPADM

## 2021-05-11 RX ORDER — ACETAMINOPHEN 325 MG/1
975 TABLET ORAL ONCE
Status: COMPLETED | OUTPATIENT
Start: 2021-05-11 | End: 2021-05-11

## 2021-05-11 RX ORDER — MORPHINE SULFATE 4 MG/ML
4 INJECTION, SOLUTION INTRAMUSCULAR; INTRAVENOUS EVERY 4 HOURS PRN
Status: DISCONTINUED | OUTPATIENT
Start: 2021-05-11 | End: 2021-05-12 | Stop reason: HOSPADM

## 2021-05-11 RX ORDER — SCOLOPAMINE TRANSDERMAL SYSTEM 1 MG/1
1 PATCH, EXTENDED RELEASE TRANSDERMAL ONCE
Status: DISCONTINUED | OUTPATIENT
Start: 2021-05-11 | End: 2021-05-12 | Stop reason: HOSPADM

## 2021-05-11 RX ORDER — ROCURONIUM BROMIDE 10 MG/ML
INJECTION, SOLUTION INTRAVENOUS AS NEEDED
Status: DISCONTINUED | OUTPATIENT
Start: 2021-05-11 | End: 2021-05-11

## 2021-05-11 RX ORDER — EPHEDRINE SULFATE 50 MG/ML
INJECTION INTRAVENOUS AS NEEDED
Status: DISCONTINUED | OUTPATIENT
Start: 2021-05-11 | End: 2021-05-11

## 2021-05-11 RX ADMIN — SODIUM CHLORIDE, SODIUM LACTATE, POTASSIUM CHLORIDE, AND CALCIUM CHLORIDE 100 ML/HR: .6; .31; .03; .02 INJECTION, SOLUTION INTRAVENOUS at 23:53

## 2021-05-11 RX ADMIN — HYDROMORPHONE HYDROCHLORIDE 0.5 MG: 2 INJECTION INTRAMUSCULAR; INTRAVENOUS; SUBCUTANEOUS at 13:37

## 2021-05-11 RX ADMIN — PROPOFOL 200 MG: 10 INJECTION, EMULSION INTRAVENOUS at 13:13

## 2021-05-11 RX ADMIN — MIDAZOLAM 2 MG: 1 INJECTION INTRAMUSCULAR; INTRAVENOUS at 13:07

## 2021-05-11 RX ADMIN — ONDANSETRON 8 MG: 2 INJECTION INTRAMUSCULAR; INTRAVENOUS at 13:24

## 2021-05-11 RX ADMIN — EPHEDRINE SULFATE 7.5 MG: 50 INJECTION, SOLUTION INTRAVENOUS at 13:40

## 2021-05-11 RX ADMIN — ROCURONIUM BROMIDE 50 MG: 10 INJECTION, SOLUTION INTRAVENOUS at 13:13

## 2021-05-11 RX ADMIN — EPHEDRINE SULFATE 7.5 MG: 50 INJECTION, SOLUTION INTRAVENOUS at 14:11

## 2021-05-11 RX ADMIN — FENTANYL CITRATE 50 MCG: 50 INJECTION INTRAMUSCULAR; INTRAVENOUS at 13:13

## 2021-05-11 RX ADMIN — LIDOCAINE HYDROCHLORIDE 60 MG: 10 INJECTION, SOLUTION EPIDURAL; INFILTRATION; INTRACAUDAL; PERINEURAL at 13:13

## 2021-05-11 RX ADMIN — FAMOTIDINE 20 MG: 10 INJECTION INTRAVENOUS at 17:47

## 2021-05-11 RX ADMIN — CEFAZOLIN SODIUM 2000 MG: 2 SOLUTION INTRAVENOUS at 12:53

## 2021-05-11 RX ADMIN — SODIUM CHLORIDE 125 ML/HR: 0.9 INJECTION, SOLUTION INTRAVENOUS at 16:36

## 2021-05-11 RX ADMIN — NEOSTIGMINE METHYLSULFATE 3 MG: 1 INJECTION INTRAVENOUS at 14:03

## 2021-05-11 RX ADMIN — METOCLOPRAMIDE 10 MG: 5 INJECTION, SOLUTION INTRAMUSCULAR; INTRAVENOUS at 17:47

## 2021-05-11 RX ADMIN — GLYCOPYRROLATE 0.4 MG: 0.2 INJECTION, SOLUTION INTRAMUSCULAR; INTRAVENOUS at 14:03

## 2021-05-11 RX ADMIN — SUGAMMADEX 199 MG: 100 INJECTION, SOLUTION INTRAVENOUS at 14:13

## 2021-05-11 RX ADMIN — ACETAMINOPHEN 975 MG: 325 TABLET, FILM COATED ORAL at 11:33

## 2021-05-11 RX ADMIN — SCOPALAMINE 1 PATCH: 1 PATCH, EXTENDED RELEASE TRANSDERMAL at 11:36

## 2021-05-11 RX ADMIN — GABAPENTIN 600 MG: 300 CAPSULE ORAL at 11:33

## 2021-05-11 RX ADMIN — SODIUM CHLORIDE: 0.9 INJECTION, SOLUTION INTRAVENOUS at 13:35

## 2021-05-11 RX ADMIN — FENTANYL CITRATE 50 MCG: 50 INJECTION INTRAMUSCULAR; INTRAVENOUS at 13:18

## 2021-05-11 RX ADMIN — FENTANYL CITRATE 50 MCG: 50 INJECTION INTRAMUSCULAR; INTRAVENOUS at 13:22

## 2021-05-11 RX ADMIN — SODIUM CHLORIDE 125 ML/HR: 0.9 INJECTION, SOLUTION INTRAVENOUS at 11:54

## 2021-05-11 RX ADMIN — SODIUM CHLORIDE: 0.9 INJECTION, SOLUTION INTRAVENOUS at 14:04

## 2021-05-11 RX ADMIN — CELECOXIB 200 MG: 200 CAPSULE ORAL at 11:33

## 2021-05-11 RX ADMIN — FENTANYL CITRATE 50 MCG: 50 INJECTION INTRAMUSCULAR; INTRAVENOUS at 13:24

## 2021-05-11 RX ADMIN — ENOXAPARIN SODIUM 40 MG: 40 INJECTION SUBCUTANEOUS at 12:04

## 2021-05-11 NOTE — ANESTHESIA POSTPROCEDURE EVALUATION
Post-Op Assessment Note    CV Status:  Stable  Pain Score: 2    Pain management: adequate     Mental Status:  Alert and awake   Hydration Status:  Euvolemic   PONV Controlled:  Controlled   Airway Patency:  Patent      Post Op Vitals Reviewed: Yes      Staff: Anesthesiologist         No complications documented      /53 (05/11/21 1441)    Temp      Pulse 86 (05/11/21 1441)   Resp 18 (05/11/21 1441)    SpO2 95 % (05/11/21 1441)

## 2021-05-11 NOTE — PLAN OF CARE
Problem: Potential for Falls  Goal: Patient will remain free of falls  Description: INTERVENTIONS:  - Assess patient frequently for physical needs  -  Identify cognitive and physical deficits and behaviors that affect risk of falls    -  [place] fall precautions as indicated by assessment   - Educate patient/family on patient safety including physical limitations  - Instruct patient to call for assistance with activity based on assessment  - Modify environment to reduce risk of injury  - Consider OT/PT consult to assist with strengthening/mobility  Outcome: Progressing     Problem: PAIN - ADULT  Goal: Verbalizes/displays adequate comfort level or baseline comfort level  Description: Interventions:  - Encourage patient to monitor pain and request assistance  - Assess pain using appropriate pain scale  - Administer analgesics based on type and severity of pain and evaluate response  - Implement non-pharmacological measures as appropriate and evaluate response  - Consider cultural and social influences on pain and pain management  - Notify physician/advanced practitioner if interventions unsuccessful or patient reports new pain  Outcome: Progressing     Problem: INFECTION - ADULT  Goal: Absence or prevention of progression during hospitalization  Description: INTERVENTIONS:  - Assess and monitor for signs and symptoms of infection  - Monitor lab/diagnostic results  - Monitor all insertion sites, i e  indwelling lines, tubes, and drains  - Monitor endotracheal if appropriate and nasal secretions for changes in amount and color  - [place] appropriate cooling/warming therapies per order  - Administer medications as ordered  - Instruct and encourage patient and family to use good hand hygiene technique  - Identify and instruct in appropriate isolation precautions for identified infection/condition  Outcome: Progressing  Goal: Absence of fever/infection during neutropenic period  Description: INTERVENTIONS:  - Monitor WBC    Outcome: Progressing     Problem: SAFETY ADULT  Goal: Patient will remain free of falls  Description: INTERVENTIONS:  - Assess patient frequently for physical needs  -  Identify cognitive and physical deficits and behaviors that affect risk of falls    -  [place] fall precautions as indicated by assessment   - Educate patient/family on patient safety including physical limitations  - Instruct patient to call for assistance with activity based on assessment  - Modify environment to reduce risk of injury  - Consider OT/PT consult to assist with strengthening/mobility  Outcome: Progressing  Goal: Maintain or return to baseline ADL function  Description: INTERVENTIONS:  -  Assess patient's ability to carry out ADLs; assess patient's baseline for ADL function and identify physical deficits which impact ability to perform ADLs (bathing, care of mouth/teeth, toileting, grooming, dressing, etc )  - Assess/evaluate cause of self-care deficits   - Assess range of motion  - Assess patient's mobility; develop plan if impaired  - Assess patient's need for assistive devices and provide as appropriate  - Encourage maximum independence but intervene and supervise when necessary  - Involve family in performance of ADLs  - Assess for home care needs following discharge   - Consider OT consult to assist with ADL evaluation and planning for discharge  - Provide patient education as appropriate  Outcome: Progressing  Goal: Maintain or return mobility status to optimal level  Description: INTERVENTIONS:  - Assess patient's baseline mobility status (ambulation, transfers, stairs, etc )    - Identify cognitive and physical deficits and behaviors that affect mobility  - Identify mobility aids required to assist with transfers and/or ambulation (gait belt, sit-to-stand, lift, walker, cane, etc )  - [place] fall precautions as indicated by assessment  - Record patient progress and toleration of activity level on Mobility SBAR; progress patient to next Phase/Stage  - Instruct patient to call for assistance with activity based on assessment  - Consider rehabilitation consult to assist with strengthening/weightbearing, etc   Outcome: Progressing     Problem: DISCHARGE PLANNING  Goal: Discharge to home or other facility with appropriate resources  Description: INTERVENTIONS:  - Identify barriers to discharge w/patient and caregiver  - Arrange for needed discharge resources and transportation as appropriate  - Identify discharge learning needs (meds, wound care, etc )  - Arrange for interpretive services to assist at discharge as needed  - Refer to Case Management Department for coordinating discharge planning if the patient needs post-hospital services based on physician/advanced practitioner order or complex needs related to functional status, cognitive ability, or social support system  Outcome: Progressing     Problem: Knowledge Deficit  Goal: Patient/family/caregiver demonstrates understanding of disease process, treatment plan, medications, and discharge instructions  Description: Complete learning assessment and assess knowledge base    Interventions:  - Provide teaching at level of understanding  - Provide teaching via preferred learning methods  Outcome: Progressing     Problem: GASTROINTESTINAL - ADULT  Goal: Minimal or absence of nausea and/or vomiting  Description: INTERVENTIONS:  - Administer IV fluids if ordered to ensure adequate hydration  - Maintain NPO status until nausea and vomiting are resolved  - Nasogastric tube if ordered  - Administer ordered antiemetic medications as needed  - Provide nonpharmacologic comfort measures as appropriate  - Advance diet as tolerated, if ordered  - Consider nutrition services referral to assist patient with adequate nutrition and appropriate food choices  Outcome: Progressing  Goal: Maintains or returns to baseline bowel function  Description: INTERVENTIONS:  - Assess bowel function  - Encourage oral fluids to ensure adequate hydration  - Administer IV fluids if ordered to ensure adequate hydration  - Administer ordered medications as needed  - Encourage mobilization and activity  - Consider nutritional services referral to assist patient with adequate nutrition and appropriate food choices  Outcome: Progressing  Goal: Maintains adequate nutritional intake  Description: INTERVENTIONS:  - Monitor percentage of each meal consumed  - Identify factors contributing to decreased intake, treat as appropriate  - Assist with meals as needed  - Monitor I&O, weight, and lab values if indicated  - Obtain nutrition services referral as needed  Outcome: Progressing

## 2021-05-11 NOTE — OP NOTE
[facility]   [address] [facility] [place], [address]  [phone] (Fax)      Operative Report  Diagnostic Lap, Extensive Lysis of Adhesions     Patient Name: Kimberly Dunn    :  1965  MRN: [id number]  Patient Location: [facility] OR ROOM 07  Surgery Date : 2021  Surgeons:  Surgeon(s) and Role:     * Matt Porter MD - Primary     * Sue Watts MD - Assisting    Diagnosis:    Pre-Op Diagnosis Codes: Morbid obesity with BMI of 40 0-44 9, adult ([facility]) [E66 01, Z68 41]  Benign hypertension [I10]  NITISH on CPAP [G47 33, Z99 89]  Gastroesophageal reflux disease, unspecified whether esophagitis present [K21 9]    Post-Op Diagnosis Codes:     * Morbid obesity with BMI of 40 0-44 9, adult ([address] ) [E66 01, Z68 41]     * Benign hypertension [I10]     * NITISH on CPAP [G47 33, Z99 89]     * Gastroesophageal reflux disease, unspecified whether esophagitis present [K21 9]    Procedure  1  Diagnostic Laparoscopy  2  Extensive Lysis of Adhesions    Specimen(s):  * No specimens in log *    Estimated Blood Loss:    20 mL   Closed/Suction Drain Right Abdomen Bulb 19 Fr  (Active)   Number of days: 0       Anesthesia Type:     General    Operative Indications: Morbid obesity with BMI of 40 0-44 9, adult ([facility]) [E66 01, Z68 41]  Benign hypertension [I10]  NITISH on CPAP [G47 33, Z99 89]  Gastroesophageal reflux disease, unspecified whether esophagitis present [K21 9]      Operative Findings:    Excessive amount of dense adhesions involving the omentum and loops of small bowel to the right upper quadrant under the Kocher incision  Complications:     None    Indications:    61 y o  female morbidly obese found to be a good candidate to undergo a weight loss operation upon being enrolled here at the [facility]      Patient has a long history of morbid obesity and is presenting to discuss the surgical weight loss options   Despite the patient best efforts patient was unable to lose any meaningful or sustainable weight using nonsurgical means  We had a long discussion regarding all the surgical weight-loss options at our disposal at this point and reviewed the risks and benefits of each procedure in details as it relates to her age, BMI and medical conditions      She has a history of what appears to be a 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 that had to be repaired with a choledoco jejunostomy  She is telling me all this from recollection and I am piecing all this from what I think happened as we have no formal records  This procedure was performed 26 years ago in [facility] and she was transferred to [facility]      She has been pre certified to undergo a Laparoscopic  Sleeve gastrectomy  Given her very extensive surgical history and open procedures I have explained to her that if I find too many adhesions and it is not safe to proceed with the surgery we will abort the operation  Procedure and Technique:    The patient was taken to the operating room and placed in a supine position  A dose of IV antibiotic prophylaxis that consisted of Ancef 2g was given  Sequential compression devices were placed on both lower extremities  After satisfactory general anesthesia induction and endotracheal intubation was achieved, the extremities were secured to prevent neurovascular and musculoskeletal injuries as best as possible  Subsequently, the abdominal wall was prepped and draped in a surgical standard sterile fashion  After a timeout was done and the patient was properly identified and the type of procedure was confirmed a supra-umbilical transverse skin incision was made, and the subcutaneous tissues dissected  Access to the peritoneal cavity was gained with a Veress needle technique at mehta's point  Upon obtaining pneumoperitoneum we used a 12 mm Optiview trocar on the left flank away from the [address] subcostal incision   With this device, we were able to visualize the layers of the abdominal wall, and enter the peritoneal cavity under direct visualization  Upon confirming there was no injury to the underlying structures either with the Veress needle or the trocar, under direct laparoscopic visualization, An additional trocar was placed: a 5 mm in the left upper quadrant subcostal position in the anterior axillary line  An excessive amount of adhesions were found obscuring the view of the stomach involving the omentum and loops of small bowel to the anterior abdomen and right upper quadrant  We proceeded to perform lysis of adhesions with a combination of blunt, sharp and energy dissection making sure that no injuries to underlying structures was being done  We were able to mobilize most of the omentum until we reached a point where everything was frozen and densely adhered to the right upper quadrant underneath the Kocher incision and the undersurface of the liver  This process was long and tedious and took over 40 minutes accomplish  We noticed a small defect on the peritoneal surface of the abdominal wall with a structured that appeared to be either the falciform ligament or an adhesive extension from it  We stopped and then inspected the area carefully and we determined that there was no bowel close to this from different angles  An additional 12 mm trocar was placed close to the midline to the left of the umbilicus to better assess this area  We approximated this peritoneal defect with interrupted stitches of 2-0 Vicryl and we sprayed with Vistaseal     At this point given the extensive amount of adhesions that were obscuring our view for the sleeve gastrectomy and also our trocar placement on the right side, I decided to abort the procedure as I deemed it was not safe to continue      We could not expose the duodenal area and we could not really make or identify the anatomy of this surgery that was performed for her more than 26 years ago  We left a drain under this area of the peritoneal reflection just in case as a precautionary measure  A four quadrant transversus abdominis plane block was performed under direct laparoscopic vision  The sponge, needle and instrument count was reported complete  The remaining ports were then also removed under laparoscopic visualization  The skin incisions were all closed with 4-0 absorbable subcuticular suture  The patient tolerated the procedure well, was extubated uneventfully and was transferred to the recovery room in stable condition  I was present for the entire length of the procedure as the attending of record  No qualified resident was available to assist   The presence of an assistant was necessary for camera holding, traction and counter traction and for help with suturing      Patient Disposition:    PACU     Signature: Camilla Madison MD  Date: May 11, 2021  Time: 2:11 PM

## 2021-05-11 NOTE — INTERVAL H&P NOTE
H&P reviewed  After examining the patient I find no changes in the patients condition since the H&P had been written      Vitals:    05/11/21 1109   BP: 128/63   Pulse: 91   Resp: 16   Temp: 98 1 °F (36 7 °C)   SpO2: 93%

## 2021-05-11 NOTE — PERIOPERATIVE NURSING NOTE
Patient's vital signs stable at this time  Patient having 0/10 pain in abdomen  No nausea at this time  Currently resting comfortably  Awaiting bed to open on E5  Will place in holding area and will continue to monitor patient

## 2021-05-11 NOTE — ANESTHESIA PREPROCEDURE EVALUATION
Procedure:  LAP SLEEVE GASTRECTOMY WITH ROBOTICS AND INTRAOPERATIVE EGD (N/A Abdomen)    Relevant Problems   ANESTHESIA (within normal limits)      CARDIO   (+) Hypertension      GI/HEPATIC   (+) Acid reflux   (+) Hepatic steatosis      /RENAL (within normal limits)      HEMATOLOGY (within normal limits)      MUSCULOSKELETAL   (+) Acute right-sided low back pain with right-sided sciatica   (+) Radicular pain of lumbosacral region      NEURO/PSYCH   (+) Generalized anxiety disorder      PULMONARY   (+) NITISH (obstructive sleep apnea)        Physical Exam    Airway    Mallampati score: III  TM Distance: >3 FB  Neck ROM: full     Dental   No notable dental hx     Cardiovascular  Rhythm: regular, Rate: normal, Cardiovascular exam normal    Pulmonary  Pulmonary exam normal Breath sounds clear to auscultation,     Other Findings        Anesthesia Plan  ASA Score- 3     Anesthesia Type- general with ASA Monitors  Additional Monitors:   Airway Plan: ETT  Plan Factors-    Chart reviewed  EKG reviewed  Existing labs reviewed  Patient summary reviewed  Patient instructed to abstain from smoking on day of procedure  Patient did not smoke on day of surgery  Induction- intravenous  Postoperative Plan- Plan for postoperative opioid use  Planned trial extubation    Informed Consent- Anesthetic plan and risks discussed with patient and spouse

## 2021-05-12 ENCOUNTER — TRANSITIONAL CARE MANAGEMENT (OUTPATIENT)
Dept: FAMILY MEDICINE CLINIC | Facility: CLINIC | Age: 56
End: 2021-05-12

## 2021-05-12 VITALS
HEIGHT: 64 IN | BODY MASS INDEX: 37.49 KG/M2 | TEMPERATURE: 98.6 F | RESPIRATION RATE: 18 BRPM | OXYGEN SATURATION: 94 % | HEART RATE: 47 BPM | DIASTOLIC BLOOD PRESSURE: 64 MMHG | SYSTOLIC BLOOD PRESSURE: 112 MMHG | WEIGHT: 219.58 LBS

## 2021-05-12 LAB
ANION GAP SERPL CALCULATED.3IONS-SCNC: 8 MMOL/L (ref 4–13)
BUN SERPL-MCNC: 11 MG/DL (ref 5–25)
CALCIUM SERPL-MCNC: 8.3 MG/DL (ref 8.3–10.1)
CHLORIDE SERPL-SCNC: 109 MMOL/L (ref 100–108)
CO2 SERPL-SCNC: 26 MMOL/L (ref 21–32)
CREAT SERPL-MCNC: 0.88 MG/DL (ref 0.6–1.3)
ERYTHROCYTE [DISTWIDTH] IN BLOOD BY AUTOMATED COUNT: 12.7 % (ref 11.6–15.1)
GFR SERPL CREATININE-BSD FRML MDRD: 73 ML/MIN/1.73SQ M
GLUCOSE SERPL-MCNC: 87 MG/DL (ref 65–140)
HCT VFR BLD AUTO: 38.5 % (ref 34.8–46.1)
HGB BLD-MCNC: 12.3 G/DL (ref 11.5–15.4)
MCH RBC QN AUTO: 30.8 PG (ref 26.8–34.3)
MCHC RBC AUTO-ENTMCNC: 31.9 G/DL (ref 31.4–37.4)
MCV RBC AUTO: 96 FL (ref 82–98)
PLATELET # BLD AUTO: 170 THOUSANDS/UL (ref 149–390)
PMV BLD AUTO: 12 FL (ref 8.9–12.7)
POTASSIUM SERPL-SCNC: 4.4 MMOL/L (ref 3.5–5.3)
RBC # BLD AUTO: 4 MILLION/UL (ref 3.81–5.12)
SODIUM SERPL-SCNC: 143 MMOL/L (ref 136–145)
WBC # BLD AUTO: 7.3 THOUSAND/UL (ref 4.31–10.16)

## 2021-05-12 PROCEDURE — 80048 BASIC METABOLIC PNL TOTAL CA: CPT | Performed by: PHYSICIAN ASSISTANT

## 2021-05-12 PROCEDURE — 85027 COMPLETE CBC AUTOMATED: CPT | Performed by: PHYSICIAN ASSISTANT

## 2021-05-12 PROCEDURE — NC001 PR NO CHARGE: Performed by: SURGERY

## 2021-05-12 PROCEDURE — 99024 POSTOP FOLLOW-UP VISIT: CPT | Performed by: SURGERY

## 2021-05-12 RX ORDER — ACETAMINOPHEN 160 MG/5ML
975 SUSPENSION, ORAL (FINAL DOSE FORM) ORAL EVERY 8 HOURS
Qty: 638.4 ML | Refills: 0 | Status: SHIPPED | OUTPATIENT
Start: 2021-05-12 | End: 2021-05-19

## 2021-05-12 NOTE — DISCHARGE SUMMARY
Discharge Summary - Lorena Fairbanks 61 y o  female MRN: [id number]    Unit/Bed#: E5 -01 Encounter: [id number]      Pre-Operative Diagnosis: Pre-Op Diagnosis Codes:     * Morbid obesity with BMI of 40 0-44 9, adult ([facility] ) [E66 01, Z68 41]     * Benign hypertension [I10]     * NITISH on CPAP [G47 33, Z99 89]     * Gastroesophageal reflux disease, unspecified whether esophagitis present [K21 9]    Post-Operative Diagnosis: Post-Op Diagnosis Codes:     * Morbid obesity with BMI of 40 0-44 9, adult ([facility] ) [E66 01, Z68 41]     * Benign hypertension [I10]     * NITISH on CPAP [G47 33, Z99 89]     * Gastroesophageal reflux disease, unspecified whether esophagitis present [K21 9]    Procedures Performed:  Procedure(s):  Diagnostic Lap, Extensive Lysis of Adhesions    Surgeon: June Love MD    See H & P for full details of admission and Operative Note for full details of operations performed  Patient tolerated surgery well without complications  In the morning postoperative Day 1, the patient had no nausea or abdominal pain  Tolerated a full liquid diet without vomiting  Able to ambulate and voiding independently  Patient was deemed ready for discharge home with drain in place  Patient was seen and examined prior to discharge  Provisions for Follow-Up Care:  See After Visit Summary/Discharge Instructions for information related to follow-up care and home orders  Disposition: Home, in stable condition  Planned Readmission: No    Discharge Medications:  See After Visit Summary/Discharge Instructions for reconciled discharge medications provided to patient and family  Post Operative instructions: Reviewed with patient and/or family      Signature:   Diana Miguel PA-C  Date: 5/12/2021 Time: 8:52 AM

## 2021-05-12 NOTE — DISCHARGE INSTRUCTIONS
Bariatric/Weight Loss Surgery  Hospital Discharge Instructions  1  ACTIVITY:  a  Progress as feels comfortable - a good rule is:  if you are doing something and it begins to hurt, stop doing the activity  Walk every hour while at home  b  Ely Antonio may walk stairs if you do so slowly  c  You may shower 48 hours after surgery  d  Use your incentive spirometer 10 times per hour while awake for 1 week  e  Do NOT drive for 48 hours after surgery  No driving 24 hours after taking certain prescription pain medications   Examples of such medication are Percocet, Darvocet, Oxycodone, Tylenol #3, and Tylenol with Codeine  2  DIET  a  Stay on a full liquid diet for 7 days after your surgery date, sipping slowly  Remember to keep your liquids sugar free or low calorie  You may have protein drinks  Make sure to drink 48 to 64 ounces per day of fluids  3  MEDICATIONS:  a  The abdominal nerve block will wear off during the first 1-2 days that you are home, and you may become sore  Continue to take your Tylenol and your pain medication as instructed  b  Start vitamins and minerals when you get home  c  Anti-acid Medication as per prescription  d  Other medications as indicated on the Physician Patient Discharge Instructions form given to you at the time of discharge  e  Make sure that you are splitting your pill or tablet medications in halves or fourths or even crushing them before you take them  Capsules should be opened and mixed with water or jello  You need to do this for at least 4 weeks after surgery  Eventually you will be able to take your medications the regular way as they were prescribed  Dee De Leon will need to consult with your Family Doctor in regards to all your prescribed medication, particularly those for blood pressure and diabetes  As you lose weight, medical conditions may change, requiring an alteration or elimination of the drug dose     g  DO NOT TAKE BIRTH CONTROL(BC) MEDICATIONS, INSERT BC VAGINAL RINGS, OR PLACE IUD OR ANY OTHER BC METHODS UNTIL 31 DAYS FROM DAY OF DISCHARGE FROM HOSPITAL  THIS PLACES YOU AT HIGH RISK FOR A POTENTIALLY LIFE THREATENING BLOOD CLOT  Remember to always use barrier methods for birth control and speak to your GYN about using two forms of birth control to start 31 days after surgery  It is very important to avoid pregnancy until at least 18-24 months after surgery  4  INCISION CARE  a  You may shower and get incisions wet 2 days after surgery  No soaking tub baths or swimming for 30 days after surgery  Keep abdominal area and incisions clean  Use soap and water to create a good lather and rinse off  Do not scrub incisions  b  If you have a drain, empty the drain as the nurses instructed  5  FOLLOW-UP APPOINTMENT should be made for one week after discharge  Call surgeons office at [phone] to schedule an appointment      6  CALL YOUR DOCTOR FOR:  pain not controlled by pain medications, a temperature greater than 101 5° F, any increase or change in drainage or redness from any incision, any vomiting or inability to keep liquids down, shortness of breath, shoulder pain, or bleeding

## 2021-05-12 NOTE — PROGRESS NOTES
Progress Note - Bariatric Surgery   Bernardino  Magdi 61 y o  female MRN: [id number]  Unit/Bed#: E5 -01 Encounter: [id number]      Subjective/Objective     Subjective:  Patient with morbid obesity POD1 s/p Diagnostic Laparoscopy with extensive lysis of adhesions  Patient denies fevers, chills, sweats, SOB, CP, calf pain  Pain adequately controlled on oral pain medication  Ambulating without assistance, voiding well, and using incentive spirometer  Patient currently NPO  Denies nausea or vomiting today  Vital signs stable  CBC today shows Hgb at 12 3  BMP obtained today WNL  Uses CPAP  Feeling great  Objective:    /64   Pulse (!) 47   Temp 98 6 °F (37 °C)   Resp 18   Ht 5' 3 5" (1 613 m)   Wt 99 6 kg (219 lb 9 3 oz)   LMP 08/03/2018 (Approximate)   SpO2 94%   BMI 38 29 kg/m²       Intake/Output Summary (Last 24 hours) at 5/12/2021 0841  Last data filed at 5/12/2021 0701  Gross per 24 hour   Intake 3200 ml   Output 790 ml   Net 2410 ml       Invasive Devices     Peripheral Intravenous Line            Peripheral IV 05/11/21 Left Forearm less than 1 day          Drain            Closed/Suction Drain Right Abdomen Bulb 19 Fr  less than 1 day                ROS: 10-point system completed  All negative except see HPI  Physical Exam    General Appearance:    Alert, cooperative, no distress, appears stated age   Head:    Normocephalic, without obvious abnormality, atraumatic   Lungs:     Respirations unlabored   Heart:    Regular rate and rhythm   Abdomen:     Soft, appropriate tenderness, no masses, no organomegaly, non-distended   Extremities:   Extremities normal, atraumatic, no cyanosis or edema   Neurologic:  Incision:  Psych:   Normal strength and sensation    Clean, dry, and intact, no bleeding, Gato drain in place on right flank with moderate serosanguineous drainage  Drain site clean      Normal mood and affect       Lab, Imaging and other studies:  CBC:   Lab Results Component Value Date    WBC 7 30 05/12/2021    HGB 12 3 05/12/2021    HCT 38 5 05/12/2021    MCV 96 05/12/2021     05/12/2021    MCH 30 8 05/12/2021    MCHC 31 9 05/12/2021    RDW 12 7 05/12/2021    MPV 12 0 05/12/2021   , CMP:   Lab Results   Component Value Date    SODIUM 143 05/12/2021    K 4 4 05/12/2021     (H) 05/12/2021    CO2 26 05/12/2021    BUN 11 05/12/2021    CREATININE 0 88 05/12/2021    CALCIUM 8 3 05/12/2021    EGFR 73 05/12/2021        VTE Mechanical Prophylaxis: sequential compression device    Assessment/Plan  1)  Patient with morbid Obesity s/p Diagnostic Laparoscopy with extensive lysis of adhesions with stable post op course  Patient afebrile and hemodynamically stable  Patient feeling great  - Recommend ambulation, use of SCDs when not ambulating, and incentive spirometry     - No need repeat labs  - Advance to full liquid diet then D/C if tolerated  - SLIM consult cancelled, no need as vitals currently stable and patient doing well with only diag lap performed  - D/C patient home today     Plan of care was discussed with patient  Care plan discussed with RADHA Acosta-C  Bariatric Surgery  5/12/2021  8:41 AM

## 2021-05-12 NOTE — UTILIZATION REVIEW
Initial Clinical Review    PROCEDURE CHANGED from what was initially scheduled  Was due to have sleeve gastrectomy  Due to intraabdominal findings, the procedure was aborted and only diagnostic laparoscopy and lysis of adhesions was performed  Initial auth note:   [id number] APPROVED AUTH# [id number] VALID FROM 5/11/21-5/14/21 [facility]  CALL REFERENCE [id number] 5/6/21 2:14PM    Elective inpatient surgical procedure  Age/Sex: 61 y o  female  Surgery Date: 5/11/21  Procedure: Diagnostic Laparoscopy   Extensive Lysis of Adhesions  From the op report:  An excessive amount of adhesions were found obscuring the view of the stomach involving the omentum and loops of small bowel to the anterior abdomen and right upper quadrant    We proceeded to perform lysis of adhesions with a combination of blunt, sharp and energy dissection making sure that no injuries to underlying structures was being done    We were able to mobilize most of the omentum until we reached a point where everything was frozen and densely adhered to the right upper quadrant underneath the Kocher incision and the undersurface of the liver    This process was long and tedious and took over 40 minutes accomplish    We noticed a small defect on the peritoneal surface of the abdominal wall with a structured that appeared to be either the falciform ligament or an adhesive extension from it    We stopped and then inspected the area carefully and we determined that there was no bowel close to this from different angles    An additional 12 mm trocar was placed close to the midline to the left of the umbilicus to better assess this area    We approximated this peritoneal defect with interrupted stitches of 2-0 Vicryl and we sprayed with Vistaseal    At this point given the extensive amount of adhesions that were obscuring our view for the sleeve gastrectomy and also our trocar placement on the right side, I decided to abort the procedure as I deemed it was not safe to continue    We could not expose the duodenal area and we could not really make or identify the anatomy of this surgery that was performed for her more than 26 years ago    We left a drain under this area of the peritoneal reflection just in case as a precautionary measure    A four quadrant transversus abdominis plane block was performed under direct laparoscopic vision      Anesthesia: general  Operative Findings: Excessive amount of dense adhesions involving the omentum and loops of small bowel to the right upper quadrant under the [address] incision  POD#1 Progress Note:  Patient denies fevers, chills, sweats, SOB, CP, calf pain  Pain adequately controlled on oral pain medication  Ambulating without assistance, voiding well, and using incentive spirometer  Patient currently NPO  Denies nausea or vomiting today  Vital signs stable  CBC today shows Hgb at 12 3  BMP obtained today WNL  Uses CPAP  Feeling great     Abdomen:     Soft, appropriate tenderness, no masses, no organomegaly, non-distended   Extremities:   Extremities normal, atraumatic, no cyanosis or edema   Neurologic:  Incision:     Normal strength and sensation    Clean, dry, and intact, no bleeding, drain in place with serosang fluid     Abdomen:     Soft, appropriate tenderness, no masses, no organomegaly, non-distended   Extremities:   Extremities normal, atraumatic, no cyanosis or edema   Neurologic:  Incision:  Psych:   Normal strength and sensation    Clean, dry, and intact, no bleeding, drain in place with serosang fluid     Admission Orders: Date/Time/Statement:   Admission Orders (From admission, onward)     Ordered        05/11/21 1408  Inpatient Admission  Once                   Orders Placed This Encounter   Procedures    Inpatient Admission     Standing Status:   Standing     Number of Occurrences:   1     Order Specific Question:   Level of Care     Answer:   Med Surg [16]     Order Specific Question:   Estimated length of stay     Answer:   Inpatient Only Surgery     Vital Signs: /64   Pulse (!) 47   Temp 98 6 °F (37 °C)   Resp 18   Ht 5' 3 5" (1 613 m)   Wt 99 6 kg (219 lb 9 3 oz)   LMP 08/03/2018 (Approximate)   SpO2 94%   BMI 38 29 kg/m²     Pertinent Labs/Diagnostic Test Results:       Results from last 7 days   Lab Units 05/12/21  0450   WBC Thousand/uL 7 30   HEMOGLOBIN g/dL 12 3   HEMATOCRIT % 38 5   PLATELETS Thousands/uL 170         Results from last 7 days   Lab Units 05/12/21  0450   SODIUM mmol/L 143   POTASSIUM mmol/L 4 4   CHLORIDE mmol/L 109*   CO2 mmol/L 26   ANION GAP mmol/L 8   BUN mg/dL 11   CREATININE mg/dL 0 88   EGFR ml/min/1 73sq m 73   CALCIUM mg/dL 8 3             Results from last 7 days   Lab Units 05/12/21  0450   GLUCOSE RANDOM mg/dL 87     Diet: bariatric liquid diet  Mobility: up as tolerated  DVT Prophylaxis: scd, SQ lovenox  Tele    Medications/Pain Control:   Scheduled Medications:  acetaminophen, 975 mg, Oral, Q8H  famotidine, 20 mg, Intravenous, Q12H [address]  scopolamine, 1 patch, Transdermal, Once    ceFAZolin (ANCEF) IVPB (premix in dextrose) 2,000 mg 50 mL   Dose: 2,000 mg  Freq: Once Route: IV  Start: 05/11/21 1115 End: 05/11/21 1253  celecoxib (CeleBREX) capsule 200 mg   Dose: 200 mg  Freq: Once Route: PO  Start: 05/11/21 1115 End: 05/11/21 1133  enoxaparin (LOVENOX) subcutaneous injection 40 mg   Dose: 40 mg  Freq:  On call to O R  Route: SC  Start: 05/11/21 1115 End: 05/11/21 1204    Continuous IV Infusions:  lactated ringers, 100 mL/hr, Intravenous, Continuous  sodium chloride, 125 mL/hr, Intravenous, Continuous      PRN Meds:  diphenhydrAMINE, 25 mg, Oral, Q6H PRN  metoclopramide, 10 mg, Intravenous, Q6H PRN  morphine injection, 4 mg, Intravenous, Q4H PRN  ondansetron, 4 mg, Intravenous, Q6H PRN  oxyCODONE, 10 mg, Oral, Q4H PRN  oxyCODONE, 5 mg, Oral, Q4H PRN  phenol, 2 spray, Mouth/Throat, Q2H PRN  promethazine, 25 mg, Intravenous, Q6H PRN  simethicone, 80 mg, Oral, 4x Daily PRN        Network Utilization Review Department  ATTENTION: Please call with any questions or concerns to [phone] and carefully listen to the prompts so that you are directed to the right person  All voicemails are confidential   Jordan Paz all requests for admission clinical reviews, approved or denied determinations and any other requests to dedicated fax number below belonging to the campus where the patient is receiving treatment   List of dedicated fax numbers for the Facilities:  [address] DENIALS (Administrative/Medical Necessity) [phone]   [address] (Maternity/NICU/Pediatrics) [phone]   [address] [place] [phone] [address] Alexia Cantu 14[address] [facility] [facility] [phone]

## 2021-05-12 NOTE — PLAN OF CARE
Problem: Potential for Falls  Goal: Patient will remain free of falls  Description: INTERVENTIONS:  - Assess patient frequently for physical needs  -  Identify cognitive and physical deficits and behaviors that affect risk of falls    -  [place] fall precautions as indicated by assessment   - Educate patient/family on patient safety including physical limitations  - Instruct patient to call for assistance with activity based on assessment  - Modify environment to reduce risk of injury  - Consider OT/PT consult to assist with strengthening/mobility  Outcome: Progressing     Problem: PAIN - ADULT  Goal: Verbalizes/displays adequate comfort level or baseline comfort level  Description: Interventions:  - Encourage patient to monitor pain and request assistance  - Assess pain using appropriate pain scale  - Administer analgesics based on type and severity of pain and evaluate response  - Implement non-pharmacological measures as appropriate and evaluate response  - Consider cultural and social influences on pain and pain management  - Notify physician/advanced practitioner if interventions unsuccessful or patient reports new pain  Outcome: Progressing     Problem: INFECTION - ADULT  Goal: Absence or prevention of progression during hospitalization  Description: INTERVENTIONS:  - Assess and monitor for signs and symptoms of infection  - Monitor lab/diagnostic results  - Monitor all insertion sites, i e  indwelling lines, tubes, and drains  - Monitor endotracheal if appropriate and nasal secretions for changes in amount and color  - [place] appropriate cooling/warming therapies per order  - Administer medications as ordered  - Instruct and encourage patient and family to use good hand hygiene technique  - Identify and instruct in appropriate isolation precautions for identified infection/condition  Outcome: Progressing  Goal: Absence of fever/infection during neutropenic period  Description: INTERVENTIONS:  - Monitor WBC    Outcome: Progressing     Problem: SAFETY ADULT  Goal: Patient will remain free of falls  Description: INTERVENTIONS:  - Assess patient frequently for physical needs  -  Identify cognitive and physical deficits and behaviors that affect risk of falls    -  [place] fall precautions as indicated by assessment   - Educate patient/family on patient safety including physical limitations  - Instruct patient to call for assistance with activity based on assessment  - Modify environment to reduce risk of injury  - Consider OT/PT consult to assist with strengthening/mobility  Outcome: Progressing  Goal: Maintain or return to baseline ADL function  Description: INTERVENTIONS:  -  Assess patient's ability to carry out ADLs; assess patient's baseline for ADL function and identify physical deficits which impact ability to perform ADLs (bathing, care of mouth/teeth, toileting, grooming, dressing, etc )  - Assess/evaluate cause of self-care deficits   - Assess range of motion  - Assess patient's mobility; develop plan if impaired  - Assess patient's need for assistive devices and provide as appropriate  - Encourage maximum independence but intervene and supervise when necessary  - Involve family in performance of ADLs  - Assess for home care needs following discharge   - Consider OT consult to assist with ADL evaluation and planning for discharge  - Provide patient education as appropriate  Outcome: Progressing  Goal: Maintain or return mobility status to optimal level  Description: INTERVENTIONS:  - Assess patient's baseline mobility status (ambulation, transfers, stairs, etc )    - Identify cognitive and physical deficits and behaviors that affect mobility  - Identify mobility aids required to assist with transfers and/or ambulation (gait belt, sit-to-stand, lift, walker, cane, etc )  - [place] fall precautions as indicated by assessment  - Record patient progress and toleration of activity level on Mobility SBAR; progress patient to next Phase/Stage  - Instruct patient to call for assistance with activity based on assessment  - Consider rehabilitation consult to assist with strengthening/weightbearing, etc   Outcome: Progressing     Problem: Knowledge Deficit  Goal: Patient/family/caregiver demonstrates understanding of disease process, treatment plan, medications, and discharge instructions  Description: Complete learning assessment and assess knowledge base    Interventions:  - Provide teaching at level of understanding  - Provide teaching via preferred learning methods  Outcome: Progressing

## 2021-05-12 NOTE — UTILIZATION REVIEW
Inpatient Admission Authorization Request   NOTIFICATION OF INPATIENT ADMISSION/INPATIENT AUTHORIZATION REQUEST   SERVICING FACILITY:   [address] [place] [address], [place] [address] [address] [facility]  Tax ID: [id number]  NPI: [id number]  Place of Service: Inpatient [facility]  [address]  Place of Service Code: 24     ATTENDING PROVIDER:  Attending Name and NPI#: Edmundo Nicolas, [address] [[id number]]  Address: [address] [place] [address], [place] [address] [address] [facility]  Phone: [phone]     UTILIZATION REVIEW CONTACT:  Ina Branham, Utilization   Network Utilization Review Department  Phone: [phone]  Fax: [phone]  Email: [email or web address]     PHYSICIAN ADVISORY SERVICES:  FOR BFOL-ST-PISK REVIEW - MEDICAL NECESSITY DENIAL  Phone: [phone]  Fax: [phone]  Email: [email or web address]     TYPE OF REQUEST:  Inpatient Status     ADMISSION INFORMATION:  ADMISSION DATE/TIME: 5/11/21 1408  PATIENT DIAGNOSIS CODE/DESCRIPTION:  Morbid obesity with BMI of 40 0-44 9, adult (HCC) [E66 01, Z68 41]  Benign hypertension [I10]  NITISH on CPAP [G47 33, Z99 89]  Gastroesophageal reflux disease, unspecified whether esophagitis present [K21 9]  DISCHARGE DATE/TIME: 5/12/2021  9:50 AM  DISCHARGE DISPOSITION (IF DISCHARGED): Home/Self Care     IMPORTANT INFORMATION:  Please contact the Ina Branham directly with any questions or concerns regarding this request  Department voicemails are confidential     Send requests for admission clinical reviews, concurrent reviews, approvals, and administrative denials due to lack of clinical to fax [phone]

## 2021-05-12 NOTE — DISCHARGE INSTR - AVS FIRST PAGE
your pain medications from [address] in [address]   Take Tylenol as instructed  Stay hydrated and follow full liquid diet until follow up  Mild nausea is ok as long as you can drink fluids  Take your omeprazole daily  Crush or cut your pills and open capsules, mix with liquid to drink    Drain care as instructed by nurses and Dr Delia Taveras  Follow up with Dr Delia Taveras and your PCP within the next week

## 2021-05-12 NOTE — NURSING NOTE
Pt discharged via wheelchair with spouse  Discharge instructions reviewed with pt and spouse  Any questions answered by this RN

## 2021-05-12 NOTE — PLAN OF CARE
Problem: Potential for Falls  Goal: Patient will remain free of falls  Description: INTERVENTIONS:  - Assess patient frequently for physical needs  -  Identify cognitive and physical deficits and behaviors that affect risk of falls    -  [place] fall precautions as indicated by assessment   - Educate patient/family on patient safety including physical limitations  - Instruct patient to call for assistance with activity based on assessment  - Modify environment to reduce risk of injury  - Consider OT/PT consult to assist with strengthening/mobility  Outcome: Adequate for Discharge     Problem: PAIN - ADULT  Goal: Verbalizes/displays adequate comfort level or baseline comfort level  Description: Interventions:  - Encourage patient to monitor pain and request assistance  - Assess pain using appropriate pain scale  - Administer analgesics based on type and severity of pain and evaluate response  - Implement non-pharmacological measures as appropriate and evaluate response  - Consider cultural and social influences on pain and pain management  - Notify physician/advanced practitioner if interventions unsuccessful or patient reports new pain  Outcome: Adequate for Discharge     Problem: INFECTION - ADULT  Goal: Absence or prevention of progression during hospitalization  Description: INTERVENTIONS:  - Assess and monitor for signs and symptoms of infection  - Monitor lab/diagnostic results  - Monitor all insertion sites, i e  indwelling lines, tubes, and drains  - Monitor endotracheal if appropriate and nasal secretions for changes in amount and color  - [place] appropriate cooling/warming therapies per order  - Administer medications as ordered  - Instruct and encourage patient and family to use good hand hygiene technique  - Identify and instruct in appropriate isolation precautions for identified infection/condition  Outcome: Adequate for Discharge  Goal: Absence of fever/infection during neutropenic period  Description: INTERVENTIONS:  - Monitor WBC    Outcome: Adequate for Discharge     Problem: SAFETY ADULT  Goal: Patient will remain free of falls  Description: INTERVENTIONS:  - Assess patient frequently for physical needs  -  Identify cognitive and physical deficits and behaviors that affect risk of falls    -  [place] fall precautions as indicated by assessment   - Educate patient/family on patient safety including physical limitations  - Instruct patient to call for assistance with activity based on assessment  - Modify environment to reduce risk of injury  - Consider OT/PT consult to assist with strengthening/mobility  Outcome: Adequate for Discharge  Goal: Maintain or return to baseline ADL function  Description: INTERVENTIONS:  -  Assess patient's ability to carry out ADLs; assess patient's baseline for ADL function and identify physical deficits which impact ability to perform ADLs (bathing, care of mouth/teeth, toileting, grooming, dressing, etc )  - Assess/evaluate cause of self-care deficits   - Assess range of motion  - Assess patient's mobility; develop plan if impaired  - Assess patient's need for assistive devices and provide as appropriate  - Encourage maximum independence but intervene and supervise when necessary  - Involve family in performance of ADLs  - Assess for home care needs following discharge   - Consider OT consult to assist with ADL evaluation and planning for discharge  - Provide patient education as appropriate  Outcome: Adequate for Discharge  Goal: Maintain or return mobility status to optimal level  Description: INTERVENTIONS:  - Assess patient's baseline mobility status (ambulation, transfers, stairs, etc )    - Identify cognitive and physical deficits and behaviors that affect mobility  - Identify mobility aids required to assist with transfers and/or ambulation (gait belt, sit-to-stand, lift, walker, cane, etc )  - [place] fall precautions as indicated by assessment  - Record patient progress and toleration of activity level on Mobility SBAR; progress patient to next Phase/Stage  - Instruct patient to call for assistance with activity based on assessment  - Consider rehabilitation consult to assist with strengthening/weightbearing, etc   Outcome: Adequate for Discharge     Problem: DISCHARGE PLANNING  Goal: Discharge to home or other facility with appropriate resources  Description: INTERVENTIONS:  - Identify barriers to discharge w/patient and caregiver  - Arrange for needed discharge resources and transportation as appropriate  - Identify discharge learning needs (meds, wound care, etc )  - Arrange for interpretive services to assist at discharge as needed  - Refer to Case Management Department for coordinating discharge planning if the patient needs post-hospital services based on physician/advanced practitioner order or complex needs related to functional status, cognitive ability, or social support system  Outcome: Adequate for Discharge     Problem: Knowledge Deficit  Goal: Patient/family/caregiver demonstrates understanding of disease process, treatment plan, medications, and discharge instructions  Description: Complete learning assessment and assess knowledge base    Interventions:  - Provide teaching at level of understanding  - Provide teaching via preferred learning methods  Outcome: Adequate for Discharge     Problem: GASTROINTESTINAL - ADULT  Goal: Minimal or absence of nausea and/or vomiting  Description: INTERVENTIONS:  - Administer IV fluids if ordered to ensure adequate hydration  - Maintain NPO status until nausea and vomiting are resolved  - Nasogastric tube if ordered  - Administer ordered antiemetic medications as needed  - Provide nonpharmacologic comfort measures as appropriate  - Advance diet as tolerated, if ordered  - Consider nutrition services referral to assist patient with adequate nutrition and appropriate food choices  Outcome: Adequate for Discharge  Goal: Maintains or returns to baseline bowel function  Description: INTERVENTIONS:  - Assess bowel function  - Encourage oral fluids to ensure adequate hydration  - Administer IV fluids if ordered to ensure adequate hydration  - Administer ordered medications as needed  - Encourage mobilization and activity  - Consider nutritional services referral to assist patient with adequate nutrition and appropriate food choices  Outcome: Adequate for Discharge  Goal: Maintains adequate nutritional intake  Description: INTERVENTIONS:  - Monitor percentage of each meal consumed  - Identify factors contributing to decreased intake, treat as appropriate  - Assist with meals as needed  - Monitor I&O, weight, and lab values if indicated  - Obtain nutrition services referral as needed  Outcome: Adequate for Discharge

## 2021-05-13 ENCOUNTER — TELEPHONE (OUTPATIENT)
Dept: MEDSURG UNIT | Facility: HOSPITAL | Age: 56
End: 2021-05-13

## 2021-05-13 NOTE — TELEPHONE ENCOUNTER
Post op follow up phone call attempted  No answer obtained on listed phone numbers  Generic message left requesting call back with an update on progress

## 2021-05-14 NOTE — UTILIZATION REVIEW
Inpatient Admission Authorization Request   NOTIFICATION OF INPATIENT ADMISSION/INPATIENT AUTHORIZATION REQUEST   SERVICING FACILITY:   [address] [place] [address], [facility], [address] [facility]  Tax ID: [id number]  NPI: [id number]  Place of Service: [facility]patient [facility]  [address]  Place of Service Code: 24     ATTENDING PROVIDER:  Attending Name and NPI#: Rai Alcantara [[id number]]  Address: [address] [place] [address], [facility], [facility] [address] [facility]  Phone: [phone]     UTILIZATION REVIEW CONTACT:  Elner Peabody, Utilization   Network Utilization Review Department  Phone: [phone]  Fax: [phone]  Email: [email or web address]     PHYSICIAN ADVISORY SERVICES:  FOR EULD-XF-CUAX REVIEW - MEDICAL NECESSITY DENIAL  Phone: [phone]  Fax: [phone]  Email: [email or web address]     TYPE OF REQUEST:  Inpatient Status     ADMISSION INFORMATION:  ADMISSION DATE/TIME: 5/11/21 1408  PATIENT DIAGNOSIS CODE/DESCRIPTION:  Morbid obesity with BMI of 40 0-44 9, adult (HCC) [E66 01, Z68 41]  Benign hypertension [I10]  NITISH on CPAP [G47 33, Z99 89]  Gastroesophageal reflux disease, unspecified whether esophagitis present [K21 9]  DISCHARGE DATE/TIME: 5/12/2021  9:50 AM  DISCHARGE DISPOSITION (IF DISCHARGED): Home/Self Care     IMPORTANT INFORMATION:  Please contact the Elner Peabody directly with any questions or concerns regarding this request  Department voicemails are confidential     Send requests for admission clinical reviews, concurrent reviews, approvals, and administrative denials due to lack of clinical to fax [phone]

## 2021-05-14 NOTE — UTILIZATION REVIEW
Initial Clinical Review    PROCEDURE CHANGED from what was initially scheduled  Was due to have sleeve gastrectomy  Due to intraabdominal findings, the procedure was aborted and only diagnostic laparoscopy and lysis of adhesions was performed  Initial auth note:   [id number] APPROVED AUTH# [id number] VALID FROM 5/11/21-5/14/21 PER ALICIA REAL  CALL REFERENCE [id number] 5/6/21 2:14PM    Elective inpatient surgical procedure  Age/Sex: 61 y o  female  Surgery Date: 5/11/21  Procedure: Diagnostic Laparoscopy   Extensive Lysis of Adhesions  From the op report:  An excessive amount of adhesions were found obscuring the view of the stomach involving the omentum and loops of small bowel to the anterior abdomen and right upper quadrant    We proceeded to perform lysis of adhesions with a combination of blunt, sharp and energy dissection making sure that no injuries to underlying structures was being done    We were able to mobilize most of the omentum until we reached a point where everything was frozen and densely adhered to the right upper quadrant underneath the Kocher incision and the undersurface of the liver    This process was long and tedious and took over 40 minutes accomplish    We noticed a small defect on the peritoneal surface of the abdominal wall with a structured that appeared to be either the falciform ligament or an adhesive extension from it    We stopped and then inspected the area carefully and we determined that there was no bowel close to this from different angles    An additional 12 mm trocar was placed close to the midline to the left of the umbilicus to better assess this area    We approximated this peritoneal defect with interrupted stitches of 2-0 Vicryl and we sprayed with Vistaseal    At this point given the extensive amount of adhesions that were obscuring our view for the sleeve gastrectomy and also our trocar placement on the right side, I decided to abort the procedure as I deemed it was not safe to continue    We could not expose the duodenal area and we could not really make or identify the anatomy of this surgery that was performed for her more than 26 years ago    We left a drain under this area of the peritoneal reflection just in case as a precautionary measure    A four quadrant transversus abdominis plane block was performed under direct laparoscopic vision      Anesthesia: general  Operative Findings: Excessive amount of dense adhesions involving the omentum and loops of small bowel to the right upper quadrant under the [address] incision  POD#1 Progress Note:  Patient denies fevers, chills, sweats, SOB, CP, calf pain  Pain adequately controlled on oral pain medication  Ambulating without assistance, voiding well, and using incentive spirometer  Patient currently NPO  Denies nausea or vomiting today  Vital signs stable  CBC today shows Hgb at 12 3  BMP obtained today WNL  Uses CPAP  Feeling great     Abdomen:     Soft, appropriate tenderness, no masses, no organomegaly, non-distended   Extremities:   Extremities normal, atraumatic, no cyanosis or edema   Neurologic:  Incision:     Normal strength and sensation    Clean, dry, and intact, no bleeding, drain in place with serosang fluid     Abdomen:     Soft, appropriate tenderness, no masses, no organomegaly, non-distended   Extremities:   Extremities normal, atraumatic, no cyanosis or edema   Neurologic:  Incision:  Psych:   Normal strength and sensation    Clean, dry, and intact, no bleeding, drain in place with serosang fluid     Admission Orders: Date/Time/Statement:   Admission Orders (From admission, onward)     Ordered        05/11/21 1408  Inpatient Admission  Once                   Orders Placed This Encounter   Procedures    Inpatient Admission     Standing Status:   Standing     Number of Occurrences:   1     Order Specific Question:   Level of Care     Answer:   Med Surg [16]     Order Specific Question:   Estimated length of stay     Answer:   Inpatient Only Surgery     Vital Signs: /64   Pulse (!) 47   Temp 98 6 °F (37 °C)   Resp 18   Ht 5' 3 5" (1 613 m)   Wt 99 6 kg (219 lb 9 3 oz)   LMP 08/03/2018 (Approximate)   SpO2 94%   BMI 38 29 kg/m²     Pertinent Labs/Diagnostic Test Results:       Results from last 7 days   Lab Units 05/12/21  0450   WBC Thousand/uL 7 30   HEMOGLOBIN g/dL 12 3   HEMATOCRIT % 38 5   PLATELETS Thousands/uL 170         Results from last 7 days   Lab Units 05/12/21  0450   SODIUM mmol/L 143   POTASSIUM mmol/L 4 4   CHLORIDE mmol/L 109*   CO2 mmol/L 26   ANION GAP mmol/L 8   BUN mg/dL 11   CREATININE mg/dL 0 88   EGFR ml/min/1 73sq m 73   CALCIUM mg/dL 8 3             Results from last 7 days   Lab Units 05/12/21  0450   GLUCOSE RANDOM mg/dL 87     Diet: bariatric liquid diet  Mobility: up as tolerated  DVT Prophylaxis: scd, SQ lovenox  Tele    Medications/Pain Control:   Scheduled Medications:  acetaminophen, 975 mg, Oral, Q8H  famotidine, 20 mg, Intravenous, Q12H [address]  scopolamine, 1 patch, Transdermal, Once    ceFAZolin (ANCEF) IVPB (premix in dextrose) 2,000 mg 50 mL   Dose: 2,000 mg  Freq: Once Route: IV  Start: 05/11/21 1115 End: 05/11/21 1253  celecoxib (CeleBREX) capsule 200 mg   Dose: 200 mg  Freq: Once Route: PO  Start: 05/11/21 1115 End: 05/11/21 1133  enoxaparin (LOVENOX) subcutaneous injection 40 mg   Dose: 40 mg  Freq: On call to O R  Route: SC  Start: 05/11/21 1115 End: 05/11/21 1204    Continuous IV Infusions:  No current facility-administered medications for this encounter       PRN Meds:  diphenhydrAMINE, 25 mg, Oral, Q6H PRN  metoclopramide, 10 mg, Intravenous, Q6H PRN  morphine injection, 4 mg, Intravenous, Q4H PRN  ondansetron, 4 mg, Intravenous, Q6H PRN  oxyCODONE, 10 mg, Oral, Q4H PRN  oxyCODONE, 5 mg, Oral, Q4H PRN  phenol, 2 spray, Mouth/Throat, Q2H PRN  promethazine, 25 mg, Intravenous, Q6H PRN  simethicone, 80 mg, Oral, 4x Daily PRN        Network Utilization Review Department  ATTENTION: Please call with any questions or concerns to [phone] and carefully listen to the prompts so that you are directed to the right person  All voicemails are confidential   Danay Grand all requests for admission clinical reviews, approved or denied determinations and any other requests to dedicated fax number below belonging to the campus where the patient is receiving treatment   List of dedicated fax numbers for the Facilities:  [address] DENIALS (Administrative/Medical Necessity) [phone]   [address] (Maternity/NICU/Pediatrics) [phone]   [address] [place] [address] [id number] [place] [address] [address] [place] [address] [phone]

## 2021-05-17 ENCOUNTER — TELEPHONE (OUTPATIENT)
Dept: BARIATRICS | Facility: CLINIC | Age: 56
End: 2021-05-17

## 2021-05-17 ENCOUNTER — TELEPHONE (OUTPATIENT)
Dept: MEDSURG UNIT | Facility: HOSPITAL | Age: 56
End: 2021-05-17

## 2021-05-17 PROCEDURE — RECHECK: Performed by: DIETITIAN, REGISTERED

## 2021-05-17 NOTE — TELEPHONE ENCOUNTER
Pt states she is having a lot of pain due to her drain  She thinks the hose of the drain is being pushed in which is causing her pain  Pt states she is also having pain every time she moves

## 2021-05-17 NOTE — TELEPHONE ENCOUNTER
Post op follow up phone call completed  Pt is toleraing liquids and is asking if diet can be increased  Pt did not have bariatric procedure but does have drain inserted  Drain is draining yellow/pink fluid  Pt thinks the drain has gone in further as she has increased pain in that area  Using tylenol around the clock but needs to use oxycodone as well  Using IS as instructed, reinforced importance of using IS to help prevent pneumonia  Ambulating about home without difficulty  Pt stated understanding about discharge instructions and medication adjustments  Follow up appt with surgeon scheduled for this Friday  Instructed to call with any additional questions or concerns  Addy txt sent to Nagi Dawson PA-C with above situation  Requested PA to call patient with further instructions

## 2021-05-17 NOTE — TELEPHONE ENCOUNTER
Returned phone call  Patient has been followed a clear liquid diet since her surgery on 5/11/2021  She had an exploratory lap with lysis of adhesions  She has been following the liquid diet for 6 days without issues  Since she did not have bariatric surgery and her stomach is intact, instructed patient to advance her diet to regular, small frequent meals  Patient has been taking tylenol for the pain  She has been tolerating liquids but feels very hungry  She will advance her diet today and contact the office with any further questions or concerns     Patient had some questions concerning her drain, transferred to the nursing line to address her concerns

## 2021-05-18 ENCOUNTER — TELEPHONE (OUTPATIENT)
Dept: BARIATRICS | Facility: CLINIC | Age: 56
End: 2021-05-18

## 2021-05-18 ENCOUNTER — OFFICE VISIT (OUTPATIENT)
Dept: FAMILY MEDICINE CLINIC | Facility: CLINIC | Age: 56
End: 2021-05-18
Payer: COMMERCIAL

## 2021-05-18 VITALS
SYSTOLIC BLOOD PRESSURE: 122 MMHG | OXYGEN SATURATION: 98 % | HEIGHT: 64 IN | RESPIRATION RATE: 18 BRPM | WEIGHT: 218 LBS | BODY MASS INDEX: 37.22 KG/M2 | DIASTOLIC BLOOD PRESSURE: 82 MMHG | HEART RATE: 68 BPM

## 2021-05-18 DIAGNOSIS — R10.11 RIGHT UPPER QUADRANT ABDOMINAL PAIN: Primary | ICD-10-CM

## 2021-05-18 DIAGNOSIS — E66.9 OBESITY (BMI 30-39.9): ICD-10-CM

## 2021-05-18 PROBLEM — M54.17 RADICULAR PAIN OF LUMBOSACRAL REGION: Status: RESOLVED | Noted: 2017-12-11 | Resolved: 2021-05-18

## 2021-05-18 PROBLEM — L20.9 ATOPIC DERMATITIS: Status: RESOLVED | Noted: 2017-11-21 | Resolved: 2021-05-18

## 2021-05-18 PROBLEM — E66.813 OBESITY, CLASS III, BMI 40-49.9 (MORBID OBESITY): Status: RESOLVED | Noted: 2021-01-08 | Resolved: 2021-05-18

## 2021-05-18 PROBLEM — M54.41 ACUTE RIGHT-SIDED LOW BACK PAIN WITH RIGHT-SIDED SCIATICA: Status: RESOLVED | Noted: 2017-11-21 | Resolved: 2021-05-18

## 2021-05-18 PROBLEM — E66.01 OBESITY, CLASS III, BMI 40-49.9 (MORBID OBESITY) (HCC): Status: RESOLVED | Noted: 2021-01-08 | Resolved: 2021-05-18

## 2021-05-18 PROCEDURE — 99214 OFFICE O/P EST MOD 30 MIN: CPT | Performed by: FAMILY MEDICINE

## 2021-05-18 NOTE — PROGRESS NOTES
Assessment/Plan:    Obesity (BMI 30-39  9)  Unfortunately she could not have gastric bypass because of extensive adhesions  Diagnoses and all orders for this visit:    Right upper quadrant abdominal pain    Obesity (BMI 30-39  9)          Subjective:      Patient ID: Laureen Torrez is a 61 y o  female  She is here with her  for post op visit  We are also using  service on ipad  She went to OR on 5/11 for gastric bypass surgery but it could not be accomplished because of extensive adhesions and unusual anatomy from prior surgery 26 years ago in [place]  At that time she had gall bladder surgery and post op complications  She ended up having extensive lysis of adhesions and placement of wound drain  She notes abd pain is improving  No N/V  She got approval from the surgeon to progress from liquid diet to solids as of yesterday  She has been taking Miralax and had large BM today  Rare oxycodone  She denies any problems with urination  The following portions of the patient's history were reviewed and updated as appropriate: allergies, current medications, past family history, past medical history, past social history, past surgical history and problem list     Review of Systems   Constitutional: Positive for activity change  Negative for chills and fever  HENT: Negative for congestion  Respiratory: Positive for cough  Negative for shortness of breath  Gastrointestinal: Positive for abdominal pain  Negative for diarrhea, nausea and vomiting  Genitourinary: Negative for difficulty urinating, dysuria and flank pain  Neurological: Negative for dizziness and headaches  Objective:      /82 (BP Location: Left arm, Patient Position: Sitting, Cuff Size: Large)   Pulse 68   Resp 18   Ht 5' 3 5" (1 613 m)   Wt 98 9 kg (218 lb)   LMP 08/03/2018 (Approximate)   SpO2 98%   BMI 38 01 kg/m²          Physical Exam  Vitals signs and nursing note reviewed  Constitutional:       Appearance: Normal appearance  HENT:      Head: Normocephalic and atraumatic  Cardiovascular:      Rate and Rhythm: Normal rate and regular rhythm  Pulses: Normal pulses  Heart sounds: Normal heart sounds  Pulmonary:      Effort: Pulmonary effort is normal       Breath sounds: Normal breath sounds  Abdominal:      General: Bowel sounds are normal  There is distension  Comments: Lap scars are healing  Drain in place RUQ  Mild tenderness diffusely  Eccymoses LLQ   Skin:     General: Skin is warm and dry  Neurological:      Mental Status: She is alert  Psychiatric:         Mood and Affect: Mood normal          Behavior: Behavior normal        I have spent 40 minutes with Patient and family today in which greater than 50% of this time was spent in counseling/coordination of care regarding Intructions for management

## 2021-05-21 ENCOUNTER — OFFICE VISIT (OUTPATIENT)
Dept: BARIATRICS | Facility: CLINIC | Age: 56
End: 2021-05-21

## 2021-05-21 VITALS
SYSTOLIC BLOOD PRESSURE: 140 MMHG | BODY MASS INDEX: 37.39 KG/M2 | DIASTOLIC BLOOD PRESSURE: 70 MMHG | WEIGHT: 219 LBS | TEMPERATURE: 98 F | HEIGHT: 64 IN | HEART RATE: 73 BPM

## 2021-05-21 DIAGNOSIS — E66.9 OBESITY, CLASS II, BMI 35-39.9: Primary | ICD-10-CM

## 2021-05-21 PROCEDURE — 99024 POSTOP FOLLOW-UP VISIT: CPT | Performed by: SURGERY

## 2021-05-21 NOTE — PROGRESS NOTES
POST OP UP VISIT - BARIATRIC SURGERY  Ada Fairbanks 61 y o  female MRN: [id number]  Unit/Bed#:  Encounter: [id number]      HPI:  Saintclair Lass is a 61 y o  female status post Diagnostic Lap, Extensive Lysis of Adhesions on 2021  She  Was found to have Excessive amount of dense adhesions involving the omentum and loops of small bowel to the right upper quadrant under the Kocher incision from her previous surgeries, for that reason we had to abort doing sleeve gastrectomy  She  Reports incisional pain around NICK drain  NICK drain has serous color, 150 cc /24 hours  she denies fever, chills, nausea or vomiting  She is tolerating her diet  Review of Systems   Constitutional: Negative for chills and fatigue  HENT: Negative for ear pain  Eyes: Negative for pain  Respiratory: Negative for cough, shortness of breath and wheezing  Cardiovascular: Negative for chest pain  Gastrointestinal: Positive for abdominal pain (incisional)  Negative for abdominal distention  Endocrine: Negative for polydipsia  Genitourinary: Negative for flank pain  Musculoskeletal: Negative for arthralgias and back pain  Skin: Negative for pallor  Allergic/Immunologic: Negative for food allergies  Neurological: Negative for weakness and headaches  Hematological: Does not bruise/bleed easily  Psychiatric/Behavioral: Negative for confusion          Unhappy        Historical Information   Past Medical History:   Diagnosis Date    Acid reflux     Anxiety     CPAP (continuous positive airway pressure) dependence     GERD (gastroesophageal reflux disease)     Hypertension     Obesity     Palpable abdominal mass     LAST ASSESSED: 17    Sleep apnea      Past Surgical History:   Procedure Laterality Date    ABDOMINAL SURGERY      AUGMENTATION MAMMAPLASTY Bilateral     Bi retro pectoral saline    BREAST SURGERY      REDUCTION PROCEDURE     SECTION      CHOLECYSTECTOMY      CHOLECYSTECTOMY LAPAROSCOPIC      LIVER SURGERY      OR LAP, ERIKA RESTRICT PROC, LONGITUDINAL GASTRECTOMY N/A 2021    Procedure: Diagnostic Lap, Extensive Lysis of Adhesions;  Surgeon: Vane Roberts MD;  Location: [facility];  Service: Bariatrics     Social History   Social History     Substance and Sexual Activity   Alcohol Use No     Social History     Substance and Sexual Activity   Drug Use No     Social History     Tobacco Use   Smoking Status Former Smoker    Quit date: 2013    Years since quittin 3   Smokeless Tobacco Never Used   Tobacco Comment    uses e cigs vaps quit 7 yrs ago  uses 0 nicotine     Family History: non-contributory    Meds/Allergies   all medications and allergies reviewed  Allergies   Allergen Reactions    Prednisone Shortness Of Breath    Alprazolam     Duloxetine     Escitalopram     Latex Rash    Omnipaque [Iohexol]      Pt erupted with rash all over body       Objective       Current Vitals:   Blood Pressure: 140/70 (21 1026)  Pulse: 73 (21 1026)  Temperature: 98 °F (36 7 °C) (21 1026)  Height: 5' 3 5" (161 3 cm) (21 1026)  Weight - Scale: 99 3 kg (219 lb) (21 1026)      Invasive Devices     Peripheral Intravenous Line            Peripheral IV 21 Left Forearm 9 days          Drain            Closed/Suction Drain Right Abdomen Bulb 19 Fr  9 days                Physical Exam  Constitutional:       Appearance: Normal appearance  HENT:      Head: Normocephalic  Mouth/Throat:      Mouth: Mucous membranes are moist    Eyes:      Conjunctiva/sclera: Conjunctivae normal       Pupils: Pupils are equal, round, and reactive to light  Neck:      Musculoskeletal: Normal range of motion  Cardiovascular:      Rate and Rhythm: Normal rate and regular rhythm  Pulmonary:      Effort: Pulmonary effort is normal    Abdominal:      General: There is no distension  Tenderness: There is no abdominal tenderness  Comments:  All laparoscopic incisions are healing well, with no signs of infection  There are no palpable incisions hernias  Deep palpation is not painful in all 4 quadrants, and there are no peritoneal findings  NICK drain in RUQ, serous color, 25 cc  Drain was removed at bedside with no issues   Musculoskeletal: Normal range of motion  Skin:     General: Skin is warm  Capillary Refill: Capillary refill takes less than 2 seconds  Neurological:      General: No focal deficit present  Mental Status: She is alert  Psychiatric:         Mood and Affect: Mood normal              Pathology, and Other Studies: I have personally reviewed pertinent reports  Assessment/PLAN:    61 y o  female status post Diagnostic Lap, Extensive Lysis of Adhesions on 5/11/2021  Patient was initially scheduled for  Laparoscopic sleeve gastrectomy  Because of extensive adhesions  Secondary to her previous surgery, possible history of choledochal jejunostomy  We were unable to proceed with sleeve gastrectomy and the surgery has to be aborted  we discussed with her that she has contraindication for intragastric balloon as she had prior surgeries and adhesions  Patient would like to explore the medical weight management    Doing well post op  No major issues  Increase physical activity as tolerated and as instructed  Advance diet as instructed by our dietitians today and as indicated in the binder                  Ju [place] MD  5/21/2021  10:42 AM

## 2021-05-23 DIAGNOSIS — E66.9 OBESITY (BMI 30-39.9): ICD-10-CM

## 2021-05-24 ENCOUNTER — TELEPHONE (OUTPATIENT)
Dept: SLEEP CENTER | Facility: CLINIC | Age: 56
End: 2021-05-24

## 2021-05-24 DIAGNOSIS — E66.9 OBESITY (BMI 30-39.9): ICD-10-CM

## 2021-05-24 RX ORDER — OMEPRAZOLE 20 MG/1
20 CAPSULE, DELAYED RELEASE ORAL DAILY
Qty: 30 CAPSULE | Refills: 0 | Status: SHIPPED | OUTPATIENT
Start: 2021-05-24 | End: 2021-06-22

## 2021-05-24 NOTE — TELEPHONE ENCOUNTER
Patient left message stating that she is having issues with her machine and that she hasn't been able to use it  Patient would like to be called back at [phone]

## 2021-05-25 NOTE — TELEPHONE ENCOUNTER
Patient left message that she missed the call but received the voicemail and is requesting another call back  Patient can be reached at [phone]

## 2021-05-28 RX ORDER — OMEPRAZOLE 20 MG/1
CAPSULE, DELAYED RELEASE ORAL
Qty: 30 CAPSULE | Refills: 0 | OUTPATIENT
Start: 2021-05-28

## 2021-06-02 NOTE — TELEPHONE ENCOUNTER
Received message from patient's   States patient would like to return her CPAP machine  He knows an appointment will be needed to do this  Prabhakar Bran representative made aware

## 2021-06-22 ENCOUNTER — TELEPHONE (OUTPATIENT)
Dept: OTHER | Facility: OTHER | Age: 56
End: 2021-06-22

## 2021-06-22 DIAGNOSIS — E66.9 OBESITY (BMI 30-39.9): ICD-10-CM

## 2021-06-22 RX ORDER — OMEPRAZOLE 20 MG/1
CAPSULE, DELAYED RELEASE ORAL
Qty: 90 CAPSULE | Refills: 1 | Status: SHIPPED | OUTPATIENT
Start: 2021-06-22 | End: 2021-08-18 | Stop reason: DRUGHIGH

## 2021-06-23 ENCOUNTER — OFFICE VISIT (OUTPATIENT)
Dept: BARIATRICS | Facility: CLINIC | Age: 56
End: 2021-06-23

## 2021-06-23 VITALS — BODY MASS INDEX: 38.92 KG/M2 | WEIGHT: 223.2 LBS

## 2021-06-23 DIAGNOSIS — E66.9 OBESITY (BMI 30-39.9): Primary | ICD-10-CM

## 2021-06-23 PROCEDURE — RECHECK: Performed by: DIETITIAN, REGISTERED

## 2021-06-23 NOTE — PROGRESS NOTES
Bariatric Nutrition Assessment Note    Type of surgery    Attempted and aborted sleeve gastrectomy  S/p diagnostic laparoscopy, extensive lysis of adhesions  Surgery Date: 5/11/2021  6 weeks  post-op  Surgeon: Dr Benoit Tobin  61 y o   female     Wt with BMI of 25: 142 9lbs  Pre-Op Excess Wt: 80 3lbs from current weight  Wt 101 kg (223 lb 3 2 oz)   LMP 08/03/2018 (Approximate)   BMI 38 92 kg/m²      Quit smoking 7 5 years ago when moved to the [facility]  Pt reports significant anxiety, which she used to calm down with smoking, but now soothes with food  Pt requested something to help her with her anxiety multiple times during our session  Encouraged pt to speak with her PCP and also discuss her anxiety with the [address] Equation:     Weight maintenance= 1889 kcal/day  Estimated calories for weight loss 308-5902 kcal/day ( 1-2# per wk wt loss - sedentary )  Estimated protein needs 52-65 g/day (0 8-1 0 gms/kg IBW )   Estimated fluid needs 1949 ml/day (30 ml/kg IBW )      Weight History   Onset of Obesity: Adult: 28 years ago when moved to the [place] from [facility]  Gained most weight about 7 years ago when living in [facility]  States she was eating "a lot" at that time  Pt reports she was a  as a young adult in [facility] and dieted and watcher her weight very closely at that time  Family history of obesity: No  Wt Loss Attempts: Exercise  Meal Replacements:  Premier Protein drinks on pre-op liver shrinking diet  Nutrition Counseling with RD  Self Created Diets (Portion Control, Healthy Food Choices, etc )  Maximum Wt Lost: Pt went from initial weight of 236 4lbs to weight of 219lbs prior to attempted sleeve procedure  Pt has since regained 4 2lbs      Review of History and Medications   Past Medical History:   Diagnosis Date    Acid reflux     Anxiety     CPAP (continuous positive airway pressure) dependence     GERD (gastroesophageal reflux disease)     Hypertension     Obesity     Palpable abdominal mass     LAST ASSESSED: 17    Sleep apnea      Past Surgical History:   Procedure Laterality Date    ABDOMINAL SURGERY      AUGMENTATION MAMMAPLASTY Bilateral     Bi retro pectoral saline    BREAST SURGERY      REDUCTION PROCEDURE     SECTION      CHOLECYSTECTOMY      CHOLECYSTECTOMY LAPAROSCOPIC      LIVER SURGERY      HI LAP, ERIKA RESTRICT PROC, LONGITUDINAL GASTRECTOMY N/A 2021    Procedure: Diagnostic Lap, Extensive Lysis of Adhesions;  Surgeon: Corinne Rudd MD;  Location: [facility] OR;  Service: Bariatrics     Social History     Socioeconomic History    Marital status: /Civil Union     Spouse name: Not on file    Number of children: Not on file    Years of education: Not on file    Highest education level: Not on file   Occupational History    Not on file   Tobacco Use    Smoking status: Former Smoker     Quit date: 2013     Years since quittin 4    Smokeless tobacco: Never Used    Tobacco comment: uses e cigs vaps quit 7 yrs ago  uses 0 nicotine   Vaping Use    Vaping Use: Some days   Substance and Sexual Activity    Alcohol use: No    Drug use: No    Sexual activity: Yes     Partners: Male     Birth control/protection: Post-menopausal   Other Topics Concern    Not on file   Social History Narrative    Not on file     Social Determinants of Health     Financial Resource Strain:     Difficulty of Paying Living Expenses:    Food Insecurity:     Worried About Running Out of Food in the Last Year:     Ran Out of Food in the Last Year:    Transportation Needs:     Lack of Transportation (Medical):      Lack of Transportation (Non-Medical):    Physical Activity:     Days of Exercise per Week:     Minutes of Exercise per Session:    Stress:     Feeling of Stress :    Social Connections:     Frequency of Communication with Friends and Family:     Frequency of Social Gatherings with Friends and Family:     Attends Tenriism Services:     Active Member of Clubs or Organizations:     Attends Club or Organization Meetings:     Marital Status:    Intimate Partner Violence:     Fear of Current or Ex-Partner:     Emotionally Abused:     Physically Abused:     Sexually Abused:        Current Outpatient Medications:     Biotin w/ Vitamins C & E (HAIR/SKIN/NAILS PO), Take by mouth, Disp: , Rfl:     Cholecalciferol (VITAMIN D3) 50 MCG (2000 UT) CHEW, , Disp: , Rfl:     lisinopril (ZESTRIL) 40 mg tablet, TAKE 1 TABLET BY MOUTH EVERY DAY, Disp: 90 tablet, Rfl: 3    melatonin 1 mg, Take 10 tablets by mouth daily at bedtime , Disp: , Rfl:     Multiple Vitamins-Minerals (WOMENS MULTIVITAMIN) TABS, Take by mouth, Disp: , Rfl:     Omega-3 Fatty Acids (OMEGA 3 500 PO), Take by mouth, Disp: , Rfl:     omeprazole (PriLOSEC) 20 mg delayed release capsule, TAKE 1 CAPSULE BY MOUTH EVERY DAY, Disp: 90 capsule, Rfl: 1    oxyCODONE (ROXICODONE) 5 mg immediate release tablet, Take 1 tablet (5 mg total) by mouth every 4 (four) hours as needed for moderate painMax Daily Amount: 30 mg (Patient not taking: Reported on 5/21/2021), Disp: 10 tablet, Rfl: 0    sucralfate (CARAFATE) 1 g tablet, Take 1 tablet (1 g total) by mouth 4 (four) times a day, Disp: 120 tablet, Rfl: 2     Food Intake and Lifestyle Assessment   Food Intake Assessment completed via usual diet recall  Wakes 7:30am  Breakfast: 1 chocolate chip muffin, powerade zero  Snack: none   Lunch: likes salad:  Vegetables, potato salad or tuna salad  Chicken or fish or tuna or shrimp  Snack: none  Dinner: 6-7pm: sandwich  Snack: none  11:30pm goes to bed  Beverage intake: water and sugar free beverages  Protein supplement: was drinking premier protein, stopped it after surgery    Estimated protein intake per day: 40-60g  Estimated fluid intake per day: >64oz  Meals eaten away from home: not assessed this visit  Typical meal pattern: 3 meals per day and 0 snacks per day  Eating Behaviors: Pt reports her main problem is her anxiety and using food to calm herself when anxious  Food allergies or intolerances: Allergies   Allergen Reactions    Prednisone Shortness Of Breath    Alprazolam     Duloxetine     Escitalopram     Latex Rash    Omnipaque [Iohexol]      Pt erupted with rash all over body     Cultural or Holiness considerations: - likes a lot of bread  Pt states since coming to our program she has cut out most breads/carbs  Physical Assessment  Physical Activity  Types of exercise: None currently since surgery 6 weeks ago  Has a treadmill in her living room, has not used it since surgery  Can walk for up to 45-60 minutes  Current physical limitations: currently has some pain from kidney stone  Psychosocial Assessment   Support systems: spouse  Spouse works night shift 7pm to 7am   Pt reports she has no other support in the [place]   2 daughters in [place]  Father in [place]  Pt moved to the [facility] from [facility] 28 years ago  Nutrition Diagnosis  Diagnosis: Overweight / Obesity (NC-3 3), Excessive energy intake (NI-1 5) and Undesirable food choices (NB-1 7)  Related to: Food and nutrition-related knowledge deficit, Physical inactivity and Excessive energy intake  As Evidenced by: BMI >25, Excessive energy intake and pt reports of emotional eating     Nutrition Prescription: Recommend the following diet  Regular  Provided pt macronutrient goals to use in food journaling lizeth of 1200kcals, 135 g CHO, 40 g fats, 75 g pro per day  Interventions and Teaching   Discussed pre-op and post-op nutrition guidelines  Patient educated and handouts provided    Exercise  Protein supplements  Appropriate carbohydrate, protein, and fat intake, and food/fluid choices to maximize safe weight loss, nutrient intake, and tolerance   Techniques for self monitoring and keeping daily food journal  Pt reports she purchased the DiscoveRX multivitamins  Instructed pt that she will not need these high-potency bariatric vitamins and she should continue an OTC women's adult multivitamin  Education provided to: patient and Spouse  Barriers to learning: Language:  Pt's primary language is Yoruba, but pt speaks [place] well and declined use of interpretation phone line  Anxiety:  Pt was very anxious/talkative today and sometimes difficult to redirect  Readiness to change: action  Prior research on procedure: discussed with provider  Comprehension: verbalizes understanding   Expected Compliance: good    Recommendations  Pt is an appropriate candidate for surgery  No: Pt is no longer a candidate for a bariatric surgery operation and is being referred to Medical Weight Management  Evaluation / Monitoring  Dietitian to Monitor: Pt is not a bariatric surgery patient and will be referred to the non-surgical dietitians      Goals  Food journal, Exercise 30 minutes 5 times per week, Eat 3 meals per day and Eliminate mindless snacking    Time Spent:   45 Minutes

## 2021-06-25 ENCOUNTER — OFFICE VISIT (OUTPATIENT)
Dept: BARIATRICS | Facility: CLINIC | Age: 56
End: 2021-06-25

## 2021-06-25 VITALS — WEIGHT: 224.1 LBS | BODY MASS INDEX: 39.07 KG/M2

## 2021-06-25 DIAGNOSIS — E66.9 OBESITY, CLASS II, BMI 35-39.9: Primary | ICD-10-CM

## 2021-06-25 PROCEDURE — RECHECK

## 2021-06-25 NOTE — PROGRESS NOTES
Bariatric Behavioral Health Evaluation    [facility]  used; Gwen Rosen #[id number]    Presenting Problem patient here to enroll in MWM program      Is the patient seeking Bariatric Surgery Eval? No If yes how long have you researched this surgery option  Patient was unable to have surgery due to adhesions  Surgery was aborted on 5/21/2021  Pre-morbid level of function and history of present illness: patient has medical issues  Concerned about weight gain and how she feels about herself with weight gain  Psychiatric/Psychological Treatment Diagnosis: patient reports Mental Health diagnosis of Anxiety  She is not on medication at this time  She has not ever received treatment for it  Patient explained she does not want to feel "drugged" so has declined to take medication when offered by physician  Explained there are different kinds of medications some with minimal side effects  Encouraged her to talk with her PCP about what options she would have and seriously consider trying medication to see how it may help her  Outpatient Counselor     Psychiatrist No     Have you had Inpatient Treatment? No    Family Constellation (include relationship with each and Psych/Med HX)    Patient laments that she is the only one in her family with weight issues  Additional comments/stressors related to family/relationships/peer support patient very upset and angry that surgery was not able to be performed  Upset about the situation because she did the months of work ahead of time and then surgery couldn't be done       Physical/Psychological Assessment:     Appearance: appropriate  Sociability: friendly  Affect: effusive  Mood: elated  Thought Process: coherent  Speech: normal  Content: no impairment  Orientation: person  Yes , place  Yes , time  Yes , normal attention span  Yes , normal memory  Yes   and normal judgement  Yes   Insight: emotional  fair    Risk Assessment:     none    Recommendations: patient is appropriate for MWM program      Observation:     Interviews this interview only  Based on the previous information, the client presents the following risk of harm to self or others: low     Note Patient reports Mental Health diagnosis of Anxiety  She is not prescribed medication at this time nor in treatment for it  Patient has declined medication in the past from her physician because she does not want to feel "drugged " Explained new and different medications available that have few side effects  Patient strongly encouraged to consider treatment for her anxiety  Patient does not drink or smoke  Patient willing to work with MWM program to see if she is able to lose weight   Patient is appropriate for MWM program

## 2021-07-30 ENCOUNTER — HOSPITAL ENCOUNTER (OUTPATIENT)
Dept: CT IMAGING | Facility: HOSPITAL | Age: 56
Discharge: HOME | End: 2021-07-30
Attending: SURGERY
Payer: COMMERCIAL

## 2021-07-30 DIAGNOSIS — E27.8 ADRENAL MASS (HCC): ICD-10-CM

## 2021-07-30 PROCEDURE — 74150 CT ABDOMEN W/O CONTRAST: CPT

## 2021-08-02 NOTE — PROGRESS NOTES
Assessment/Plan:    No problem-specific Assessment & Plan notes found for this encounter  Diagnoses and all orders for this visit:    Obesity (BMI 30-39 9)  -     liraglutide (SAXENDA) injection; Inject 0 6mg subcutaneously once per day for first week  Increase in weekly intervals by 0 6mg until a dose of 3mg is reached  -     Insulin Pen Needle (NovoFine) 32G X 6 MM MISC; Use daily    NITISH (obstructive sleep apnea)  -     liraglutide (SAXENDA) injection; Inject 0 6mg subcutaneously once per day for first week  Increase in weekly intervals by 0 6mg until a dose of 3mg is reached  -     Insulin Pen Needle (NovoFine) 32G X 6 MM MISC; Use daily    Essential hypertension  -     liraglutide (SAXENDA) injection; Inject 0 6mg subcutaneously once per day for first week  Increase in weekly intervals by 0 6mg until a dose of 3mg is reached  -     Insulin Pen Needle (NovoFine) 32G X 6 MM MISC; Use daily    Generalized anxiety disorder  -     liraglutide (SAXENDA) injection; Inject 0 6mg subcutaneously once per day for first week  Increase in weekly intervals by 0 6mg until a dose of 3mg is reached  -     Insulin Pen Needle (NovoFine) 32G X 6 MM MISC; Use daily    Hepatic steatosis  -     liraglutide (SAXENDA) injection; Inject 0 6mg subcutaneously once per day for first week  Increase in weekly intervals by 0 6mg until a dose of 3mg is reached  -     Insulin Pen Needle (NovoFine) 32G X 6 MM MISC; Use daily    Gastroesophageal reflux disease, unspecified whether esophagitis present  -     liraglutide (SAXENDA) injection; Inject 0 6mg subcutaneously once per day for first week  Increase in weekly intervals by 0 6mg until a dose of 3mg is reached  -     Insulin Pen Needle (NovoFine) 32G X 6 MM MISC; Use daily        -Patient is pursuing Conservative Program  -Initial weight loss goal of 5-10% weight loss for improved health  -Screening labs- 11/19/20 and 4/19/21  - discussed AOMs, saxenda vs contrave   Decided for saxenda since she has BMI 40 and could get more weight loss with saxenda  Discussed use, titration and common side effects  Patient denies personal and family history of MCT, MEN2 tumors and medullary CA  Patient denies personal history of pancreatitis  Initial MWM: 230 8lbs  Current:   Change:  Goal: 70kg    Goals:  Food log (ie ) www myfitnesspal com,sparkpeople  com,loseit com,calorieking  com,etc  baritastic  No sugary beverages  At least 64oz of water daily  Increase physical activity by 10 minutes daily  Gradually increase physical activity to a goal of 5 days per week for 30 minutes of MODERATE intensity PLUS 2 days per week of FULL BODY resistance training  [id number] calories per day    Follow up in approximately 6 weeks with Non-Surgical Physician/Advanced Practitioner  Subjective:   Chief Complaint   Patient presents with    Consult     MWM consult        Patient ID: Nayeli Burgos  is a 61 y o  female with excess weight/obesity here to pursue weight management  HPI     Attempted and aborted sleeve gastrectomy  S/p diagnostic laparoscopy, extensive lysis of adhesions  Surgery Date: 5/11/2021  6 weeks  post-op  Surgeon: Dr Lg Mackey    Pt presents for MWM consult for weight loss  The following portions of the patient's history were reviewed and updated as appropriate: allergies, current medications, past family history, past medical history, past social history, past surgical history and problem list     Review of Systems   Constitutional: Negative for activity change and appetite change  Respiratory: Negative  Cardiovascular: Negative  Gastrointestinal: Negative  Genitourinary: Negative  Musculoskeletal: Negative for arthralgias  Skin: Negative for rash  Psychiatric/Behavioral: The patient is nervous/anxious          Objective:    /68 (BP Location: Left arm, Patient Position: Sitting, Cuff Size: Adult)   Pulse 88   Temp (!) 97 2 °F (36 2 °C) (Tympanic)   Resp 16   Ht 5' 3 5" (1 613 m)   Wt 105 kg (230 lb 12 8 oz)   LMP 08/03/2018 (Approximate)   BMI 40 24 kg/m²      Physical Exam    Constitutional   General appearance: Abnormal   well developed and obese  Eyes No conjunctival pallor     Musculoskeletal   Gait and station: Normal     Psychiatric   Orientation to person, place and time: Normal     Affect: appropriate

## 2021-08-03 ENCOUNTER — OFFICE VISIT (OUTPATIENT)
Dept: BARIATRICS | Facility: CLINIC | Age: 56
End: 2021-08-03
Payer: COMMERCIAL

## 2021-08-03 VITALS
TEMPERATURE: 97.2 F | WEIGHT: 230.8 LBS | RESPIRATION RATE: 16 BRPM | HEIGHT: 64 IN | BODY MASS INDEX: 39.4 KG/M2 | HEART RATE: 88 BPM | SYSTOLIC BLOOD PRESSURE: 128 MMHG | DIASTOLIC BLOOD PRESSURE: 68 MMHG

## 2021-08-03 DIAGNOSIS — F41.1 GENERALIZED ANXIETY DISORDER: ICD-10-CM

## 2021-08-03 DIAGNOSIS — E66.9 OBESITY (BMI 30-39.9): Primary | ICD-10-CM

## 2021-08-03 DIAGNOSIS — K21.9 GASTROESOPHAGEAL REFLUX DISEASE, UNSPECIFIED WHETHER ESOPHAGITIS PRESENT: ICD-10-CM

## 2021-08-03 DIAGNOSIS — I10 ESSENTIAL HYPERTENSION: ICD-10-CM

## 2021-08-03 DIAGNOSIS — G47.33 OSA (OBSTRUCTIVE SLEEP APNEA): ICD-10-CM

## 2021-08-03 DIAGNOSIS — K76.0 HEPATIC STEATOSIS: ICD-10-CM

## 2021-08-03 PROBLEM — N20.0 KIDNEY STONES: Status: ACTIVE | Noted: 2021-08-03

## 2021-08-03 PROCEDURE — 99214 OFFICE O/P EST MOD 30 MIN: CPT | Performed by: FAMILY MEDICINE

## 2021-08-03 NOTE — PATIENT INSTRUCTIONS
VISIT SAXENDA  COM  INJECT SAXENDA DAILY SUBCUTANEOUSLY  -WEEK 1: 0 6mg daily  -if tolerated WEEK 2: 1 2mg daily  -if tolerated WEEK 3: 1 8mg daily  -if tolerated WEEK 4: 2 4mg daily  -if tolerated WEEK 5: 3mg daily  -IF NAUSEA/VOMITING DEVELOP STOP MEDICATION FOR A FEW DAYS AND DECREASE TO PREVIOUSLY TOLERATED DOSE  STAY HYDRATED  -IF YOU DEVELOP SEVERE ABDOMINAL PAIN WHICH MAY RADIATE TO THE BACK, SOMETIMES ASSOCIATED WITH FEVER, AND VOMITING, STOP MEDICATION AND SEEK URGENT CARE AS THIS MAY BE A SIGN OF PANCREATITIS       Saxenda the potential side effects include increased heart rate, headache, low blood sugar, GI/abdominal upset, heartburn, fatigue and dizziness

## 2021-08-10 ENCOUNTER — TELEPHONE (OUTPATIENT)
Dept: BARIATRICS | Facility: CLINIC | Age: 56
End: 2021-08-10

## 2021-08-11 ENCOUNTER — TELEPHONE (OUTPATIENT)
Dept: BARIATRICS | Facility: CLINIC | Age: 56
End: 2021-08-11

## 2021-08-13 ENCOUNTER — TELEPHONE (OUTPATIENT)
Dept: BARIATRICS | Facility: CLINIC | Age: 56
End: 2021-08-13

## 2021-08-13 NOTE — TELEPHONE ENCOUNTER
Patient called ( Lithuanian speaking) looking to talk to someone regarding an RX that is not covered by her Insurance  Patient can be reached back @ [phone]

## 2021-08-16 NOTE — TELEPHONE ENCOUNTER
I tried calling pt twice but goes to voicemail  Nothing we can do because her insurance excludes coverage for weight loss medications  Also she wrote me previously a msg via [employer] (in [address]) but it gave me the impression that she wasn't going to keep following up with us and she cancelled her follow up appointment with me  I cant help her if she is not planing to come back     LB

## 2021-08-17 ENCOUNTER — OFFICE VISIT (OUTPATIENT)
Dept: SURGICAL ONCOLOGY | Facility: CLINIC | Age: 56
End: 2021-08-17
Payer: COMMERCIAL

## 2021-08-17 VITALS
SYSTOLIC BLOOD PRESSURE: 130 MMHG | BODY MASS INDEX: 39.27 KG/M2 | WEIGHT: 230 LBS | DIASTOLIC BLOOD PRESSURE: 82 MMHG | RESPIRATION RATE: 16 BRPM | OXYGEN SATURATION: 98 % | HEART RATE: 84 BPM | TEMPERATURE: 99.8 F | HEIGHT: 64 IN

## 2021-08-17 DIAGNOSIS — E27.8 ADRENAL MASS (HCC): Primary | ICD-10-CM

## 2021-08-17 PROCEDURE — 99213 OFFICE O/P EST LOW 20 MIN: CPT | Performed by: SURGERY

## 2021-08-17 NOTE — PROGRESS NOTES
Surgical Oncology Follow Up       [address] [facility]  [address] [facility] [address]  [facility] [address]  [id number]  1965  [id number]  [address] [facility]  [address] [facility] [address]  [phone]    Diagnoses and all orders for this visit:    Adrenal mass ([address] )  -     CT abdomen wo contrast; Future        No chief complaint on file  Return in about 1 year (around 8/17/2022) for Office Visit, Imaging - See orders  Oncology History    No history exists  History of Present Illness:  Patient returns in follow-up of her adrenal mass  Her biochemical workup has been negative  She is status post hepaticojejunostomy for bile duct injury  Her CT from July 30, 2021 reveals a stable 3 8 x 3 7 cm left adrenal nodule  I personally reviewed the films  No problems with uncontrolled hypertension  Review of Systems  Complete ROS Surg Onc:   Complete ROS Surg Onc:   Constitutional: The patient denies new or recent history of general fatigue, no recent weight loss, no change in appetite  Eyes: No complaints of visual problems, no scleral icterus  ENT: no complaints of ear pain, no hoarseness, no difficulty swallowing,  no tinnitus and no new masses in head, oral cavity, or neck  Cardiovascular: No complaints of chest pain, no palpitations, no ankle edema  Respiratory: No complaints of shortness of breath, no cough  Gastrointestinal: No complaints of jaundice, no bloody stools, no pale stools  Genitourinary: No complaints of dysuria, no hematuria, no nocturia, no frequent urination, no urethral discharge  Musculoskeletal: No complaints of weakness, paralysis, joint stiffness or arthralgias  Integumentary: No complaints of rash, no new lesions  Neurological: No complaints of convulsions, no seizures, no dizziness  Hematologic/Lymphatic: No complaints of easy bruising  Endocrine:  No hot or cold intolerance  No polydipsia, polyphagia, or polyuria  Allergy/immunology:  No environmental allergies  No food allergies  Not immunocompromised  Skin:  No pallor or rash  No wound  Patient Active Problem List   Diagnosis    Adrenal mass ([facility] [address] )    Hypertension    Obesity (BMI 30-39  9)    Acid reflux    Generalized anxiety disorder    Hepatic steatosis    Screening for colon cancer    LFT elevation    Scalp lesion    NITISH (obstructive sleep apnea)    Preop cardiovascular exam    Kidney stones     Past Medical History:   Diagnosis Date    Acid reflux     Anxiety     CPAP (continuous positive airway pressure) dependence     GERD (gastroesophageal reflux disease)     Hypertension     Obesity     Palpable abdominal mass     LAST ASSESSED: 17    Sleep apnea      Past Surgical History:   Procedure Laterality Date    ABDOMINAL SURGERY      AUGMENTATION MAMMAPLASTY Bilateral     Bi retro pectoral saline    BREAST SURGERY      REDUCTION PROCEDURE     SECTION      CHOLECYSTECTOMY      CHOLECYSTECTOMY LAPAROSCOPIC      LIVER SURGERY      NE LAP, ERIKA RESTRICT PROC, LONGITUDINAL GASTRECTOMY N/A 2021    Procedure: Diagnostic Lap, Extensive Lysis of Adhesions;  Surgeon: Wendy Salamanca MD;  Location: [facility] OR;  Service: Bariatrics     Family History   Problem Relation Age of Onset    Pancreatic cancer Mother 67    Prostate cancer Father 61    No Known Problems Sister     No Known Problems Daughter     No Known Problems Maternal Grandmother     No Known Problems Maternal Grandfather     No Known Problems Paternal Grandmother     No Known Problems Paternal Grandfather     No Known Problems Sister     No Known Problems Daughter     No Known Problems Maternal Aunt     No Known Problems Maternal Aunt     No Known Problems Paternal Aunt     No Known Problems Paternal Aunt     No Known Problems Paternal Aunt      Social History     Socioeconomic History    Marital status: /Civil Union     Spouse name: Not on file    Number of children: Not on file    Years of education: Not on file    Highest education level: Not on file   Occupational History    Not on file   Tobacco Use    Smoking status: Former Smoker     Quit date: 2013     Years since quittin 6    Smokeless tobacco: Never Used    Tobacco comment: uses e cigs vaps quit 7 yrs ago  uses 0 nicotine   Vaping Use    Vaping Use: Some days   Substance and Sexual Activity    Alcohol use: No    Drug use: No    Sexual activity: Yes     Partners: Male     Birth control/protection: Post-menopausal   Other Topics Concern    Not on file   Social History Narrative    Not on file     Social Determinants of Health     Financial Resource Strain:     Difficulty of Paying Living Expenses:    Food Insecurity:     Worried About Running Out of Food in the Last Year:     Ran Out of Food in the Last Year:    Transportation Needs:     Lack of Transportation (Medical):      Lack of Transportation (Non-Medical):    Physical Activity:     Days of Exercise per Week:     Minutes of Exercise per Session:    Stress:     Feeling of Stress :    Social Connections:     Frequency of Communication with Friends and Family:     Frequency of Social Gatherings with Friends and Family:     Attends Jainism Services:     Active Member of Clubs or Organizations:     Attends Club or Organization Meetings:     Marital Status:    Intimate Partner Violence:     Fear of Current or Ex-Partner:     Emotionally Abused:     Physically Abused:     Sexually Abused:        Current Outpatient Medications:     Biotin w/ Vitamins C & E (HAIR/SKIN/NAILS PO), Take by mouth, Disp: , Rfl:     Cholecalciferol (VITAMIN D3) 50 MCG (2000 UT) CHEW, , Disp: , Rfl:     Insulin Pen Needle (NovoFine) 32G X 6 MM MISC, Use daily, Disp: 90 each, Rfl: 1   liraglutide (SAXENDA) injection, Inject 0 6mg subcutaneously once per day for first week  Increase in weekly intervals by 0 6mg until a dose of 3mg is reached , Disp: 15 mL, Rfl: 1    lisinopril (ZESTRIL) 40 mg tablet, TAKE 1 TABLET BY MOUTH EVERY DAY, Disp: 90 tablet, Rfl: 3    melatonin 1 mg, Take 10 tablets by mouth daily at bedtime , Disp: , Rfl:     Multiple Vitamins-Minerals (WOMENS MULTIVITAMIN) TABS, Take by mouth, Disp: , Rfl:     Omega-3 Fatty Acids (OMEGA 3 500 PO), Take by mouth, Disp: , Rfl:     omeprazole (PriLOSEC) 20 mg delayed release capsule, TAKE 1 CAPSULE BY MOUTH EVERY DAY, Disp: 90 capsule, Rfl: 1  Allergies   Allergen Reactions    Prednisone Shortness Of Breath    Alprazolam     Duloxetine     Escitalopram     Latex Rash    Omnipaque [Iohexol]      Pt erupted with rash all over body     Vitals:    08/17/21 0943   BP: 130/82   Pulse: 84   Resp: 16   Temp: 99 8 °F (37 7 °C)   SpO2: 98%       Physical Exam  Constitutional: General appearance: The Patient is well-developed and well-nourished who appears the stated age in no acute distress  Patient is pleasant and talkative  HEENT:  Normocephalic  Sclerae are anicteric  Mucous membranes are moist  Neck is supple without adenopathy  No JVD  Chest: The lungs are clear to auscultation  Cardiac: Heart is regular rate  Abdomen: Abdomen is soft, non-tender, non-distended and without masses  Extremities: There is no clubbing or cyanosis  There is no edema  Symmetric  Neuro: Grossly nonfocal  Gait is normal      Lymphatic: No evidence of cervical adenopathy bilaterally  No evidence of axillary adenopathy bilaterally  No evidence of inguinal adenopathy bilaterally  Skin: Warm, anicteric  Psych:  Patient is pleasant and talkative    Breasts:        Pathology:  [unfilled]    Labs:      Imaging  CT abdomen wo contrast    Result Date: 8/3/2021  Narrative: CT - ABDOMEN WITHOUT IV CONTRAST INDICATION:   E27 8: Other specified disorders of adrenal gland  COMPARISON:  CT abdomen/pelvis 5/26/2020  TECHNIQUE: CT examination of the abdomen was performed without intravenous contrast   Axial, sagittal, and coronal 2D reformatted images were created from the source data and submitted for interpretation  Radiation dose length product (DLP) for this visit:  628 mGy-cm   This examination, like all CT scans performed in the [facility], was performed utilizing techniques to minimize radiation dose exposure, including the use of iterative reconstruction and automated exposure control  Enteric contrast was administered  FINDINGS: ABDOMEN LOWER CHEST:  Small hiatal hernia noted  No other clinically significant abnormality identified in the visualized lower chest  LIVER/BILIARY TREE: Liver is within normal limits  Minimal pneumobilia related to choledochojejunostomy  GALLBLADDER:  Gallbladder is surgically absent  SPLEEN:  Unremarkable  PANCREAS:  Unremarkable  ADRENAL GLANDS:  Unchanged 3 8 x 3 7 cm left adrenal nodule, previously characterized as indeterminate  Right adrenal gland is within normal limits  KIDNEYS/URETERS:  Unchanged nonobstructing 4 mm left mid renal calculus  Punctate nonobstructing right renal calculi  No hydronephrosis  VISUALIZED STOMACH AND BOWEL:  Small bowel anastomosis in the left mid abdomen  No bowel obstruction  VISUALIZED ABDOMINAL CAVITY:  No pathologically enlarged periportal, mesenteric or upper retroperitoneal lymph nodes  No ascites or free intraperitoneal air  No fluid collection  VISUALIZED BODY WALL:  Postsurgical changes in the anterior abdominal wall  VISUALIZED ABDOMINAL VESSELS:  Unremarkable for patient's age  OSSEOUS STRUCTURES:  No acute fracture or destructive osseous lesion  Impression: Unchanged 3 8 cm left adrenal nodule  Bilateral nonobstructing renal calculi   Workstation performed: [id number]     I reviewed the above laboratory and imaging data     Discussion/Summary:  77-year-old female with a left adrenal mass   This is essentially stable   This is not functioning   I have recommended continued observation   Since this has been stable for over 3 years, we will repeat her imaging in 1 year  Clekathie Mitchel will see her again at that time with a repeat CT  She is agreeable to this   All her questions were answered

## 2021-08-18 ENCOUNTER — OFFICE VISIT (OUTPATIENT)
Dept: FAMILY MEDICINE CLINIC | Facility: CLINIC | Age: 56
End: 2021-08-18
Payer: COMMERCIAL

## 2021-08-18 ENCOUNTER — TELEPHONE (OUTPATIENT)
Dept: FAMILY MEDICINE CLINIC | Facility: CLINIC | Age: 56
End: 2021-08-18

## 2021-08-18 VITALS
WEIGHT: 229.4 LBS | DIASTOLIC BLOOD PRESSURE: 82 MMHG | HEART RATE: 74 BPM | SYSTOLIC BLOOD PRESSURE: 128 MMHG | BODY MASS INDEX: 39.16 KG/M2 | OXYGEN SATURATION: 96 % | HEIGHT: 64 IN | RESPIRATION RATE: 16 BRPM | TEMPERATURE: 97.7 F

## 2021-08-18 DIAGNOSIS — K21.9 GASTROESOPHAGEAL REFLUX DISEASE, UNSPECIFIED WHETHER ESOPHAGITIS PRESENT: ICD-10-CM

## 2021-08-18 DIAGNOSIS — E27.8 ADRENAL MASS (HCC): ICD-10-CM

## 2021-08-18 DIAGNOSIS — I10 ESSENTIAL HYPERTENSION: ICD-10-CM

## 2021-08-18 DIAGNOSIS — Z12.11 SCREEN FOR COLON CANCER: ICD-10-CM

## 2021-08-18 DIAGNOSIS — E66.9 OBESITY (BMI 30-39.9): Primary | ICD-10-CM

## 2021-08-18 PROCEDURE — 99214 OFFICE O/P EST MOD 30 MIN: CPT | Performed by: FAMILY MEDICINE

## 2021-08-18 RX ORDER — LISINOPRIL 40 MG/1
40 TABLET ORAL DAILY
Qty: 90 TABLET | Refills: 3 | Status: SHIPPED | OUTPATIENT
Start: 2021-08-18 | End: 2021-10-09 | Stop reason: SDUPTHER

## 2021-08-18 RX ORDER — OMEPRAZOLE 40 MG/1
40 CAPSULE, DELAYED RELEASE ORAL DAILY
Qty: 90 CAPSULE | Refills: 1 | Status: SHIPPED | OUTPATIENT
Start: 2021-08-18 | End: 2021-10-09 | Stop reason: SDUPTHER

## 2021-08-18 RX ORDER — AMLODIPINE BESYLATE 5 MG/1
5 TABLET ORAL DAILY
Qty: 90 TABLET | Refills: 1 | Status: SHIPPED | OUTPATIENT
Start: 2021-08-18 | End: 2021-09-21 | Stop reason: SDUPTHER

## 2021-08-18 NOTE — ASSESSMENT & PLAN NOTE
She unfortunately could not have the gastric bypass surgery and when she went to weight management they gave her a prescription for Saxenda but her insurance would not cover it  I gave her a prescription for Contrave  She has no contraindications  We reviewed titration in Slovenian  Will plan follow-up in about a month but she should call sooner with any questions

## 2021-08-18 NOTE — PROGRESS NOTES
Assessment/Plan:    Adrenal mass (HCC)  Stable adrenal mass  She will cont yearly f/u with Dr Marimar Tineo    Obesity (BMI 30-39  9)    She unfortunately could not have the gastric bypass surgery and when she went to weight management they gave her a prescription for Saxenda but her insurance would not cover it  I gave her a prescription for Contrave  She has no contraindications  We reviewed titration in Lao  Will plan follow-up in about a month but she should call sooner with any questions  Acid reflux    She gets breakthrough symptoms on 20 mg of omeprazole daily and requests return to 40 mg daily  Hypertension    She will continue lisinopril 40 mg daily and  Will add back amlodipine 5 mg daily  She will continue to monitor blood pressure at home  Diagnoses and all orders for this visit:    Obesity (BMI 30-39 9)  -     Naltrexone-buPROPion HCl ER 8-90 MG TB12; Take 1 po daily x 1 week, then 1 po bid x 1 week, then 2 po am and 1 po pm x 1 week    Adrenal mass (HCC)    Essential hypertension  -     amLODIPine (NORVASC) 5 mg tablet; Take 1 tablet (5 mg total) by mouth daily  -     lisinopril (ZESTRIL) 40 mg tablet; Take 1 tablet (40 mg total) by mouth daily    Gastroesophageal reflux disease, unspecified whether esophagitis present  -     omeprazole (PriLOSEC) 40 MG capsule; Take 1 capsule (40 mg total) by mouth daily    Screen for colon cancer  -     Ambulatory referral to Gastroenterology; Future          Subjective:      Patient ID: Nadya Griffin is a 61 y o  female  She is here today for follow-up  Unfortunately she is very stressed because she went to bariatric doctor and felt like she was treated very badly  She was given a prescription for Saxenda for weight loss but her insurance will not cover it  She has been having some elevated blood pressure readings at home    She has amlodipine 5 mg left over and she gets better readings when she takes this in addition to the lisinopril  She also had some palpitations but left problem when she takes the 2 blood pressure pills  She saw Dr Evelina Mccracken yesterday for adrenal mass  Everything is stable  Unfortunately she could not have gastric bypass because of multiple adhesions  She has recovered from the surgery in May  She has occasional mild abdominal pain  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      The following portions of the patient's history were reviewed and updated as appropriate: allergies, current medications, past family history, past medical history, past social history, past surgical history and problem list     Review of Systems   Constitutional: Negative for activity change, chills, fatigue and fever  HENT: Negative for congestion, ear pain, sinus pressure and sore throat  Eyes: Negative for pain and visual disturbance  Respiratory: Negative for cough, chest tightness, shortness of breath and wheezing  Cardiovascular: Negative for chest pain, palpitations and leg swelling  Gastrointestinal: Negative for abdominal pain, blood in stool, constipation, diarrhea, nausea and vomiting  Endocrine: Negative for polydipsia and polyuria  Genitourinary: Negative for difficulty urinating, dysuria, frequency and urgency  Musculoskeletal: Negative for arthralgias, joint swelling and myalgias  Skin: Negative for rash  Neurological: Negative for dizziness, weakness, numbness and headaches  Hematological: Negative for adenopathy  Does not bruise/bleed easily  Psychiatric/Behavioral: Negative for dysphoric mood  The patient is not nervous/anxious            Objective:      /82 (BP Location: Left arm, Patient Position: Sitting, Cuff Size: Large)   Pulse 74   Temp 97 7 °F (36 5 °C) (Tympanic)   Resp 16   Ht 5' 3 5" (1 613 m)   Wt 104 kg (229 lb 6 4 oz)   LMP 08/03/2018 (Approximate)   SpO2 96%   BMI 40 00 kg/m²          Physical Exam  Vitals and nursing note reviewed  Constitutional:       Appearance: She is obese  HENT:      Head: Normocephalic  Mouth/Throat:      Mouth: Mucous membranes are moist    Cardiovascular:      Rate and Rhythm: Normal rate  Pulses: Normal pulses  Heart sounds: Normal heart sounds  Pulmonary:      Effort: Pulmonary effort is normal       Breath sounds: Normal breath sounds  Abdominal:      Palpations: There is no mass  Tenderness: There is no abdominal tenderness  Comments: Obese  Multiple surgical scars   Skin:     General: Skin is warm and dry  Neurological:      General: No focal deficit present  Mental Status: She is alert and oriented to person, place, and time     Psychiatric:         Mood and Affect: Mood normal

## 2021-08-18 NOTE — TELEPHONE ENCOUNTER
PATIENT CALLED SAID DR Teresa Donovan GAVE HER AN RX FOR CONTRAVE MEDICINE HOWEVER HER PHARMACY SAID THIS WOULD NOT BE COVERED AND NEEDS A PRIO AUTH SAID WE NEED TO CALL [phone]

## 2021-08-18 NOTE — ASSESSMENT & PLAN NOTE
She will continue lisinopril 40 mg daily and  Will add back amlodipine 5 mg daily  She will continue to monitor blood pressure at home

## 2021-08-27 ENCOUNTER — TELEPHONE (OUTPATIENT)
Dept: FAMILY MEDICINE CLINIC | Facility: CLINIC | Age: 56
End: 2021-08-27

## 2021-08-27 NOTE — TELEPHONE ENCOUNTER
Pt called the office stating she needs this medication and needs a prior authorization for this  I tried to send a prior authorization through cover my meds for Naltrexone-buPROPion HCl ER 8-90 MG TB12   And this medication can not be processed due to her insurance not being able to cover this  I confirmed with her insurance  They stated they will send an appeal  I requested they send me a form with alternatives they can inform me about in regards of this medication  The pt called very frustrated stating that she called her insurance and they are willing to cover this medication if they can see her condition and what has been going on with her since she got her surgery  I informed her that it foes not work like that when I was on the phone with her  I still proceed with helping her getting this done for her  Her insurance confirmed that none of the weight loss medications that you can prescribe for this pt is covered under her plan

## 2021-08-31 NOTE — TELEPHONE ENCOUNTER
Pt called the today stating she called her insurance and they informed her that we can appeal this denial  I did advise the pt that I spoke with her insurance on Friday and informed the person who I spoke with to send me the form for the appeal  I gave her our office fax number [phone]  She then stated that I should give her this fax number so that she can call her insurance and give it to them  So that they can resend the appeal notice  Which I still have not received  I informed the pt to call me back if she has anymore problems  She stated she need this to be done because of her condition I advised her that tracey done my part the rest is up to her insurance  If her insurance does not cover she need to ask them what they cover under her plan

## 2021-09-21 DIAGNOSIS — I10 ESSENTIAL HYPERTENSION: ICD-10-CM

## 2021-09-21 DIAGNOSIS — E66.9 OBESITY (BMI 30-39.9): ICD-10-CM

## 2021-09-22 DIAGNOSIS — E66.9 OBESITY (BMI 30-39.9): ICD-10-CM

## 2021-09-22 RX ORDER — AMLODIPINE BESYLATE 5 MG/1
5 TABLET ORAL DAILY
Qty: 90 TABLET | Refills: 0 | Status: SHIPPED | OUTPATIENT
Start: 2021-09-22 | End: 2021-10-09 | Stop reason: SDUPTHER

## 2021-09-23 ENCOUNTER — OFFICE VISIT (OUTPATIENT)
Dept: BARIATRICS | Facility: CLINIC | Age: 56
End: 2021-09-23

## 2021-09-23 VITALS — HEIGHT: 64 IN | BODY MASS INDEX: 39.44 KG/M2 | WEIGHT: 231 LBS

## 2021-09-23 DIAGNOSIS — E66.01 OBESITY, CLASS III, BMI 40-49.9 (MORBID OBESITY) (HCC): Primary | ICD-10-CM

## 2021-09-23 PROCEDURE — RECHECK

## 2021-09-23 NOTE — PROGRESS NOTES
Pt here to follow up for medical weight loss  Provided pt with 1200 calorie diet, is currently taking multivitamins  Was prescribed Contrave by Dr Daniella Melchor  To return in 1 month

## 2021-10-09 DIAGNOSIS — I10 ESSENTIAL HYPERTENSION: ICD-10-CM

## 2021-10-09 DIAGNOSIS — K21.9 GASTROESOPHAGEAL REFLUX DISEASE, UNSPECIFIED WHETHER ESOPHAGITIS PRESENT: ICD-10-CM

## 2021-10-11 RX ORDER — OMEPRAZOLE 40 MG/1
40 CAPSULE, DELAYED RELEASE ORAL DAILY
Qty: 90 CAPSULE | Refills: 0 | Status: SHIPPED | OUTPATIENT
Start: 2021-10-11

## 2021-10-11 RX ORDER — LISINOPRIL 40 MG/1
40 TABLET ORAL DAILY
Qty: 90 TABLET | Refills: 0 | Status: SHIPPED | OUTPATIENT
Start: 2021-10-11 | End: 2021-11-04 | Stop reason: SDUPTHER

## 2021-10-11 RX ORDER — AMLODIPINE BESYLATE 5 MG/1
5 TABLET ORAL DAILY
Qty: 90 TABLET | Refills: 0 | Status: SHIPPED | OUTPATIENT
Start: 2021-10-11 | End: 2021-11-04 | Stop reason: SDUPTHER

## 2021-10-28 ENCOUNTER — OFFICE VISIT (OUTPATIENT)
Dept: BARIATRICS | Facility: CLINIC | Age: 56
End: 2021-10-28

## 2021-10-28 VITALS — WEIGHT: 222.6 LBS | BODY MASS INDEX: 38 KG/M2 | HEIGHT: 64 IN

## 2021-10-28 DIAGNOSIS — E66.9 OBESITY, CLASS II, BMI 35-39.9: Primary | ICD-10-CM

## 2021-10-28 PROCEDURE — RECHECK

## 2021-11-04 ENCOUNTER — OFFICE VISIT (OUTPATIENT)
Dept: FAMILY MEDICINE CLINIC | Facility: CLINIC | Age: 56
End: 2021-11-04
Payer: COMMERCIAL

## 2021-11-04 VITALS
RESPIRATION RATE: 18 BRPM | HEART RATE: 76 BPM | OXYGEN SATURATION: 98 % | TEMPERATURE: 97.1 F | WEIGHT: 222.2 LBS | BODY MASS INDEX: 37.94 KG/M2 | DIASTOLIC BLOOD PRESSURE: 84 MMHG | HEIGHT: 64 IN | SYSTOLIC BLOOD PRESSURE: 128 MMHG

## 2021-11-04 DIAGNOSIS — E55.9 VITAMIN D DEFICIENCY: ICD-10-CM

## 2021-11-04 DIAGNOSIS — E66.9 OBESITY (BMI 30-39.9): ICD-10-CM

## 2021-11-04 DIAGNOSIS — E27.8 ADRENAL MASS (HCC): ICD-10-CM

## 2021-11-04 DIAGNOSIS — Z23 NEED FOR INFLUENZA VACCINATION: ICD-10-CM

## 2021-11-04 DIAGNOSIS — I10 ESSENTIAL HYPERTENSION: ICD-10-CM

## 2021-11-04 DIAGNOSIS — Z23 NEED FOR TETANUS BOOSTER: ICD-10-CM

## 2021-11-04 DIAGNOSIS — Z13.220 SCREENING FOR HYPERLIPIDEMIA: ICD-10-CM

## 2021-11-04 DIAGNOSIS — Z00.00 ANNUAL PHYSICAL EXAM: Primary | ICD-10-CM

## 2021-11-04 DIAGNOSIS — Z12.11 SCREEN FOR COLON CANCER: ICD-10-CM

## 2021-11-04 PROCEDURE — 99396 PREV VISIT EST AGE 40-64: CPT | Performed by: FAMILY MEDICINE

## 2021-11-04 PROCEDURE — 90471 IMMUNIZATION ADMIN: CPT

## 2021-11-04 PROCEDURE — 90472 IMMUNIZATION ADMIN EACH ADD: CPT

## 2021-11-04 PROCEDURE — 3008F BODY MASS INDEX DOCD: CPT | Performed by: INTERNAL MEDICINE

## 2021-11-04 PROCEDURE — 90682 RIV4 VACC RECOMBINANT DNA IM: CPT

## 2021-11-04 PROCEDURE — 90715 TDAP VACCINE 7 YRS/> IM: CPT

## 2021-11-04 RX ORDER — LISINOPRIL 40 MG/1
40 TABLET ORAL DAILY
Qty: 90 TABLET | Refills: 1 | Status: SHIPPED | OUTPATIENT
Start: 2021-11-04 | End: 2022-01-18 | Stop reason: SDUPTHER

## 2021-11-04 RX ORDER — AMLODIPINE BESYLATE 5 MG/1
5 TABLET ORAL DAILY
Qty: 90 TABLET | Refills: 1 | Status: SHIPPED | OUTPATIENT
Start: 2021-11-04 | End: 2022-01-18 | Stop reason: SDUPTHER

## 2021-11-09 ENCOUNTER — OFFICE VISIT (OUTPATIENT)
Dept: GASTROENTEROLOGY | Facility: MEDICAL CENTER | Age: 56
End: 2021-11-09
Payer: COMMERCIAL

## 2021-11-09 VITALS
BODY MASS INDEX: 38.67 KG/M2 | SYSTOLIC BLOOD PRESSURE: 122 MMHG | TEMPERATURE: 98.3 F | HEART RATE: 83 BPM | DIASTOLIC BLOOD PRESSURE: 76 MMHG | WEIGHT: 221.8 LBS

## 2021-11-09 DIAGNOSIS — K76.0 NAFLD (NONALCOHOLIC FATTY LIVER DISEASE): ICD-10-CM

## 2021-11-09 DIAGNOSIS — K21.9 GASTROESOPHAGEAL REFLUX DISEASE WITHOUT ESOPHAGITIS: ICD-10-CM

## 2021-11-09 DIAGNOSIS — Z12.11 COLON CANCER SCREENING: Primary | ICD-10-CM

## 2021-11-09 PROCEDURE — 99213 OFFICE O/P EST LOW 20 MIN: CPT | Performed by: INTERNAL MEDICINE

## 2021-11-10 ENCOUNTER — APPOINTMENT (OUTPATIENT)
Dept: LAB | Age: 56
End: 2021-11-10
Payer: COMMERCIAL

## 2021-11-10 ENCOUNTER — ANNUAL EXAM (OUTPATIENT)
Dept: OBGYN CLINIC | Facility: MEDICAL CENTER | Age: 56
End: 2021-11-10
Payer: COMMERCIAL

## 2021-11-10 VITALS — SYSTOLIC BLOOD PRESSURE: 125 MMHG | BODY MASS INDEX: 38.71 KG/M2 | WEIGHT: 222 LBS | DIASTOLIC BLOOD PRESSURE: 80 MMHG

## 2021-11-10 DIAGNOSIS — Z01.419 ENCOUNTER FOR GYNECOLOGICAL EXAMINATION: Primary | ICD-10-CM

## 2021-11-10 DIAGNOSIS — E55.9 VITAMIN D DEFICIENCY: ICD-10-CM

## 2021-11-10 DIAGNOSIS — Z13.220 SCREENING FOR HYPERLIPIDEMIA: ICD-10-CM

## 2021-11-10 LAB
25(OH)D3 SERPL-MCNC: 40.3 NG/ML (ref 30–100)
ALBUMIN SERPL BCP-MCNC: 4 G/DL (ref 3.5–5)
ALP SERPL-CCNC: 138 U/L (ref 46–116)
ALT SERPL W P-5'-P-CCNC: 80 U/L (ref 12–78)
ANION GAP SERPL CALCULATED.3IONS-SCNC: 8 MMOL/L (ref 4–13)
AST SERPL W P-5'-P-CCNC: 34 U/L (ref 5–45)
BILIRUB SERPL-MCNC: 0.48 MG/DL (ref 0.2–1)
BUN SERPL-MCNC: 21 MG/DL (ref 5–25)
CALCIUM SERPL-MCNC: 10 MG/DL (ref 8.3–10.1)
CHLORIDE SERPL-SCNC: 105 MMOL/L (ref 100–108)
CHOLEST SERPL-MCNC: 188 MG/DL (ref 50–200)
CO2 SERPL-SCNC: 28 MMOL/L (ref 21–32)
CREAT SERPL-MCNC: 1.07 MG/DL (ref 0.6–1.3)
GFR SERPL CREATININE-BSD FRML MDRD: 58 ML/MIN/1.73SQ M
GLUCOSE P FAST SERPL-MCNC: 92 MG/DL (ref 65–99)
HDLC SERPL-MCNC: 48 MG/DL
LDLC SERPL CALC-MCNC: 95 MG/DL (ref 0–100)
NONHDLC SERPL-MCNC: 140 MG/DL
POTASSIUM SERPL-SCNC: 4.6 MMOL/L (ref 3.5–5.3)
PROT SERPL-MCNC: 8.5 G/DL (ref 6.4–8.2)
SODIUM SERPL-SCNC: 141 MMOL/L (ref 136–145)
TRIGL SERPL-MCNC: 224 MG/DL

## 2021-11-10 PROCEDURE — S0612 ANNUAL GYNECOLOGICAL EXAMINA: HCPCS | Performed by: OBSTETRICS & GYNECOLOGY

## 2021-11-10 PROCEDURE — 80061 LIPID PANEL: CPT

## 2021-11-10 PROCEDURE — 36415 COLL VENOUS BLD VENIPUNCTURE: CPT

## 2021-11-10 PROCEDURE — 82306 VITAMIN D 25 HYDROXY: CPT

## 2021-11-10 PROCEDURE — 80053 COMPREHEN METABOLIC PANEL: CPT

## 2021-12-02 ENCOUNTER — OFFICE VISIT (OUTPATIENT)
Dept: BARIATRICS | Facility: CLINIC | Age: 56
End: 2021-12-02

## 2021-12-02 VITALS — HEIGHT: 64 IN | WEIGHT: 224.8 LBS | BODY MASS INDEX: 38.38 KG/M2

## 2021-12-02 DIAGNOSIS — E66.9 OBESITY, CLASS II, BMI 35-39.9: Primary | ICD-10-CM

## 2021-12-02 PROCEDURE — RECHECK

## 2021-12-02 PROCEDURE — 3008F BODY MASS INDEX DOCD: CPT | Performed by: INTERNAL MEDICINE

## 2021-12-04 DIAGNOSIS — E66.9 OBESITY (BMI 30-39.9): ICD-10-CM

## 2021-12-07 ENCOUNTER — TELEPHONE (OUTPATIENT)
Dept: PREADMISSION TESTING | Facility: HOSPITAL | Age: 56
End: 2021-12-07

## 2021-12-15 DIAGNOSIS — E66.9 OBESITY (BMI 30-39.9): ICD-10-CM

## 2021-12-27 DIAGNOSIS — Z20.822 EXPOSURE TO COVID-19 VIRUS: Primary | ICD-10-CM

## 2021-12-27 PROCEDURE — U0005 INFEC AGEN DETEC AMPLI PROBE: HCPCS | Performed by: FAMILY MEDICINE

## 2021-12-27 PROCEDURE — U0003 INFECTIOUS AGENT DETECTION BY NUCLEIC ACID (DNA OR RNA); SEVERE ACUTE RESPIRATORY SYNDROME CORONAVIRUS 2 (SARS-COV-2) (CORONAVIRUS DISEASE [COVID-19]), AMPLIFIED PROBE TECHNIQUE, MAKING USE OF HIGH THROUGHPUT TECHNOLOGIES AS DESCRIBED BY CMS-2020-01-R: HCPCS | Performed by: FAMILY MEDICINE

## 2021-12-28 ENCOUNTER — ANESTHESIA EVENT (OUTPATIENT)
Dept: ANESTHESIOLOGY | Facility: HOSPITAL | Age: 56
End: 2021-12-28

## 2021-12-28 ENCOUNTER — ANESTHESIA (OUTPATIENT)
Dept: ANESTHESIOLOGY | Facility: HOSPITAL | Age: 56
End: 2021-12-28

## 2021-12-28 RX ORDER — SODIUM CHLORIDE 9 MG/ML
125 INJECTION, SOLUTION INTRAVENOUS CONTINUOUS
Status: CANCELLED | OUTPATIENT
Start: 2021-12-28

## 2021-12-29 ENCOUNTER — TELEPHONE (OUTPATIENT)
Dept: GASTROENTEROLOGY | Facility: HOSPITAL | Age: 56
End: 2021-12-29

## 2022-01-18 DIAGNOSIS — I10 ESSENTIAL HYPERTENSION: ICD-10-CM

## 2022-01-18 RX ORDER — AMLODIPINE BESYLATE 5 MG/1
5 TABLET ORAL DAILY
Qty: 90 TABLET | Refills: 0 | Status: SHIPPED | OUTPATIENT
Start: 2022-01-18 | End: 2022-01-21 | Stop reason: SDUPTHER

## 2022-01-18 RX ORDER — LISINOPRIL 40 MG/1
40 TABLET ORAL DAILY
Qty: 90 TABLET | Refills: 0 | Status: SHIPPED | OUTPATIENT
Start: 2022-01-18 | End: 2022-01-21 | Stop reason: SDUPTHER

## 2022-01-21 DIAGNOSIS — E66.9 OBESITY (BMI 30-39.9): ICD-10-CM

## 2022-01-21 DIAGNOSIS — I10 ESSENTIAL HYPERTENSION: ICD-10-CM

## 2022-01-21 RX ORDER — LISINOPRIL 40 MG/1
40 TABLET ORAL DAILY
Qty: 90 TABLET | Refills: 0 | Status: SHIPPED | OUTPATIENT
Start: 2022-01-21 | End: 2022-04-15 | Stop reason: SDUPTHER

## 2022-01-21 RX ORDER — AMLODIPINE BESYLATE 5 MG/1
5 TABLET ORAL DAILY
Qty: 90 TABLET | Refills: 0 | Status: SHIPPED | OUTPATIENT
Start: 2022-01-21 | End: 2022-04-15 | Stop reason: SDUPTHER

## 2022-01-25 ENCOUNTER — TELEPHONE (OUTPATIENT)
Dept: PREADMISSION TESTING | Facility: HOSPITAL | Age: 57
End: 2022-01-25

## 2022-02-01 ENCOUNTER — TELEPHONE (OUTPATIENT)
Dept: FAMILY MEDICINE CLINIC | Facility: CLINIC | Age: 57
End: 2022-02-01

## 2022-02-02 NOTE — TELEPHONE ENCOUNTER
The medication below has been requested by patient to be refilled; However, I do not see this on the med list  Please advise

## 2022-02-02 NOTE — TELEPHONE ENCOUNTER
From: Claybon Lesch PerezSebelin  To: Office of Sulema Gama MD  Sent: 2/1/2022 7:39 PM EST  Subject: Medication Renewal Request    Refills have been requested for the following medications:    Other - Tamsulosin HCL 0 4 mg capsule    Preferred pharmacy: [address] [place] [address] [address] [place] [address]      Medication renewals requested in this message routed separately:     Naltrexone-buPROPion HCl ER 8-90 MG TB12 Sulema Gama MD]   Patient Comment: Contrave

## 2022-02-08 ENCOUNTER — OFFICE VISIT (OUTPATIENT)
Dept: FAMILY MEDICINE CLINIC | Facility: CLINIC | Age: 57
End: 2022-02-08
Payer: COMMERCIAL

## 2022-02-08 VITALS
HEIGHT: 64 IN | TEMPERATURE: 97.3 F | RESPIRATION RATE: 18 BRPM | OXYGEN SATURATION: 98 % | DIASTOLIC BLOOD PRESSURE: 86 MMHG | BODY MASS INDEX: 37.52 KG/M2 | WEIGHT: 219.8 LBS | SYSTOLIC BLOOD PRESSURE: 140 MMHG | HEART RATE: 93 BPM

## 2022-02-08 DIAGNOSIS — I10 PRIMARY HYPERTENSION: Primary | ICD-10-CM

## 2022-02-08 DIAGNOSIS — N20.0 KIDNEY STONES: ICD-10-CM

## 2022-02-08 DIAGNOSIS — E27.8 ADRENAL MASS (HCC): ICD-10-CM

## 2022-02-08 DIAGNOSIS — E66.9 OBESITY (BMI 30-39.9): ICD-10-CM

## 2022-02-08 PROCEDURE — 3725F SCREEN DEPRESSION PERFORMED: CPT | Performed by: FAMILY MEDICINE

## 2022-02-08 PROCEDURE — 99214 OFFICE O/P EST MOD 30 MIN: CPT | Performed by: FAMILY MEDICINE

## 2022-02-08 PROCEDURE — 3008F BODY MASS INDEX DOCD: CPT | Performed by: FAMILY MEDICINE

## 2022-02-08 RX ORDER — TAMSULOSIN HYDROCHLORIDE 0.4 MG/1
CAPSULE ORAL
Qty: 10 CAPSULE | Refills: 1 | Status: SHIPPED | OUTPATIENT
Start: 2022-02-08

## 2022-02-08 NOTE — ASSESSMENT & PLAN NOTE
We will refill her Flomax in case she develops renal colic  She usually takes it for a few days until the stone passes

## 2022-02-08 NOTE — PROGRESS NOTES
Assessment/Plan:    Kidney stones  We will refill her Flomax in case she develops renal colic  She usually takes it for a few days until the stone passes  Hypertension  Blood pressure remains stable on amlodipine 5 mg daily and lisinopril 40 mg daily    Obesity (BMI 30-39  9)  She remains stable on Contrave       Diagnoses and all orders for this visit:    Primary hypertension    Obesity (BMI 30-39  9)    Kidney stones  -     tamsulosin (FLOMAX) 0 4 mg; Take 1 po daily prn renal colic    Adrenal mass (HCC)    Other orders  -     Probiotic Product (PROBIOTIC ACIDOPHILUS BEADS PO)          Subjective:      Patient ID: Ahsan Cardozo is a 61 y o  female  She is here for follow up  She had Covid end of December  She had mild illness in spite of not getting vaccinated  She scheduled visit today to discuss tamsulosin     She had small 4 mm nonobstructing stone left kidney seen on CT stone in July  She originally got the Flomax from the emergency room doctor when she had kidney stone pain  She likes to have it on hand so that if she experiences that pain she starts taking it for short course  She recently passed a small stone  She had sleep study which showed sleep apnea but she could not sleep with machine  She is stable on Contrave, weight remains stable      The following portions of the patient's history were reviewed and updated as appropriate: allergies, current medications, past family history, past medical history, past social history, past surgical history and problem list     Review of Systems   Constitutional: Negative for activity change, chills, fatigue and fever  Gastrointestinal: Negative for abdominal pain  Genitourinary: Negative for difficulty urinating, dysuria and hematuria  Neurological: Negative for dizziness and headaches  Psychiatric/Behavioral: Negative for sleep disturbance           Objective:      /86 (BP Location: Left arm, Patient Position: Sitting, Cuff Size: Large)   Pulse 93   Temp (!) 97 3 °F (36 3 °C) (Tympanic)   Resp 18   Ht 5' 3 5" (1 613 m)   Wt 99 7 kg (219 lb 12 8 oz)   LMP 08/03/2018 (Approximate)   SpO2 98%   BMI 38 33 kg/m²          Physical Exam  Vitals and nursing note reviewed  Constitutional:       Appearance: She is obese  HENT:      Head: Normocephalic and atraumatic  Cardiovascular:      Rate and Rhythm: Normal rate and regular rhythm  Pulmonary:      Effort: Pulmonary effort is normal       Breath sounds: Normal breath sounds  Musculoskeletal:      Cervical back: Normal range of motion  Lymphadenopathy:      Cervical: No cervical adenopathy  Skin:     General: Skin is warm and dry  Neurological:      General: No focal deficit present  Mental Status: She is alert and oriented to person, place, and time     Psychiatric:         Mood and Affect: Mood normal          Behavior: Behavior normal

## 2022-02-09 ENCOUNTER — TELEPHONE (OUTPATIENT)
Dept: GASTROENTEROLOGY | Facility: HOSPITAL | Age: 57
End: 2022-02-09

## 2022-02-10 ENCOUNTER — HOSPITAL ENCOUNTER (OUTPATIENT)
Dept: GASTROENTEROLOGY | Facility: HOSPITAL | Age: 57
Setting detail: OUTPATIENT SURGERY
Discharge: HOME | End: 2022-02-10
Attending: INTERNAL MEDICINE | Admitting: STUDENT IN AN ORGANIZED HEALTH CARE EDUCATION/TRAINING PROGRAM
Payer: COMMERCIAL

## 2022-02-10 ENCOUNTER — ANESTHESIA EVENT (OUTPATIENT)
Dept: GASTROENTEROLOGY | Facility: HOSPITAL | Age: 57
End: 2022-02-10

## 2022-02-10 ENCOUNTER — ANESTHESIA (OUTPATIENT)
Dept: GASTROENTEROLOGY | Facility: HOSPITAL | Age: 57
End: 2022-02-10

## 2022-02-10 VITALS
HEIGHT: 64 IN | OXYGEN SATURATION: 97 % | SYSTOLIC BLOOD PRESSURE: 111 MMHG | BODY MASS INDEX: 37.39 KG/M2 | WEIGHT: 219 LBS | HEART RATE: 81 BPM | RESPIRATION RATE: 18 BRPM | DIASTOLIC BLOOD PRESSURE: 56 MMHG | TEMPERATURE: 97.7 F

## 2022-02-10 DIAGNOSIS — Z12.11 COLON CANCER SCREENING: ICD-10-CM

## 2022-02-10 PROCEDURE — 45380 COLONOSCOPY AND BIOPSY: CPT | Performed by: STUDENT IN AN ORGANIZED HEALTH CARE EDUCATION/TRAINING PROGRAM

## 2022-02-10 PROCEDURE — 88305 TISSUE EXAM BY PATHOLOGIST: CPT | Performed by: PATHOLOGY

## 2022-02-10 PROCEDURE — 45385 COLONOSCOPY W/LESION REMOVAL: CPT | Performed by: STUDENT IN AN ORGANIZED HEALTH CARE EDUCATION/TRAINING PROGRAM

## 2022-02-10 RX ORDER — SODIUM CHLORIDE 9 MG/ML
INJECTION, SOLUTION INTRAVENOUS CONTINUOUS PRN
Status: DISCONTINUED | OUTPATIENT
Start: 2022-02-10 | End: 2022-02-10

## 2022-02-10 RX ORDER — LIDOCAINE HYDROCHLORIDE 10 MG/ML
INJECTION, SOLUTION EPIDURAL; INFILTRATION; INTRACAUDAL; PERINEURAL AS NEEDED
Status: DISCONTINUED | OUTPATIENT
Start: 2022-02-10 | End: 2022-02-10

## 2022-02-10 RX ORDER — SODIUM CHLORIDE 9 MG/ML
100 INJECTION, SOLUTION INTRAVENOUS CONTINUOUS
Status: CANCELLED | OUTPATIENT
Start: 2022-02-10

## 2022-02-10 RX ORDER — SODIUM CHLORIDE 9 MG/ML
125 INJECTION, SOLUTION INTRAVENOUS CONTINUOUS
Status: DISCONTINUED | OUTPATIENT
Start: 2022-02-10 | End: 2022-02-14 | Stop reason: HOSPADM

## 2022-02-10 RX ORDER — PROPOFOL 10 MG/ML
INJECTION, EMULSION INTRAVENOUS AS NEEDED
Status: DISCONTINUED | OUTPATIENT
Start: 2022-02-10 | End: 2022-02-10

## 2022-02-10 RX ADMIN — LIDOCAINE HYDROCHLORIDE 50 MG: 10 INJECTION, SOLUTION EPIDURAL; INFILTRATION; INTRACAUDAL; PERINEURAL at 07:31

## 2022-02-10 RX ADMIN — PROPOFOL 50 MG: 10 INJECTION, EMULSION INTRAVENOUS at 07:36

## 2022-02-10 RX ADMIN — PROPOFOL 50 MG: 10 INJECTION, EMULSION INTRAVENOUS at 07:44

## 2022-02-10 RX ADMIN — PROPOFOL 50 MG: 10 INJECTION, EMULSION INTRAVENOUS at 07:51

## 2022-02-10 RX ADMIN — PROPOFOL 50 MG: 10 INJECTION, EMULSION INTRAVENOUS at 08:00

## 2022-02-10 RX ADMIN — PROPOFOL 50 MG: 10 INJECTION, EMULSION INTRAVENOUS at 07:40

## 2022-02-10 RX ADMIN — SODIUM CHLORIDE 125 ML/HR: 0.9 INJECTION, SOLUTION INTRAVENOUS at 06:56

## 2022-02-10 RX ADMIN — SODIUM CHLORIDE: 0.9 INJECTION, SOLUTION INTRAVENOUS at 06:51

## 2022-02-10 RX ADMIN — PROPOFOL 150 MG: 10 INJECTION, EMULSION INTRAVENOUS at 07:32

## 2022-02-10 RX ADMIN — PROPOFOL 30 MG: 10 INJECTION, EMULSION INTRAVENOUS at 07:47

## 2022-02-10 NOTE — DISCHARGE INSTRUCTIONS
Colonoscopia   LO QUE NECESITA SABER:   Amie colonoscopia es un procedimiento para examinar con un endoscopio el interior de ordaz colon (intestino)  Leta amie colonoscopia, es posible que le retiren pólipos o crecimientos de tejidos  Es normal que se sienta inflamado o que tenga molestia abdominal  Usted debería estar expulsando los gases  Si tiene hemorroides o si le removieron pólipos, usted podría presentar amie pequeña cantidad de sangrado  INSTRUCCIONES SOBRE EL [facility]ITAL[facility]:   Busque atención médica de inmediato si:  · Usted presenta amie cantidad oleg de jazlyn gracia brillante en michael evacuaciones intestinales  · Ordza abdomen está elba y firme y usted siente dolor intenso  · Usted tiene dificultad repentina para respirar  Llame a ordaz médico si:  · Usted presenta sarpullido o urticaria  · Usted tiene fiebre dentro de las 24 horas después de ordaz procedimiento  · Tienen náuseas y vómitos  · TXU Toyin de la anestesia leta más de 24 horas  · Usted no ha tenido amie evacuación intestinal después de 3 días de ordaz procedimiento  · Usted tiene preguntas o inquietudes acerca de ordaz condición o cuidado  Después de la colonoscopia:  · No levante nada, no se esfuerce o corra hasta que ordaz médico lo autorice  · Descanse el mayor tiempo posible  A usted le love administrado medicamento para relajarse  No maneje ni tome decisiones importantes por al menos 24 horas  Regrese a michael actividades normales según le indiquen  · [employer] gases y la incomodidad de la inflamación acostándose sobre ordaz lado kelvin con amie almohada térmica sobre ordaz abdomen  Es posible que necesite caminar un poco para ayudar a eliminar los gases  Coma comidas pequeñas hasta que se alivie de la inflamación  Si a usted le removieron pólipos: Por 7 días después de ordaz procedimiento:  · No  tome aspirina  · No  realice paseos largos en ely      Ayude a prevenir el estreñimiento:  · Consuma alimentos saludables y variados  Los alimentos saludables incluyen fruta, vegetales, panes integrales, productos lácteos bajo en grasa, frijoles, dana sin grasa, y pescado  Pregunte si necesita seguir amie dieta especial  Ordaz médico puede recomendarle que coma alimentos ricos en fibra, raciel frijoles cocidos  La fibra lo ayuda a tener evacuaciones intestinales regulares  · [address] se le haya indicado  Los adultos deberían de beber entre 9 a 13 vasos de 8 onzas de líquidos cada día  Pregunte cuál es la cantidad [place] para usted  Para [facility], los mejores líquidos son Clorinda Justin, y [place]  · Ejercítese según indicaciones  Consulte con ordaz médico acerca de cuál es el mejor régimen de ejercicio para usted  El ejercicio puede ayudar a prevenir estreñimiento, reducir ordaz presión arterial y [employer]  Acuda a la consulta de control con ordaz médico según las indicaciones: Anote michael preguntas para que se acuerde de hacerlas leta michael visitas  © Copyright [employer] 2021 Information is for End User's use only and may not be sold, redistributed or otherwise used for commercial purposes  All illustrations and images included in CareNotes® are the copyrighted property of A D A M , [facility]  or [address] [place] [address] es sólo para uso en educación  Ordaz intención no es darle un consejo médico sobre enfermedades o tratamientos  Colsulte con ordaz Jennifer Bjornstad farmacéutico antes de seguir cualquier régimen médico para saber si es seguro y efectivo para usted

## 2022-02-10 NOTE — ANESTHESIA PREPROCEDURE EVALUATION
Procedure:  COLONOSCOPY    Relevant Problems   CARDIO   (+) Hypertension      GI/HEPATIC   (+) Acid reflux   (+) NAFLD (nonalcoholic fatty liver disease)      /RENAL   (+) Kidney stones      NEURO/PSYCH   (+) Generalized anxiety disorder      PULMONARY   (+) NITISH (obstructive sleep apnea)        Physical Exam    Airway    Mallampati score: II  TM Distance: >3 FB  Neck ROM: full     Dental   No notable dental hx     Cardiovascular  Cardiovascular exam normal    Pulmonary  Pulmonary exam normal     Other Findings        Anesthesia Plan  ASA Score- 3     Anesthesia Type- IV sedation with anesthesia with ASA Monitors  Additional Monitors:   Airway Plan:           Plan Factors-Exercise tolerance (METS): >4 METS  Chart reviewed  Existing labs reviewed  Patient is not a current smoker  Patient not instructed to abstain from smoking on day of procedure  Patient did not smoke on day of surgery  Induction- intravenous  Postoperative Plan-     Informed Consent- Anesthetic plan and risks discussed with patient  I personally reviewed this patient with the CRNA  Discussed and agreed on the Anesthesia Plan with the CRNA  Jose Hazel

## 2022-03-11 DIAGNOSIS — E66.9 OBESITY (BMI 30-39.9): ICD-10-CM

## 2022-03-12 DIAGNOSIS — E66.9 OBESITY (BMI 30-39.9): ICD-10-CM

## 2022-03-12 NOTE — TELEPHONE ENCOUNTER
Naltrexone-bupropion HCL ER  Medication is not assigned to a  protocol 
Spine appears normal, range of motion is not limited, no muscle or joint tenderness
Martha Leblanc  (RN)  2021 17:12:39
Martha Leblanc  (RN)  2021 17:14:21

## 2022-03-14 DIAGNOSIS — E66.9 OBESITY (BMI 30-39.9): ICD-10-CM

## 2022-03-14 NOTE — TELEPHONE ENCOUNTER
Patient called today asking to send RX for Contrave (Naltrexon -Bupropion ) to mail order in [facility] pharmacy is charging her to much

## 2022-03-16 DIAGNOSIS — E66.9 OBESITY (BMI 30-39.9): ICD-10-CM

## 2022-04-09 ENCOUNTER — TELEPHONE (OUTPATIENT)
Dept: OTHER | Facility: OTHER | Age: 57
End: 2022-04-09

## 2022-04-10 NOTE — TELEPHONE ENCOUNTER
Pt called, requesting a call back from office at best convenience, to schedule appointment due to foot injury  Language line needed for Kinyarwanda speaking

## 2022-04-11 ENCOUNTER — TELEPHONE (OUTPATIENT)
Dept: OBGYN CLINIC | Facility: HOSPITAL | Age: 57
End: 2022-04-11

## 2022-04-11 ENCOUNTER — TELEPHONE (OUTPATIENT)
Dept: OBGYN CLINIC | Facility: MEDICAL CENTER | Age: 57
End: 2022-04-11
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
## 2022-04-11 NOTE — TELEPHONE ENCOUNTER
I received a Care Request from patient to schedule for:    Where Does it Hurt? in the middle of the foot, open foot, sprain  Are you considering joint replacement? No   Are you seeking a second opinion? No  If yes, who is your doctor? I lvm to cb to schedule

## 2022-04-13 ENCOUNTER — APPOINTMENT (OUTPATIENT)
Dept: RADIOLOGY | Facility: MEDICAL CENTER | Age: 57
End: 2022-04-13
Payer: COMMERCIAL

## 2022-04-13 ENCOUNTER — OFFICE VISIT (OUTPATIENT)
Dept: OBGYN CLINIC | Facility: CLINIC | Age: 57
End: 2022-04-13
Payer: COMMERCIAL

## 2022-04-13 VITALS
WEIGHT: 219 LBS | DIASTOLIC BLOOD PRESSURE: 60 MMHG | SYSTOLIC BLOOD PRESSURE: 112 MMHG | HEIGHT: 64 IN | BODY MASS INDEX: 37.39 KG/M2

## 2022-04-13 DIAGNOSIS — G57.61 MORTON'S NEUROMA OF RIGHT FOOT: Primary | ICD-10-CM

## 2022-04-13 DIAGNOSIS — M79.671 PAIN IN RIGHT FOOT: ICD-10-CM

## 2022-04-13 PROCEDURE — 3008F BODY MASS INDEX DOCD: CPT | Performed by: PHYSICIAN ASSISTANT

## 2022-04-13 PROCEDURE — 73630 X-RAY EXAM OF FOOT: CPT

## 2022-04-13 PROCEDURE — 99203 OFFICE O/P NEW LOW 30 MIN: CPT | Performed by: PHYSICIAN ASSISTANT

## 2022-04-13 NOTE — PROGRESS NOTES
Patient Name:  Luz Maria Torres  MRN:  [id number]    [address] [place] [address]     Right foot pain, likely Bowers's neuroma  1  Ultrasound of the right foot to evaluate for neuroma  2  Recommend over-the-counter metatarsal pads  3  Follow-up after ultrasound with primary care sports medicine, Dr Swati Sanderson    Chief Complaint     Right foot pain    History of the Present Illness     Luz Maria Torres is a 61 y o  female who reports to the office today for evaluation of her right foot  She noted an onset of pain approximately one week ago  She denies any injury or trauma but states the pain began after wearing high heel shoes  She states the pain is localized to the plantar aspect of the forefoot in the region of the second third and fourth metatarsal heads  Pain is worse with bearing weight and ambulating  She notes weakness secondary to pain  No instability  No numbness or tingling  No fevers or chills  She currently takes nothing for pain    Physical Exam     /60   Ht 5' 4" (1 626 m)   Wt 99 3 kg (219 lb)   LMP 08/03/2018 (Approximate)   BMI 37 59 kg/m²     Right foot: No gross deformity  Skin intact  No erythema ecchymosis or swelling  There is tenderness to palpation across the second third and fourth metatarsal heads  No tenderness to palpation about the plantar fascia insertion site  There is a positive Ml's click  Toes are warm and mobile  Sensation is intact distally  Brisk capillary refill  No tenderness to palpation about the Lisfranc ligament  Eyes: Anicteric sclerae  ENT: Trachea midline  Lungs: Normal respiratory effort  CV: Capillary refill is less than 2 seconds  Skin: Intact without erythema  Lymph: No palpable lymphadenopathy  Neuro: Sensation is grossly intact to light touch  Psych: Mood and affect are appropriate      Data Review     I have personally reviewed pertinent films in PACS, and my interpretation follows:    Weightbearing radiographs of the right foot 22:  No acute osseous abnormalities  No fracture or dislocation noted  No significant degenerative changes      Past Medical History:   Diagnosis Date    Acid reflux     Anxiety     CPAP (continuous positive airway pressure) dependence     GERD (gastroesophageal reflux disease)     Hypertension     Obesity     Palpable abdominal mass     LAST ASSESSED: 17    Sleep apnea        Past Surgical History:   Procedure Laterality Date    ABDOMINAL SURGERY      AUGMENTATION MAMMAPLASTY Bilateral     Bi retro pectoral saline    BREAST SURGERY      REDUCTION PROCEDURE     SECTION      CHOLECYSTECTOMY      CHOLECYSTECTOMY LAPAROSCOPIC      LIVER SURGERY      AL LAP, ERIKA RESTRICT PROC, LONGITUDINAL GASTRECTOMY N/A 2021    Procedure: Diagnostic Lap, Extensive Lysis of Adhesions;  Surgeon: Rodrigue Amado MD;  Location: [facility];  Service: Bariatrics       Allergies   Allergen Reactions    Prednisone Shortness Of Breath    Alprazolam     Duloxetine     Escitalopram     Latex Rash    Omnipaque [Iohexol]      Pt erupted with rash all over body       Current Outpatient Medications on File Prior to Visit   Medication Sig Dispense Refill    amLODIPine (NORVASC) 5 mg tablet Take 1 tablet (5 mg total) by mouth daily 90 tablet 0    Biotin w/ Vitamins C & E (HAIR/SKIN/NAILS PO) Take by mouth      Cholecalciferol (VITAMIN D3) 50 MCG ( UT) CHEW       Insulin Pen Needle (NovoFine) 32G X 6 MM MISC Use daily 90 each 1    lisinopril (ZESTRIL) 40 mg tablet Take 1 tablet (40 mg total) by mouth daily 90 tablet 0    melatonin 1 mg Take 10 tablets by mouth daily at bedtime       Multiple Vitamins-Minerals (WOMENS MULTIVITAMIN) TABS Take by mouth      Naltrexone-buPROPion HCl ER 8-90 MG TB12 Take 2 tablets by mouth 2 (two) times a day 120 tablet 0    Omega-3 Fatty Acids (OMEGA 3 500 PO) Take by mouth       omeprazole (PriLOSEC) 40 MG capsule Take 1 capsule (40 mg total) by mouth daily 90 capsule 0    Probiotic Product (PROBIOTIC ACIDOPHILUS BEADS PO)       tamsulosin (FLOMAX) 0 4 mg Take 1 po daily prn renal colic 10 capsule 1     No current facility-administered medications on file prior to visit  Social History     Tobacco Use    Smoking status: Former Smoker     Quit date: 2013     Years since quittin 2    Smokeless tobacco: Never Used    Tobacco comment: uses e cigs vaps quit 7 yrs ago  uses 0 nicotine   Vaping Use    Vaping Use: Some days   Substance Use Topics    Alcohol use: No    Drug use: No       Family History   Problem Relation Age of Onset    Pancreatic cancer Mother 67    Prostate cancer Father 61    No Known Problems Sister     No Known Problems Daughter     No Known Problems Maternal Grandmother     No Known Problems Maternal Grandfather     No Known Problems Paternal Grandmother     No Known Problems Paternal Grandfather     No Known Problems Sister     No Known Problems Daughter     No Known Problems Maternal Aunt     No Known Problems Maternal Aunt     No Known Problems Paternal Aunt     No Known Problems Paternal Aunt     No Known Problems Paternal Aunt        Review of Systems     As stated in the HPI  All other systems reviewed and are negative

## 2022-04-15 DIAGNOSIS — I10 ESSENTIAL HYPERTENSION: ICD-10-CM

## 2022-04-18 DIAGNOSIS — I10 ESSENTIAL HYPERTENSION: ICD-10-CM

## 2022-04-18 RX ORDER — AMLODIPINE BESYLATE 5 MG/1
5 TABLET ORAL DAILY
Qty: 90 TABLET | Refills: 0 | Status: SHIPPED | OUTPATIENT
Start: 2022-04-18 | End: 2022-04-18 | Stop reason: SDUPTHER

## 2022-04-18 RX ORDER — LISINOPRIL 40 MG/1
40 TABLET ORAL DAILY
Qty: 90 TABLET | Refills: 0 | Status: SHIPPED | OUTPATIENT
Start: 2022-04-18 | End: 2022-04-18 | Stop reason: SDUPTHER

## 2022-04-19 RX ORDER — LISINOPRIL 40 MG/1
40 TABLET ORAL DAILY
Qty: 90 TABLET | Refills: 0 | Status: SHIPPED | OUTPATIENT
Start: 2022-04-19 | End: 2022-08-01 | Stop reason: SDUPTHER

## 2022-04-19 RX ORDER — AMLODIPINE BESYLATE 5 MG/1
5 TABLET ORAL DAILY
Qty: 90 TABLET | Refills: 0 | Status: SHIPPED | OUTPATIENT
Start: 2022-04-19 | End: 2022-08-01 | Stop reason: SDUPTHER

## 2022-05-10 ENCOUNTER — OFFICE VISIT (OUTPATIENT)
Dept: FAMILY MEDICINE CLINIC | Facility: CLINIC | Age: 57
End: 2022-05-10
Payer: COMMERCIAL

## 2022-05-10 ENCOUNTER — APPOINTMENT (OUTPATIENT)
Dept: RADIOLOGY | Facility: MEDICAL CENTER | Age: 57
End: 2022-05-10
Attending: FAMILY MEDICINE
Payer: COMMERCIAL

## 2022-05-10 VITALS
SYSTOLIC BLOOD PRESSURE: 126 MMHG | HEIGHT: 64 IN | RESPIRATION RATE: 20 BRPM | WEIGHT: 220.4 LBS | OXYGEN SATURATION: 99 % | BODY MASS INDEX: 37.63 KG/M2 | TEMPERATURE: 97.4 F | DIASTOLIC BLOOD PRESSURE: 60 MMHG | HEART RATE: 79 BPM

## 2022-05-10 DIAGNOSIS — M25.562 ACUTE PAIN OF LEFT KNEE: ICD-10-CM

## 2022-05-10 DIAGNOSIS — E66.9 OBESITY (BMI 30-39.9): ICD-10-CM

## 2022-05-10 DIAGNOSIS — I10 PRIMARY HYPERTENSION: ICD-10-CM

## 2022-05-10 DIAGNOSIS — M25.562 ACUTE PAIN OF LEFT KNEE: Primary | ICD-10-CM

## 2022-05-10 PROCEDURE — 99214 OFFICE O/P EST MOD 30 MIN: CPT | Performed by: FAMILY MEDICINE

## 2022-05-10 PROCEDURE — 73562 X-RAY EXAM OF KNEE 3: CPT

## 2022-05-10 NOTE — PROGRESS NOTES
Assessment/Plan:    She has acute left knee pain which has been present for about a month  There is no injury that she recalls  I have ordered x-ray and given orthopedics referral ASAP as she has travel plans in the very near future  Will start Voltaren gel 4 times a day and ice packs  Obesity (BMI 30-39  9)  She is following with medical weight loss program    Hypertension  Blood pressure is stable and well controlled on lisinopril 40 mg daily and amlodipine 5 mg daily  Diagnoses and all orders for this visit:    Acute pain of left knee  -     Diclofenac Sodium (VOLTAREN) 1 %; Apply 2 g topically 4 (four) times a day  -     Ambulatory Referral to Orthopedic Surgery; Future  -     XR knee 3 vw left non injury; Future    Obesity (BMI 30-39  9)    Primary hypertension          Subjective:      Patient ID: Yusra Robb is a 61 y o  female  She is here for follow up  Also her left knee has been hurting for the past month  She does not recall injuring her knee  The pain is located on the outer joint line and behind the knee  She does not note swelling of the knee  Pain with weight-bearing but not at rest   She has been applying Lidoderm patches which helped temporarily but then the pain recurs  She had right foot pain after wearing heals but this has resolved  She stopped Contrave because of cost   She is going to a weight management practice and getting diet and injections of Saxenda      The following portions of the patient's history were reviewed and updated as appropriate: allergies, current medications, past family history, past medical history, past social history, past surgical history and problem list     Review of Systems   Constitutional: Positive for activity change  Negative for chills and fever  HENT: Negative for congestion  Respiratory: Negative for chest tightness  Cardiovascular: Negative for chest pain  Gastrointestinal: Negative for abdominal pain  Musculoskeletal: Positive for arthralgias  Neurological: Negative for dizziness and headaches  Objective:      /60 (BP Location: Left arm, Patient Position: Sitting, Cuff Size: Large)   Pulse 79   Temp (!) 97 4 °F (36 3 °C) (Temporal)   Resp 20   Ht 5' 4" (1 626 m)   Wt 100 kg (220 lb 6 4 oz)   LMP 08/03/2018 (Approximate)   SpO2 99%   BMI 37 83 kg/m²          Physical Exam  Vitals and nursing note reviewed  Constitutional:       Appearance: She is obese  HENT:      Head: Normocephalic and atraumatic  Cardiovascular:      Rate and Rhythm: Normal rate and regular rhythm  Pulses: Normal pulses  Heart sounds: Normal heart sounds  Pulmonary:      Effort: Pulmonary effort is normal       Breath sounds: Normal breath sounds  Musculoskeletal:      Right lower leg: No edema  Left lower leg: No edema  Comments: Left knee tender lateral joint line  Full range of motion but pain with full extension and full flexion  Positive Ronald's and crepitus palpated over the lateral joint line  No ligament laxity   Skin:     General: Skin is warm and dry  Neurological:      General: No focal deficit present  Mental Status: She is alert and oriented to person, place, and time     Psychiatric:         Mood and Affect: Mood normal          Behavior: Behavior normal

## 2022-05-26 ENCOUNTER — OFFICE VISIT (OUTPATIENT)
Dept: OBGYN CLINIC | Facility: CLINIC | Age: 57
End: 2022-05-26
Payer: COMMERCIAL

## 2022-05-26 VITALS
BODY MASS INDEX: 37.39 KG/M2 | HEIGHT: 64 IN | WEIGHT: 219 LBS | DIASTOLIC BLOOD PRESSURE: 63 MMHG | SYSTOLIC BLOOD PRESSURE: 117 MMHG | HEART RATE: 77 BPM

## 2022-05-26 DIAGNOSIS — M25.562 ACUTE PAIN OF LEFT KNEE: ICD-10-CM

## 2022-05-26 DIAGNOSIS — M17.12 ARTHRITIS OF LEFT KNEE: Primary | ICD-10-CM

## 2022-05-26 PROCEDURE — 20610 DRAIN/INJ JOINT/BURSA W/O US: CPT | Performed by: ORTHOPAEDIC SURGERY

## 2022-05-26 PROCEDURE — 3008F BODY MASS INDEX DOCD: CPT | Performed by: FAMILY MEDICINE

## 2022-05-26 PROCEDURE — 99244 OFF/OP CNSLTJ NEW/EST MOD 40: CPT | Performed by: ORTHOPAEDIC SURGERY

## 2022-05-26 RX ADMIN — LIDOCAINE HYDROCHLORIDE 4 ML: 10 INJECTION, SOLUTION INFILTRATION; PERINEURAL at 11:23

## 2022-05-26 RX ADMIN — TRIAMCINOLONE ACETONIDE 20 MG: 40 INJECTION, SUSPENSION INTRA-ARTICULAR; INTRAMUSCULAR at 11:23

## 2022-05-26 NOTE — PROGRESS NOTES
Assessment/Plan     1  Arthritis of left knee    2  Acute pain of left knee      Orders Placed This Encounter   Procedures    Large joint arthrocentesis: L knee    XR knee 1 or 2 vw left    Ambulatory referral to Physical Therapy     ·  Patient has mild left knee osteoarthritis with possible meniscal tear  · Discussed with patient conservative treatments: steroid injections, physical therapy, medications, bracing, visco supplementation injections  · Physical therapy order was given out today   · Declined prescription medication:  May take over-the-counter Motrin or Aleve as needed for pain relief  · May consider ordering a MRI of the left knee at the next office visit if pain persists  Return in about 6 weeks (around 7/7/2022) for Left knee   I answered all of the patient's questions during the visit and provided education of the patient's condition during the visit  The patient verbalized understanding of the information given and agrees with the plan  This note was dictated using Kaesu software  It may contain errors including improperly dictated words  Please contact physician directly for any questions  History of Present Illness   Chief complaint:   Chief Complaint   Patient presents with    Left Knee - Pain       HPI: Chaim Aaron is a 61 y o  female that c/o left knee pain  She was referred by her PCP Dr Laguna Poag  She has been having left knee pain for one month denies any falls or trauma  She states on April 18th she was wearing high heels and mild aggravated her left knee  She states the knee is getting better but pain is still there  Also has history of Bowers's neuroma in the right foot  She states she has pain that comes and goes  over the anterior lateral and posterior aspect of the left knee  She is uncertain about instability  Pain is worse with walking and with transitional positions  She has been using a lidocaine patch as needed for pain relief    She also tried using Voltaren gel with no relief  She has not taking any oral medications but can not take them  She has no history having injections, physical therapy or surgeries on the left knee  ROS:    See HPI for musculoskeletal review     All other systems reviewed are negative     Historical Information   Past Medical History:   Diagnosis Date    Acid reflux     Anxiety     CPAP (continuous positive airway pressure) dependence     GERD (gastroesophageal reflux disease)     Hypertension     Obesity     Palpable abdominal mass     LAST ASSESSED: 17    Sleep apnea      Past Surgical History:   Procedure Laterality Date    ABDOMINAL SURGERY      AUGMENTATION MAMMAPLASTY Bilateral     Bi retro pectoral saline    BREAST SURGERY      REDUCTION PROCEDURE     SECTION      CHOLECYSTECTOMY      CHOLECYSTECTOMY LAPAROSCOPIC      LIVER SURGERY      MS LAP, ERIKA RESTRICT PROC, LONGITUDINAL GASTRECTOMY N/A 2021    Procedure: Diagnostic Lap, Extensive Lysis of Adhesions;  Surgeon: Renato Saxena MD;  Location: [facility];  Service: Bariatrics     Social History   Social History     Substance and Sexual Activity   Alcohol Use No     Social History     Substance and Sexual Activity   Drug Use No     Social History     Tobacco Use   Smoking Status Former Smoker    Quit date: 2013    Years since quittin 3   Smokeless Tobacco Never Used   Tobacco Comment    uses e cigs vaps quit 7 yrs ago  uses 0 nicotine     Family History:   Family History   Problem Relation Age of Onset    Pancreatic cancer Mother 67    Prostate cancer Father 61    No Known Problems Sister     No Known Problems Daughter     No Known Problems Maternal Grandmother     No Known Problems Maternal Grandfather     No Known Problems Paternal Grandmother     No Known Problems Paternal Grandfather     No Known Problems Sister     No Known Problems Daughter     No Known Problems Maternal Aunt     No Known Problems Maternal Aunt     No Known Problems Paternal Aunt     No Known Problems Paternal Aunt     No Known Problems Paternal Aunt        Current Outpatient Medications on File Prior to Visit   Medication Sig Dispense Refill    amLODIPine (NORVASC) 5 mg tablet Take 1 tablet (5 mg total) by mouth daily 90 tablet 0    Biotin w/ Vitamins C & E (HAIR/SKIN/NAILS PO) Take by mouth      Cholecalciferol (VITAMIN D3) 50 MCG (2000 UT) CHEW       Diclofenac Sodium (VOLTAREN) 1 % Apply 2 g topically 4 (four) times a day 2 g 1    Insulin Pen Needle (NovoFine) 32G X 6 MM MISC Use daily 90 each 1    lisinopril (ZESTRIL) 40 mg tablet Take 1 tablet (40 mg total) by mouth daily 90 tablet 0    melatonin 1 mg Take 10 tablets by mouth daily at bedtime       Multiple Vitamins-Minerals (WOMENS MULTIVITAMIN) TABS Take by mouth      Naltrexone-buPROPion HCl ER 8-90 MG TB12 Take 2 tablets by mouth 2 (two) times a day 120 tablet 0    Omega-3 Fatty Acids (OMEGA 3 500 PO) Take by mouth       omeprazole (PriLOSEC) 40 MG capsule Take 1 capsule (40 mg total) by mouth daily 90 capsule 0    Probiotic Product (PROBIOTIC ACIDOPHILUS BEADS PO)       tamsulosin (FLOMAX) 0 4 mg Take 1 po daily prn renal colic 10 capsule 1     No current facility-administered medications on file prior to visit       Allergies   Allergen Reactions    Prednisone Shortness Of Breath    Alprazolam     Duloxetine     Escitalopram     Latex Rash    Omnipaque [Iohexol]      Pt erupted with rash all over body       Current Outpatient Medications on File Prior to Visit   Medication Sig Dispense Refill    amLODIPine (NORVASC) 5 mg tablet Take 1 tablet (5 mg total) by mouth daily 90 tablet 0    Biotin w/ Vitamins C & E (HAIR/SKIN/NAILS PO) Take by mouth      Cholecalciferol (VITAMIN D3) 50 MCG (2000 UT) CHEW       Diclofenac Sodium (VOLTAREN) 1 % Apply 2 g topically 4 (four) times a day 2 g 1    Insulin Pen Needle (NovoFine) 32G X 6 MM MISC Use daily 90 each 1    lisinopril (ZESTRIL) 40 mg tablet Take 1 tablet (40 mg total) by mouth daily 90 tablet 0    melatonin 1 mg Take 10 tablets by mouth daily at bedtime       Multiple Vitamins-Minerals (WOMENS MULTIVITAMIN) TABS Take by mouth      Naltrexone-buPROPion HCl ER 8-90 MG TB12 Take 2 tablets by mouth 2 (two) times a day 120 tablet 0    Omega-3 Fatty Acids (OMEGA 3 500 PO) Take by mouth       omeprazole (PriLOSEC) 40 MG capsule Take 1 capsule (40 mg total) by mouth daily 90 capsule 0    Probiotic Product (PROBIOTIC ACIDOPHILUS BEADS PO)       tamsulosin (FLOMAX) 0 4 mg Take 1 po daily prn renal colic 10 capsule 1     No current facility-administered medications on file prior to visit  Objective   Vitals: Blood pressure 117/63, pulse 77, height 5' 4" (1 626 m), weight 99 3 kg (219 lb), last menstrual period 08/03/2018  ,Body mass index is 37 59 kg/m²  PE:  AAOx 3  WDWN  Hearing intact, no drainage from eyes  Regular rate  no audible wheezing  no abdominal distension  LE compartments soft, skin intact    leftknee:    Appearance:  no swelling   No ecchymosis  no obvious joint deformity   No effusion  Palpation/Tenderness:  No TTP over medial joint line  No TTP over lateral joint line   No TTP over patella  No TTP over patellar tendon  No TTP over pes anserine bursa  Active Range of Motion:  AROM: 0-100   Special Tests:  Medial Ronald's Test:  Positive  Lateral Ronald's Test:  Negative  Apley's compression test:  Negative  Lachman's Test:  negative  Anterior Drawer Test:  Negative  Patellar grind:  Negative  Valgus Stress Test:  negative  Varus Stress Test:  negative     No ipsilateral hip pain with ROM    leftLE:    Sensation grossly intact  Palpable  pulse  AT/GS/EHL intact    Imaging Studies: I have personally reviewed pertinent films in PACS  XR leftknee:  Mild DJD     Large joint arthrocentesis: L knee  Universal Protocol:  Consent: Verbal consent obtained    Risks and benefits: risks, benefits and alternatives were discussed  Consent given by: patient  Time out: Immediately prior to procedure a "time out" was called to verify the correct patient, procedure, equipment, support staff and site/side marked as required    Timeout called at: 5/26/2022 11:22 AM   Patient understanding: patient states understanding of the procedure being performed  Site marked: the operative site was marked  Supporting Documentation  Indications: pain   Procedure Details  Location: knee - L knee  Preparation: Patient was prepped and draped in the usual sterile fashion  Needle size: 22 G  Approach: anterolateral  Medications administered: 4 mL lidocaine 1 %; 20 mg triamcinolone acetonide 40 mg/mL    Patient tolerance: patient tolerated the procedure well with no immediate complications  Dressing:  Sterile dressing applied          Scribe Attestation    I,:  Alejandra Webster am acting as a scribe while in the presence of the attending physician :       I,:  Cleve Yoo DO personally performed the services described in this documentation    as scribed in my presence :

## 2022-05-26 NOTE — LETTER
May 27, 2022     Tania Pedro MD  [address]  [address] [facility] [address]    Patient: Bruce Huffman   YOB: 1965   Date of Visit: 5/26/2022       Dear Dr Stacy Steele: Thank you for referring Lyndon Palafox to me for evaluation  Below are my notes for this consultation  If you have questions, please do not hesitate to call me  I look forward to following your patient along with you  Sincerely,        Nahun Rivers,         CC: No Recipients  Jony christine DO  5/27/2022  8:05 AM  Sign when Signing Visit   Assessment/Plan     1  Arthritis of left knee    2  Acute pain of left knee      Orders Placed This Encounter   Procedures    Large joint arthrocentesis: L knee    XR knee 1 or 2 vw left    Ambulatory referral to Physical Therapy     ·  Patient has mild left knee osteoarthritis with possible meniscal tear  · Discussed with patient conservative treatments: steroid injections, physical therapy, medications, bracing, visco supplementation injections  · Physical therapy order was given out today   · Declined prescription medication:  May take over-the-counter Motrin or Aleve as needed for pain relief  · May consider ordering a MRI of the left knee at the next office visit if pain persists  Return in about 6 weeks (around 7/7/2022) for Left knee   I answered all of the patient's questions during the visit and provided education of the patient's condition during the visit  The patient verbalized understanding of the information given and agrees with the plan  This note was dictated using [employer] software  It may contain errors including improperly dictated words  Please contact physician directly for any questions  History of Present Illness   Chief complaint:   Chief Complaint   Patient presents with    Left Knee - Pain       HPI: Bruce Huffman is a 61 y o  female that c/o left knee pain    She was referred by her PCP Dr Lon Salgado  She has been having left knee pain for one month denies any falls or trauma  She states on  she was wearing high heels and mild aggravated her left knee  She states the knee is getting better but pain is still there  Also has history of Bowers's neuroma in the right foot  She states she has pain that comes and goes  over the anterior lateral and posterior aspect of the left knee  She is uncertain about instability  Pain is worse with walking and with transitional positions  She has been using a lidocaine patch as needed for pain relief  She also tried using Voltaren gel with no relief  She has not taking any oral medications but can not take them  She has no history having injections, physical therapy or surgeries on the left knee  ROS:    See HPI for musculoskeletal review     All other systems reviewed are negative     Historical Information   Past Medical History:   Diagnosis Date    Acid reflux     Anxiety     CPAP (continuous positive airway pressure) dependence     GERD (gastroesophageal reflux disease)     Hypertension     Obesity     Palpable abdominal mass     LAST ASSESSED: 17    Sleep apnea      Past Surgical History:   Procedure Laterality Date    ABDOMINAL SURGERY      AUGMENTATION MAMMAPLASTY Bilateral     Bi retro pectoral saline    BREAST SURGERY      REDUCTION PROCEDURE     SECTION      CHOLECYSTECTOMY      CHOLECYSTECTOMY LAPAROSCOPIC      LIVER SURGERY      WA LAP, ERIKA RESTRICT PROC, LONGITUDINAL GASTRECTOMY N/A 2021    Procedure: Diagnostic Lap, Extensive Lysis of Adhesions;  Surgeon: Constance Jennings MD;  Location: [facility];  Service: Bariatrics     Social History   Social History     Substance and Sexual Activity   Alcohol Use No     Social History     Substance and Sexual Activity   Drug Use No     Social History     Tobacco Use   Smoking Status Former Smoker    Quit date: 2013    Years since quittin 3 Smokeless Tobacco Never Used   Tobacco Comment    uses e cigs vaps quit 7 yrs ago  uses 0 nicotine     Family History:   Family History   Problem Relation Age of Onset    Pancreatic cancer Mother 67    Prostate cancer Father 61    No Known Problems Sister     No Known Problems Daughter     No Known Problems Maternal Grandmother     No Known Problems Maternal Grandfather     No Known Problems Paternal Grandmother     No Known Problems Paternal Grandfather     No Known Problems Sister     No Known Problems Daughter     No Known Problems Maternal Aunt     No Known Problems Maternal Aunt     No Known Problems Paternal Aunt     No Known Problems Paternal Aunt     No Known Problems Paternal Aunt        Current Outpatient Medications on File Prior to Visit   Medication Sig Dispense Refill    amLODIPine (NORVASC) 5 mg tablet Take 1 tablet (5 mg total) by mouth daily 90 tablet 0    Biotin w/ Vitamins C & E (HAIR/SKIN/NAILS PO) Take by mouth      Cholecalciferol (VITAMIN D3) 50 MCG (2000 UT) CHEW       Diclofenac Sodium (VOLTAREN) 1 % Apply 2 g topically 4 (four) times a day 2 g 1    Insulin Pen Needle (NovoFine) 32G X 6 MM MISC Use daily 90 each 1    lisinopril (ZESTRIL) 40 mg tablet Take 1 tablet (40 mg total) by mouth daily 90 tablet 0    melatonin 1 mg Take 10 tablets by mouth daily at bedtime       Multiple Vitamins-Minerals (WOMENS MULTIVITAMIN) TABS Take by mouth      Naltrexone-buPROPion HCl ER 8-90 MG TB12 Take 2 tablets by mouth 2 (two) times a day 120 tablet 0    Omega-3 Fatty Acids (OMEGA 3 500 PO) Take by mouth       omeprazole (PriLOSEC) 40 MG capsule Take 1 capsule (40 mg total) by mouth daily 90 capsule 0    Probiotic Product (PROBIOTIC ACIDOPHILUS BEADS PO)       tamsulosin (FLOMAX) 0 4 mg Take 1 po daily prn renal colic 10 capsule 1     No current facility-administered medications on file prior to visit       Allergies   Allergen Reactions    Prednisone Shortness Of Breath    Alprazolam     Duloxetine     Escitalopram     Latex Rash    Omnipaque [Iohexol]      Pt erupted with rash all over body       Current Outpatient Medications on File Prior to Visit   Medication Sig Dispense Refill    amLODIPine (NORVASC) 5 mg tablet Take 1 tablet (5 mg total) by mouth daily 90 tablet 0    Biotin w/ Vitamins C & E (HAIR/SKIN/NAILS PO) Take by mouth      Cholecalciferol (VITAMIN D3) 50 MCG (2000 UT) CHEW       Diclofenac Sodium (VOLTAREN) 1 % Apply 2 g topically 4 (four) times a day 2 g 1    Insulin Pen Needle (NovoFine) 32G X 6 MM MISC Use daily 90 each 1    lisinopril (ZESTRIL) 40 mg tablet Take 1 tablet (40 mg total) by mouth daily 90 tablet 0    melatonin 1 mg Take 10 tablets by mouth daily at bedtime       Multiple Vitamins-Minerals (WOMENS MULTIVITAMIN) TABS Take by mouth      Naltrexone-buPROPion HCl ER 8-90 MG TB12 Take 2 tablets by mouth 2 (two) times a day 120 tablet 0    Omega-3 Fatty Acids (OMEGA 3 500 PO) Take by mouth       omeprazole (PriLOSEC) 40 MG capsule Take 1 capsule (40 mg total) by mouth daily 90 capsule 0    Probiotic Product (PROBIOTIC ACIDOPHILUS BEADS PO)       tamsulosin (FLOMAX) 0 4 mg Take 1 po daily prn renal colic 10 capsule 1     No current facility-administered medications on file prior to visit  Objective   Vitals: Blood pressure 117/63, pulse 77, height 5' 4" (1 626 m), weight 99 3 kg (219 lb), last menstrual period 08/03/2018  ,Body mass index is 37 59 kg/m²      PE:  AAOx 3  WDWN  Hearing intact, no drainage from eyes  Regular rate  no audible wheezing  no abdominal distension  LE compartments soft, skin intact    leftknee:    Appearance:  no swelling   No ecchymosis  no obvious joint deformity   No effusion  Palpation/Tenderness:  No TTP over medial joint line  No TTP over lateral joint line   No TTP over patella  No TTP over patellar tendon  No TTP over pes anserine bursa  Active Range of Motion:  AROM: 0-100   Special Tests:  Medial Ronald's Test:  Positive  Lateral Ronald's Test:  Negative  Apley's compression test:  Negative  Lachman's Test:  negative  Anterior Drawer Test:  Negative  Patellar grind:  Negative  Valgus Stress Test:  negative  Varus Stress Test:  negative     No ipsilateral hip pain with ROM    leftLE:    Sensation grossly intact  Palpable  pulse  AT/GS/EHL intact    Imaging Studies: I have personally reviewed pertinent films in PACS  XR leftknee:  Mild DJD     Large joint arthrocentesis: L knee  Universal Protocol:  Consent: Verbal consent obtained  Risks and benefits: risks, benefits and alternatives were discussed  Consent given by: patient  Time out: Immediately prior to procedure a "time out" was called to verify the correct patient, procedure, equipment, support staff and site/side marked as required    Timeout called at: 5/26/2022 11:22 AM   Patient understanding: patient states understanding of the procedure being performed  Site marked: the operative site was marked  Supporting Documentation  Indications: pain   Procedure Details  Location: knee - L knee  Preparation: Patient was prepped and draped in the usual sterile fashion  Needle size: 22 G  Approach: anterolateral  Medications administered: 4 mL lidocaine 1 %; 20 mg triamcinolone acetonide 40 mg/mL    Patient tolerance: patient tolerated the procedure well with no immediate complications  Dressing:  Sterile dressing applied          Scribe Attestation    I,:  Rolly Webster am acting as a scribe while in the presence of the attending physician :       I,:  Rashmi Valles DO personally performed the services described in this documentation    as scribed in my presence :

## 2022-05-26 NOTE — PATIENT INSTRUCTIONS
Mild left knee osteoarthritis with possible meniscal tear  /Discussed with patient conservative treatments: steroid injections, physical therapy, medications, bracing, visco supplementation injections,   Physical therapy order was given out today   Received left knee steroid injection today  Patient knows to ice and avoid strenuous activity for 1-2 days if needed  May take over the counter motrin or Aleve as needed for pain relief     Follow up in 6 weeks

## 2022-05-27 RX ORDER — TRIAMCINOLONE ACETONIDE 40 MG/ML
20 INJECTION, SUSPENSION INTRA-ARTICULAR; INTRAMUSCULAR
Status: COMPLETED | OUTPATIENT
Start: 2022-05-26 | End: 2022-05-26

## 2022-05-27 RX ORDER — LIDOCAINE HYDROCHLORIDE 10 MG/ML
4 INJECTION, SOLUTION INFILTRATION; PERINEURAL
Status: COMPLETED | OUTPATIENT
Start: 2022-05-26 | End: 2022-05-26

## 2022-06-09 ENCOUNTER — TELEPHONE (OUTPATIENT)
Dept: FAMILY MEDICINE CLINIC | Facility: CLINIC | Age: 57
End: 2022-06-09

## 2022-06-09 NOTE — TELEPHONE ENCOUNTER
Denial rec'd from patient insurance regarding RX for diclofenac sodium topical gel  Reason given: no information rec'd that you have concern of an intolerance or contraindication to oral NSAIDs  Dr Regina Allen - Are you able to review chart to see if there is anything further we may be able to provide insurance? If not, can something else be recommended?     Thank you

## 2022-06-11 NOTE — TELEPHONE ENCOUNTER
Please let pt know that voltaren gel is available over the counter  Please also inform her that if she uses this - to limit use to prn only as there is always the possibility of some systemic absorption - and it is an nsaid - so can still upset the stomach

## 2022-06-13 NOTE — TELEPHONE ENCOUNTER
Left message for patient regarding  Voltaren gel denial by her insurance and regarding  Dr Valarie Malave   Call us back if any questions

## 2022-06-15 ENCOUNTER — HOSPITAL ENCOUNTER (OUTPATIENT)
Dept: MAMMOGRAPHY | Facility: MEDICAL CENTER | Age: 57
Discharge: HOME | End: 2022-06-15
Payer: COMMERCIAL

## 2022-06-15 VITALS — BODY MASS INDEX: 37.37 KG/M2 | HEIGHT: 64 IN | WEIGHT: 218.92 LBS

## 2022-06-15 DIAGNOSIS — Z12.31 ENCOUNTER FOR SCREENING MAMMOGRAM FOR BREAST CANCER: ICD-10-CM

## 2022-06-15 PROCEDURE — 77067 SCR MAMMO BI INCL CAD: CPT

## 2022-06-15 PROCEDURE — 77063 BREAST TOMOSYNTHESIS BI: CPT

## 2022-08-01 ENCOUNTER — PATIENT MESSAGE (OUTPATIENT)
Dept: FAMILY MEDICINE CLINIC | Facility: CLINIC | Age: 57
End: 2022-08-01

## 2022-08-01 DIAGNOSIS — I10 ESSENTIAL HYPERTENSION: ICD-10-CM

## 2022-08-01 NOTE — TELEPHONE ENCOUNTER
Patient send message in [address] requesting Rx for Amlodipine and Lisinopril , she is schedule for physical with you on 11/15/22     Please advised

## 2022-08-02 DIAGNOSIS — I10 ESSENTIAL HYPERTENSION: ICD-10-CM

## 2022-08-02 RX ORDER — LISINOPRIL 40 MG/1
40 TABLET ORAL DAILY
Qty: 90 TABLET | Refills: 0 | Status: SHIPPED | OUTPATIENT
Start: 2022-08-02 | End: 2022-08-02 | Stop reason: SDUPTHER

## 2022-08-02 RX ORDER — AMLODIPINE BESYLATE 5 MG/1
5 TABLET ORAL DAILY
Qty: 90 TABLET | Refills: 0 | Status: SHIPPED | OUTPATIENT
Start: 2022-08-02 | End: 2022-08-02 | Stop reason: SDUPTHER

## 2022-08-03 RX ORDER — LISINOPRIL 40 MG/1
40 TABLET ORAL DAILY
Qty: 90 TABLET | Refills: 0 | Status: SHIPPED | OUTPATIENT
Start: 2022-08-03 | End: 2022-10-07 | Stop reason: SDUPTHER

## 2022-08-03 RX ORDER — AMLODIPINE BESYLATE 5 MG/1
5 TABLET ORAL DAILY
Qty: 90 TABLET | Refills: 0 | Status: SHIPPED | OUTPATIENT
Start: 2022-08-03 | End: 2022-10-07 | Stop reason: SDUPTHER

## 2022-08-18 ENCOUNTER — HOSPITAL ENCOUNTER (OUTPATIENT)
Dept: CT IMAGING | Facility: HOSPITAL | Age: 57
Discharge: HOME | End: 2022-08-18
Attending: SURGERY
Payer: COMMERCIAL

## 2022-08-18 DIAGNOSIS — E27.8 ADRENAL MASS (HCC): ICD-10-CM

## 2022-08-18 PROCEDURE — 74150 CT ABDOMEN W/O CONTRAST: CPT

## 2022-08-18 PROCEDURE — G1004 CDSM NDSC: HCPCS

## 2022-08-23 ENCOUNTER — OFFICE VISIT (OUTPATIENT)
Dept: SURGICAL ONCOLOGY | Facility: CLINIC | Age: 57
End: 2022-08-23
Payer: COMMERCIAL

## 2022-08-23 VITALS
OXYGEN SATURATION: 98 % | SYSTOLIC BLOOD PRESSURE: 120 MMHG | TEMPERATURE: 97.6 F | BODY MASS INDEX: 38.93 KG/M2 | HEIGHT: 64 IN | HEART RATE: 79 BPM | WEIGHT: 228 LBS | DIASTOLIC BLOOD PRESSURE: 62 MMHG | RESPIRATION RATE: 16 BRPM

## 2022-08-23 DIAGNOSIS — E27.8 ADRENAL MASS (HCC): Primary | ICD-10-CM

## 2022-08-23 PROCEDURE — 99213 OFFICE O/P EST LOW 20 MIN: CPT | Performed by: SURGERY

## 2022-08-23 NOTE — PROGRESS NOTES
Surgical Oncology Follow Up       [address] [facility] Ruthie Regaladoterie [address] [place] [id number]  [address] [place] [address] Magdi  1965  [id number]  [address] [facility] [address] [address] [phone]    Diagnoses and all orders for this visit:    Adrenal mass [facility])  -     CT abdomen wo contrast; Future        Chief Complaint   Patient presents with    Follow-up       Return in about 2 years (around 8/23/2024) for Office Visit, Imaging - See orders, with Stephanie  Oncology History    No history exists  History of Present Illness:  Patient returns in follow-up of her adrenal mass  She is feeling well  She denies any abdominal pain, nausea, vomiting or headaches  She feels she has less nervous over the last year  CT from August 18, 2022 reveals a stable 3 8 x 3 7 cm left adrenal nodule  I personally reviewed the films  This has been stable for over 4-1/2 years  The lesion has been negative from a biochemical workup  Review of Systems  Complete ROS Surg Onc:   Complete ROS Surg Onc:   Constitutional: The patient denies new or recent history of general fatigue, no recent weight loss, no change in appetite  Eyes: No complaints of visual problems, no scleral icterus  ENT: no complaints of ear pain, no hoarseness, no difficulty swallowing,  no tinnitus and no new masses in head, oral cavity, or neck  Cardiovascular: No complaints of chest pain, no palpitations, no ankle edema  Respiratory: No complaints of shortness of breath, no cough  Gastrointestinal: No complaints of jaundice, no bloody stools, no pale stools  Genitourinary: No complaints of dysuria, no hematuria, no nocturia, no frequent urination, no urethral discharge     Musculoskeletal: No complaints of weakness, paralysis, joint stiffness or arthralgias  Integumentary: No complaints of rash, no new lesions  Neurological: No complaints of convulsions, no seizures, no dizziness  Hematologic/Lymphatic: No complaints of easy bruising  Endocrine:  No hot or cold intolerance  No polydipsia, polyphagia, or polyuria  Allergy/immunology:  No environmental allergies  No food allergies  Not immunocompromised  Skin:  No pallor or rash  No wound  Patient Active Problem List   Diagnosis    Adrenal mass ([address] )    Hypertension    Obesity (BMI 30-39  9)    Acid reflux    Generalized anxiety disorder    NAFLD (nonalcoholic fatty liver disease)    Screening for colon cancer    LFT elevation    Scalp lesion    NITISH (obstructive sleep apnea)    Preop cardiovascular exam    Kidney stones     Past Medical History:   Diagnosis Date    Acid reflux     Anxiety     CPAP (continuous positive airway pressure) dependence     GERD (gastroesophageal reflux disease)     Hypertension     Obesity     Palpable abdominal mass     LAST ASSESSED: 17    Sleep apnea      Past Surgical History:   Procedure Laterality Date    ABDOMINAL SURGERY      AUGMENTATION MAMMAPLASTY Bilateral     Bi retro pectoral saline    BREAST SURGERY      REDUCTION PROCEDURE     SECTION      CHOLECYSTECTOMY      CHOLECYSTECTOMY LAPAROSCOPIC      LIVER SURGERY      OR LAP, ERIKA RESTRICT PROC, LONGITUDINAL GASTRECTOMY N/A 2021    Procedure: Diagnostic Lap, Extensive Lysis of Adhesions;  Surgeon: Lala Haile MD;  [facility];  Service: Bariatrics     Family History   Problem Relation Age of Onset    Pancreatic cancer Mother 67    Prostate cancer Father 61    No Known Problems Sister     No Known Problems Daughter     No Known Problems Maternal Grandmother     No Known Problems Maternal Grandfather     No Known Problems Paternal Grandmother     No Known Problems Paternal Grandfather     No Known Problems Sister     No Known Problems Daughter     No Known Problems Maternal Aunt     No Known Problems Maternal Aunt     No Known Problems Paternal Aunt     No Known Problems Paternal Aunt     No Known Problems Paternal Aunt      Social History     Socioeconomic History    Marital status: /Civil Union     Spouse name: Not on file    Number of children: Not on file    Years of education: Not on file    Highest education level: Not on file   Occupational History    Not on file   Tobacco Use    Smoking status: Former Smoker     Quit date: 2013     Years since quittin 6    Smokeless tobacco: Never Used    Tobacco comment: uses e cigs vaps quit 7 yrs ago  uses 0 nicotine   Vaping Use    Vaping Use: Some days   Substance and Sexual Activity    Alcohol use: No    Drug use: No    Sexual activity: Yes     Partners: Male     Birth control/protection: Post-menopausal   Other Topics Concern    Not on file   Social History Narrative    Not on file     Social Determinants of Health     Financial Resource Strain: Not on file   Food Insecurity: Not on file   Transportation Needs: Not on file   Physical Activity: Not on file   Stress: Not on file   Social Connections: Not on file   Intimate Partner Violence: Not on file   Housing Stability: Not on file       Current Outpatient Medications:     amLODIPine (NORVASC) 5 mg tablet, Take 1 tablet (5 mg total) by mouth daily, Disp: 90 tablet, Rfl: 0    Biotin w/ Vitamins C & E (HAIR/SKIN/NAILS PO), Take by mouth, Disp: , Rfl:     Cholecalciferol (VITAMIN D3) 50 MCG (2000 UT) CHEW, , Disp: , Rfl:     lisinopril (ZESTRIL) 40 mg tablet, Take 1 tablet (40 mg total) by mouth daily, Disp: 90 tablet, Rfl: 0    melatonin 1 mg, Take 10 tablets by mouth daily at bedtime , Disp: , Rfl:     Multiple Vitamins-Minerals (WOMENS MULTIVITAMIN) TABS, Take by mouth, Disp: , Rfl:     Naltrexone-buPROPion HCl ER 8-90 MG TB12, Take 2 tablets by mouth 2 (two) times a day, Disp: 120 tablet, Rfl: 0    Omega-3 Fatty Acids (OMEGA 3 500 PO), Take by mouth , Disp: , Rfl:     omeprazole (PriLOSEC) 40 MG capsule, Take 1 capsule (40 mg total) by mouth daily, Disp: 90 capsule, Rfl: 0    Probiotic Product (PROBIOTIC ACIDOPHILUS BEADS PO), , Disp: , Rfl:     tamsulosin (FLOMAX) 0 4 mg, Take 1 po daily prn renal colic, Disp: 10 capsule, Rfl: 1    Diclofenac Sodium (VOLTAREN) 1 %, Apply 2 g topically 4 (four) times a day (Patient not taking: No sig reported), Disp: 2 g, Rfl: 1    Insulin Pen Needle (NovoFine) 32G X 6 MM MISC, Use daily (Patient not taking: Reported on 8/23/2022), Disp: 90 each, Rfl: 1  Allergies   Allergen Reactions    Prednisone Shortness Of Breath    Alprazolam     Duloxetine     Escitalopram     Latex Rash    Omnipaque [Iohexol]      Pt erupted with rash all over body     Vitals:    08/23/22 0851   BP: 120/62   Pulse: 79   Resp: 16   Temp: 97 6 °F (36 4 °C)   SpO2: 98%       Physical Exam  Constitutional: General appearance: The Patient is well-developed and well-nourished who appears the stated age in no acute distress  Patient is pleasant and talkative  HEENT:  Normocephalic  Sclerae are anicteric  Mucous membranes are moist  Neck is supple without adenopathy  No JVD  Chest: The lungs are clear to auscultation  Cardiac: Heart is regular rate  Abdomen: Abdomen is soft, non-tender, non-distended and without masses  Extremities: There is no clubbing or cyanosis  There is no edema  Symmetric  Neuro: Grossly nonfocal  Gait is normal      Lymphatic: No evidence of cervical adenopathy bilaterally  Skin: Warm, anicteric  Psych:  Patient is pleasant and talkative  Breasts:        Pathology:  [unfilled]    Labs:      Imaging  CT abdomen wo contrast    Result Date: 8/22/2022  Narrative: CT - ABDOMEN WITHOUT IV CONTRAST INDICATION:   E27 8: Other specified disorders of adrenal gland  Known history of adrenal mass  Reassessment   COMPARISON:  7/30/2021 TECHNIQUE: CT examination of the abdomen was performed without intravenous contrast  Axial, sagittal, and coronal 2D reformatted images were created from the source data and submitted for interpretation  Radiation dose length product (DLP) for this visit:  625 mGy-cm   This examination, like all CT scans performed in the [facility], was performed utilizing techniques to minimize radiation dose exposure, including the use of iterative reconstruction and automated exposure control  Enteric contrast was not administered  FINDINGS: ABDOMEN LOWER CHEST:  No clinically significant abnormality identified in the visualized lower chest  LIVER/BILIARY TREE:  Unremarkable  GALLBLADDER:  Gallbladder is surgically absent  SPLEEN:  Unremarkable  PANCREAS: Unremarkable  ADRENAL GLANDS:  The right adrenal gland is unremarkable  The left adrenal nodule measures 3 8 x 3 7 cm in size, unchanged from prior  Morphologic appearance is also unchanged  KIDNEYS/URETERS:  There is a nonobstructing 3 mm right kidney lower pole calculus and several 1 mm nonobstructing upper pole calculi on the right  A prior left renal calculus is no longer visualized  VISUALIZED STOMACH AND BOWEL:  No acute finding VISUALIZED ABDOMINAL CAVITY:  No adenopathy or ascites VISUALIZED BODY WALL:  Unremarkable  VESSELS:  Unremarkable for patient's age  OSSEOUS STRUCTURES:  No acute finding     Impression: 1  The left adrenal nodule is unchanged in size or character since 7/30/2021 2  Previously seen left renal calculus no longer present  Nephrolithiasis on the right is again noted Workstation performed: [id number]     I reviewed the above laboratory and imaging data      Discussion/Summary: 61year-old female with a left adrenal mass   This is essentially stable   This is not functioning   I have recommended continued observation   Since this has been stable for over 4-1/2 years, we will repeat her imaging in 2 years  Jessica Sandoval will see her again at that time with a repeat CT and for a clinical exam  She is agreeable to this   All her questions were answered

## 2022-09-06 ENCOUNTER — OFFICE VISIT (OUTPATIENT)
Dept: FAMILY MEDICINE CLINIC | Facility: CLINIC | Age: 57
End: 2022-09-06
Payer: COMMERCIAL

## 2022-09-06 VITALS
WEIGHT: 227.2 LBS | SYSTOLIC BLOOD PRESSURE: 122 MMHG | TEMPERATURE: 97.4 F | HEIGHT: 64 IN | RESPIRATION RATE: 16 BRPM | HEART RATE: 78 BPM | OXYGEN SATURATION: 96 % | BODY MASS INDEX: 38.79 KG/M2 | DIASTOLIC BLOOD PRESSURE: 64 MMHG

## 2022-09-06 DIAGNOSIS — E27.8 ADRENAL MASS (HCC): ICD-10-CM

## 2022-09-06 DIAGNOSIS — K21.9 GASTROESOPHAGEAL REFLUX DISEASE, UNSPECIFIED WHETHER ESOPHAGITIS PRESENT: ICD-10-CM

## 2022-09-06 DIAGNOSIS — N20.0 KIDNEY STONES: ICD-10-CM

## 2022-09-06 DIAGNOSIS — M17.10 PRIMARY OSTEOARTHRITIS OF KNEE, UNSPECIFIED LATERALITY: ICD-10-CM

## 2022-09-06 DIAGNOSIS — R79.89 LFT ELEVATION: ICD-10-CM

## 2022-09-06 DIAGNOSIS — K76.0 NAFLD (NONALCOHOLIC FATTY LIVER DISEASE): ICD-10-CM

## 2022-09-06 DIAGNOSIS — G47.33 OSA (OBSTRUCTIVE SLEEP APNEA): ICD-10-CM

## 2022-09-06 DIAGNOSIS — J30.81 ALLERGIC RHINITIS DUE TO ANIMAL HAIR AND DANDER: ICD-10-CM

## 2022-09-06 DIAGNOSIS — F41.1 GENERALIZED ANXIETY DISORDER: ICD-10-CM

## 2022-09-06 DIAGNOSIS — E78.2 MIXED HYPERLIPIDEMIA: ICD-10-CM

## 2022-09-06 DIAGNOSIS — I10 PRIMARY HYPERTENSION: Primary | ICD-10-CM

## 2022-09-06 DIAGNOSIS — E66.09 CLASS 2 OBESITY DUE TO EXCESS CALORIES WITH BODY MASS INDEX (BMI) OF 39.0 TO 39.9 IN ADULT, UNSPECIFIED WHETHER SERIOUS COMORBIDITY PRESENT: ICD-10-CM

## 2022-09-06 DIAGNOSIS — E88.09 HYPERPROTEINEMIA: ICD-10-CM

## 2022-09-06 PROBLEM — E66.812 CLASS 2 OBESITY DUE TO EXCESS CALORIES WITH BODY MASS INDEX (BMI) OF 39.0 TO 39.9 IN ADULT: Status: ACTIVE | Noted: 2022-09-06

## 2022-09-06 PROCEDURE — 99214 OFFICE O/P EST MOD 30 MIN: CPT | Performed by: FAMILY MEDICINE

## 2022-09-06 RX ORDER — AZELASTINE 1 MG/ML
1 SPRAY, METERED NASAL 2 TIMES DAILY
Qty: 1 ML | Refills: 1 | Status: SHIPPED | OUTPATIENT
Start: 2022-09-06 | End: 2022-09-28

## 2022-09-06 NOTE — PROGRESS NOTES
FAMILY PRACTICE OFFICE VISIT    NAME: Gregory Fairbanks    AGE: 61 y o  SEX: female  : 1965   MRN: [id number]    DATE: 2022  TIME: 8:20 AM    Assessment and Plan     1  Primary hypertension  Well controlled      2  NITISH (obstructive sleep apnea)  Not tolerant of cpap  3  NAFLD (nonalcoholic fatty liver disease)  Urge weight loss  Caution with medication - rx by another provider - see below      4  Gastroesophageal reflux disease, unspecified whether esophagitis present  Well controlled without PPI - pt d/c ppi      5  Kidney stones  No current symptoms  Had recent CT scan in 2022 -   IMPRESSION:     1  The left adrenal nodule is unchanged in size or character since 2021  2  Previously seen left renal calculus no longer present  Nephrolithiasis on the right is again noted    6  Generalized anxiety disorder  Stable  7  LFT elevation  Repeat ordered  Avoid hepatotoxins  8  Allergic rhinitis due to animal hair and dander  Add astelin nasal spray  Cannot issue rx for flonase as pt with allergy to prednisone  To consider claritin  Stop otc zyrtec for now      9  Class 2 obesity due to excess calories with body mass index (BMI) of 39 0 to 39 9 in adult, unspecified whether serious comorbidity present  Pt now taking wegovy thru another dr   Added to med list  No longer taking contrave    10  Adrenal mass ([address] )  See above   Recent CT   Unchanged adrenal lesion  H/o biochemical workup in past and non-functioning  11  Primary osteoarthritis of knee, unspecified laterality      12  Hyperproteinemia  Repeat fasting labs  Patient to call for results if he/she does not hear from us      13   Mixed hyperlipidemia    Patient Instructions   Fasting labs     rx for nasal spray to use for allergies    Return in November for physical exam    And flu shot    Nurse visit in oct for PCR for covid - prior to travel      Chief Complaint     Chief Complaint   Patient presents with   Veronica Stallings Follow-up       History of Present Illness   Anselmo Masterson is a 61y o -year-old female who presents today for routine visit  New pt to me  Pt began taking semaglutide for weight loss  rx by - dr Amanda Siddiqui - [phone]      Review of Systems   Review of Systems   Constitutional:        9 # weight gain in the past 3 mos  Respiratory: Positive for apnea  Does not wear cpap  Genitourinary: Negative for dysuria and hematuria  No recent renal calculi  Has not taken flomax in a while - takes prn pain only    Musculoskeletal: Positive for arthralgias  Knee pain  Has seen ortho and had injection - did not help  Rarely takes motrin    Has seen ortho for mortons neuroma as well   Allergic/Immunologic:        Allergy to new dog  Feels it in her throat  Wondering what to take for allergies  Active Problem List     Patient Active Problem List   Diagnosis    Adrenal mass ([address] )    Hypertension    Obesity (BMI 30-39  9)    Acid reflux    Generalized anxiety disorder    NAFLD (nonalcoholic fatty liver disease)    Screening for colon cancer    LFT elevation    Scalp lesion    NITISH (obstructive sleep apnea)    Preop cardiovascular exam    Kidney stones         Past Medical History:  Past Medical History:   Diagnosis Date    Acid reflux     Anxiety     CPAP (continuous positive airway pressure) dependence     GERD (gastroesophageal reflux disease)     Hypertension     Obesity     Palpable abdominal mass     LAST ASSESSED: 17    Sleep apnea        Past Surgical History:  Past Surgical History:   Procedure Laterality Date    ABDOMINAL SURGERY      AUGMENTATION MAMMAPLASTY Bilateral     Bi retro pectoral saline    BREAST SURGERY      REDUCTION PROCEDURE     SECTION      CHOLECYSTECTOMY      CHOLECYSTECTOMY LAPAROSCOPIC      LIVER SURGERY      VA LAP, ERIKA RESTRICT PROC, LONGITUDINAL GASTRECTOMY N/A 2021    Procedure: Diagnostic Lap, Extensive Lysis of Adhesions;  Surgeon: Wendi Israel MD;  Location: [facility];  Service: Bariatrics       Family History:  Family History   Problem Relation Age of Onset    Pancreatic cancer Mother 67    Prostate cancer Father 61    No Known Problems Sister     No Known Problems Daughter     No Known Problems Maternal Grandmother     No Known Problems Maternal Grandfather     No Known Problems Paternal Grandmother     No Known Problems Paternal Grandfather     No Known Problems Sister     No Known Problems Daughter     No Known Problems Maternal Aunt     No Known Problems Maternal Aunt     No Known Problems Paternal Aunt     No Known Problems Paternal Aunt     No Known Problems Paternal Aunt        Social History:  Social History     Socioeconomic History    Marital status: /Civil Union     Spouse name: Not on file    Number of children: Not on file    Years of education: Not on file    Highest education level: Not on file   Occupational History    Not on file   Tobacco Use    Smoking status: Former Smoker     Quit date: 2013     Years since quittin 6    Smokeless tobacco: Never Used    Tobacco comment: uses e cigs vaps quit 7 yrs ago  uses 0 nicotine   Vaping Use    Vaping Use: Some days   Substance and Sexual Activity    Alcohol use: No    Drug use: No    Sexual activity: Yes     Partners: Male     Birth control/protection: Post-menopausal   Other Topics Concern    Not on file   Social History Narrative    Not on file     Social Determinants of Health     Financial Resource Strain: Not on file   Food Insecurity: Not on file   Transportation Needs: Not on file   Physical Activity: Not on file   Stress: Not on file   Social Connections: Not on file   Intimate Partner Violence: Not on file   Housing Stability: Not on file       Objective     Vitals:    22 0812   BP: 122/64   Pulse: 78   Resp: 16   Temp: (!) 97 4 °F (36 3 °C)   SpO2: 96%     Wt Readings from Last 3 Encounters:   09/06/22 103 kg (227 lb 3 2 oz)   08/23/22 103 kg (228 lb)   06/15/22 99 3 kg (218 lb 14 7 oz)       Physical Exam  Vitals and nursing note reviewed  Constitutional:       General: She is not in acute distress  Appearance: Normal appearance  She is not ill-appearing or toxic-appearing  HENT:      Nose: Nose normal  No congestion or rhinorrhea  Comments: No gross nasal congestion       Mouth/Throat:      Mouth: Mucous membranes are moist       Pharynx: No oropharyngeal exudate or posterior oropharyngeal erythema  Comments: No postnasal drip  Mucous membranes moist and pink    No posterior oropharyngeal cobblestoning    Eyes:      General: No scleral icterus  Extraocular Movements: Extraocular movements intact  Pupils: Pupils are equal, round, and reactive to light  Cardiovascular:      Rate and Rhythm: Normal rate and regular rhythm  Pulses: Normal pulses  Heart sounds: Normal heart sounds  No murmur heard  Pulmonary:      Effort: Pulmonary effort is normal  No respiratory distress  Breath sounds: Normal breath sounds  No wheezing, rhonchi or rales  Abdominal:      General: There is no distension  Palpations: There is no mass  Tenderness: There is no abdominal tenderness  There is no guarding or rebound  Musculoskeletal:      Cervical back: Neck supple  Right lower leg: No edema  Left lower leg: No edema  Lymphadenopathy:      Cervical: No cervical adenopathy  Neurological:      General: No focal deficit present  Mental Status: She is alert and oriented to person, place, and time  Psychiatric:         Mood and Affect: Mood normal          Behavior: Behavior normal          Thought Content:  Thought content normal          Judgment: Judgment normal          Pertinent Laboratory/Diagnostic Studies:  Lab Results   Component Value Date    BUN 21 11/10/2021    CREATININE 1 07 11/10/2021    CALCIUM 10 0 11/10/2021    K 4 6 11/10/2021    CO2 28 11/10/2021     11/10/2021     Lab Results   Component Value Date    ALT 80 (H) 11/10/2021    AST 34 11/10/2021    GGT 55 08/03/2018    ALKPHOS 138 (H) 11/10/2021       Lab Results   Component Value Date    WBC 7 30 05/12/2021    HGB 12 3 05/12/2021    HCT 38 5 05/12/2021    MCV 96 05/12/2021     05/12/2021       No results found for: TSH    No results found for: CHOL  Lab Results   Component Value Date    TRIG 224 (H) 11/10/2021     Lab Results   Component Value Date    HDL 48 11/10/2021     Lab Results   Component Value Date    LDLCALC 95 11/10/2021     Lab Results   Component Value Date    HGBA1C 5 6 04/19/2021       Results for orders placed or performed during the hospital encounter of 02/10/22   Tissue Exam   Result Value Ref Range    Case Report       Surgical Pathology Report                         Case: [id number]                                   Authorizing Provider:  Lea Torrez MD          Collected:           02/10/2022 0746              Ordering Location:     [facility] Received:            02/10/2022 1436                                     Heart Endoscopy                                                              Pathologist:           Iris Leal MD                                                         Specimens:   A) - Polyp, Colorectal, cecal polyp- forceps                                                        B) - Polyp, Colorectal, splenic flexure polyps x2- forceps                                          C) - Polyp, Colorectal, descending colon polyps x2- forceps                                         D) - Polyp, Colorectal, sigmoid colon polyps x4- forceps                                   Final Diagnosis       A  Colon, cecum, polyp:     - Colonic mucosa without significant pathologic abnormalities consistent with redundant mucosal fold  - No dysplasia or neoplasia identified      B   Colon, splenic flexure, polyps:     - Tubular adenomas (x2)  - Separate fragments of unremarkable colonic mucosa  - No high grade dysplasia or carcinoma identified  C   Colon, descending, polyps:     - Tubular adenomas (x2)  - No high grade dysplasia or carcinoma identified  D   Colon, sigmoid, polyps:     - Hyperplastic polyps (x2)  - Separate fragments of serrated polyps; see comment      - No carcinoma identified  Comment:  Serrated polyps from the descending/sigmoid colon are more likely to represent a hyperplastic polyp rather than a sessile serrated adenoma  Additional Information       All reported additional testing was performed with appropriately reactive controls  These tests were developed and their performance characteristics determined by [facility] or appropriate performing facility, though some tests may be performed on tissues which have not been validated for performance characteristics (such as staining performed on alcohol exposed cell blocks and decalcified tissues)  Results should be interpreted with caution and in the context of the patients clinical condition  These tests may not be cleared or approved by the [place]  Food and Drug Administration, though the FDA has determined that such clearance or approval is not necessary  These tests are used for clinical purposes and they should not be regarded as investigational or for research  This laboratory has been approved by CLIA 88, designated as a high-complexity laboratory and is qualified to perform these tests     - Interpretation performed at [facility], [address] [facility], [address] [place] [address]      Gross Description          A  The specimen is received in formalin, labeled with the patient's name and hospital number, and is designated "cecal polyp  The specimen consists of 2 tan-pink soft tissue fragments measuring 0 3 and 0 5 cm in greatest dimension  Entirely submitted  Screened cassette  B   The specimen is received in formalin, labeled with the patient's name and hospital number, and is designated "splenic flexure polyps x2"  The specimen consists of 7 tan-pink soft tissue fragments ranging from 0 1-0 7 cm in greatest dimension  Entirely submitted  Screened cassette  C  The specimen is received in formalin, labeled with the patient's name and hospital number, and is designated "descending colon polyps x2"  The specimen consists of 2 tan-pink soft tissue fragments measuring 0 5 and 0 9 cm in greatest dimension  Entirely submitted  Screened cassette  D  The specimen is received in formalin, labeled with the patient's name and hospital number, and is designated "sigmoid colon polyps x4"  The specimen consists of 4 tan-pink, rubbery polypoid portions of tissue ranging from 0 2 0 7 cm in greatest dimension  Entirely submitted  Screened cassette  Note: The estimated total formalin fixation time based upon information provided by the submitting clinician and the standard processing schedule is under 72 hours  Dilip              No orders of the defined types were placed in this encounter        ALLERGIES:  Allergies   Allergen Reactions    Prednisone Shortness Of Breath    Alprazolam     Duloxetine     Escitalopram     Latex Rash    Omnipaque [Iohexol]      Pt erupted with rash all over body       Current Medications     Current Outpatient Medications   Medication Sig Dispense Refill    amLODIPine (NORVASC) 5 mg tablet Take 1 tablet (5 mg total) by mouth daily 90 tablet 0    Biotin w/ Vitamins C & E (HAIR/SKIN/NAILS PO) Take by mouth      Cholecalciferol (VITAMIN D3) 50 MCG (2000 UT) CHEW       lisinopril (ZESTRIL) 40 mg tablet Take 1 tablet (40 mg total) by mouth daily 90 tablet 0    melatonin 1 mg Take 10 tablets by mouth daily at bedtime       Multiple Vitamins-Minerals (WOMENS MULTIVITAMIN) TABS Take by mouth      Naltrexone-buPROPion HCl ER 8-90 MG TB12 Take 2 tablets by mouth 2 (two) times a day 120 tablet 0    Omega-3 Fatty Acids (OMEGA 3 500 PO) Take by mouth       Probiotic Product (PROBIOTIC ACIDOPHILUS BEADS PO)       tamsulosin (FLOMAX) 0 4 mg Take 1 po daily prn renal colic 10 capsule 1    Diclofenac Sodium (VOLTAREN) 1 % Apply 2 g topically 4 (four) times a day (Patient not taking: No sig reported) 2 g 1    Insulin Pen Needle (NovoFine) 32G X 6 MM MISC Use daily (Patient not taking: No sig reported) 90 each 1    omeprazole (PriLOSEC) 40 MG capsule Take 1 capsule (40 mg total) by mouth daily (Patient not taking: Reported on 9/6/2022) 90 capsule 0     No current facility-administered medications for this visit           Health Maintenance     Health Maintenance   Topic Date Due    COVID-19 Vaccine (1) Never done    Cervical Cancer Screening  11/08/2020    Influenza Vaccine (1) 09/01/2022    Annual Physical  11/04/2022    BMI: Followup Plan  12/02/2022    Depression Screening  02/08/2023    Breast Cancer Screening: Mammogram  06/15/2023    BMI: Adult  08/23/2023    Colorectal Cancer Screening  02/09/2025    DTaP,Tdap,and Td Vaccines (2 - Td or Tdap) 11/04/2031    HIV Screening  Completed    Hepatitis C Screening  Completed    Pneumococcal Vaccine: Pediatrics (0 to 5 Years) and At-Risk Patients (6 to 59 Years)  Aged Out    HIB Vaccine  Aged Out    Hepatitis B Vaccine  Aged Out    IPV Vaccine  Aged Out    Hepatitis A Vaccine  Aged Out    Meningococcal ACWY Vaccine  Aged Out    HPV Vaccine  Aged [employer] History   Administered Date(s) Administered    INFLUENZA 11/09/2018    Influenza Quadrivalent, 6-35 Months IM 11/21/2017    Influenza, recombinant, quadrivalent,injectable, preservative free 11/09/2018, 10/22/2019, 10/07/2020, 11/04/2021    Tdap 11/04/2021          Dina Aceves, DO

## 2022-09-21 ENCOUNTER — LAB (OUTPATIENT)
Dept: LAB | Age: 57
End: 2022-09-21
Payer: COMMERCIAL

## 2022-09-21 DIAGNOSIS — F41.1 GENERALIZED ANXIETY DISORDER: ICD-10-CM

## 2022-09-21 DIAGNOSIS — K21.9 GASTROESOPHAGEAL REFLUX DISEASE, UNSPECIFIED WHETHER ESOPHAGITIS PRESENT: ICD-10-CM

## 2022-09-21 DIAGNOSIS — E78.2 MIXED HYPERLIPIDEMIA: ICD-10-CM

## 2022-09-21 DIAGNOSIS — R79.89 LFT ELEVATION: ICD-10-CM

## 2022-09-21 DIAGNOSIS — E88.09 HYPERPROTEINEMIA: ICD-10-CM

## 2022-09-21 LAB
ALBUMIN SERPL BCP-MCNC: 3.7 G/DL (ref 3.5–5)
ALP SERPL-CCNC: 117 U/L (ref 46–116)
ALT SERPL W P-5'-P-CCNC: 53 U/L (ref 12–78)
ANION GAP SERPL CALCULATED.3IONS-SCNC: 4 MMOL/L (ref 4–13)
AST SERPL W P-5'-P-CCNC: 28 U/L (ref 5–45)
BASOPHILS # BLD AUTO: 0.04 THOUSANDS/ΜL (ref 0–0.1)
BASOPHILS NFR BLD AUTO: 0 % (ref 0–1)
BILIRUB SERPL-MCNC: 0.32 MG/DL (ref 0.2–1)
BUN SERPL-MCNC: 27 MG/DL (ref 5–25)
CALCIUM SERPL-MCNC: 9.2 MG/DL (ref 8.3–10.1)
CHLORIDE SERPL-SCNC: 110 MMOL/L (ref 96–108)
CHOLEST SERPL-MCNC: 196 MG/DL
CO2 SERPL-SCNC: 26 MMOL/L (ref 21–32)
CREAT SERPL-MCNC: 1.19 MG/DL (ref 0.6–1.3)
EOSINOPHIL # BLD AUTO: 0.21 THOUSAND/ΜL (ref 0–0.61)
EOSINOPHIL NFR BLD AUTO: 2 % (ref 0–6)
ERYTHROCYTE [DISTWIDTH] IN BLOOD BY AUTOMATED COUNT: 12.7 % (ref 11.6–15.1)
EST. AVERAGE GLUCOSE BLD GHB EST-MCNC: 108 MG/DL
GFR SERPL CREATININE-BSD FRML MDRD: 50 ML/MIN/1.73SQ M
GLUCOSE P FAST SERPL-MCNC: 103 MG/DL (ref 65–99)
HBA1C MFR BLD: 5.4 %
HCT VFR BLD AUTO: 44.7 % (ref 34.8–46.1)
HDLC SERPL-MCNC: 46 MG/DL
HGB BLD-MCNC: 14.8 G/DL (ref 11.5–15.4)
IMM GRANULOCYTES # BLD AUTO: 0.07 THOUSAND/UL (ref 0–0.2)
IMM GRANULOCYTES NFR BLD AUTO: 1 % (ref 0–2)
LDLC SERPL CALC-MCNC: 103 MG/DL (ref 0–100)
LYMPHOCYTES # BLD AUTO: 2.21 THOUSANDS/ΜL (ref 0.6–4.47)
LYMPHOCYTES NFR BLD AUTO: 24 % (ref 14–44)
MCH RBC QN AUTO: 30.6 PG (ref 26.8–34.3)
MCHC RBC AUTO-ENTMCNC: 33.1 G/DL (ref 31.4–37.4)
MCV RBC AUTO: 93 FL (ref 82–98)
MONOCYTES # BLD AUTO: 0.57 THOUSAND/ΜL (ref 0.17–1.22)
MONOCYTES NFR BLD AUTO: 6 % (ref 4–12)
NEUTROPHILS # BLD AUTO: 6.09 THOUSANDS/ΜL (ref 1.85–7.62)
NEUTS SEG NFR BLD AUTO: 67 % (ref 43–75)
NONHDLC SERPL-MCNC: 150 MG/DL
NRBC BLD AUTO-RTO: 0 /100 WBCS
PLATELET # BLD AUTO: 231 THOUSANDS/UL (ref 149–390)
PMV BLD AUTO: 12 FL (ref 8.9–12.7)
POTASSIUM SERPL-SCNC: 4.6 MMOL/L (ref 3.5–5.3)
PROT SERPL-MCNC: 8.2 G/DL (ref 6.4–8.4)
RBC # BLD AUTO: 4.83 MILLION/UL (ref 3.81–5.12)
SODIUM SERPL-SCNC: 140 MMOL/L (ref 135–147)
TRIGL SERPL-MCNC: 234 MG/DL
TSH SERPL DL<=0.05 MIU/L-ACNC: 2.93 UIU/ML (ref 0.45–4.5)
WBC # BLD AUTO: 9.19 THOUSAND/UL (ref 4.31–10.16)

## 2022-09-21 PROCEDURE — 80061 LIPID PANEL: CPT

## 2022-09-21 PROCEDURE — 36415 COLL VENOUS BLD VENIPUNCTURE: CPT

## 2022-09-21 PROCEDURE — 83036 HEMOGLOBIN GLYCOSYLATED A1C: CPT

## 2022-09-21 PROCEDURE — 85025 COMPLETE CBC W/AUTO DIFF WBC: CPT

## 2022-09-21 PROCEDURE — 80053 COMPREHEN METABOLIC PANEL: CPT

## 2022-09-21 PROCEDURE — 84443 ASSAY THYROID STIM HORMONE: CPT

## 2022-09-28 DIAGNOSIS — J30.81 ALLERGIC RHINITIS DUE TO ANIMAL HAIR AND DANDER: ICD-10-CM

## 2022-09-28 RX ORDER — AZELASTINE HYDROCHLORIDE 137 UG/1
SPRAY, METERED NASAL
Qty: 30 ML | Refills: 1 | Status: SHIPPED | OUTPATIENT
Start: 2022-09-28 | End: 2022-10-29

## 2022-10-07 ENCOUNTER — TELEPHONE (OUTPATIENT)
Dept: FAMILY MEDICINE CLINIC | Facility: CLINIC | Age: 57
End: 2022-10-07

## 2022-10-07 DIAGNOSIS — I10 ESSENTIAL HYPERTENSION: ICD-10-CM

## 2022-10-07 RX ORDER — LISINOPRIL 40 MG/1
40 TABLET ORAL DAILY
Qty: 90 TABLET | Refills: 0 | Status: SHIPPED | OUTPATIENT
Start: 2022-10-07 | End: 2022-10-13 | Stop reason: SDUPTHER

## 2022-10-07 RX ORDER — AMLODIPINE BESYLATE 5 MG/1
5 TABLET ORAL DAILY
Qty: 90 TABLET | Refills: 0 | Status: SHIPPED | OUTPATIENT
Start: 2022-10-07 | End: 2022-10-13 | Stop reason: SDUPTHER

## 2022-10-10 NOTE — TELEPHONE ENCOUNTER
From: Cheryle Benne PerezSebelin  To: Office of Karyle Snook  Sent: 10/7/2022 2:33 PM EDT  Subject: Medication Renewal Request    Refills have been requested for the following medications: Other - PriLOSEC (omeprazole) 40 MG Cpdr    Preferred pharmacy: [address] [place] [address]      Medication renewals requested in this message routed separately:     amLODIPine (NORVASC) 5 mg tablet [Eugenia Jennifer]     lisinopril (ZESTRIL) 40 mg tablet [Eugenia Jennifer]

## 2022-10-10 NOTE — TELEPHONE ENCOUNTER
Please call pt and ask if she is taking prilosec? At our last visit - she reported that she was NOT taking and was doing well    Let me know  Thanks

## 2022-10-13 ENCOUNTER — TELEPHONE (OUTPATIENT)
Dept: FAMILY MEDICINE CLINIC | Facility: CLINIC | Age: 57
End: 2022-10-13

## 2022-10-13 DIAGNOSIS — E66.09 CLASS 2 OBESITY DUE TO EXCESS CALORIES WITH BODY MASS INDEX (BMI) OF 39.0 TO 39.9 IN ADULT, UNSPECIFIED WHETHER SERIOUS COMORBIDITY PRESENT: ICD-10-CM

## 2022-10-13 DIAGNOSIS — I10 ESSENTIAL HYPERTENSION: ICD-10-CM

## 2022-10-13 DIAGNOSIS — K21.9 GASTROESOPHAGEAL REFLUX DISEASE, UNSPECIFIED WHETHER ESOPHAGITIS PRESENT: Primary | ICD-10-CM

## 2022-10-13 RX ORDER — OMEPRAZOLE 40 MG/1
40 CAPSULE, DELAYED RELEASE ORAL DAILY
Qty: 30 CAPSULE | Refills: 0 | Status: SHIPPED | OUTPATIENT
Start: 2022-10-13 | End: 2022-10-16

## 2022-10-13 RX ORDER — LISINOPRIL 40 MG/1
40 TABLET ORAL DAILY
Qty: 90 TABLET | Refills: 0 | Status: SHIPPED | OUTPATIENT
Start: 2022-10-13

## 2022-10-13 RX ORDER — AMLODIPINE BESYLATE 5 MG/1
5 TABLET ORAL DAILY
Qty: 90 TABLET | Refills: 0 | Status: SHIPPED | OUTPATIENT
Start: 2022-10-13

## 2022-10-13 NOTE — TELEPHONE ENCOUNTER
Spoke with patient   She use Prilosec 40 mg in the past for GERD  She stop few months ago and now GERD come back   She will like you to send Rx to [facility] in [facility]

## 2022-10-13 NOTE — TELEPHONE ENCOUNTER
Please call pt - [place]ish speaking  She has requested a refill on prilosec - but it is not listed on med list   Please confirm  Thanks

## 2022-10-26 ENCOUNTER — TELEPHONE (OUTPATIENT)
Dept: FAMILY MEDICINE CLINIC | Facility: CLINIC | Age: 57
End: 2022-10-26

## 2022-10-26 NOTE — TELEPHONE ENCOUNTER
Pt will need to see a dr in [facility] so that they can order the appropriate labs and perform pre-op exam if necessary  Please inform her of this  OR  She can come for visit here to do a pre-op visit

## 2022-10-26 NOTE — TELEPHONE ENCOUNTER
Patient is asking if you can do order for blood work before her surgery for Blepharoplastia on 11/10/22 in [facility]   She will competed this blood work in [employer] and take with her to [facility]

## 2022-10-29 DIAGNOSIS — J30.81 ALLERGIC RHINITIS DUE TO ANIMAL HAIR AND DANDER: ICD-10-CM

## 2022-10-29 RX ORDER — AZELASTINE HYDROCHLORIDE 137 UG/1
SPRAY, METERED NASAL
Qty: 90 ML | Refills: 1 | Status: SHIPPED | OUTPATIENT
Start: 2022-10-29

## 2022-10-31 ENCOUNTER — APPOINTMENT (OUTPATIENT)
Dept: RADIOLOGY | Facility: MEDICAL CENTER | Age: 57
End: 2022-10-31

## 2022-10-31 ENCOUNTER — OFFICE VISIT (OUTPATIENT)
Dept: FAMILY MEDICINE CLINIC | Facility: CLINIC | Age: 57
End: 2022-10-31

## 2022-10-31 VITALS
RESPIRATION RATE: 12 BRPM | HEIGHT: 64 IN | HEART RATE: 92 BPM | WEIGHT: 229 LBS | BODY MASS INDEX: 39.09 KG/M2 | SYSTOLIC BLOOD PRESSURE: 123 MMHG | DIASTOLIC BLOOD PRESSURE: 65 MMHG | OXYGEN SATURATION: 91 %

## 2022-10-31 DIAGNOSIS — Z01.811 PRE-OP CHEST EXAM: Primary | ICD-10-CM

## 2022-10-31 DIAGNOSIS — K76.0 NAFLD (NONALCOHOLIC FATTY LIVER DISEASE): ICD-10-CM

## 2022-10-31 DIAGNOSIS — Z01.811 PRE-OP CHEST EXAM: ICD-10-CM

## 2022-10-31 DIAGNOSIS — I10 PRIMARY HYPERTENSION: ICD-10-CM

## 2022-10-31 NOTE — PROGRESS NOTES
Assessment/Plan:       Problem List Items Addressed This Visit        Digestive    NAFLD (nonalcoholic fatty liver disease)    Relevant Orders    CBC and differential    Comprehensive metabolic panel    UA (URINE) with reflex to Scope       Cardiovascular and Mediastinum    Hypertension     Remains well controlled, hold lisinopril morning of surgery  Relevant Orders    CBC and differential    Comprehensive metabolic panel    UA (URINE) with reflex to Scope      Other Visit Diagnoses     Pre-op chest exam    -  Primary    Relevant Orders    CBC and differential    Comprehensive metabolic panel    UA (URINE) with reflex to Scope    XR chest pa & lateral    POCT ECG            Subjective:      Patient ID: Yocasta Gardner is a 61 y o  female  HPI     61 ear old female with pmh, of htn, presenting today for pre op exam, she is having cosmetic surgery in [facility]  She has no shortness of breath with exercise, can walk up stairs walks dog multiple miles per day  Overall she is feeling well, she is coming with a list of required labs, including HIV, VLDR, ESR, CRP, have advised to ask surgeon if this are needed and will have surgeon order if needed  Patient low risk for surgery, although may have difficulty with follow up due [place]  ECG done today and is normal     Patient low risk for surgery, pending labs, advised to discuss with surgeon follow up plan prior to surgery      The following portions of the patient's history were reviewed and updated as appropriate: allergies, current medications, past family history, past medical history, past social history, past surgical history and problem list       Current Outpatient Medications:   •  amLODIPine (NORVASC) 5 mg tablet, Take 1 tablet (5 mg total) by mouth daily, Disp: 90 tablet, Rfl: 0  •  Azelastine HCl 137 MCG/SPRAY SOLN, USE 1 SPRAY IN EACH NOSTRIL 2 TIMES DAILY AS DIRECTED, Disp: 90 mL, Rfl: 1  •  Biotin w/ Vitamins C & E (HAIR/SKIN/NAILS PO), Take by mouth, Disp: , Rfl:   •  Cholecalciferol (VITAMIN D3) 50 MCG (2000 UT) CHEW, , Disp: , Rfl:   •  lisinopril (ZESTRIL) 40 mg tablet, Take 1 tablet (40 mg total) by mouth daily, Disp: 90 tablet, Rfl: 0  •  melatonin 1 mg, Take 10 tablets by mouth daily at bedtime , Disp: , Rfl:   •  Multiple Vitamins-Minerals (WOMENS MULTIVITAMIN) TABS, Take by mouth, Disp: , Rfl:   •  Omega-3 Fatty Acids (OMEGA 3 500 PO), Take by mouth , Disp: , Rfl:   •  omeprazole (PriLOSEC) 40 MG capsule, TAKE 1 CAPSULE (40 MG TOTAL) BY MOUTH DAILY IN THE AM PRN GERD, Disp: 90 capsule, Rfl: 0  •  Probiotic Product (PROBIOTIC ACIDOPHILUS BEADS PO), , Disp: , Rfl:   •  Semaglutide-Weight Management (WEGOVY) 0 25 MG/0 5ML, Unsure of dose - pt taking thru another provider, Disp: 0 5 mL, Rfl: 0  •  tamsulosin (FLOMAX) 0 4 mg, Take 1 po daily prn renal colic, Disp: 10 capsule, Rfl: 1     Review of Systems   Constitutional: Negative for activity change and appetite change  Respiratory: Negative for apnea and chest tightness  Gastrointestinal: Negative for abdominal distention and abdominal pain  Genitourinary: Negative for difficulty urinating  Musculoskeletal: Negative for arthralgias and back pain  Objective:      /65 (BP Location: Left arm, Patient Position: Sitting, Cuff Size: Standard)   Pulse 92   Resp 12   Ht 5' 4" (1 626 m)   Wt 104 kg (229 lb)   LMP 08/03/2018 (Approximate)   SpO2 91%   BMI 39 31 kg/m²          Physical Exam  Constitutional:       Appearance: Normal appearance  Cardiovascular:      Rate and Rhythm: Normal rate and regular rhythm  Pulmonary:      Effort: Pulmonary effort is normal       Breath sounds: Normal breath sounds  Abdominal:      General: Abdomen is flat  Tenderness: There is no abdominal tenderness  Musculoskeletal:         General: Normal range of motion  Neurological:      General: No focal deficit present        Mental Status: She is alert and oriented to person, place, and time             [facility]

## 2022-11-02 ENCOUNTER — APPOINTMENT (OUTPATIENT)
Dept: LAB | Age: 57
End: 2022-11-02

## 2022-11-02 ENCOUNTER — CLINICAL SUPPORT (OUTPATIENT)
Dept: FAMILY MEDICINE CLINIC | Facility: CLINIC | Age: 57
End: 2022-11-02

## 2022-11-02 DIAGNOSIS — Z01.818 PRE-OP EXAM: Primary | ICD-10-CM

## 2022-11-02 DIAGNOSIS — K76.0 NAFLD (NONALCOHOLIC FATTY LIVER DISEASE): ICD-10-CM

## 2022-11-02 DIAGNOSIS — Z01.811 PRE-OP CHEST EXAM: ICD-10-CM

## 2022-11-02 DIAGNOSIS — I10 PRIMARY HYPERTENSION: ICD-10-CM

## 2022-11-02 LAB
ALBUMIN SERPL BCP-MCNC: 3.4 G/DL (ref 3.5–5)
ALP SERPL-CCNC: 103 U/L (ref 46–116)
ALT SERPL W P-5'-P-CCNC: 47 U/L (ref 12–78)
ANION GAP SERPL CALCULATED.3IONS-SCNC: 3 MMOL/L (ref 4–13)
AST SERPL W P-5'-P-CCNC: 19 U/L (ref 5–45)
BASOPHILS # BLD AUTO: 0.03 THOUSANDS/ÂΜL (ref 0–0.1)
BASOPHILS NFR BLD AUTO: 0 % (ref 0–1)
BILIRUB SERPL-MCNC: 0.46 MG/DL (ref 0.2–1)
BILIRUB UR QL STRIP: NEGATIVE
BUN SERPL-MCNC: 22 MG/DL (ref 5–25)
CALCIUM ALBUM COR SERPL-MCNC: 9.5 MG/DL (ref 8.3–10.1)
CALCIUM SERPL-MCNC: 9 MG/DL (ref 8.3–10.1)
CHLORIDE SERPL-SCNC: 106 MMOL/L (ref 96–108)
CLARITY UR: CLEAR
CO2 SERPL-SCNC: 29 MMOL/L (ref 21–32)
COLOR UR: NORMAL
CREAT SERPL-MCNC: 0.84 MG/DL (ref 0.6–1.3)
EOSINOPHIL # BLD AUTO: 0.17 THOUSAND/ÂΜL (ref 0–0.61)
EOSINOPHIL NFR BLD AUTO: 2 % (ref 0–6)
ERYTHROCYTE [DISTWIDTH] IN BLOOD BY AUTOMATED COUNT: 12.2 % (ref 11.6–15.1)
GFR SERPL CREATININE-BSD FRML MDRD: 76 ML/MIN/1.73SQ M
GLUCOSE P FAST SERPL-MCNC: 86 MG/DL (ref 65–99)
GLUCOSE UR STRIP-MCNC: NEGATIVE MG/DL
HCT VFR BLD AUTO: 44.6 % (ref 34.8–46.1)
HGB BLD-MCNC: 14.2 G/DL (ref 11.5–15.4)
HGB UR QL STRIP.AUTO: NEGATIVE
IMM GRANULOCYTES # BLD AUTO: 0.06 THOUSAND/UL (ref 0–0.2)
IMM GRANULOCYTES NFR BLD AUTO: 1 % (ref 0–2)
KETONES UR STRIP-MCNC: NEGATIVE MG/DL
LEUKOCYTE ESTERASE UR QL STRIP: NEGATIVE
LYMPHOCYTES # BLD AUTO: 2.11 THOUSANDS/ÂΜL (ref 0.6–4.47)
LYMPHOCYTES NFR BLD AUTO: 27 % (ref 14–44)
MCH RBC QN AUTO: 29.9 PG (ref 26.8–34.3)
MCHC RBC AUTO-ENTMCNC: 31.8 G/DL (ref 31.4–37.4)
MCV RBC AUTO: 94 FL (ref 82–98)
MONOCYTES # BLD AUTO: 0.65 THOUSAND/ÂΜL (ref 0.17–1.22)
MONOCYTES NFR BLD AUTO: 8 % (ref 4–12)
NEUTROPHILS # BLD AUTO: 4.83 THOUSANDS/ÂΜL (ref 1.85–7.62)
NEUTS SEG NFR BLD AUTO: 62 % (ref 43–75)
NITRITE UR QL STRIP: NEGATIVE
NRBC BLD AUTO-RTO: 0 /100 WBCS
PH UR STRIP.AUTO: 6 [PH]
PLATELET # BLD AUTO: 193 THOUSANDS/UL (ref 149–390)
PMV BLD AUTO: 11.9 FL (ref 8.9–12.7)
POTASSIUM SERPL-SCNC: 4.2 MMOL/L (ref 3.5–5.3)
PROT SERPL-MCNC: 7.5 G/DL (ref 6.4–8.4)
PROT UR STRIP-MCNC: NEGATIVE MG/DL
RBC # BLD AUTO: 4.75 MILLION/UL (ref 3.81–5.12)
SODIUM SERPL-SCNC: 138 MMOL/L (ref 135–147)
SP GR UR STRIP.AUTO: 1.02 (ref 1–1.03)
UROBILINOGEN UR STRIP-ACNC: <2 MG/DL
WBC # BLD AUTO: 7.85 THOUSAND/UL (ref 4.31–10.16)

## 2022-11-03 LAB — SARS-COV-2 RNA RESP QL NAA+PROBE: NEGATIVE

## 2022-11-04 ENCOUNTER — ANNUAL EXAM (OUTPATIENT)
Dept: OBGYN CLINIC | Facility: MEDICAL CENTER | Age: 57
End: 2022-11-04

## 2022-11-04 VITALS
BODY MASS INDEX: 39.09 KG/M2 | SYSTOLIC BLOOD PRESSURE: 125 MMHG | WEIGHT: 229 LBS | DIASTOLIC BLOOD PRESSURE: 68 MMHG | HEIGHT: 64 IN

## 2022-11-04 DIAGNOSIS — Z12.31 ENCOUNTER FOR SCREENING MAMMOGRAM FOR BREAST CANCER: ICD-10-CM

## 2022-11-04 DIAGNOSIS — Z01.419 ENCOUNTER FOR GYNECOLOGICAL EXAMINATION: Primary | ICD-10-CM

## 2022-11-04 NOTE — PROGRESS NOTES
ASSESSMENT & PLAN: Vishal Ferreira is a 61 y o  [id number] with normal gynecologic exam     1   Routine well woman exam done today  2  Pap and HPV:  The patient's last pap and hpv was   It was normal     Pap with cotesting was done today  Current ASCCP Guidelines reviewed  3   Mammogram ordered  4  Colorectal cancer screening was not ordered  5  The following were reviewed in today's visit: breast self exam, mammography screening ordered, exercise and healthy diet    CC:  Annual Gynecologic Examination    HPI: Vishal Ferreira is a 61 y o  [id number] who presents for annual gynecologic examination  She has the following concerns:  None     Health Maintenance:    She wears her seatbelt routinely  She does perform regular monthly self breast exams  She feels safe at home         Past Medical History:   Diagnosis Date   • Acid reflux    • Anxiety    • CPAP (continuous positive airway pressure) dependence    • GERD (gastroesophageal reflux disease)    • Hypertension    • Obesity    • Palpable abdominal mass     LAST ASSESSED: 17   • Sleep apnea        Past Surgical History:   Procedure Laterality Date   • ABDOMINAL SURGERY     • AUGMENTATION MAMMAPLASTY Bilateral     Bi retro pectoral saline   • BREAST SURGERY      REDUCTION PROCEDURE   •  SECTION     • CHOLECYSTECTOMY     • CHOLECYSTECTOMY LAPAROSCOPIC     • LIVER SURGERY     • PA LAP, ERIKA RESTRICT PROC, LONGITUDINAL GASTRECTOMY N/A 2021    Procedure: Diagnostic Lap, Extensive Lysis of Adhesions;  Surgeon: Arleth Leroy MD;  Location: [facility];  Service: Bariatrics       Past OB/Gyn History:  OB History        2    Para   2    Term   2            AB        Living   2       SAB        IAB        Ectopic        Multiple        Live Births                       Family History   Problem Relation Age of Onset   • Pancreatic cancer Mother 67   • Prostate cancer Father 61   • No Known Problems Sister    • No Known Problems Daughter    • No Known Problems Maternal Grandmother    • No Known Problems Maternal Grandfather    • No Known Problems Paternal Grandmother    • No Known Problems Paternal Grandfather    • No Known Problems Sister    • No Known Problems Daughter    • No Known Problems Maternal Aunt    • No Known Problems Maternal Aunt    • No Known Problems Paternal Aunt    • No Known Problems Paternal Aunt    • No Known Problems Paternal Aunt        Social History:  Social History     Socioeconomic History   • Marital status: /Civil Union     Spouse name: Not on file   • Number of children: Not on file   • Years of education: Not on file   • Highest education level: Not on file   Occupational History   • Not on file   Tobacco Use   • Smoking status: Former Smoker     Quit date: 2013     Years since quittin 8   • Smokeless tobacco: Never Used   • Tobacco comment: uses e cigs vaps quit 7 yrs ago  uses 0 nicotine   Vaping Use   • Vaping Use: Some days   Substance and Sexual Activity   • Alcohol use: No   • Drug use: No   • Sexual activity: Yes     Partners: Male     Birth control/protection: Post-menopausal   Other Topics Concern   • Not on file   Social History Narrative   • Not on file     Social Determinants of Health     Financial Resource Strain: Not on file   Food Insecurity: Not on file   Transportation Needs: Not on file   Physical Activity: Not on file   Stress: Not on file   Social Connections: Not on file   Intimate Partner Violence: Not on file   Housing Stability: Not on file       Allergies   Allergen Reactions   • Prednisone Shortness Of Breath   • Dog Epithelium Throat Swelling   • Alprazolam    • Duloxetine    • Escitalopram    • Latex Rash   • Omnipaque [Iohexol]      Pt erupted with rash all over body       Current Outpatient Medications:   •  amLODIPine (NORVASC) 5 mg tablet, Take 1 tablet (5 mg total) by mouth daily, Disp: 90 tablet, Rfl: 0  •  Azelastine HCl 137 MCG/SPRAY SOLN, USE 1 SPRAY IN EACH NOSTRIL 2 TIMES DAILY AS DIRECTED, Disp: 90 mL, Rfl: 1  •  Biotin w/ Vitamins C & E (HAIR/SKIN/NAILS PO), Take by mouth, Disp: , Rfl:   •  Cholecalciferol (VITAMIN D3) 50 MCG (2000 UT) CHEW, , Disp: , Rfl:   •  lisinopril (ZESTRIL) 40 mg tablet, Take 1 tablet (40 mg total) by mouth daily, Disp: 90 tablet, Rfl: 0  •  melatonin 1 mg, Take 10 tablets by mouth daily at bedtime , Disp: , Rfl:   •  Multiple Vitamins-Minerals (WOMENS MULTIVITAMIN) TABS, Take by mouth, Disp: , Rfl:   •  omeprazole (PriLOSEC) 40 MG capsule, TAKE 1 CAPSULE (40 MG TOTAL) BY MOUTH DAILY IN THE AM PRN GERD, Disp: 90 capsule, Rfl: 0  •  Probiotic Product (PROBIOTIC ACIDOPHILUS BEADS PO), , Disp: , Rfl:   •  Semaglutide-Weight Management (WEGOVY) 0 25 MG/0 5ML, Unsure of dose - pt taking thru another provider, Disp: 0 5 mL, Rfl: 0  •  tamsulosin (FLOMAX) 0 4 mg, Take 1 po daily prn renal colic, Disp: 10 capsule, Rfl: 1  •  Omega-3 Fatty Acids (OMEGA 3 500 PO), Take by mouth , Disp: , Rfl:       Review of Systems  Constitutional :no fever, feels well, no tiredness, no recent weight gain or loss  ENT: no ear ache, no loss of hearing, no nosebleeds or nasal discharge, no sore throat or hoarseness  Cardiovascular: no complaints of slow or fast heart beat, no chest pain, no palpitations, no leg claudication or lower extremity edema  Respiratory: no complaints of shortness of shortness of breath, no MARTINEZ  Breasts:no complaints of breast pain, breast lump, or nipple discharge  Gastrointestinal: no complaints of abdominal pain, constipation, nausea, vomiting, or diarrhea or bloody stools  Genitourinary : no complaints of dysuria, incontinence, pelvic pain, no dysmenorrhea, vaginal discharge or abnormal vaginal bleeding and as noted in HPI  Musculoskeletal: no complaints of arthralgia, no myalgia, no joint swelling or stiffness, no limb pain or swelling    Integumentary: no complaints of skin rash or lesion, itching or dry skin  Neurological: no complaints of headache, no confusion, no numbness or tingling, no dizziness or fainting    Objective      /68   Ht 5' 4" (1 626 m)   Wt 104 kg (229 lb)   LMP 08/03/2018 (Approximate)   BMI 39 31 kg/m²     General:   appears stated age, cooperative, alert normal mood and affect   Lungs: Unlabored breathing    Breasts: normal appearance, no masses or tenderness   Abdomen: soft, non-tender, without masses or organomegaly   Vulva: normal   Vagina: normal vagina, no discharge, exudate, lesion, or erythema   Urethra: normal   Cervix: Normal, no discharge  Nontender     Uterus: normal size, contour, position, consistency, mobility, non-tender   Adnexa: no mass, fullness, tenderness   Psychiatric orientation to person, place, and time: normal  mood and affect: normal

## 2022-11-07 LAB
HPV HR 12 DNA CVX QL NAA+PROBE: NEGATIVE
HPV16 DNA CVX QL NAA+PROBE: NEGATIVE
HPV18 DNA CVX QL NAA+PROBE: NEGATIVE

## 2022-11-14 LAB
LAB AP GYN PRIMARY INTERPRETATION: NORMAL
Lab: NORMAL

## 2022-11-20 ENCOUNTER — PATIENT MESSAGE (OUTPATIENT)
Dept: FAMILY MEDICINE CLINIC | Facility: CLINIC | Age: 57
End: 2022-11-20

## 2022-11-21 ENCOUNTER — TELEPHONE (OUTPATIENT)
Dept: OBGYN CLINIC | Facility: MEDICAL CENTER | Age: 57
End: 2022-11-21

## 2022-11-23 ENCOUNTER — OFFICE VISIT (OUTPATIENT)
Dept: FAMILY MEDICINE CLINIC | Facility: CLINIC | Age: 57
End: 2022-11-23

## 2022-11-23 VITALS
RESPIRATION RATE: 16 BRPM | DIASTOLIC BLOOD PRESSURE: 80 MMHG | HEART RATE: 83 BPM | SYSTOLIC BLOOD PRESSURE: 118 MMHG | HEIGHT: 64 IN | WEIGHT: 229 LBS | BODY MASS INDEX: 39.09 KG/M2 | OXYGEN SATURATION: 98 % | TEMPERATURE: 97.2 F

## 2022-11-23 DIAGNOSIS — Z48.02 VISIT FOR SUTURE REMOVAL: Primary | ICD-10-CM

## 2022-11-23 DIAGNOSIS — Z23 NEED FOR INFLUENZA VACCINATION: ICD-10-CM

## 2022-11-23 NOTE — TELEPHONE ENCOUNTER
From: Rina Fairbanks  To: Mehrdad Stone  Sent: 11/20/2022 3:03 AM EST  Subject: Putos de sutura de mi Blefaroplastia en [facility]    Saludos, necesito amie linn para el katherin jueves 24 para sacarme unos puntos de sutura en mis parpados y otro en mi barbilla de mi operacion de [place] plastica realizada el katehrin 10 de [place] en [facility]  Ademas, me falta la inyeccion de gripe de [employer]  Tengo los recipes con la indicacion de mi [place] de sacar esos puntos el katherin jueves, muchas tray y favor contestar donde podria ir a realizarme estos procedimientos, tray

## 2022-11-23 NOTE — PROGRESS NOTES
FAMILY PRACTICE OFFICE VISIT    NAME: Elis Fairbanks    AGE: 61 y o  SEX: female  : 1965   MRN: [id number]    DATE: 2022  TIME: 9:25 AM    Assessment and Plan    1  Need for influenza vaccination  Pt opted out of getting today  Will wait about 1 week    2  Visit for suture removal  1st removed steri strips from upper and lower eyelids b/l -   Removed 2 sutures from right side eye  2 from chin  And 7 from around left eye  No difficulty with removal    Steri strips then placed again      There are no Patient Instructions on file for this visit  Chief Complaint     Chief Complaint   Patient presents with   • Suture / Staple Removal     Visit for Stiches Removal and Flu Vaccine  History of Present Illness   Keller Christopher is a 61y o -year-old female who presents today for suture removal       Review of Systems   Review of Systems    Active Problem List     Patient Active Problem List   Diagnosis   • Adrenal mass ([address] )   • Hypertension   • Obesity (BMI 30-39  9)   • Acid reflux   • Generalized anxiety disorder   • NAFLD (nonalcoholic fatty liver disease)   • Screening for colon cancer   • LFT elevation   • Scalp lesion   • NITISH (obstructive sleep apnea)   • Preop cardiovascular exam   • Kidney stones   • Class 2 obesity due to excess calories with body mass index (BMI) of 39 0 to 39 9 in adult   • Allergic rhinitis due to animal hair and dander   • Primary osteoarthritis of knee   • Hyperproteinemia   • Mixed hyperlipidemia         Past Medical History:  Past Medical History:   Diagnosis Date   • Acid reflux    • Anxiety    • CPAP (continuous positive airway pressure) dependence    • GERD (gastroesophageal reflux disease)    • Hypertension    • Obesity    • Palpable abdominal mass     LAST ASSESSED: 17   • Sleep apnea        Past Surgical History:  Past Surgical History:   Procedure Laterality Date   • ABDOMINAL SURGERY     • AUGMENTATION MAMMAPLASTY Bilateral 2007    Bi retro pectoral saline   • BREAST SURGERY      REDUCTION PROCEDURE   •  SECTION     • CHOLECYSTECTOMY     • CHOLECYSTECTOMY LAPAROSCOPIC     • LIVER SURGERY     • OR LAP, ERIKA RESTRICT PROC, LONGITUDINAL GASTRECTOMY N/A 2021    Procedure: Diagnostic Lap, Extensive Lysis of Adhesions;  Surgeon: Rodrigue Amado MD;  Location: [facility];  Service: Bariatrics       Family History:  Family History   Problem Relation Age of Onset   • Pancreatic cancer Mother 67   • Prostate cancer Father 61   • No Known Problems Sister    • No Known Problems Daughter    • No Known Problems Maternal Grandmother    • No Known Problems Maternal Grandfather    • No Known Problems Paternal Grandmother    • No Known Problems Paternal Grandfather    • No Known Problems Sister    • No Known Problems Daughter    • No Known Problems Maternal Aunt    • No Known Problems Maternal Aunt    • No Known Problems Paternal Aunt    • No Known Problems Paternal Aunt    • No Known Problems Paternal Aunt        Social History:  Social History     Socioeconomic History   • Marital status: /Civil Union     Spouse name: Not on file   • Number of children: Not on file   • Years of education: Not on file   • Highest education level: Not on file   Occupational History   • Not on file   Tobacco Use   • Smoking status: Former     Types: Cigarettes     Quit date: 2013     Years since quittin 8   • Smokeless tobacco: Never   • Tobacco comments:     uses e cigs vaps quit 7 yrs ago  uses 0 nicotine   Vaping Use   • Vaping Use: Some days   Substance and Sexual Activity   • Alcohol use: No   • Drug use: No   • Sexual activity: Yes     Partners: Male     Birth control/protection: Post-menopausal   Other Topics Concern   • Not on file   Social History Narrative   • Not on file     Social Determinants of Health     Financial Resource Strain: Not on file   Food Insecurity: Not on file   Transportation Needs: Not on file Physical Activity: Not on file   Stress: Not on file   Social Connections: Not on file   Intimate Partner Violence: Not on file   Housing Stability: Not on file       Objective     Vitals:    11/23/22 0800   BP: 118/80   Pulse: 83   Resp: 16   Temp: (!) 97 2 °F (36 2 °C)   SpO2: 98%     Wt Readings from Last 3 Encounters:   11/23/22 104 kg (229 lb)   11/04/22 104 kg (229 lb)   10/31/22 104 kg (229 lb)       Physical Exam  Vitals and nursing note reviewed  Constitutional:       General: She is not in acute distress  Appearance: Normal appearance  She is not ill-appearing or toxic-appearing  Cardiovascular:      Rate and Rhythm: Normal rate and regular rhythm  Pulses: Normal pulses  Heart sounds: Normal heart sounds  No murmur heard  Pulmonary:      Effort: Pulmonary effort is normal  No respiratory distress  Breath sounds: Normal breath sounds  No wheezing, rhonchi or rales  Skin:     Comments: Initially removed steri strips from upper and lower eyelids b/l  Removed sutures from lateral corner of right eye X 2  Removed sutures from lateral corner of left eye X 2 and 5 sutures from left upper eyelid  All removed without difficulty  Placed steri strips on after suture removal as well     Neurological:      General: No focal deficit present  Mental Status: She is alert and oriented to person, place, and time  Psychiatric:         Mood and Affect: Mood normal          Behavior: Behavior normal          Thought Content:  Thought content normal          Judgment: Judgment normal          Pertinent Laboratory/Diagnostic Studies:  Lab Results   Component Value Date    BUN 22 11/02/2022    CREATININE 0 84 11/02/2022    CALCIUM 9 0 11/02/2022    K 4 2 11/02/2022    CO2 29 11/02/2022     11/02/2022     Lab Results   Component Value Date    ALT 47 11/02/2022    AST 19 11/02/2022    GGT 55 08/03/2018    ALKPHOS 103 11/02/2022       Lab Results   Component Value Date    WBC 7 85 11/02/2022 HGB 14 2 11/02/2022    HCT 44 6 11/02/2022    MCV 94 11/02/2022     11/02/2022       No results found for: TSH    No results found for: CHOL  Lab Results   Component Value Date    TRIG 234 (H) 09/21/2022     Lab Results   Component Value Date    HDL 46 (L) 09/21/2022     Lab Results   Component Value Date    LDLCALC 103 (H) 09/21/2022     Lab Results   Component Value Date    HGBA1C 5 4 09/21/2022       Results for orders placed or performed in visit on 11/04/22   HPV High Risk    Specimen: Cervix; Thin-Prep Vial   Result Value Ref Range    HPV Other HR Negative Negative    HPV16 Negative Negative    HPV18 Negative Negative   Liquid-based pap, screening   Result Value Ref Range    Case Report       Gynecologic Cytology Report                       Case: Grace Ayla Provider:  Mable Castle MD  Collected:           11/04/2022 0940              Ordering Location:     [facility]  Received:            11/04/2022 0940                                     [facility] Screen:          [facility]                                                                Specimen:    LIQUID-BASED PAP, SCREENING, Cervix                                                        Primary Interpretation Negative for intraepithelial lesion or malignancy     Specimen Adequacy       Satisfactory for evaluation  Endocervical/transformation zone component present  Additional Information       [employer]'s FDA approved ,  and ThinPrep Imaging Duo System are utilized with strict adherence to the 's instruction manual to prepare gynecologic and non-gynecologic cytology specimens for the production of ThinPrep slides as well as for gynecologic ThinPrep imaging  These processes have been validated by our laboratory and/or by the     The Pap test is not a diagnostic procedure and should not be used as the sole means to detect cervical cancer  It is only a screening procedure to aid in the detection of cervical cancer and its precursors  Both false-negative and false-positive results have been experienced  Your patient's test result should be interpreted in this context together with the history and clinical findings  Orders Placed This Encounter   Procedures   • influenza vaccine, quadrivalent, recombinant, PF, 0 5 mL, for patients 18 yr+ (FLUBLOK)       ALLERGIES:  Allergies   Allergen Reactions   • Prednisone Shortness Of Breath   • Dog Epithelium Throat Swelling   • Alprazolam    • Duloxetine    • Escitalopram    • Latex Rash   • Omnipaque [Iohexol]      Pt erupted with rash all over body       Current Medications     Current Outpatient Medications   Medication Sig Dispense Refill   • amLODIPine (NORVASC) 5 mg tablet Take 1 tablet (5 mg total) by mouth daily 90 tablet 0   • Azelastine HCl 137 MCG/SPRAY SOLN USE 1 SPRAY IN EACH NOSTRIL 2 TIMES DAILY AS DIRECTED 90 mL 1   • Biotin w/ Vitamins C & E (HAIR/SKIN/NAILS PO) Take by mouth     • Cholecalciferol (VITAMIN D3) 50 MCG (2000 UT) CHEW      • lisinopril (ZESTRIL) 40 mg tablet Take 1 tablet (40 mg total) by mouth daily 90 tablet 0   • melatonin 1 mg Take 10 tablets by mouth daily at bedtime      • Multiple Vitamins-Minerals (WOMENS MULTIVITAMIN) TABS Take by mouth     • Omega-3 Fatty Acids (OMEGA 3 500 PO) Take by mouth      • omeprazole (PriLOSEC) 40 MG capsule TAKE 1 CAPSULE (40 MG TOTAL) BY MOUTH DAILY IN THE AM PRN GERD 90 capsule 0   • Probiotic Product (PROBIOTIC ACIDOPHILUS BEADS PO)      • Semaglutide-Weight Management (WEGOVY) 0 25 MG/0 5ML Unsure of dose - pt taking thru another provider 0 5 mL 0   • tamsulosin (FLOMAX) 0 4 mg Take 1 po daily prn renal colic 10 capsule 1     No current facility-administered medications for this visit           Health Maintenance     Health Maintenance   Topic Date Due   • Hepatitis B Vaccine (1 of 3 - 3-dose series) Never done   • COVID-19 Vaccine (1) Never done   • Influenza Vaccine (1) 09/01/2022   • Annual Physical  11/04/2022   • BMI: Followup Plan  12/02/2022   • Depression Screening  02/08/2023   • Breast Cancer Screening: Mammogram  06/15/2023   • BMI: Adult  11/23/2023   • Colorectal Cancer Screening  02/09/2025   • Cervical Cancer Screening  11/04/2025   • DTaP,Tdap,and Td Vaccines (2 - Td or Tdap) 11/04/2031   • HIV Screening  Completed   • Hepatitis C Screening  Completed   • Pneumococcal Vaccine: Pediatrics (0 to 5 Years) and At-Risk Patients (6 to 59 Years)  Aged Out   • HIB Vaccine  Aged Out   • IPV Vaccine  Aged Out   • Hepatitis A Vaccine  Aged Out   • Meningococcal ACWY Vaccine  Aged Out   • HPV Vaccine  Aged [employer] History   Administered Date(s) Administered   • INFLUENZA 11/09/2018   • Influenza Quadrivalent, 6-35 Months IM 11/21/2017   • Influenza, recombinant, quadrivalent,injectable, preservative free 11/09/2018, 10/22/2019, 10/07/2020, 11/04/2021   • Tdap 11/04/2021          Aristeo Yusuf DO

## 2022-11-30 ENCOUNTER — OFFICE VISIT (OUTPATIENT)
Dept: FAMILY MEDICINE CLINIC | Facility: CLINIC | Age: 57
End: 2022-11-30

## 2022-11-30 VITALS
RESPIRATION RATE: 18 BRPM | HEIGHT: 64 IN | HEART RATE: 81 BPM | WEIGHT: 229.4 LBS | SYSTOLIC BLOOD PRESSURE: 130 MMHG | TEMPERATURE: 96.1 F | OXYGEN SATURATION: 98 % | BODY MASS INDEX: 39.16 KG/M2 | DIASTOLIC BLOOD PRESSURE: 80 MMHG

## 2022-11-30 DIAGNOSIS — R09.89 GLOBUS SENSATION: ICD-10-CM

## 2022-11-30 DIAGNOSIS — R07.0 THROAT DISCOMFORT: Primary | ICD-10-CM

## 2022-11-30 RX ORDER — METHYLPREDNISOLONE 4 MG/1
TABLET ORAL
COMMUNITY
Start: 2022-11-23

## 2022-11-30 RX ORDER — AMOXICILLIN 500 MG/1
CAPSULE ORAL
COMMUNITY
Start: 2022-11-23

## 2022-11-30 NOTE — PATIENT INSTRUCTIONS
Refer to ENT - call to schedule appointment    Avoid hot spicy foods  Limit caffeine and alcohol intake  Avoid citrusy foods and beverages  No added salt

## 2022-11-30 NOTE — PROGRESS NOTES
FAMILY PRACTICE OFFICE VISIT    NAME: Rina Fairbanks    AGE: 61 y o  SEX: female  : 1965   MRN: [id number]    DATE: 2022  TIME: 6:29 PM    Assessment and Plan   1  Throat discomfort  Suspect injury from recent intubation  Refer to ENT for flex NLG  Unable to see any abnormalities on exam today  Completed amoxicillin and prednisone and magic mouthwash for tongue injury - which is healed  2  Globus sensation  See above  Pt to return for routine visit      Patient Instructions   Refer to ENT - call to schedule appointment    Avoid hot spicy foods  Limit caffeine and alcohol intake  Avoid citrusy foods and beverages  No added salt  Chief Complaint   No chief complaint on file  History of Present Illness   Christoph Daniel is a 61y o -year-old female who presents today with her  with c/o discomfort in throat  Pt had recent blepharoplasty - in [address] [place] [address] on 11/10/2022  Had sutures removed on 2022    After the surgery - pt had a complication (unknown?  ) but pt describing something with her throat  They also cut her tongue; pt describes 3 attempts at intubation  Was seen by a specialist post-op - but pt is not a good historian   was not in [address] [place] [address] so is unsure  Pt was given rx for amoxicillin upon return to [address] [place] [address] [address] by dentist - due to tongue laceration from surgery a couple of weeks ago  Also given prednisone and magic mouthwash  This is healed  Review of Systems   Review of Systems   Constitutional: Negative for fever  Overall - pt feels well and not feeling sick   HENT: Positive for postnasal drip and sore throat  Negative for trouble swallowing  Pt feels that something is wrong in her throat  Has a foreign body sensation in throat  Feels that she needs water  Symptoms for the past 4 mos (even prior to surgery)  Had difficulty with intubation  Feels better with eating and drinking    Had a change in voice initially after surgery which was less than a month ago -   Voice sounds normal now  Healed well from blepharoplasty  Not coughing up blood  Respiratory: Positive for apnea  Negative for cough, shortness of breath and wheezing  Does not wear cpap  Cardiovascular: Negative for chest pain  Gastrointestinal:        Taking PPI for GERD  No symptoms         Active Problem List     Patient Active Problem List   Diagnosis   • Adrenal mass ([address] )   • Hypertension   • Obesity (BMI 30-39  9)   • Acid reflux   • Generalized anxiety disorder   • NAFLD (nonalcoholic fatty liver disease)   • Screening for colon cancer   • LFT elevation   • Scalp lesion   • NITISH (obstructive sleep apnea)   • Preop cardiovascular exam   • Kidney stones   • Class 2 obesity due to excess calories with body mass index (BMI) of 39 0 to 39 9 in adult   • Allergic rhinitis due to animal hair and dander   • Primary osteoarthritis of knee   • Hyperproteinemia   • Mixed hyperlipidemia         Past Medical History:  Past Medical History:   Diagnosis Date   • Acid reflux    • Anxiety    • CPAP (continuous positive airway pressure) dependence    • GERD (gastroesophageal reflux disease)    • Hypertension    • Obesity    • Palpable abdominal mass     LAST ASSESSED: 17   • Sleep apnea        Past Surgical History:  Past Surgical History:   Procedure Laterality Date   • ABDOMINAL SURGERY     • AUGMENTATION MAMMAPLASTY Bilateral     Bi retro pectoral saline   • BREAST SURGERY      REDUCTION PROCEDURE   •  SECTION     • CHOLECYSTECTOMY     • CHOLECYSTECTOMY LAPAROSCOPIC     • LIVER SURGERY     • IN LAP, ERIKA RESTRICT PROC, LONGITUDINAL GASTRECTOMY N/A 2021    Procedure: Diagnostic Lap, Extensive Lysis of Adhesions;  Surgeon: Constance Jennings MD;  Location: [facility];  Service: Bariatrics       Family History:  Family History   Problem Relation Age of Onset   • Pancreatic cancer Mother 67   • Prostate cancer Father 61   • No Known Problems Sister    • No Known Problems Daughter    • No Known Problems Maternal Grandmother    • No Known Problems Maternal Grandfather    • No Known Problems Paternal Grandmother    • No Known Problems Paternal Grandfather    • No Known Problems Sister    • No Known Problems Daughter    • No Known Problems Maternal Aunt    • No Known Problems Maternal Aunt    • No Known Problems Paternal Aunt    • No Known Problems Paternal Aunt    • No Known Problems Paternal Aunt        Social History:  Social History     Socioeconomic History   • Marital status: /Civil Union     Spouse name: Not on file   • Number of children: Not on file   • Years of education: Not on file   • Highest education level: Not on file   Occupational History   • Not on file   Tobacco Use   • Smoking status: Former     Types: Cigarettes     Quit date: 2013     Years since quittin 8   • Smokeless tobacco: Never   • Tobacco comments:     uses e cigs vaps quit 7 yrs ago  uses 0 nicotine   Vaping Use   • Vaping Use: Some days   Substance and Sexual Activity   • Alcohol use: No   • Drug use: No   • Sexual activity: Yes     Partners: Male     Birth control/protection: Post-menopausal   Other Topics Concern   • Not on file   Social History Narrative   • Not on file     Social Determinants of Health     Financial Resource Strain: Not on file   Food Insecurity: Not on file   Transportation Needs: Not on file   Physical Activity: Not on file   Stress: Not on file   Social Connections: Not on file   Intimate Partner Violence: Not on file   Housing Stability: Not on file       Objective     Vitals:    22 1700   BP: 130/80   Pulse: 81   Resp: 18   Temp: (!) 96 1 °F (35 6 °C)   SpO2: 98%     Wt Readings from Last 3 Encounters:   22 104 kg (229 lb 6 4 oz)   22 104 kg (229 lb)   22 104 kg (229 lb)       Physical Exam  Vitals and nursing note reviewed  Constitutional:       General: She is not in acute distress  Appearance: Normal appearance  She is not ill-appearing or toxic-appearing  HENT:      Nose: Nose normal  No congestion  Mouth/Throat:      Mouth: Mucous membranes are moist       Pharynx: No oropharyngeal exudate or posterior oropharyngeal erythema  Comments: Healed left sided tongue laceration - now just whitish in color - so will continue to monitor  Otherwise - Mucous membranes moist and pink      No oropharyngeal reddness or lesions  No tonsillar enlargement or exudates    Eyes:      General: No scleral icterus  Extraocular Movements: Extraocular movements intact  Pupils: Pupils are equal, round, and reactive to light  Neck:      Comments: No palpable adenopathy  Pt points to left side of throat (deeper down)  Cardiovascular:      Rate and Rhythm: Normal rate and regular rhythm  Pulses: Normal pulses  Heart sounds: Normal heart sounds  No murmur heard  Pulmonary:      Effort: Pulmonary effort is normal  No respiratory distress  Breath sounds: Normal breath sounds  No wheezing, rhonchi or rales  Musculoskeletal:      Cervical back: Neck supple  No tenderness  Lymphadenopathy:      Cervical: No cervical adenopathy  Neurological:      General: No focal deficit present  Mental Status: She is alert and oriented to person, place, and time  Psychiatric:         Mood and Affect: Mood normal          Behavior: Behavior normal          Thought Content:  Thought content normal          Judgment: Judgment normal          Pertinent Laboratory/Diagnostic Studies:  Lab Results   Component Value Date    BUN 22 11/02/2022    CREATININE 0 84 11/02/2022    CALCIUM 9 0 11/02/2022    K 4 2 11/02/2022    CO2 29 11/02/2022     11/02/2022     Lab Results   Component Value Date    ALT 47 11/02/2022    AST 19 11/02/2022    GGT 55 08/03/2018    ALKPHOS 103 11/02/2022       Lab Results   Component Value Date    WBC 7 85 11/02/2022    HGB 14 2 11/02/2022    HCT 44 6 11/02/2022    MCV 94 11/02/2022     11/02/2022       No results found for: TSH    No results found for: CHOL  Lab Results   Component Value Date    TRIG 234 (H) 09/21/2022     Lab Results   Component Value Date    HDL 46 (L) 09/21/2022     Lab Results   Component Value Date    LDLCALC 103 (H) 09/21/2022     Lab Results   Component Value Date    HGBA1C 5 4 09/21/2022       Results for orders placed or performed in visit on 11/04/22   HPV High Risk    Specimen: Cervix; Thin-Prep Vial   Result Value Ref Range    HPV Other HR Negative Negative    HPV16 Negative Negative    HPV18 Negative Negative   Liquid-based pap, screening   Result Value Ref Range    Case Report       Gynecologic Cytology Report                       Case: Maddi Franks Provider:  Cecilia Geller MD  Collected:           11/04/2022 0940              Ordering Location:     [facility]  Received:            11/04/2022 0940                                     [facility] Screen:          [facility]                                                                Specimen:    LIQUID-BASED PAP, SCREENING, Cervix                                                        Primary Interpretation Negative for intraepithelial lesion or malignancy     Specimen Adequacy       Satisfactory for evaluation  Endocervical/transformation zone component present  Additional Information       [employer] FDA approved ,  and ThinPrep Imaging Duo System are utilized with strict adherence to the 's instruction manual to prepare gynecologic and non-gynecologic cytology specimens for the production of ThinPrep slides as well as for gynecologic ThinPrep imaging  These processes have been validated by our laboratory and/or by the     The Pap test is not a diagnostic procedure and should not be used as the sole means to detect cervical cancer  It is only a screening procedure to aid in the detection of cervical cancer and its precursors  Both false-negative and false-positive results have been experienced  Your patient's test result should be interpreted in this context together with the history and clinical findings  No orders of the defined types were placed in this encounter        ALLERGIES:  Allergies   Allergen Reactions   • Prednisone Shortness Of Breath   • Dog Epithelium Throat Swelling   • Alprazolam    • Duloxetine    • Escitalopram    • Latex Rash   • Omnipaque [Iohexol]      Pt erupted with rash all over body       Current Medications     Current Outpatient Medications   Medication Sig Dispense Refill   • amLODIPine (NORVASC) 5 mg tablet Take 1 tablet (5 mg total) by mouth daily 90 tablet 0   • amoxicillin (AMOXIL) 500 mg capsule TAKE 2 CAPSULES NOW, THEN TAKE 1 CAPSULE THREE TIMES A DAY UNTIL FINISHED     • Azelastine HCl 137 MCG/SPRAY SOLN USE 1 SPRAY IN EACH NOSTRIL 2 TIMES DAILY AS DIRECTED 90 mL 1   • Biotin w/ Vitamins C & E (HAIR/SKIN/NAILS PO) Take by mouth     • Cholecalciferol (VITAMIN D3) 50 MCG (2000 UT) CHEW      • lisinopril (ZESTRIL) 40 mg tablet Take 1 tablet (40 mg total) by mouth daily 90 tablet 0   • melatonin 1 mg Take 10 tablets by mouth daily at bedtime      • methylPREDNISolone 4 MG tablet therapy pack TAKE THE WHOLE ROW OF TABLETS, WITH FOOD, AT THE SAME TIME EACH EVENING     • Multiple Vitamins-Minerals (WOMENS MULTIVITAMIN) TABS Take by mouth     • Omega-3 Fatty Acids (OMEGA 3 500 PO) Take by mouth      • omeprazole (PriLOSEC) 40 MG capsule TAKE 1 CAPSULE (40 MG TOTAL) BY MOUTH DAILY IN THE AM PRN GERD 90 capsule 0   • Probiotic Product (PROBIOTIC ACIDOPHILUS BEADS PO)      • Semaglutide-Weight Management (WEGOVY) 0 25 MG/0 5ML Unsure of dose - pt taking thru another provider 0 5 mL 0   • tamsulosin (FLOMAX) 0 4 mg Take 1 po daily prn renal colic 10 capsule 1     No current facility-administered medications for this visit           Health Maintenance     Health Maintenance   Topic Date Due   • Hepatitis B Vaccine (1 of 3 - 3-dose series) Never done   • COVID-19 Vaccine (1) Never done   • Influenza Vaccine (1) 09/01/2022   • Annual Physical  11/04/2022   • BMI: Followup Plan  12/02/2022   • Depression Screening  02/08/2023   • Breast Cancer Screening: Mammogram  06/15/2023   • BMI: Adult  11/30/2023   • Colorectal Cancer Screening  02/09/2025   • Cervical Cancer Screening  11/04/2025   • DTaP,Tdap,and Td Vaccines (2 - Td or Tdap) 11/04/2031   • HIV Screening  Completed   • Hepatitis C Screening  Completed   • Pneumococcal Vaccine: Pediatrics (0 to 5 Years) and At-Risk Patients (6 to 59 Years)  Aged Out   • HIB Vaccine  Aged Out   • IPV Vaccine  Aged Out   • Hepatitis A Vaccine  Aged Out   • Meningococcal ACWY Vaccine  Aged Out   • HPV Vaccine  Aged [employer] History   Administered Date(s) Administered   • INFLUENZA 11/09/2018   • Influenza Quadrivalent, 6-35 Months IM 11/21/2017   • Influenza, recombinant, quadrivalent,injectable, preservative free 11/09/2018, 10/22/2019, 10/07/2020, 11/04/2021   • Tdap 11/04/2021          Cristina Arnold DO

## 2022-12-29 DIAGNOSIS — K21.9 GASTROESOPHAGEAL REFLUX DISEASE, UNSPECIFIED WHETHER ESOPHAGITIS PRESENT: ICD-10-CM

## 2022-12-30 RX ORDER — OMEPRAZOLE 40 MG/1
40 CAPSULE, DELAYED RELEASE ORAL DAILY
Qty: 90 CAPSULE | Refills: 0 | Status: SHIPPED | OUTPATIENT
Start: 2022-12-30

## 2023-01-10 ENCOUNTER — OFFICE VISIT (OUTPATIENT)
Dept: FAMILY MEDICINE CLINIC | Facility: CLINIC | Age: 58
End: 2023-01-10

## 2023-01-10 VITALS
SYSTOLIC BLOOD PRESSURE: 120 MMHG | WEIGHT: 227.8 LBS | TEMPERATURE: 96.8 F | HEART RATE: 81 BPM | HEIGHT: 64 IN | BODY MASS INDEX: 38.89 KG/M2 | OXYGEN SATURATION: 95 % | DIASTOLIC BLOOD PRESSURE: 74 MMHG

## 2023-01-10 DIAGNOSIS — E27.8 ADRENAL MASS (HCC): ICD-10-CM

## 2023-01-10 DIAGNOSIS — Z23 NEED FOR INFLUENZA VACCINATION: Primary | ICD-10-CM

## 2023-01-10 DIAGNOSIS — E66.09 CLASS 2 OBESITY DUE TO EXCESS CALORIES WITH BODY MASS INDEX (BMI) OF 39.0 TO 39.9 IN ADULT, UNSPECIFIED WHETHER SERIOUS COMORBIDITY PRESENT: ICD-10-CM

## 2023-01-10 DIAGNOSIS — R04.0 EPISTAXIS: ICD-10-CM

## 2023-01-10 NOTE — PROGRESS NOTES
FAMILY PRACTICE OFFICE VISIT    NAME: Elham Fairbanks    AGE: 61 y o  SEX: female  : 1965   MRN: [id number]    DATE: 1/10/2023  TIME: 9:34 AM    Assessment and Plan   There are no Patient Instructions on file for this visit  1  Need for influenza vaccination      2  Epistaxis  Pt thinks that nose bleeds started AFTER NLG done 2022 by ENT  BP's controlled  Discussed proper techniques to stop nose bleed  Not taking nsaids or ASA  Is taking fish oil - but nothing new  No need for labs today - as exam identifies area of friability        Patient Instructions   Use a cool mist humidifier in bedroom  Drink plenty of water  And use vaseline on q-tip into nostrils (both sides) - as many times of day as you can remember  Avoid aggressive blowing  Wipe only    Refer to weight management to discuss wegovy  If you do not hear from them - let dr Shruti Tee know    Return in 1 month for routine visit  As pt's visits have been acute in nature  Chief Complaint     Chief Complaint   Patient presents with   • Nose Bleed     5 in total most are in the morning and 2 during the night  History of Present Illness   Nuria Potter is a 61y o -year-old female who presents today with her  to discuss nosebleeds  - 5 x over the past month - 2 of which were while in bed upon awakening  Has been checking BP and normal  Running AC in bedroom - 61-62 degrees  Has an air purifier X 1 year ago  Lasts 15-30 min    No other bleeding or bruising elsewhere  No trauma        No recent surgery on nose - just for blepharoplasty    Interestingly - pt was just to ENT on 2022 - and  2023 - for throat discomfort following above surgery and for NITISH    Had flex NLG on 2022  Pt unsure if nose bleeds started prior to or after the NLG      Not using any nasal spray - since 2022      Gave referral to weight management as pt requesting to get back on wegovy   And would like a new provider    Review of Systems   Review of Systems   Constitutional: Negative for fever  HENT: Positive for nosebleeds  Pt brought in towel covered in blood today  Pt reports that she does at times pick inside her nose and has very long nails  Respiratory: Positive for apnea  Negative for cough, shortness of breath and wheezing  Did not tolerate cpap  Is not currently using     Cardiovascular: Negative for chest pain and palpitations  Home BP readings controlled as per pt   Neurological: Negative for dizziness and headaches  Hematological: Does not bruise/bleed easily  Active Problem List     Patient Active Problem List   Diagnosis   • Adrenal mass ([address] )   • Hypertension   • Obesity (BMI 30-39  9)   • Acid reflux   • Generalized anxiety disorder   • NAFLD (nonalcoholic fatty liver disease)   • Screening for colon cancer   • LFT elevation   • Scalp lesion   • NITISH (obstructive sleep apnea)   • Preop cardiovascular exam   • Kidney stones   • Class 2 obesity due to excess calories with body mass index (BMI) of 39 0 to 39 9 in adult   • Allergic rhinitis due to animal hair and dander   • Primary osteoarthritis of knee   • Hyperproteinemia   • Mixed hyperlipidemia         Past Medical History:  Past Medical History:   Diagnosis Date   • Acid reflux    • Anxiety    • CPAP (continuous positive airway pressure) dependence    • GERD (gastroesophageal reflux disease)    • Hypertension    • Obesity    • Palpable abdominal mass     LAST ASSESSED: 17   • Sleep apnea        Past Surgical History:  Past Surgical History:   Procedure Laterality Date   • ABDOMINOPLASTY     • AUGMENTATION MAMMAPLASTY Bilateral     Bi retro pectoral saline   • BLEPHAROPLASTY     • BREAST SURGERY      REDUCTION PROCEDURE   •  SECTION     • CHOLECYSTECTOMY LAPAROSCOPIC     • LIVER SURGERY      repair of laceration   • NECK SURGERY      liposuction - neck lift   • CA LAPS GSTRC RSTRICTIV PX LONGITUDINAL GASTRECTOMY N/A 05/11/2021    Procedure: Diagnostic Lap, Extensive Lysis of Adhesions;  Surgeon: Aubrie Benson MD;  Location: [facility];  Service: Bariatrics   • TONSILLECTOMY         Family History:  Family History   Problem Relation Age of Onset   • Pancreatic cancer Mother 67   • Prostate cancer Father 61   • No Known Problems Sister    • No Known Problems Daughter    • No Known Problems Maternal Grandmother    • No Known Problems Maternal Grandfather    • No Known Problems Paternal Grandmother    • No Known Problems Paternal Grandfather    • No Known Problems Sister    • No Known Problems Daughter    • No Known Problems Maternal Aunt    • No Known Problems Maternal Aunt    • No Known Problems Paternal Aunt    • No Known Problems Paternal Aunt    • No Known Problems Paternal Aunt        Social History:  Social History     Socioeconomic History   • Marital status: /Civil Union     Spouse name: Not on file   • Number of children: Not on file   • Years of education: Not on file   • Highest education level: Not on file   Occupational History   • Not on file   Tobacco Use   • Smoking status: Former     Types: Cigarettes     Quit date: 1/9/2013     Years since quitting: 10 0   • Smokeless tobacco: Never   • Tobacco comments:     uses e cigs vaps quit 7 yrs ago  uses 0 nicotine   Vaping Use   • Vaping Use: Some days   Substance and Sexual Activity   • Alcohol use: No   • Drug use: No   • Sexual activity: Yes     Partners: Male     Birth control/protection: Post-menopausal   Other Topics Concern   • Not on file   Social History Narrative   • Not on file     Social Determinants of Health     Financial Resource Strain: Not on file   Food Insecurity: Not on file   Transportation Needs: Not on file   Physical Activity: Not on file   Stress: Not on file   Social Connections: Not on file   Intimate Partner Violence: Not on file   Housing Stability: Not on file       Objective     Vitals:    01/10/23 0920   BP: 120/74   Pulse: 81 Temp: (!) 96 8 °F (36 °C)   SpO2: 95%     Wt Readings from Last 3 Encounters:   01/10/23 103 kg (227 lb 12 8 oz)   01/04/23 100 kg (221 lb)   12/08/22 100 kg (221 lb)       Physical Exam  Vitals and nursing note reviewed  Constitutional:       General: She is not in acute distress  Appearance: Normal appearance  She is not ill-appearing or toxic-appearing  HENT:      Right Ear: Tympanic membrane normal       Left Ear: Tympanic membrane normal       Nose:      Comments: Well defined cut in nares - right side - measuring about 1/4-1/2 cm - with surrounding friability  Also friability noted left nares as well  Not actively bleeding       Mouth/Throat:      Mouth: Mucous membranes are moist       Pharynx: No oropharyngeal exudate or posterior oropharyngeal erythema  Comments: Mucous membranes moist and pink      Eyes:      General: No scleral icterus  Cardiovascular:      Rate and Rhythm: Normal rate and regular rhythm  Pulmonary:      Effort: Pulmonary effort is normal  No respiratory distress  Breath sounds: Normal breath sounds  No wheezing, rhonchi or rales  Neurological:      General: No focal deficit present  Mental Status: She is alert and oriented to person, place, and time  Psychiatric:         Mood and Affect: Mood normal          Behavior: Behavior normal          Thought Content:  Thought content normal          Judgment: Judgment normal          Pertinent Laboratory/Diagnostic Studies:  Lab Results   Component Value Date    BUN 22 11/02/2022    CREATININE 0 84 11/02/2022    CALCIUM 9 0 11/02/2022    K 4 2 11/02/2022    CO2 29 11/02/2022     11/02/2022     Lab Results   Component Value Date    ALT 47 11/02/2022    AST 19 11/02/2022    GGT 55 08/03/2018    ALKPHOS 103 11/02/2022       Lab Results   Component Value Date    WBC 7 85 11/02/2022    HGB 14 2 11/02/2022    HCT 44 6 11/02/2022    MCV 94 11/02/2022     11/02/2022       No results found for: TSH    No results found for: CHOL  Lab Results   Component Value Date    TRIG 234 (H) 09/21/2022     Lab Results   Component Value Date    HDL 46 (L) 09/21/2022     Lab Results   Component Value Date    LDLCALC 103 (H) 09/21/2022     Lab Results   Component Value Date    HGBA1C 5 4 09/21/2022       Results for orders placed or performed in visit on 11/04/22   HPV High Risk    Specimen: Cervix; Thin-Prep Vial   Result Value Ref Range    HPV Other HR Negative Negative    HPV16 Negative Negative    HPV18 Negative Negative   Liquid-based pap, screening   Result Value Ref Range    Case Report       Gynecologic Cytology Report                       Case: Gilberto Newton Provider:  Prashanth Thomas MD  Collected:           11/04/2022 0940              Ordering Location:     [facility]  Received:            11/04/2022 0940                                     [facility]                                                           First Screen:          [facility]                                                                Specimen:    LIQUID-BASED PAP, SCREENING, Cervix                                                        Primary Interpretation Negative for intraepithelial lesion or malignancy     Specimen Adequacy       Satisfactory for evaluation  Endocervical/transformation zone component present  Additional Information       [employer]'s FDA approved ,  and ThinPrep Imaging Duo System are utilized with strict adherence to the 's instruction manual to prepare gynecologic and non-gynecologic cytology specimens for the production of ThinPrep slides as well as for gynecologic ThinPrep imaging  These processes have been validated by our laboratory and/or by the   The Pap test is not a diagnostic procedure and should not be used as the sole means to detect cervical cancer   It is only a screening procedure to aid in the detection of cervical cancer and its precursors  Both false-negative and false-positive results have been experienced  Your patient's test result should be interpreted in this context together with the history and clinical findings  No orders of the defined types were placed in this encounter        ALLERGIES:  Allergies   Allergen Reactions   • Prednisone Shortness Of Breath   • Dog Epithelium Throat Swelling   • Alprazolam    • Duloxetine    • Escitalopram    • Latex Rash   • Omnipaque [Iohexol]      Pt erupted with rash all over body       Current Medications     Current Outpatient Medications   Medication Sig Dispense Refill   • amLODIPine (NORVASC) 5 mg tablet Take 1 tablet (5 mg total) by mouth daily 90 tablet 0   • Cholecalciferol (VITAMIN D3) 50 MCG (2000 UT) CHEW      • lisinopril (ZESTRIL) 40 mg tablet Take 1 tablet (40 mg total) by mouth daily 90 tablet 0   • melatonin 1 mg Take 10 tablets by mouth daily at bedtime      • Multiple Vitamins-Minerals (WOMENS MULTIVITAMIN) TABS Take by mouth     • Omega-3 Fatty Acids (OMEGA 3 500 PO) Take by mouth      • omeprazole (PriLOSEC) 40 MG capsule Take 1 capsule (40 mg total) by mouth daily In the am prn gerd 90 capsule 0   • amoxicillin (AMOXIL) 500 mg capsule TAKE 2 CAPSULES NOW, THEN TAKE 1 CAPSULE THREE TIMES A DAY UNTIL FINISHED     • Azelastine HCl 137 MCG/SPRAY SOLN USE 1 SPRAY IN EACH NOSTRIL 2 TIMES DAILY AS DIRECTED 90 mL 1   • Biotin w/ Vitamins C & E (HAIR/SKIN/NAILS PO) Take by mouth     • methylPREDNISolone 4 MG tablet therapy pack TAKE THE WHOLE ROW OF TABLETS, WITH FOOD, AT THE SAME TIME EACH EVENING     • Probiotic Product (PROBIOTIC ACIDOPHILUS BEADS PO)  (Patient not taking: Reported on 1/10/2023)     • Semaglutide-Weight Management (WEGOVY) 0 25 MG/0 5ML Unsure of dose - pt taking thru another provider (Patient not taking: Reported on 1/10/2023) 0 5 mL 0   • tamsulosin (FLOMAX) 0 4 mg Take 1 po daily prn renal colic 10 capsule 1     No current facility-administered medications for this visit           Health Maintenance     Health Maintenance   Topic Date Due   • Hepatitis B Vaccine (1 of 3 - 3-dose series) Never done   • BMI: Followup Plan  10/07/2021   • Influenza Vaccine (1) 09/01/2022   • Annual Physical  11/04/2022   • Depression Screening  02/08/2023   • COVID-19 Vaccine (1) 04/11/2023 (Originally 1965)   • Breast Cancer Screening: Mammogram  06/15/2023   • BMI: Adult  01/04/2024   • Colorectal Cancer Screening  02/09/2025   • Cervical Cancer Screening  11/04/2025   • DTaP,Tdap,and Td Vaccines (2 - Td or Tdap) 11/04/2031   • HIV Screening  Completed   • Hepatitis C Screening  Completed   • Pneumococcal Vaccine: Pediatrics (0 to 5 Years) and At-Risk Patients (6 to 59 Years)  Aged Out   • HIB Vaccine  Aged Out   • IPV Vaccine  Aged Out   • Hepatitis A Vaccine  Aged Out   • Meningococcal ACWY Vaccine  Aged Out   • HPV Vaccine  Aged [employer] History   Administered Date(s) Administered   • INFLUENZA 11/09/2018   • Influenza Quadrivalent, 6-35 Months IM 11/21/2017   • Influenza, recombinant, quadrivalent,injectable, preservative free 11/09/2018, 10/22/2019, 10/07/2020, 11/04/2021   • Tdap 11/04/2021          Karyle Snook, DO

## 2023-01-10 NOTE — PATIENT INSTRUCTIONS
Use a cool mist humidifier in bedroom  Drink plenty of water  And use vaseline on q-tip into nostrils (both sides) - as many times of day as you can remember  Avoid aggressive blowing  Wipe only      Refer to weight management to discuss wegovy  If you do not hear from them - let dr Tea Vega know

## 2023-01-11 DIAGNOSIS — I10 ESSENTIAL HYPERTENSION: ICD-10-CM

## 2023-01-11 RX ORDER — AMLODIPINE BESYLATE 5 MG/1
5 TABLET ORAL DAILY
Qty: 90 TABLET | Refills: 0 | Status: SHIPPED | OUTPATIENT
Start: 2023-01-11

## 2023-01-11 RX ORDER — LISINOPRIL 40 MG/1
TABLET ORAL
Qty: 90 TABLET | Refills: 0 | Status: SHIPPED | OUTPATIENT
Start: 2023-01-11

## 2023-02-21 ENCOUNTER — OFFICE VISIT (OUTPATIENT)
Dept: FAMILY MEDICINE CLINIC | Facility: CLINIC | Age: 58
End: 2023-02-21

## 2023-02-21 VITALS
HEART RATE: 89 BPM | SYSTOLIC BLOOD PRESSURE: 124 MMHG | OXYGEN SATURATION: 95 % | BODY MASS INDEX: 39.72 KG/M2 | WEIGHT: 231.4 LBS | DIASTOLIC BLOOD PRESSURE: 72 MMHG | TEMPERATURE: 97.1 F

## 2023-02-21 DIAGNOSIS — R04.0 EPISTAXIS: ICD-10-CM

## 2023-02-21 DIAGNOSIS — E66.09 CLASS 2 OBESITY DUE TO EXCESS CALORIES WITH BODY MASS INDEX (BMI) OF 39.0 TO 39.9 IN ADULT, UNSPECIFIED WHETHER SERIOUS COMORBIDITY PRESENT: ICD-10-CM

## 2023-02-21 DIAGNOSIS — E78.2 MIXED HYPERLIPIDEMIA: ICD-10-CM

## 2023-02-21 DIAGNOSIS — E27.8 ADRENAL MASS (HCC): ICD-10-CM

## 2023-02-21 DIAGNOSIS — F41.1 GENERALIZED ANXIETY DISORDER: ICD-10-CM

## 2023-02-21 DIAGNOSIS — L30.9 ECZEMA, UNSPECIFIED TYPE: ICD-10-CM

## 2023-02-21 DIAGNOSIS — E66.9 OBESITY (BMI 30-39.9): ICD-10-CM

## 2023-02-21 DIAGNOSIS — N20.0 KIDNEY STONES: ICD-10-CM

## 2023-02-21 DIAGNOSIS — Z00.00 PHYSICAL EXAM: Primary | ICD-10-CM

## 2023-02-21 DIAGNOSIS — K21.9 GASTROESOPHAGEAL REFLUX DISEASE, UNSPECIFIED WHETHER ESOPHAGITIS PRESENT: ICD-10-CM

## 2023-02-21 DIAGNOSIS — J30.81 ALLERGIC RHINITIS DUE TO ANIMAL HAIR AND DANDER: ICD-10-CM

## 2023-02-21 DIAGNOSIS — I10 PRIMARY HYPERTENSION: ICD-10-CM

## 2023-02-21 DIAGNOSIS — Z87.891 PERSONAL HISTORY OF TOBACCO USE, PRESENTING HAZARDS TO HEALTH: ICD-10-CM

## 2023-02-21 DIAGNOSIS — I10 ESSENTIAL HYPERTENSION: ICD-10-CM

## 2023-02-21 DIAGNOSIS — G47.33 OSA (OBSTRUCTIVE SLEEP APNEA): ICD-10-CM

## 2023-02-21 DIAGNOSIS — M17.10 PRIMARY OSTEOARTHRITIS OF KNEE, UNSPECIFIED LATERALITY: ICD-10-CM

## 2023-02-21 RX ORDER — TRIAMCINOLONE ACETONIDE 5 MG/G
CREAM TOPICAL
Qty: 30 G | Refills: 0 | Status: SHIPPED | OUTPATIENT
Start: 2023-02-21

## 2023-02-21 RX ORDER — OMEPRAZOLE 40 MG/1
40 CAPSULE, DELAYED RELEASE ORAL DAILY
Qty: 90 CAPSULE | Refills: 1 | Status: SHIPPED | OUTPATIENT
Start: 2023-02-21

## 2023-02-21 RX ORDER — LISINOPRIL 40 MG/1
40 TABLET ORAL DAILY
Qty: 90 TABLET | Refills: 1 | Status: SHIPPED | OUTPATIENT
Start: 2023-02-21

## 2023-02-21 NOTE — PATIENT INSTRUCTIONS
Call weight management to discuss weight loss medication  Advise regular exercise 3-5x/week - get heartrate up each time - for about 30 minutes      If nose bleeds at home -   For any repeat bleeding spray oxymetazoline onto tissue, cotton ball, or nasal tampon and place into the side where bleeding is coming from  Pinch and hold the nostrils completely closed for 10 minutes without releasing pressure  If bleeding continues repeat 1 time  If bleeding persists after second attempt please contact [facility] on call or present to nearest emergency department  And if still a problem - notify ENT      Schedule CT chest to screen for lung cancer    Fasting labs  Patient to call for results if he/she does not hear from us Repair Anesthesia Method: local infiltration

## 2023-02-21 NOTE — PROGRESS NOTES
FAMILY PRACTICE OFFICE VISIT    NAME: Aileen Fairbanks    AGE: 61 y o  SEX: female  : 1965   MRN: [id number]    DATE: 2023  TIME: 1:40 PM    Assessment and Plan     1  Gastroesophageal reflux disease, unspecified whether esophagitis present  Stable with PPI  Pt with h/o egd     - omeprazole (PriLOSEC) 40 MG capsule; Take 1 capsule (40 mg total) by mouth daily In the am prn gerd  Dispense: 90 capsule; Refill: 1    2  Essential hypertension  Controlled  - lisinopril (ZESTRIL) 40 mg tablet; Take 1 tablet (40 mg total) by mouth daily  Dispense: 90 tablet; Refill: 1  - Comprehensive metabolic panel; Future    3  Class 2 obesity due to excess calories with body mass index (BMI) of 39 0 to 39 9 in adult, unspecified whether serious comorbidity present      4  Mixed hyperlipidemia  Repeat ordered  Trigs were elevated in past     - Comprehensive metabolic panel; Future  - Lipid panel; Future    5  Allergic rhinitis due to animal hair and dander  Stable  6  NITISH (obstructive sleep apnea)  Not wearing cpap  Could consider inspire  But if pt has significant weight loss may not need treatment        7  Primary hypertension      8  Primary osteoarthritis of knee, unspecified laterality      9  Kidney stones  No recent calculi      10  Adrenal mass ([address] )  CT done 2022 -   IMPRESSION:     1  The left adrenal nodule is unchanged in size or character since 2021  2  Previously seen left renal calculus no longer present  Nephrolithiasis on the right is again noted    Had biochemical workup for adrenal nodule in past - and non-functioning  11  Obesity (BMI 30-39  9)  Refer to weight management       12  Generalized anxiety disorder  Stable      13   Physical exam  Anticipatory guidance and preventative medicine discussed  Advise shingrix - pt will consider      14   Epistaxis  Seen again by ent 2023 - had cauterization  Reprinted out ent recommendations for acute nose bleeds - pt has not been doing this  13   Ho tob use presenting hazards to health  Baseline CT chest  Patient to call for results if he/she does not hear from us    Patient Instructions   Call weight management to discuss weight loss medication  Advise regular exercise 3-5x/week - get heartrate up each time - for about 30 minutes      If nose bleeds at home -   For any repeat bleeding spray oxymetazoline onto tissue, cotton ball, or nasal tampon and place into the side where bleeding is coming from  Pinch and hold the nostrils completely closed for 10 minutes without releasing pressure  If bleeding continues repeat 1 time  If bleeding persists after second attempt please contact [facility] on call or present to nearest emergency department  And if still a problem - notify ENT  Schedule CT chest to screen for lung cancer    Fasting labs  Patient to call for results if he/she does not hear from us      Return in 6 mos      Was still having nosebleeds and had cauterization with ent      Chief Complaint     Chief Complaint   Patient presents with   • Physical Exam     Still having nose bleeds 1 time a week       History of Present Illness   Benjamen Staff is a 61y o -year-old female who presents today with her  for routine annual PE  Took wegovy in past and lost 12 # in 6 mos  No longer taking due to cost    Pt with h/o attempted weight loss surgery but unable to proceed due to adhesions:  Attempted and aborted sleeve gastrectomy  S/p diagnostic laparoscopy, extensive lysis of adhesions  Surgery Date: 5/11/2021        Review of Systems   Review of Systems   Constitutional: Positive for unexpected weight change  Negative for chills, fatigue and fever  Gained 5 # since last visit  No longer taking wegovy due to cost   No regular exercise     HENT: Negative for ear pain and sore throat  Was still having nosebleeds and had cauterization with ent     Eyes: Negative for pain and visual disturbance  Respiratory: Positive for apnea  Negative for cough, shortness of breath and wheezing  Cardiovascular: Negative for chest pain and palpitations  Gastrointestinal: Negative for abdominal pain and vomiting  GERD controlled on PPI  Genitourinary: Negative for dysuria and hematuria  Musculoskeletal: Negative for arthralgias and back pain  Skin: Negative for color change and rash  Dry skin behind ears - requesting refill on triamcinolone cream  Uses prn     Neurological: Negative for seizures and syncope  All other systems reviewed and are negative  Active Problem List     Patient Active Problem List   Diagnosis   • Adrenal mass ([address] )   • Hypertension   • Obesity (BMI 30-39  9)   • Acid reflux   • Generalized anxiety disorder   • NAFLD (nonalcoholic fatty liver disease)   • Screening for colon cancer   • LFT elevation   • Scalp lesion   • NITISH (obstructive sleep apnea)   • Preop cardiovascular exam   • Kidney stones   • Class 2 obesity due to excess calories with body mass index (BMI) of 39 0 to 39 9 in adult   • Allergic rhinitis due to animal hair and dander   • Primary osteoarthritis of knee   • Hyperproteinemia   • Mixed hyperlipidemia         Past Medical History:  Past Medical History:   Diagnosis Date   • Acid reflux    • Anxiety    • CPAP (continuous positive airway pressure) dependence    • GERD (gastroesophageal reflux disease)    • Hypertension    • Obesity    • Palpable abdominal mass     LAST ASSESSED: 17   • Sleep apnea        Past Surgical History:  Past Surgical History:   Procedure Laterality Date   • ABDOMINOPLASTY     • AUGMENTATION MAMMAPLASTY Bilateral     Bi retro pectoral saline   • BLEPHAROPLASTY     • BREAST SURGERY      REDUCTION PROCEDURE   •  SECTION     • CHOLECYSTECTOMY LAPAROSCOPIC     • LIVER SURGERY      repair of laceration   • NECK SURGERY      liposuction - neck lift   • MS LAPS GSTRC RSTRICTIV PX LONGITUDINAL GASTRECTOMY N/A 05/11/2021    Procedure: Diagnostic Lap, Extensive Lysis of Adhesions;  Surgeon: Lasandra Lefort, MD;  Location: [facility];  Service: Bariatrics   • TONSILLECTOMY         Family History:  Family History   Problem Relation Age of Onset   • Pancreatic cancer Mother 67   • Prostate cancer Father 61   • No Known Problems Sister    • No Known Problems Daughter    • No Known Problems Maternal Grandmother    • No Known Problems Maternal Grandfather    • No Known Problems Paternal Grandmother    • No Known Problems Paternal Grandfather    • No Known Problems Sister    • No Known Problems Daughter    • No Known Problems Maternal Aunt    • No Known Problems Maternal Aunt    • No Known Problems Paternal Aunt    • No Known Problems Paternal Aunt    • No Known Problems Paternal Aunt        Social History:  Social History     Socioeconomic History   • Marital status: /Civil Union     Spouse name: Not on file   • Number of children: Not on file   • Years of education: Not on file   • Highest education level: Not on file   Occupational History   • Not on file   Tobacco Use   • Smoking status: Former     Types: Cigarettes     Quit date: 1/9/2013     Years since quitting: 10 1   • Smokeless tobacco: Never   • Tobacco comments:     uses e cigs vaps quit 7 yrs ago  uses 0 nicotine   Vaping Use   • Vaping Use: Some days   Substance and Sexual Activity   • Alcohol use: No   • Drug use: No   • Sexual activity: Yes     Partners: Male     Birth control/protection: Post-menopausal   Other Topics Concern   • Not on file   Social History Narrative   • Not on file     Social Determinants of Health     Financial Resource Strain: Not on file   Food Insecurity: Not on file   Transportation Needs: Not on file   Physical Activity: Not on file   Stress: Not on file   Social Connections: Not on file   Intimate Partner Violence: Not on file   Housing Stability: Not on file       Objective     Vitals:    02/21/23 1325   BP: 124/72   Pulse: 89 Temp: (!) 97 1 °F (36 2 °C)   SpO2: 95%     Wt Readings from Last 3 Encounters:   02/21/23 105 kg (231 lb 6 4 oz)   01/25/23 103 kg (226 lb)   01/10/23 103 kg (227 lb 12 8 oz)       Physical Exam  Vitals and nursing note reviewed  Constitutional:       General: She is not in acute distress  Appearance: Normal appearance  She is not ill-appearing or toxic-appearing  HENT:      Right Ear: Tympanic membrane normal  There is no impacted cerumen  Left Ear: Tympanic membrane normal  There is no impacted cerumen  Nose: No congestion or rhinorrhea  Comments: Some friability in right nostril but not bleeding       Mouth/Throat:      Mouth: Mucous membranes are moist       Pharynx: No oropharyngeal exudate or posterior oropharyngeal erythema  Eyes:      General: No scleral icterus  Extraocular Movements: Extraocular movements intact  Pupils: Pupils are equal, round, and reactive to light  Neck:      Vascular: No carotid bruit  Comments: No thyromegaly  Cardiovascular:      Rate and Rhythm: Normal rate and regular rhythm  Pulses: Normal pulses  Heart sounds: Normal heart sounds  No murmur heard  Pulmonary:      Effort: Pulmonary effort is normal  No respiratory distress  Breath sounds: Normal breath sounds  No wheezing, rhonchi or rales  Abdominal:      General: There is no distension  Palpations: Abdomen is soft  There is no mass  Tenderness: There is no abdominal tenderness  There is no guarding or rebound  Musculoskeletal:      Cervical back: Neck supple  No tenderness  Right lower leg: No edema  Left lower leg: No edema  Lymphadenopathy:      Cervical: No cervical adenopathy  Skin:     General: Skin is warm  Coloration: Skin is not jaundiced  Neurological:      General: No focal deficit present  Mental Status: She is alert and oriented to person, place, and time     Psychiatric:         Mood and Affect: Mood normal  Behavior: Behavior normal          Thought Content: Thought content normal          Judgment: Judgment normal       Comments: Very pleasant   supportive  Pertinent Laboratory/Diagnostic Studies:  Lab Results   Component Value Date    BUN 22 11/02/2022    CREATININE 0 84 11/02/2022    CALCIUM 9 0 11/02/2022    K 4 2 11/02/2022    CO2 29 11/02/2022     11/02/2022     Lab Results   Component Value Date    ALT 47 11/02/2022    AST 19 11/02/2022    GGT 55 08/03/2018    ALKPHOS 103 11/02/2022       Lab Results   Component Value Date    WBC 7 85 11/02/2022    HGB 14 2 11/02/2022    HCT 44 6 11/02/2022    MCV 94 11/02/2022     11/02/2022       No results found for: TSH    No results found for: CHOL  Lab Results   Component Value Date    TRIG 234 (H) 09/21/2022     Lab Results   Component Value Date    HDL 46 (L) 09/21/2022     Lab Results   Component Value Date    LDLCALC 103 (H) 09/21/2022     Lab Results   Component Value Date    HGBA1C 5 4 09/21/2022       Results for orders placed or performed in visit on 11/04/22   HPV High Risk    Specimen: Cervix;  Thin-Prep Vial   Result Value Ref Range    HPV Other HR Negative Negative    HPV16 Negative Negative    HPV18 Negative Negative   Liquid-based pap, screening   Result Value Ref Range    Case Report       Gynecologic Cytology Report                       Case: Royal Ramos Provider:  Cornelius Hoskins MD  Collected:           11/04/2022 0940              Ordering Location:     [facility]  Received:            11/04/2022 0940                                     [facility]                                                           First Screen:          [facility]                                                                Specimen:    LIQUID-BASED PAP, SCREENING, Cervix                                                        Primary Interpretation Negative for intraepithelial lesion or malignancy     Specimen Adequacy       Satisfactory for evaluation  Endocervical/transformation zone component present  Additional Information       Hologic's FDA approved ,  and ThinPrep Imaging Duo System are utilized with strict adherence to the 's instruction manual to prepare gynecologic and non-gynecologic cytology specimens for the production of ThinPrep slides as well as for gynecologic ThinPrep imaging  These processes have been validated by our laboratory and/or by the   The Pap test is not a diagnostic procedure and should not be used as the sole means to detect cervical cancer  It is only a screening procedure to aid in the detection of cervical cancer and its precursors  Both false-negative and false-positive results have been experienced  Your patient's test result should be interpreted in this context together with the history and clinical findings  No orders of the defined types were placed in this encounter  ALLERGIES:  Allergies   Allergen Reactions   • Prednisone Shortness Of Breath   • Dog Epithelium Throat Swelling   • Alprazolam    • Duloxetine    • Escitalopram    • Latex Rash   • Omnipaque [Iohexol]      Pt erupted with rash all over body       Current Medications     Current Outpatient Medications   Medication Sig Dispense Refill   • amLODIPine (NORVASC) 5 mg tablet TAKE 1 TABLET (5 MG TOTAL) BY MOUTH DAILY  90 tablet 0   • Cholecalciferol (VITAMIN D3) 50 MCG (2000 UT) CHEW      • lisinopril (ZESTRIL) 40 mg tablet TAKE 1 TABLET BY MOUTH EVERY DAY 90 tablet 0   • melatonin 1 mg Take 10 tablets by mouth daily at bedtime      • Multiple Vitamins-Minerals (WOMENS MULTIVITAMIN) TABS Take by mouth     • omeprazole (PriLOSEC) 40 MG capsule Take 1 capsule (40 mg total) by mouth daily In the am prn gerd 90 capsule 0     No current facility-administered medications for this visit           [facility] Maintenance   Topic Date Due   • BMI: Followup Plan  10/07/2021   • Annual Physical  11/04/2022   • COVID-19 Vaccine (1) 04/11/2023 (Originally 1965)   • Breast Cancer Screening: Mammogram  06/15/2023   • BMI: Adult  01/25/2024   • Depression Screening  02/21/2024   • Colorectal Cancer Screening  02/09/2025   • Cervical Cancer Screening  11/04/2025   • DTaP,Tdap,and Td Vaccines (2 - Td or Tdap) 11/04/2031   • HIV Screening  Completed   • Hepatitis C Screening  Completed   • Influenza Vaccine  Completed   • Pneumococcal Vaccine: Pediatrics (0 to 5 Years) and At-Risk Patients (6 to 59 Years)  Aged Out   • HIB Vaccine  Aged Out   • IPV Vaccine  Aged Out   • Hepatitis A Vaccine  Aged Out   • Meningococcal ACWY Vaccine  Aged Out   • HPV Vaccine  Aged [employer] History   Administered Date(s) Administered   • INFLUENZA 11/09/2018, 01/10/2023   • Influenza Quadrivalent, 6-35 Months IM 11/21/2017   • Influenza, recombinant, quadrivalent,injectable, preservative free 11/09/2018, 10/22/2019, 10/07/2020, 11/04/2021, 01/10/2023   • Tdap 11/04/2021     BMI Counseling: Body mass index is 39 72 kg/m²  The BMI is above normal  Nutrition recommendations include decreasing portion sizes, encouraging healthy choices of fruits and vegetables, decreasing fast food intake, consuming healthier snacks, limiting drinks that contain sugar, moderation in carbohydrate intake, increasing intake of lean protein, reducing intake of saturated and trans fat and reducing intake of cholesterol  Exercise recommendations include exercising 3-5 times per week  No pharmacotherapy was ordered  Rationale for BMI follow-up plan is due to patient being overweight or obese  Advise regular exercise 3-5x/week - get heartrate up each time - for about 30 minutes    Depression Screening and Follow-up Plan: Patient was screened for depression during today's encounter  They screened negative with a PHQ-2 score of 0          Melva George DO

## 2023-03-23 DIAGNOSIS — K21.9 GASTROESOPHAGEAL REFLUX DISEASE, UNSPECIFIED WHETHER ESOPHAGITIS PRESENT: ICD-10-CM

## 2023-03-23 DIAGNOSIS — I10 ESSENTIAL HYPERTENSION: ICD-10-CM

## 2023-03-23 RX ORDER — LISINOPRIL 40 MG/1
40 TABLET ORAL DAILY
Qty: 90 TABLET | Refills: 0 | Status: SHIPPED | OUTPATIENT
Start: 2023-03-23 | End: 2023-03-29 | Stop reason: SDUPTHER

## 2023-03-23 RX ORDER — OMEPRAZOLE 40 MG/1
40 CAPSULE, DELAYED RELEASE ORAL DAILY
Qty: 90 CAPSULE | Refills: 0 | Status: SHIPPED | OUTPATIENT
Start: 2023-03-23 | End: 2023-03-29 | Stop reason: SDUPTHER

## 2023-03-29 DIAGNOSIS — I10 ESSENTIAL HYPERTENSION: ICD-10-CM

## 2023-03-29 DIAGNOSIS — K21.9 GASTROESOPHAGEAL REFLUX DISEASE, UNSPECIFIED WHETHER ESOPHAGITIS PRESENT: ICD-10-CM

## 2023-03-29 RX ORDER — AMLODIPINE BESYLATE 5 MG/1
5 TABLET ORAL DAILY
Qty: 90 TABLET | Refills: 0 | Status: SHIPPED | OUTPATIENT
Start: 2023-03-29

## 2023-03-29 RX ORDER — OMEPRAZOLE 40 MG/1
40 CAPSULE, DELAYED RELEASE ORAL DAILY
Qty: 90 CAPSULE | Refills: 0 | Status: SHIPPED | OUTPATIENT
Start: 2023-03-29

## 2023-03-29 RX ORDER — LISINOPRIL 40 MG/1
40 TABLET ORAL DAILY
Qty: 90 TABLET | Refills: 0 | Status: SHIPPED | OUTPATIENT
Start: 2023-03-29

## 2023-07-15 DIAGNOSIS — I10 ESSENTIAL HYPERTENSION: ICD-10-CM

## 2023-07-16 DIAGNOSIS — K21.9 GASTROESOPHAGEAL REFLUX DISEASE, UNSPECIFIED WHETHER ESOPHAGITIS PRESENT: ICD-10-CM

## 2023-07-16 DIAGNOSIS — I10 ESSENTIAL HYPERTENSION: ICD-10-CM

## 2023-07-16 RX ORDER — OMEPRAZOLE 40 MG/1
CAPSULE, DELAYED RELEASE ORAL
Qty: 90 CAPSULE | Refills: 2 | Status: SHIPPED | OUTPATIENT
Start: 2023-07-16 | End: 2023-07-20 | Stop reason: SDUPTHER

## 2023-07-16 RX ORDER — AMLODIPINE BESYLATE 5 MG/1
5 TABLET ORAL DAILY
Qty: 90 TABLET | Refills: 2 | Status: SHIPPED | OUTPATIENT
Start: 2023-07-16 | End: 2023-07-20 | Stop reason: SDUPTHER

## 2023-07-16 RX ORDER — LISINOPRIL 40 MG/1
TABLET ORAL
Qty: 90 TABLET | Refills: 2 | Status: SHIPPED | OUTPATIENT
Start: 2023-07-16 | End: 2023-07-20 | Stop reason: SDUPTHER

## 2023-07-17 DIAGNOSIS — I10 ESSENTIAL HYPERTENSION: ICD-10-CM

## 2023-07-17 DIAGNOSIS — K21.9 GASTROESOPHAGEAL REFLUX DISEASE, UNSPECIFIED WHETHER ESOPHAGITIS PRESENT: ICD-10-CM

## 2023-07-17 RX ORDER — AMLODIPINE BESYLATE 5 MG/1
5 TABLET ORAL DAILY
Qty: 90 TABLET | Refills: 0 | OUTPATIENT
Start: 2023-07-17

## 2023-07-18 RX ORDER — LISINOPRIL 40 MG/1
40 TABLET ORAL DAILY
Qty: 90 TABLET | Refills: 0 | OUTPATIENT
Start: 2023-07-18

## 2023-07-18 RX ORDER — OMEPRAZOLE 40 MG/1
CAPSULE, DELAYED RELEASE ORAL
Qty: 90 CAPSULE | Refills: 0 | OUTPATIENT
Start: 2023-07-18

## 2023-07-18 RX ORDER — AMLODIPINE BESYLATE 5 MG/1
5 TABLET ORAL DAILY
Qty: 90 TABLET | Refills: 0 | OUTPATIENT
Start: 2023-07-18

## 2023-07-20 RX ORDER — AMLODIPINE BESYLATE 5 MG/1
5 TABLET ORAL DAILY
Qty: 90 TABLET | Refills: 0 | Status: SHIPPED | OUTPATIENT
Start: 2023-07-20

## 2023-07-20 RX ORDER — OMEPRAZOLE 40 MG/1
CAPSULE, DELAYED RELEASE ORAL
Qty: 90 CAPSULE | Refills: 0 | Status: SHIPPED | OUTPATIENT
Start: 2023-07-20

## 2023-07-20 RX ORDER — LISINOPRIL 40 MG/1
40 TABLET ORAL DAILY
Qty: 90 TABLET | Refills: 0 | Status: SHIPPED | OUTPATIENT
Start: 2023-07-20

## 2023-07-24 ENCOUNTER — HOSPITAL ENCOUNTER (OUTPATIENT)
Dept: MAMMOGRAPHY | Facility: CLINIC | Age: 58
Discharge: HOME | End: 2023-07-24
Payer: COMMERCIAL

## 2023-07-24 VITALS — WEIGHT: 231 LBS | HEIGHT: 64 IN | BODY MASS INDEX: 39.44 KG/M2

## 2023-07-24 DIAGNOSIS — Z12.31 ENCOUNTER FOR SCREENING MAMMOGRAM FOR BREAST CANCER: ICD-10-CM

## 2023-07-24 PROCEDURE — 77067 SCR MAMMO BI INCL CAD: CPT

## 2023-07-24 PROCEDURE — 77063 BREAST TOMOSYNTHESIS BI: CPT

## 2023-08-03 ENCOUNTER — ANNUAL EXAM (OUTPATIENT)
Dept: OBGYN CLINIC | Facility: MEDICAL CENTER | Age: 58
End: 2023-08-03
Payer: COMMERCIAL

## 2023-08-03 VITALS
DIASTOLIC BLOOD PRESSURE: 70 MMHG | HEIGHT: 64 IN | WEIGHT: 237 LBS | BODY MASS INDEX: 40.46 KG/M2 | SYSTOLIC BLOOD PRESSURE: 116 MMHG

## 2023-08-03 DIAGNOSIS — Z01.419 ENCOUNTER FOR GYNECOLOGICAL EXAMINATION: Primary | ICD-10-CM

## 2023-08-03 PROCEDURE — S0612 ANNUAL GYNECOLOGICAL EXAMINA: HCPCS | Performed by: OBSTETRICS & GYNECOLOGY

## 2023-08-03 NOTE — PROGRESS NOTES
ASSESSMENT & PLAN: Kerwin Woodard is a 60 y.o. [id number] with normal gynecologic exam.    1.  Routine well woman exam done today  2. Pap and HPV:  The patient's last pap and hpv was 2022. It was normal.    Pap and cotesting was not done today. Current ASCCP Guidelines reviewed. 3.  Mammogram ordered  4. The following were reviewed in today's visit: breast self exam, mammography screening ordered, menopause, exercise and healthy diet      CC:  Annual Gynecologic Examination    HPI: Kerwin Woodard is a 60 y.o. [id number] who presents for annual gynecologic examination. She has the following concerns:  None  GYN     Health Maintenance:    She wears her seatbelt routinely. She does perform regular monthly self breast exams. She feels safe at home. Patient Active Problem List   Diagnosis   • Adrenal mass ([address])   • Hypertension   • Obesity (BMI 30-39. 9)   • Acid reflux   • Generalized anxiety disorder   • NAFLD (nonalcoholic fatty liver disease)   • Screening for colon cancer   • LFT elevation   • Scalp lesion   • NITISH (obstructive sleep apnea)   • Preop cardiovascular exam   • Kidney stones   • Class 2 obesity due to excess calories with body mass index (BMI) of 39.0 to 39.9 in adult   • Allergic rhinitis due to animal hair and dander   • Primary osteoarthritis of knee   • Hyperproteinemia   • Mixed hyperlipidemia   • Epistaxis   • Personal history of tobacco use, presenting hazards to health       Past Medical History:   Diagnosis Date   • Acid reflux    • Anxiety    • CPAP (continuous positive airway pressure) dependence    • GERD (gastroesophageal reflux disease)    • Hypertension    • Obesity    • Palpable abdominal mass     LAST ASSESSED: 12/11/17   • Sleep apnea        Past Surgical History:   Procedure Laterality Date   • ABDOMINOPLASTY     • AUGMENTATION MAMMAPLASTY Bilateral 2007    Bi retro pectoral saline   • BLEPHAROPLASTY     • BLEPHAROPLASTY      2022 [facility]   • BREAST SURGERY      REDUCTION PROCEDURE   •  SECTION     • CHOLECYSTECTOMY LAPAROSCOPIC     • LIVER SURGERY      repair of laceration   • NECK SURGERY      liposuction - neck lift   • UT LAPS GSTRC RSTRICTIV PX LONGITUDINAL GASTRECTOMY N/A 2021    Procedure: Diagnostic Lap, Extensive Lysis of Adhesions;  Surgeon: Chandrika Mckeon MD;  Location: [facility];  Service: Bariatrics   • TONSILLECTOMY         Past OB/Gyn History:  OB History        2    Para   2    Term   2            AB        Living   2       SAB        IAB        Ectopic        Multiple        Live Births                      Family History   Problem Relation Age of Onset   • Pancreatic cancer Mother 67   • Prostate cancer Father 61   • No Known Problems Sister    • No Known Problems Daughter    • No Known Problems Maternal Grandmother    • No Known Problems Maternal Grandfather    • No Known Problems Paternal Grandmother    • No Known Problems Paternal Grandfather    • No Known Problems Sister    • No Known Problems Daughter    • No Known Problems Maternal Aunt    • No Known Problems Maternal Aunt    • No Known Problems Paternal Aunt    • No Known Problems Paternal Aunt    • No Known Problems Paternal Aunt        Social History:  Social History     Socioeconomic History   • Marital status: /Civil Union     Spouse name: Not on file   • Number of children: Not on file   • Years of education: Not on file   • Highest education level: Not on file   Occupational History   • Not on file   Tobacco Use   • Smoking status: Former     Types: Cigarettes     Quit date: 2013     Years since quitting: 10.5   • Smokeless tobacco: Never   • Tobacco comments:     uses e cigs vaps quit 7 yrs ago  uses 0 nicotine   Vaping Use   • Vaping Use: Some days   Substance and Sexual Activity   • Alcohol use: No   • Drug use: No   • Sexual activity: Yes     Partners: Male     Birth control/protection: Post-menopausal   Other Topics Concern   • Not on file   Social History Narrative   • Not on file     Social Determinants of Health     Financial Resource Strain: Not on file   Food Insecurity: Not on file   Transportation Needs: Not on file   Physical Activity: Not on file   Stress: Not on file   Social Connections: Not on file   Intimate Partner Violence: Not on file   Housing Stability: Not on file       Allergies   Allergen Reactions   • Prednisone Shortness Of Breath   • Dog Epithelium Throat Swelling   • Alprazolam    • Duloxetine    • Escitalopram    • Latex Rash   • Omnipaque [Iohexol]      Pt erupted with rash all over body       Current Outpatient Medications:   •  amLODIPine (NORVASC) 5 mg tablet, Take 1 tablet (5 mg total) by mouth daily, Disp: 90 tablet, Rfl: 0  •  Cholecalciferol (VITAMIN D3) 50 MCG (2000 UT) CHEW, , Disp: , Rfl:   •  famotidine (PEPCID) 40 MG tablet, Take 1 tablet (40 mg total) by mouth daily, Disp: 90 tablet, Rfl: 3  •  lisinopril (ZESTRIL) 40 mg tablet, Take 1 tablet (40 mg total) by mouth daily, Disp: 90 tablet, Rfl: 0  •  melatonin 1 mg, Take 10 tablets by mouth daily at bedtime , Disp: , Rfl:   •  Multiple Vitamins-Minerals (WOMENS MULTIVITAMIN) TABS, Take by mouth, Disp: , Rfl:   •  omeprazole (PriLOSEC) 40 MG capsule, Take 1 tab po daily in the am prn ; take 30 min prior to eating, Disp: 90 capsule, Rfl: 0  •  triamcinolone (KENALOG) 0.5 % cream, Apply to affected areas bid for up to 10 days prn ; avoid face/genitals. , Disp: 30 g, Rfl: 0    Review of Systems:    Review of Systems  Constitutional :no fever, feels well, no tiredness, no recent weight gain or loss  ENT: no ear ache, no loss of hearing, no nosebleeds or nasal discharge, no sore throat or hoarseness. Cardiovascular: no complaints of slow or fast heart beat, no chest pain, no palpitations, no leg claudication or lower extremity edema.   Respiratory: no complaints of shortness of shortness of breath, no MARTINEZ  Breasts:no complaints of breast pain, breast lump, or nipple discharge  Gastrointestinal: no complaints of abdominal pain, constipation, nausea, vomiting, or diarrhea or bloody stools  Genitourinary : no complaints of dysuria, incontinence, pelvic pain, no dysmenorrhea, vaginal discharge or abnormal vaginal bleeding and as noted in HPI. Musculoskeletal: no complaints of arthralgia, no myalgia, no joint swelling or stiffness, no limb pain or swelling. Integumentary: no complaints of skin rash or lesion, itching or dry skin  Neurological: no complaints of headache, no confusion, no numbness or tingling, no dizziness or fainting    Objective      /70   Ht 5' 4" (1.626 m)   Wt 108 kg (237 lb)   LMP 08/03/2018 (Approximate)   BMI 40.68 kg/m²     General:   appears stated age, cooperative, alert normal mood and affect   Lungs: Unlabored breathing     Breasts: normal appearance, no masses or tenderness   Abdomen: soft, non-tender, without masses or organomegaly   Vulva: normal female genitalia, Bartholin's, Urethra, [place] normal   Vagina: normal vagina   Urethra: normal   Cervix: Normal, no discharge. Nontender.    Uterus: normal size, contour, position, consistency, mobility, non-tender   Adnexa: no mass, fullness, tenderness   Psychiatric orientation to person, place, and time: normal. mood and affect: normal

## 2023-10-30 NOTE — PROGRESS NOTES
Bariatric Follow Up Nutrition Note    Preop  wt checks  Preop Program    Type of surgery  Preop  Surgery Date: TBD    Surgeon: Dr Nan Ta  63 y o   female  Height 5' 3 5" (1 613 m), weight 105 kg (232 lb 1 6 oz), last menstrual period 2018  Body mass index is 40 47 kg/m²        Review of History and Medications   Past Medical History:   Diagnosis Date    Acid reflux     Anxiety     Hypertension     Obesity     Palpable abdominal mass     LAST ASSESSED: 17     Past Surgical History:   Procedure Laterality Date    ABDOMINAL SURGERY      AUGMENTATION MAMMAPLASTY Bilateral     Bi retro pectoral saline    BREAST SURGERY      REDUCTION PROCEDURE    CHOLECYSTECTOMY      CHOLECYSTECTOMY LAPAROSCOPIC      LIVER SURGERY       Social History     Socioeconomic History    Marital status: /Civil Union     Spouse name: Not on file    Number of children: Not on file    Years of education: Not on file    Highest education level: Not on file   Occupational History    Not on file   Social Needs    Financial resource strain: Not on file    Food insecurity     Worry: Not on file     Inability: Not on file    Transportation needs     Medical: Not on file     Non-medical: Not on file   Tobacco Use    Smoking status: Former Smoker     Quit date: 2013     Years since quittin 1    Smokeless tobacco: Never Used   Substance and Sexual Activity    Alcohol use: No    Drug use: No    Sexual activity: Yes     Partners: Male     Birth control/protection: Post-menopausal   Lifestyle    Physical activity     Days per week: Not on file     Minutes per session: Not on file    Stress: Not on file   Relationships    Social connections     Talks on phone: Not on file     Gets together: Not on file     Attends Shinto service: Not on file     Active member of club or organization: Not on file     Attends meetings of clubs or Options with respect to offering the patient invasive genetic prenatal testing, non-invasive prenatal testing (NIPT/NIPS), maternal carrier genetic screen, MSAFP, and Fay's [place] screen could not be completed today, as the patient declines a Maternal-Fetal Medicine physician consultation today. Patient declines a Maternal-Fetal Medicine complete physician consultation today regarding the fetal and/or maternal medical/obstetrical complications/co-morbidities of pregnancy. Maternal-Fetal Medicine (MFM) attending physician will defer all management for these medical/obstetrical complications of pregnancy to the primary attending obstetrical physician/provider, as a result. Therefore, only an ultrasound evaluation was completed today in the MFM office. Please refer to [id number] resident progress note in [facility]. organizations: Not on file     Relationship status: Not on file    Intimate partner violence     Fear of current or ex partner: Not on file     Emotionally abused: Not on file     Physically abused: Not on file     Forced sexual activity: Not on file   Other Topics Concern    Not on file   Social History Narrative    Not on file       Current Outpatient Medications:     amLODIPine (NORVASC) 5 mg tablet, Take 1 tablet (5 mg total) by mouth daily, Disp: 90 tablet, Rfl: 1    Barberry-Oreg Grape-Goldenseal (BERBERINE COMPLEX PO), , Disp: , Rfl:     Biotin w/ Vitamins C & E (HAIR/SKIN/NAILS PO), Take by mouth, Disp: , Rfl:     Cholecalciferol (VITAMIN D3) 50 MCG (2000 UT) CHEW, , Disp: , Rfl:     clobetasol (TEMOVATE) 0 05 % ointment, Apply as needed to affected area , Disp: 30 g, Rfl: 2    hydrocortisone 2 5 % cream, , Disp: , Rfl:     lisinopril (ZESTRIL) 40 mg tablet, TAKE 1 TABLET BY MOUTH EVERY DAY, Disp: 90 tablet, Rfl: 3    melatonin 1 mg, Take 1 tablet by mouth daily at bedtime, Disp: , Rfl:     Multiple Vitamins-Minerals (WOMENS MULTIVITAMIN) TABS, Take by mouth, Disp: , Rfl:     Omega-3 Fatty Acids (OMEGA 3 500 PO), Take by mouth, Disp: , Rfl:     omeprazole (PriLOSEC) 40 MG capsule, TAKE 1 CAPSULE BY MOUTH EVERY DAY, Disp: 90 capsule, Rfl: 0    oxyCODONE (ROXICODONE) 5 mg immediate release tablet, Take 1 tablet (5 mg total) by mouth every 4 (four) hours as needed for moderate pain for up to 5 dosesMax Daily Amount: 30 mg, Disp: 5 tablet, Rfl: 0    Pediatric Multiple Vitamins (CHEWABLE MULTIPLE VITAMINS/C PO), , Disp: , Rfl:     sucralfate (CARAFATE) 1 g tablet, Take 1 tablet (1 g total) by mouth 4 (four) times a day, Disp: 120 tablet, Rfl: 2    tamsulosin (FLOMAX) 0 4 mg, Take 1 capsule (0 4 mg total) by mouth daily with dinner for 7 doses, Disp: 7 capsule, Rfl: 0    triamcinolone (KENALOG) 0 5 % cream, Apply 1 application topically 2 times a day for 15 days, Disp: 30 g, Rfl: 0    Food Intake and Lifestyle Assessment   Food Intake Assessment completed via usual diet recall  Breakfast: 6:00 chocolate chip muffin 1/2 c juice  Snack: 0  Lunch: 2:00 vegetables and meat or chicken    Snack: 0  Dinner:0  Snack: 2 Greek yogurt  Beverage intake: water and juice-cut down alot  Diet texture/stage: regular  Protein supplement: 0  Estimated protein intake per day: 50 gm  Estimated fluid intake per day: 40 oz-doesn't like water, trying to increase  Meals eaten away from home: once/week now  Typical meal pattern: 3 meals per day and 2 snacks per day  Eating Behaviors: Does not drink with meals and waits 30-minutes after meal before resuming drinking    Food allergies or intolerances: N/A  Cultural or Advent considerations: [place] from [facility]    Physical Assessment  Nutrition Related Findings  Eating more vegetables, not enough protein    Physical Activity  Types of exercise: Walking   Current physical limitations: none    Psychosocial Assessment   Support systems: spouse  Socioeconomic factors: currently not working    Nutrition Diagnosis  Diagnosis: Overweight / Obesity (NC-3 3)  Related to: Physical inactivity and Excessive energy intake  As Evidenced by: BMI >25     Interventions and Teaching   Patient educated on post-op nutrition guidelines  Patient educated and handouts provided    Surgical changes to stomach / GI  Capacity of post-surgery stomach  Diet progression  Adequate hydration  Sugar and fat restriction to decrease "dumping syndrome"  Fat restriction to decrease steatorrhea  Expected weight loss  Weight loss plateaus/ possibility of weight regain  Exercise  Suggestions for pre-op diet  Nutrition considerations after surgery  Protein supplements  Meal planning and preparation  Appropriate carbohydrate, protein, and fat intake, and food/fluid choices to maximize safe weight loss, nutrient intake, and tolerance   Dietary and lifestyle changes  Possible problems with poor eating habits  Intuitive eating  Techniques for self monitoring and keeping daily food journal  Potential for food intolerance after surgery, and ways to deal with them including: lactose intolerance, nausea, reflux, vomiting, diarrhea, food intolerance, appetite changes, gas  Vitamin / Mineral supplementation of Multivitamin with minerals and Vitamin D    Education provided to: patient    Barriers to learning: Pt talks constantly, need to redirect often    Readiness to change: preparation    Comprehension: verbalizes understanding     Expected Compliance: good    Goals  Eliminate sugar sweetened beverages, Exercise 30 minutes 5 times per week, Complete lession plans 1-6, Eat 3 meals per day and Eliminate mindless snacking     Time Spent:   30 Minutes

## 2023-11-08 ENCOUNTER — OFFICE VISIT (OUTPATIENT)
Dept: FAMILY MEDICINE CLINIC | Facility: CLINIC | Age: 58
End: 2023-11-08
Payer: COMMERCIAL

## 2023-11-08 VITALS
HEART RATE: 82 BPM | TEMPERATURE: 98 F | DIASTOLIC BLOOD PRESSURE: 84 MMHG | BODY MASS INDEX: 41.76 KG/M2 | SYSTOLIC BLOOD PRESSURE: 120 MMHG | RESPIRATION RATE: 20 BRPM | OXYGEN SATURATION: 99 % | HEIGHT: 64 IN | WEIGHT: 244.6 LBS

## 2023-11-08 DIAGNOSIS — J30.81 ALLERGIC RHINITIS DUE TO ANIMAL HAIR AND DANDER: ICD-10-CM

## 2023-11-08 DIAGNOSIS — G47.33 OSA (OBSTRUCTIVE SLEEP APNEA): ICD-10-CM

## 2023-11-08 DIAGNOSIS — Z23 ENCOUNTER FOR IMMUNIZATION: ICD-10-CM

## 2023-11-08 DIAGNOSIS — E66.09 CLASS 2 OBESITY DUE TO EXCESS CALORIES WITH BODY MASS INDEX (BMI) OF 39.0 TO 39.9 IN ADULT, UNSPECIFIED WHETHER SERIOUS COMORBIDITY PRESENT: ICD-10-CM

## 2023-11-08 DIAGNOSIS — R73.03 PRE-DIABETES: ICD-10-CM

## 2023-11-08 DIAGNOSIS — I10 PRIMARY HYPERTENSION: ICD-10-CM

## 2023-11-08 DIAGNOSIS — R04.0 EPISTAXIS: ICD-10-CM

## 2023-11-08 DIAGNOSIS — E78.2 MIXED HYPERLIPIDEMIA: ICD-10-CM

## 2023-11-08 DIAGNOSIS — Z00.00 PHYSICAL EXAM: Primary | ICD-10-CM

## 2023-11-08 PROCEDURE — 90686 IIV4 VACC NO PRSV 0.5 ML IM: CPT | Performed by: FAMILY MEDICINE

## 2023-11-08 PROCEDURE — 90471 IMMUNIZATION ADMIN: CPT | Performed by: FAMILY MEDICINE

## 2023-11-08 PROCEDURE — 99396 PREV VISIT EST AGE 40-64: CPT | Performed by: FAMILY MEDICINE

## 2023-11-08 PROCEDURE — 99214 OFFICE O/P EST MOD 30 MIN: CPT | Performed by: FAMILY MEDICINE

## 2023-11-08 NOTE — PROGRESS NOTES
FAMILY PRACTICE OFFICE VISIT    NAME: Ghazala Fairbanks    AGE: 60 y.o. SEX: female  : 1965   MRN: [id number]    DATE: 2023  TIME: 12:42 PM    Assessment and Plan   1. Encounter for immunization    - influenza vaccine, quadrivalent, 0.5 mL, preservative-free, for adult and pediatric patients 6 mos+ (AFLURIA, FLUARIX, FLULAVAL, FLUZONE)    2. Physical exam  Anticipatory guidance and preventative medicine discussed  Mammo and colonoscopy up to date  Gyn when due      3. NITISH (obstructive sleep apnea)  Did not tolerate cpap. Suspect will improve with weight loss. 4. Primary hypertension  Controlled. 5. Allergic rhinitis due to animal hair and dander      6. Class 2 obesity due to excess calories with body mass index (BMI) of 39.0 to 39.9 in adult, unspecified whether serious comorbidity present    - Ambulatory Referral to Weight Management; Future    7. Mixed hyperlipidemia      8. Epistaxis  Has been seen by ent In past  Use humidfier  Return to ent if not improving  Using humidifier      9 . Pre diabetes    Patient Instructions   Schedule with weight management     Reminder to get CT chest for lung cancer screening    Return to ENT for nose bleeds              Chief Complaint     Chief Complaint   Patient presents with    Physical Exam       History of Present Illness   John Ritchie is a 60y.o.-year-old female who presents today for routine PE  Would like to consider gastric sleeve surgery for weight loss      Review of Systems   Review of Systems   Constitutional:  Negative for chills and fever. Gained 14 # in 3 mos. But was traveling   HENT:  Positive for nosebleeds. Negative for ear pain and sore throat. Had a nosebleed earlier today     Eyes:  Negative for pain and visual disturbance. Respiratory:  Negative for cough, shortness of breath and wheezing. Cardiovascular:  Negative for chest pain and palpitations.    Gastrointestinal:  Negative for abdominal pain and vomiting. Genitourinary:  Negative for dysuria and hematuria. Musculoskeletal:  Negative for arthralgias and back pain. Skin:  Negative for color change and rash. Neurological:  Negative for seizures and syncope. Psychiatric/Behavioral:  Negative for self-injury and suicidal ideas. Pt is very happy when speaking of her family  Recently visited in [address] [place] [address]  Stressed over current financial situation   All other systems reviewed and are negative. Active Problem List     Patient Active Problem List   Diagnosis    Adrenal mass (HCC)    Hypertension    Obesity (BMI 30-39. 9)    Acid reflux    Generalized anxiety disorder    NAFLD (nonalcoholic fatty liver disease)    Screening for colon cancer    LFT elevation    Scalp lesion    NITISH (obstructive sleep apnea)    Preop cardiovascular exam    Kidney stones    Class 2 obesity due to excess calories with body mass index (BMI) of 39.0 to 39.9 in adult    Allergic rhinitis due to animal hair and dander    Primary osteoarthritis of knee    Hyperproteinemia    Mixed hyperlipidemia    Epistaxis    Personal history of tobacco use, presenting hazards to health         Past Medical History:  Past Medical History:   Diagnosis Date    Acid reflux     Anxiety     CPAP (continuous positive airway pressure) dependence     GERD (gastroesophageal reflux disease)     Hypertension     Obesity     Palpable abdominal mass     LAST ASSESSED: 12/11/17    Sleep apnea        Past Surgical History:  Past Surgical History:   Procedure Laterality Date    ABDOMINOPLASTY      AUGMENTATION MAMMAPLASTY Bilateral 2007    Bi retro pectoral saline    BLEPHAROPLASTY      BLEPHAROPLASTY      2022 in [address] [place] [address]      LIVER SURGERY      repair of laceration    NECK SURGERY      liposuction - neck lift    GA LAPS [address] RSTRICTIV [address] LONGITUDINAL GASTRECTOMY N/A 05/11/2021    Procedure: Diagnostic Lap, Extensive Lysis of Adhesions;  Surgeon: Clair Dela Cruz MD;  Location: [facility] OR;  Service: Bariatrics    TONSILLECTOMY         Family History:  Family History   Problem Relation Age of Onset    Pancreatic cancer Mother 67    Prostate cancer Father 61    No Known Problems Sister     No Known Problems Daughter     No Known Problems Maternal Grandmother     No Known Problems Maternal Grandfather     No Known Problems Paternal Grandmother     No Known Problems Paternal Grandfather     No Known Problems Sister     No Known Problems Daughter     No Known Problems Maternal Aunt     No Known Problems Maternal Aunt     No Known Problems Paternal Aunt     No Known Problems Paternal Aunt     No Known Problems Paternal Aunt        Social History:  Social History     Socioeconomic History    Marital status: /Civil Union     Spouse name: Not on file    Number of children: Not on file    Years of education: Not on file    Highest education level: Not on file   Occupational History    Not on file   Tobacco Use    Smoking status: Former     Types: Cigarettes     Quit date: 1/9/2013     Years since quitting: 10.8    Smokeless tobacco: Never    Tobacco comments:     uses e cigs vaps quit 7 yrs ago  uses 0 nicotine   Vaping Use    Vaping Use: Some days   Substance and Sexual Activity    Alcohol use: No    Drug use: No    Sexual activity: Yes     Partners: Male     Birth control/protection: Post-menopausal   Other Topics Concern    Not on file   Social History Narrative    Not on file     Social Determinants of Health     Financial Resource Strain: Not on file   Food Insecurity: Not on file   Transportation Needs: Not on file   Physical Activity: Not on file   Stress: Not on file   Social Connections: Not on file   Intimate Partner Violence: Not on file   Housing Stability: Not on file       Objective     Vitals:    11/08/23 1233   BP: 120/84   Pulse: 82   Resp: 20   Temp: 98 °F (36.7 °C)   SpO2: 99%     Wt Readings from Last 3 Encounters:   11/08/23 111 kg (244 lb 9.6 oz)   08/03/23 108 kg (237 lb)   07/24/23 105 kg (231 lb)       Physical Exam  Vitals reviewed. Constitutional:       General: She is not in acute distress. Appearance: Normal appearance. She is not ill-appearing or toxic-appearing. HENT:      Head: Normocephalic. Right Ear: Tympanic membrane normal.      Left Ear: Tympanic membrane normal.      Nose: Congestion present. No rhinorrhea. Comments: Friability in right nares  Not actively bleeding. But has had nosebleeds recently - shortlived but heavy bleeding as per pt  Mild congestion       Mouth/Throat:      Mouth: Mucous membranes are moist.      Pharynx: Oropharynx is clear. No oropharyngeal exudate or posterior oropharyngeal erythema. Comments: Mucous membranes moist and pink  No tonsillar exudates    Eyes:      General: No scleral icterus. Conjunctiva/sclera: Conjunctivae normal.      Pupils: Pupils are equal, round, and reactive to light. Neck:      Vascular: No carotid bruit. Cardiovascular:      Rate and Rhythm: Normal rate and regular rhythm. Pulses: Normal pulses. Heart sounds: Normal heart sounds. No murmur heard. Pulmonary:      Effort: Pulmonary effort is normal. No respiratory distress. Breath sounds: Normal breath sounds. No stridor. No wheezing, rhonchi or rales. Abdominal:      General: Abdomen is flat. There is no distension. Palpations: There is no mass. Tenderness: There is no abdominal tenderness. There is no guarding or rebound. Musculoskeletal:         General: No swelling, tenderness or deformity. Cervical back: Normal range of motion and neck supple. No rigidity. No muscular tenderness. Right lower leg: No edema. Left lower leg: No edema. Comments: No calf tenderness   Lymphadenopathy:      Cervical: No cervical adenopathy. Skin:     General: Skin is warm.       Capillary Refill: Capillary refill takes less than 2 seconds. Coloration: Skin is not jaundiced or pale. Findings: No lesion or rash. Neurological:      General: No focal deficit present. Mental Status: She is alert and oriented to person, place, and time. Cranial Nerves: No cranial nerve deficit. Sensory: No sensory deficit. Motor: No weakness. Gait: Gait normal.      Deep Tendon Reflexes: Reflexes normal.   Psychiatric:         Mood and Affect: Mood normal.         Behavior: Behavior normal.         Thought Content:  Thought content normal.         Judgment: Judgment normal.         Pertinent Laboratory/Diagnostic Studies:  Lab Results   Component Value Date    BUN 16 04/13/2023    CREATININE 0.84 04/13/2023    CALCIUM 9.4 04/13/2023    K 4.8 04/13/2023    CO2 27 04/13/2023     04/13/2023     Lab Results   Component Value Date    ALT 62 04/13/2023    AST 38 04/13/2023    GGT 55 08/03/2018    ALKPHOS 124 (H) 04/13/2023       Lab Results   Component Value Date    WBC 7.85 11/02/2022    HGB 14.2 11/02/2022    HCT 44.6 11/02/2022    MCV 94 11/02/2022     11/02/2022       No results found for: "TSH"    No results found for: "CHOL"  Lab Results   Component Value Date    TRIG 278 (H) 04/13/2023     Lab Results   Component Value Date    HDL 51 04/13/2023     Lab Results   Component Value Date    LDLCALC 88 04/13/2023     Lab Results   Component Value Date    HGBA1C 5.4 09/21/2022       Results for orders placed or performed in visit on 04/13/23   Comprehensive metabolic panel   Result Value Ref Range    Sodium 134 (L) 135 - 147 mmol/L    Potassium 4.8 3.5 - 5.3 mmol/L    Chloride 106 96 - 108 mmol/L    CO2 27 21 - 32 mmol/L    ANION GAP 1 (L) 4 - 13 mmol/L    BUN 16 5 - 25 mg/dL    Creatinine 0.84 0.60 - 1.30 mg/dL    Glucose, Fasting 106 (H) 65 - 99 mg/dL    Calcium 9.4 8.3 - 10.1 mg/dL    AST 38 5 - 45 U/L    ALT 62 12 - 78 U/L    Alkaline Phosphatase 124 (H) 46 - 116 U/L    Total Protein 7.7 6.4 - 8.4 g/dL    Albumin 3.8 3.5 - 5.0 g/dL    Total Bilirubin 0.47 0.20 - 1.00 mg/dL    eGFR 76 ml/min/1.73sq m   Lipid panel   Result Value Ref Range    Cholesterol 195 See Comment mg/dL    Triglycerides 278 (H) See Comment mg/dL    HDL, Direct 51 >=50 mg/dL    LDL Calculated 88 0 - 100 mg/dL    Non-HDL-Chol (CHOL-HDL) 144 mg/dl       Orders Placed This Encounter   Procedures    influenza vaccine, quadrivalent, 0.5 mL, preservative-free, for adult and pediatric patients 6 mos+ (AFLURIA, FLUARIX, FLULAVAL, FLUZONE)       ALLERGIES:  Allergies   Allergen Reactions    Prednisone Shortness Of Breath    Dog Epithelium Throat Swelling    Alprazolam     Duloxetine     Escitalopram     Latex Rash    Omnipaque [Iohexol]      Pt erupted with rash all over body       Current Medications     Current Outpatient Medications   Medication Sig Dispense Refill    amLODIPine (NORVASC) 5 mg tablet Take 1 tablet (5 mg total) by mouth daily 90 tablet 0    Cholecalciferol (VITAMIN D3) 50 MCG (2000 UT) CHEW       famotidine (PEPCID) 40 MG tablet Take 1 tablet (40 mg total) by mouth daily 90 tablet 3    lisinopril (ZESTRIL) 40 mg tablet Take 1 tablet (40 mg total) by mouth daily 90 tablet 0    melatonin 1 mg Take 10 tablets by mouth daily at bedtime       omeprazole (PriLOSEC) 40 MG capsule Take 1 tab po daily in the am prn ; take 30 min prior to eating 90 capsule 0    triamcinolone (KENALOG) 0.5 % cream Apply to affected areas bid for up to 10 days prn ; avoid face/genitals. 30 g 0    Multiple Vitamins-Minerals (WOMENS MULTIVITAMIN) TABS Take by mouth       No current facility-administered medications for this visit.          Health Maintenance     Health Maintenance   Topic Date Due    COVID-19 Vaccine (1) Never done    Annual Physical  11/04/2022    Influenza Vaccine (1) 09/01/2023    BMI: Followup Plan  02/21/2024    Breast Cancer Screening: Mammogram  07/24/2024    BMI: Adult  08/03/2024    Depression Screening  11/08/2024    Colorectal Cancer Screening  02/09/2025    Cervical Cancer Screening  11/04/2025    DTaP,Tdap,and Td Vaccines (2 - Td or Tdap) 11/04/2031    HIV Screening  Completed    Hepatitis C Screening  Completed    Pneumococcal Vaccine: Pediatrics (0 to 5 Years) and At-Risk Patients (6 to 59 Years)  Aged Out    HIB Vaccine  Aged Out    IPV Vaccine  Aged Out    Hepatitis A Vaccine  Aged Out    Meningococcal ACWY Vaccine  Aged Out    HPV Vaccine  Aged [employer] History   Administered Date(s) Administered    INFLUENZA 11/09/2018, 01/10/2023    Influenza Quadrivalent, 6-35 Months IM 11/21/2017    Influenza, recombinant, quadrivalent,injectable, preservative free 11/09/2018, 10/22/2019, 10/07/2020, 11/04/2021, 01/10/2023    Tdap 11/04/2021       Depression Screening and Follow-up Plan: Patient was screened for depression during today's encounter. They screened negative with a PHQ-2 score of 0.       Haris Wilkins DO

## 2023-11-08 NOTE — PATIENT INSTRUCTIONS
Schedule with weight management     Reminder to get CT chest for lung cancer screening    Return to ENT for nose bleeds

## 2023-12-14 NOTE — H&P
History and Physical - [facility]  Ana Shalonda MaicolRanjana 61 y o  female MRN: [id number]                  HPI: Carolina Cat is a 61y o  year old female who presents for history of colon polyps      REVIEW OF SYSTEMS: Per the HPI, and otherwise unremarkable      Historical Information   Past Medical History:   Diagnosis Date    Acid reflux     Anxiety     CPAP (continuous positive airway pressure) dependence     GERD (gastroesophageal reflux disease)     Hypertension     Obesity     Palpable abdominal mass     LAST ASSESSED: 17    Sleep apnea      Past Surgical History:   Procedure Laterality Date    ABDOMINAL SURGERY      AUGMENTATION MAMMAPLASTY Bilateral     Bi retro pectoral saline    BREAST SURGERY      REDUCTION PROCEDURE     SECTION      CHOLECYSTECTOMY      CHOLECYSTECTOMY LAPAROSCOPIC      LIVER SURGERY      OR LAP, ERIKA RESTRICT PROC, LONGITUDINAL GASTRECTOMY N/A 2021    Procedure: Diagnostic Lap, Extensive Lysis of Adhesions;  Surgeon: Nilo Mcpherson MD;  Location: [facility];  Service: Bariatrics     Social History   Social History     Substance and Sexual Activity   Alcohol Use No     Social History     Substance and Sexual Activity   Drug Use No     Social History     Tobacco Use   Smoking Status Former Smoker    Quit date: 2013    Years since quittin 0   Smokeless Tobacco Never Used   Tobacco Comment    uses e cigs vaps quit 7 yrs ago  uses 0 nicotine     Family History   Problem Relation Age of Onset    Pancreatic cancer Mother 67    Prostate cancer Father 61    No Known Problems Sister     No Known Problems Daughter     No Known Problems Maternal Grandmother     No Known Problems Maternal Grandfather     No Known Problems Paternal Grandmother     No Known Problems Paternal Grandfather     No Known Problems Sister     No Known Problems Daughter     No Known Problems Maternal Aunt     No Known Problems Maternal Aunt     No Known Problems Paternal Aunt     No Known Problems Paternal Aunt     No Known Problems Paternal Aunt        Meds/Allergies       Current Outpatient Medications:     amLODIPine (NORVASC) 5 mg tablet    Biotin w/ Vitamins C & E (HAIR/SKIN/NAILS PO)    Cholecalciferol (VITAMIN D3) 50 MCG (2000 UT) CHEW    lisinopril (ZESTRIL) 40 mg tablet    melatonin 1 mg    Multiple Vitamins-Minerals (WOMENS MULTIVITAMIN) TABS    Naltrexone-buPROPion HCl ER 8-90 MG TB12    omeprazole (PriLOSEC) 40 MG capsule    Probiotic Product (PROBIOTIC ACIDOPHILUS BEADS PO)    tamsulosin (FLOMAX) 0 4 mg    Insulin Pen Needle (NovoFine) 32G X 6 MM MISC    Omega-3 Fatty Acids (OMEGA 3 500 PO)    Current Facility-Administered Medications:     sodium chloride 0 9 % infusion, 125 mL/hr, Intravenous, Continuous, 125 mL/hr at 02/10/22 5027    Facility-Administered Medications Ordered in Other Encounters:     sodium chloride 0 9 % infusion, , Intravenous, Continuous PRN, New Bag at 02/10/22 3465    Allergies   Allergen Reactions    Prednisone Shortness Of Breath    Alprazolam     Duloxetine     Escitalopram     Latex Rash    Omnipaque [Iohexol]      Pt erupted with rash all over body       Objective     /63   Pulse 88   Temp 97 7 °F (36 5 °C) (Temporal)   Resp 20   Ht 5' 3 5" (1 613 m)   Wt 99 3 kg (219 lb)   LMP 08/03/2018 (Approximate)   SpO2 96%   BMI 38 19 kg/m²       PHYSICAL EXAM    Gen: NAD  Head: NCAT  CV: RRR  CHEST: Clear  ABD: soft, NT/ND  EXT: no edema      ASSESSMENT/PLAN:  This is a 61y o  year old female here for colonoscopy, and she is stable and optimized for her procedure  Continue Regimen: Sotyktu Detail Level: Zone Render In Strict Bullet Format?: No

## 2023-12-15 ENCOUNTER — OFFICE VISIT (OUTPATIENT)
Dept: OBGYN CLINIC | Facility: CLINIC | Age: 58
End: 2023-12-15

## 2023-12-15 VITALS
WEIGHT: 219 LBS | BODY MASS INDEX: 37.39 KG/M2 | SYSTOLIC BLOOD PRESSURE: 118 MMHG | HEIGHT: 64 IN | DIASTOLIC BLOOD PRESSURE: 70 MMHG

## 2023-12-15 DIAGNOSIS — B07.0 PLANTAR WART OF RIGHT FOOT: Primary | ICD-10-CM

## 2023-12-15 NOTE — PROGRESS NOTES
Patient Name:  Elyse Iniguez  MRN:  [id number]    [id number] [address]     Right foot painful plantar mass. Likely plantar wart. Patient exhibits a painful mass on the plantar surface of the foot consistent with a plantar wart. Prescription for salicyclic gel provided today. Follow-up in 4 to 6 weeks with Dr. Robby Escudero. Chief Complaint     Right foot mass    History of the Present Illness     Elyse Ingiuez is a 60 y.o. female who reports to the office today for evaluation of her right foot. She notes an onset of pain a few days ago. She notes a painful "bump" on the plantar surface of her foot. She denies any recent injury or trauma. She states she was recently traveling in [place] over the past few months. Pain is worse with direct pressure as well as bearing weight namely. She denies any open wounds or drainage. She denies any numbness and tingling. No fevers or chills. Physical Exam     /70   Ht 5' 4" (1.626 m)   Wt 99.3 kg (219 lb)   LMP 08/03/2018 (Approximate)   BMI 37.59 kg/m²     Right foot: Skin intact. Small rough growth on the plantar surface of the midfoot appreciated without open wounds or drainage. Growth is tender to palpation. No tenderness or growth on the dorsum of the foot. Toes are warm and mobile. Full ankle range of motion without pain. Sensation is intact distally. 2+ DP pulse. Eyes: Anicteric sclerae. ENT: Trachea midline. Lungs: Normal respiratory effort. CV: Capillary refill is less than 2 seconds. Skin: Intact without erythema. Lymph: No palpable lymphadenopathy. Neuro: Sensation is grossly intact to light touch. Psych: Mood and affect are appropriate.     Past Medical History:   Diagnosis Date    Acid reflux     Anxiety     CPAP (continuous positive airway pressure) dependence     GERD (gastroesophageal reflux disease)     Hypertension     Obesity     Palpable abdominal mass     LAST ASSESSED: 12/11/17    Sleep apnea        Past Surgical History:   Procedure Laterality Date    ABDOMINOPLASTY      AUGMENTATION MAMMAPLASTY Bilateral 2007    Bi retro pectoral saline    BLEPHAROPLASTY      BLEPHAROPLASTY      2022 in [address] [place] [address]      LIVER SURGERY      repair of laceration    NECK SURGERY      liposuction - neck lift    AR LAPS [address] RSTRICTIV PX LONGITUDINAL GASTRECTOMY N/A 05/11/2021    Procedure: Diagnostic Lap, Extensive Lysis of Adhesions;  Surgeon: Andry Cagle MD;  Location: [facility];  Service: Bariatrics    TONSILLECTOMY         Allergies   Allergen Reactions    Prednisone Shortness Of Breath    Dog Epithelium Throat Swelling    Alprazolam     Duloxetine     Escitalopram     Latex Rash    Omnipaque [Iohexol]      Pt erupted with rash all over body       Current Outpatient Medications on File Prior to Visit   Medication Sig Dispense Refill    amLODIPine (NORVASC) 5 mg tablet Take 1 tablet (5 mg total) by mouth daily 90 tablet 0    Cholecalciferol (VITAMIN D3) 50 MCG (2000 UT) CHEW       famotidine (PEPCID) 40 MG tablet Take 1 tablet (40 mg total) by mouth daily 90 tablet 3    lisinopril (ZESTRIL) 40 mg tablet Take 1 tablet (40 mg total) by mouth daily 90 tablet 0    melatonin 1 mg Take 10 tablets by mouth daily at bedtime       omeprazole (PriLOSEC) 40 MG capsule Take 1 tab po daily in the am prn ; take 30 min prior to eating 90 capsule 0    triamcinolone (KENALOG) 0.5 % cream Apply to affected areas bid for up to 10 days prn ; avoid face/genitals. 30 g 0    Multiple Vitamins-Minerals (WOMENS MULTIVITAMIN) TABS Take by mouth       No current facility-administered medications on file prior to visit.        Social History     Tobacco Use    Smoking status: Former     Current packs/day: 0.00     Types: Cigarettes     Quit date: 1/9/2013     Years since quitting: 10.9    Smokeless tobacco: Never    Tobacco comments:     uses e cigs vaps quit 7 yrs ago  uses 0 nicotine   Vaping Use    Vaping status: Some Days   Substance Use Topics    Alcohol use: No    Drug use: No       Family History   Problem Relation Age of Onset    Pancreatic cancer Mother 67    Prostate cancer Father 61    No Known Problems Sister     No Known Problems Daughter     No Known Problems Maternal Grandmother     No Known Problems Maternal Grandfather     No Known Problems Paternal Grandmother     No Known Problems Paternal Grandfather     No Known Problems Sister     No Known Problems Daughter     No Known Problems Maternal Aunt     No Known Problems Maternal Aunt     No Known Problems Paternal Aunt     No Known Problems Paternal Aunt     No Known Problems Paternal Aunt        Review of Systems     As stated in the HPI. All other systems reviewed and are negative.

## 2023-12-31 DIAGNOSIS — I10 ESSENTIAL HYPERTENSION: ICD-10-CM

## 2023-12-31 DIAGNOSIS — K21.9 GASTROESOPHAGEAL REFLUX DISEASE, UNSPECIFIED WHETHER ESOPHAGITIS PRESENT: ICD-10-CM

## 2023-12-31 RX ORDER — LISINOPRIL 40 MG/1
40 TABLET ORAL DAILY
Qty: 90 TABLET | Refills: 0 | Status: SHIPPED | OUTPATIENT
Start: 2023-12-31

## 2023-12-31 RX ORDER — OMEPRAZOLE 40 MG/1
CAPSULE, DELAYED RELEASE ORAL
Qty: 90 CAPSULE | Refills: 0 | Status: SHIPPED | OUTPATIENT
Start: 2023-12-31

## 2023-12-31 RX ORDER — AMLODIPINE BESYLATE 5 MG/1
5 TABLET ORAL DAILY
Qty: 90 TABLET | Refills: 0 | Status: SHIPPED | OUTPATIENT
Start: 2023-12-31

## 2024-01-03 ENCOUNTER — OFFICE VISIT (OUTPATIENT)
Dept: GASTROENTEROLOGY | Facility: MEDICAL CENTER | Age: 59
End: 2024-01-03
Payer: COMMERCIAL

## 2024-01-03 VITALS
DIASTOLIC BLOOD PRESSURE: 97 MMHG | HEART RATE: 89 BPM | SYSTOLIC BLOOD PRESSURE: 140 MMHG | WEIGHT: 243.8 LBS | BODY MASS INDEX: 41.85 KG/M2 | TEMPERATURE: 98.6 F

## 2024-01-03 DIAGNOSIS — E66.01 CLASS 3 SEVERE OBESITY DUE TO EXCESS CALORIES WITH BODY MASS INDEX (BMI) OF 40.0 TO 44.9 IN ADULT, UNSPECIFIED WHETHER SERIOUS COMORBIDITY PRESENT (HCC): ICD-10-CM

## 2024-01-03 DIAGNOSIS — K21.9 GASTROESOPHAGEAL REFLUX DISEASE WITHOUT ESOPHAGITIS: Primary | ICD-10-CM

## 2024-01-03 DIAGNOSIS — K76.0 NAFLD (NONALCOHOLIC FATTY LIVER DISEASE): ICD-10-CM

## 2024-01-03 PROCEDURE — 99214 OFFICE O/P EST MOD 30 MIN: CPT | Performed by: INTERNAL MEDICINE

## 2024-01-03 NOTE — PATIENT INSTRUCTIONS
Enfermedad por reflujo gastroesofágico (ERGE)   CUIDADO AMBULATORIO:   La enfermedad por reflujo gastroesofágico (ERGE) es el reflujo que ocurre más de 2 veces a la semana leta varias semanas. Reflujo significa que el ácido y los alimentos en el estómago regresan al esófago. La ERGE puede causar otros problemas de maycol con el tiempo si no es tratada.       Causas frecuentes de la ERGE: La ERGE a menudo ocurre porque el músculo inferior (esfínter) del esófago no dianna martine. Sofía esfínter normalmente se abre para dejar pasar los alimentos al estómago. Luego se dianna para mantener los alimentos y el ácido estomacal dentro del estómago. Si el esfínter no dianna martine, el ácido y los alimentos regresan (reflujo) al esófago. Lo siguiente podría aumentar ordaz riesgo de la ERGE:  Ciertos alimentos raciel picantes, chocolate, alimentos que contengan cafeína, menta y alimentos fritos.    Hernia hiatal    Ciertos medicamentos raciel los antagonistas del calcio (utilizados para tratar la presión arterial ez), medicamentos para la alergia, sedantes o antidepresivos    Embarazo, obesidad o esclerodermia    Acostarse después de comer    Beber alcohol o fumar cigarrillos    Signos y síntomas:  Acidez (dolor con ardor en el pecho)    Dolor después de las comidas que se extiende al clarissa, la mandíbula o el hombro    Dolor que se raymond cuando cambia de posición    Sabor amargo o ácido en ordaz boca    Grace tos seca    Dificultad para tragar o dolor al tragar    Ronquera o dolor de garganta    Eructos o hipo    Sensación de llenura pronto después de empezar a comer    Llame al número de emergencias local (911 en los [place]) si:  Usted siente dolor deshawn en el pecho y dificultad repentina para respirar.      Busque atención médica de inmediato si:  Tiene dificultad para respirar después de vomitar.    Tiene dificultad para tragar o dolor al tragar.    Marycruz evacuaciones intestinales son de color marshall, con jazlyn, o de apariencia  alquitranada.    Ordaz vómito parece raciel café molido o contiene jazlyn.    Llame a ordaz médico o gastroenterólogo si:  Usted siente llenura y no puede eructar o vomitar.    Vomita grandes cantidades, o vomita con frecuencia.    Usted pierde peso sin proponérselo.    Marycruz síntomas empeoran o no mejoran con el tratamiento.    Usted tiene preguntas o inquietudes acerca de ordaz condición o cuidado.    Tratamiento para la ERGE:  Los medicamentos para disminuir el ácido estomacal. Los medicamentos también podrían usarse para ayudar a que ordaz esfínter esofágico inferior y ordaz estómago se contraigan (estrechen) más.    La cirugía se realiza para envolver la parte superior del estómago alrededor del esfínter esofágico. Titanic fortalecerá el esfínter y prevendrá el reflujo.    Control de la ERGE:      No consuma alimentos o bebidas que puedan aumentar la acidez. Estos incluyen chocolate, menta, comidas fritas o grasosas, bebidas que contienen cafeína o bebidas gaseosas. Otros alimentos incluyen comidas picantes, cebollas, tomates y alimentos a base de tomate. No consuma alimentos y bebidas que puedan irritar ordaz esófago, raciel las frutas cítricas, los jugos y las bebidas alcohólicas.    No ingiera comidas abundantes. Cuando usted come mucha comida a la vez, ordaz estómago necesita más ácido para digerirla. Consuma 6 comidas pequeñas al día en vez de 3 comidas grandes, y coma lentamente. No consuma alimentos entre 2 y 3 horas antes de acostarse.    Eleve la cabecera de ordaz cama. Coloque bloques de 6 pulgadas debajo de la cabecera de la estructura de ordaz cama. También podría usar más amie almohada para apoyar ordaz geovanni y hombros mientras duerme.    Mantenga un peso saludable. Si usted tiene sobrepeso, la pérdida de peso podría ayudar a aliviar los síntomas de la ERGE.    No fume. Fumar debilita el esfínter esofágico inferior y aumenta el riesgo de ERGE. Pida información a ordaz médico si usted actualmente fuma y necesita ayuda para dejar de fumar.  Los cigarrillos electrónicos o el tabaco sin humo igualmente contienen nicotina. Consulte con ordaz médico antes de utilizar estos productos.    No aplique presión sobre el abdomen. La presión empuja el ácido hacia el esófago. No use ropa que queda ajustada alrededor de la cintura. No se agache. Doble las rodillas para recoger algo.  Acuda a michael consultas de control con ordaz médico o gastroenterólogo según le indicaron: Anote michael preguntas para que se acuerde de hacerlas leta michael visitas.  © Copyright [employer] 2023 Information is for End User's use only and may not be sold, redistributed or otherwise used for commercial purposes.  Esta información es sólo para uso en educación. Ordaz intención no es darle un consejo médico sobre enfermedades o tratamientos. Colsulte con ordaz médico, enfermera o farmacéutico antes de seguir cualquier régimen médico para saber si es seguro y efectivo para usted.

## 2024-01-03 NOTE — PROGRESS NOTES
[facility] - Outpatient Follow-up Note  Veronica Fairbanks 58 y.o. female MRN: [id number]  Encounter: [id number]          ASSESSMENT AND PLAN:   58-year-old female with history of GERD, obesity, hypertension who presents for follow-up evaluation.    1. Gastroesophageal reflux disease without esophagitis  She has a history of chronic reflux and underwent EGD in 2020 which was overall normal other than fundic gland polyps and hiatal hernia.  She was recently seen by ENT for globus sensation underwent bedside laryngoscopy showing findings concerning for LPR.  She has been started on famotidine daily with complete resolution of her symptoms.  She has no indication for repeat EGD at this time but is interested in having EGD scheduled at the time of her colonoscopy which is due in 2025    2. Class 3 severe obesity due to excess calories with body mass index (BMI) of 40.0 to 44.9 in adult, unspecified whether serious comorbidity present (HCC)  She was previously started on GLP-1 agonist and had weight loss but had to stop occasion due to cost issues.  She was also seen by bariatric surgery and plan to undergo gastric weight loss surgery but this cannot be performed due to adhesive disease.  She is interested in referral to weight management to discuss additional options for weight loss.  - Ambulatory Referral to Weight Management; Future      3. NAFLD (nonalcoholic fatty liver disease)  She has a history of fatty liver likely due to nonalcoholic fatty liver disease given her risk factors of elevated BMI, hypertension.  Discussed the treatment includes healthy diet, regular exercise and controlling the underlying risk factors      Follow-up in 6 months    ______________________________________________________________________    SUBJECTIVE: 58-year-old female with history of GERD, obesity, hypertension who presents for follow-up evaluation.    She was last seen in the GI office in 2021.  At  that time she was due for colonoscopy for history of colon polyps and being followed for history of GERD and nonalcoholic fatty liver disease.    Interval history: She reports seeing ENT for globus sensation underwent laryngoscopy in the office which showed moderate cobblestoning on the posterior hypopharynx and moderate erythema of true and false vocal cords consistent with LPR.  She was started on Pepcid 40 mg daily which he has been taking at night with almost complete resolution of her symptoms.    She was previously seen by weight management and plan to undergo gastric surgery for weight loss but had an ex lap in 2021 showing adhesive disease and it was unable to be performed.  She states that she was taking injection of the GLP-1 medications but had to stop it due to cost issues    She underwent colonoscopy 2/2022 showing a 10 mm sigmoid polyp and 8 subcentimeter polyps.  Multiple polyps were tubular adenomas and serrated polyps and she was recommended repeat in 3 years      4/2023 liver enzymes are within normal limits other than alkaline phosphatase 124  She has no recent abdominal imaging available for review    Prior EGD/colonoscopy     2020 EGD showed small hiatal hernia and gastric polyps. Pathology showed fundic gland polyps, reactive gastropathy and normal duodenal biopsies.   She previously reported undergoing a colonoscopy approximately 2018 and had 2 polyps removed and was recommended repeat in 3 years.    I conducted the interview in [place].  I offered translation services to the patient.  The patient declined stating that they understood the encounter and recommendations.      REVIEW OF SYSTEMS IS OTHERWISE NEGATIVE.  10 point review of systems is negative other than stated as per HPI    Historical Information   Past Medical History:   Diagnosis Date    Acid reflux     Anxiety     CPAP (continuous positive airway pressure) dependence     GERD (gastroesophageal reflux disease)     Hypertension      Obesity     Palpable abdominal mass     LAST ASSESSED: 17    Sleep apnea      Past Surgical History:   Procedure Laterality Date    ABDOMINOPLASTY      AUGMENTATION MAMMAPLASTY Bilateral     Bi retro pectoral saline    BLEPHAROPLASTY      BLEPHAROPLASTY       in [facility]    BREAST SURGERY      REDUCTION PROCEDURE     SECTION      CHOLECYSTECTOMY LAPAROSCOPIC      LIVER SURGERY      repair of laceration    NECK SURGERY      liposuction - neck lift    NM LAPS GSTRC RSTRICTIV PX LONGITUDINAL GASTRECTOMY N/A 2021    Procedure: Diagnostic Lap, Extensive Lysis of Adhesions;  Surgeon: Dimas López MD;  Location: [facility];  Service: Bariatrics    TONSILLECTOMY       Social History   Social History     Substance and Sexual Activity   Alcohol Use No     Social History     Substance and Sexual Activity   Drug Use No     Social History     Tobacco Use   Smoking Status Former    Current packs/day: 0.00    Types: Cigarettes    Quit date: 2013    Years since quitting: 10.9   Smokeless Tobacco Never   Tobacco Comments    uses e cigs vaps quit 7 yrs ago  uses 0 nicotine     Family History   Problem Relation Age of Onset    Pancreatic cancer Mother 72    Prostate cancer Father 60    No Known Problems Sister     No Known Problems Daughter     No Known Problems Maternal Grandmother     No Known Problems Maternal Grandfather     No Known Problems Paternal Grandmother     No Known Problems Paternal Grandfather     No Known Problems Sister     No Known Problems Daughter     No Known Problems Maternal Aunt     No Known Problems Maternal Aunt     No Known Problems Paternal Aunt     No Known Problems Paternal Aunt     No Known Problems Paternal Aunt        Meds/Allergies       Current Outpatient Medications:     amLODIPine (NORVASC) 5 mg tablet    Cholecalciferol (VITAMIN D3) 50 MCG (2000) CHEW    famotidine (PEPCID) 40 MG tablet    lisinopril (ZESTRIL) 40 mg tablet    melatonin 1 mg    omeprazole  (PriLOSEC) 40 MG capsule    salicylic acid 17 % gel    triamcinolone (KENALOG) 0.5 % cream    Multiple Vitamins-Minerals (WOMENS MULTIVITAMIN) TABS    Allergies   Allergen Reactions    Prednisone Shortness Of Breath    Dog Epithelium Throat Swelling    Alprazolam     Duloxetine     Escitalopram     Latex Rash    Omnipaque [Iohexol]      Pt erupted with rash all over body           Objective     Blood pressure 140/97, pulse 89, temperature 98.6 °F (37 °C), weight 111 kg (243 lb 12.8 oz), last menstrual period 08/03/2018. Body mass index is 41.85 kg/m².      PHYSICAL EXAM:      General Appearance:   Alert, cooperative, no distress   HEENT:   Normocephalic, atraumatic, anicteric.     Neck:  Supple, symmetrical, trachea midline   Lungs:   espirations unlabored    Heart::   Regular rate and rhythm;   Abdomen:   Soft, non-tender, non-distended; normal bowel sounds; no masses, no organomegaly    Genitalia:   Deferred    Rectal:   Deferred    Extremities:  No cyanosis, clubbing or edema    Pulses:  2+ and symmetric    Skin:  No jaundice, rashes, or lesions    Lymph nodes:  No palpable cervical lymphadenopathy        Lab Results:   No visits with results within 1 Day(s) from this visit.   Latest known visit with results is:   Lab on 04/13/2023   Component Date Value    Sodium 04/13/2023 134 (L)     Potassium 04/13/2023 4.8     Chloride 04/13/2023 106     CO2 04/13/2023 27     ANION GAP 04/13/2023 1 (L)     BUN 04/13/2023 16     Creatinine 04/13/2023 0.84     Glucose, Fasting 04/13/2023 106 (H)     Calcium 04/13/2023 9.4     AST 04/13/2023 38     ALT 04/13/2023 62     Alkaline Phosphatase 04/13/2023 124 (H)     Total Protein 04/13/2023 7.7     Albumin 04/13/2023 3.8     Total Bilirubin 04/13/2023 0.47     eGFR 04/13/2023 76     Cholesterol 04/13/2023 195     Triglycerides 04/13/2023 278 (H)     HDL, Direct 04/13/2023 51     LDL Calculated 04/13/2023 88     Non-HDL-Chol (CHOL-HDL) 04/13/2023 144          Radiology Results:    No results found.

## 2024-01-04 ENCOUNTER — TELEPHONE (OUTPATIENT)
Dept: BARIATRICS | Facility: CLINIC | Age: 59
End: 2024-01-04

## 2024-01-05 ENCOUNTER — OFFICE VISIT (OUTPATIENT)
Dept: BARIATRICS | Facility: CLINIC | Age: 59
End: 2024-01-05
Payer: COMMERCIAL

## 2024-01-05 VITALS
SYSTOLIC BLOOD PRESSURE: 119 MMHG | HEIGHT: 63 IN | WEIGHT: 243.8 LBS | RESPIRATION RATE: 16 BRPM | HEART RATE: 85 BPM | DIASTOLIC BLOOD PRESSURE: 70 MMHG | BODY MASS INDEX: 43.2 KG/M2

## 2024-01-05 DIAGNOSIS — E66.01 OBESITY, CLASS III, BMI 40-49.9 (MORBID OBESITY) (HCC): Primary | ICD-10-CM

## 2024-01-05 DIAGNOSIS — G47.33 OSA (OBSTRUCTIVE SLEEP APNEA): ICD-10-CM

## 2024-01-05 DIAGNOSIS — I10 PRIMARY HYPERTENSION: ICD-10-CM

## 2024-01-05 DIAGNOSIS — K21.9 GASTROESOPHAGEAL REFLUX DISEASE, UNSPECIFIED WHETHER ESOPHAGITIS PRESENT: ICD-10-CM

## 2024-01-05 DIAGNOSIS — K76.0 NAFLD (NONALCOHOLIC FATTY LIVER DISEASE): ICD-10-CM

## 2024-01-05 PROCEDURE — 99214 OFFICE O/P EST MOD 30 MIN: CPT | Performed by: NURSE PRACTITIONER

## 2024-01-05 RX ORDER — TIRZEPATIDE 2.5 MG/.5ML
2.5 INJECTION, SOLUTION SUBCUTANEOUS WEEKLY
Qty: 2 ML | Refills: 0 | Status: SHIPPED | OUTPATIENT
Start: 2024-01-05 | End: 2024-02-02

## 2024-01-05 NOTE — PATIENT INSTRUCTIONS
- Start Zepbound 2.5 mg subcutaneously weekly. After you have taken the second pen, please give me an update, as we will likely increase the dose the next month if you are tolerating it well.    - Side effects of Zepbound include nausea, vomiting, diarrhea, or constipation. Keep an eye on your heart rate while on Zepbound. If you resting heart rate is greater than 100 beats per minutes, please notify me. If you develop severe abdominal pain, stop Zepbound and go to the emergency room, as that could be a sign of pancreatitis.     - Please notify me if you have surgery, upper endoscopy, or colonoscopy scheduled, as we typically hold Zepbound for one week prior to the procedure.

## 2024-01-07 PROBLEM — F41.1 GENERALIZED ANXIETY DISORDER: Status: RESOLVED | Noted: 2018-05-02 | Resolved: 2024-01-07

## 2024-01-07 PROBLEM — L98.9 SCALP LESION: Status: RESOLVED | Noted: 2019-05-09 | Resolved: 2024-01-07

## 2024-01-07 PROBLEM — Z01.810 PREOP CARDIOVASCULAR EXAM: Status: RESOLVED | Noted: 2021-04-16 | Resolved: 2024-01-07

## 2024-01-07 PROBLEM — J30.81 ALLERGIC RHINITIS DUE TO ANIMAL HAIR AND DANDER: Status: RESOLVED | Noted: 2022-09-06 | Resolved: 2024-01-07

## 2024-01-07 NOTE — ASSESSMENT & PLAN NOTE
- Taking pepcid and prilosec. May improve with weight loss and lifestyle modification. Continue management with prescribing provider.

## 2024-01-07 NOTE — ASSESSMENT & PLAN NOTE
- Taking norvasc. May improve with weight loss and lifestyle modification. Continue management with prescribing provider.

## 2024-01-07 NOTE — ASSESSMENT & PLAN NOTE
- Patient is pursuing Conservative Program  - Initial weight loss goal of 5-10% weight loss for improved health  - Previously following with the surgical program. Attempted and aborted sleeve gastrectomy.  S/P diagnostic laparoscopy, extensive lysis of adhesions on 5/11/2021 with Dr. Dimas López.  - Contrave 2 tablets twice a day tried in past, but was not helpful.   - Not a candidate for Qsymia or Topamax due to history of kidney stones.   - Was taking compounded semaglutide through another weight management program. This was helpful, but became too costly and needed to be discontinued.   - She would like to restart GLP-1 therapy.   - Patient denies personal history of pancreatitis. Patient also denies personal and family history of thyroid cancer and multiple endocrine neoplasia type 2 (MEN 2 tumor).   - Postmenopausal.  - She made an informed decision to start Zepbound.  - Start Zepbound 2.5 mg weekly. After you have taken the second pen, please give me an update, as we will likely increase the dose the next month if you are tolerating it well.  - Side effects of Zepbound include nausea, vomiting, diarrhea, or constipation. Keep an eye on your heart rate while on Zepbound. If you resting heart rate is greater than 100 beats per minutes, please notify me. If you develop severe abdominal pain, stop Zepbound and go to the emergency room, as that could be a sign of pancreatitis.   - Please notify me if you have surgery, upper endoscopy, or colonoscopy scheduled, as we typically hold Zepbound for one week prior to the procedure.    - Labs reviewed: Lipid and CMP 4/13/2023. TSH and A1C 9/21/2023. Trig, glucose, and alk phos elevated, which all may improve with weight loss. Remainder of the blood work within acceptable range.       Goals:  Do not skip meals.  Food log (ie.) www.Fishbowl.com,sparkpeople.com,loseit.com,EletrogÃƒÂ³es.com,etc. baritastic (use [employer] Diamond Technologies[employer].com, dietdoctor.com or smartphone lizeth  Bereket for recipes)  No sugary beverages. At least 64oz of water daily.  Increase physical activity by 10 minutes daily. Gradually increase physical activity to a goal of 5 days per week for 30 minutes of MODERATE intensity PLUS 2 days per week of FULL BODY resistance training (use smartphone apps SplitGigs, Home Workout, etc.)  Start food logging.  Increase plain water to goal of at least 64 oz daily.   Start Zepbound.

## 2024-01-08 ENCOUNTER — OFFICE VISIT (OUTPATIENT)
Dept: PODIATRY | Facility: CLINIC | Age: 59
End: 2024-01-08
Payer: COMMERCIAL

## 2024-01-08 VITALS
BODY MASS INDEX: 43.41 KG/M2 | SYSTOLIC BLOOD PRESSURE: 139 MMHG | HEART RATE: 83 BPM | WEIGHT: 245 LBS | DIASTOLIC BLOOD PRESSURE: 81 MMHG | HEIGHT: 63 IN

## 2024-01-08 DIAGNOSIS — M72.2 PLANTAR FASCIAL FIBROMATOSIS OF RIGHT FOOT: Primary | ICD-10-CM

## 2024-01-08 PROCEDURE — 99203 OFFICE O/P NEW LOW 30 MIN: CPT | Performed by: PODIATRIST

## 2024-01-08 RX ORDER — LIDOCAINE 50 MG/G
OINTMENT TOPICAL AS NEEDED
Qty: 50 G | Refills: 1 | Status: SHIPPED | OUTPATIENT
Start: 2024-01-08

## 2024-01-08 NOTE — PROGRESS NOTES
"Assessment/Plan:       Diagnoses and all orders for this visit:    Plantar fascial fibromatosis of right foot  -     Ambulatory Referral to Podiatry  -     lidocaine (XYLOCAINE) 5 % ointment; Apply topically as needed for mild pain      Diagnosis and options discussed with patient  Patient agreeable to the plan as stated below    The fibroma in her medial arch is TTP. Can try topical pain management as needed. We briefly discussed excision but this does come with risk of painful scar or recurrence. Pad in shoes.     Home therapy stretches given to patient in [place]    Subjective:      Patient ID: Veronica Fairbanks is a 58 y.o. female.    PAtient has a lump on her right foot, in the arch. She thinks it is a wart. It hurts when walking on it.        The following portions of the patient's history were reviewed and updated as appropriate: allergies, current medications, past family history, past medical history, past social history, past surgical history, and problem list.    Review of Systems    Constitutional: Negative.    Respiratory: Negative for cough and shortness of breath.    Gastrointestinal: Negative for diarrhea, nausea and vomiting.   Musculoskeletal: right foot pain  Skin: wart   Neurological: Negative for weakness, numbness and headaches.       Objective:      /81   Pulse 83   Ht 5' 3\" (1.6 m)   Wt 111 kg (245 lb)   LMP 08/03/2018 (Approximate)   BMI 43.40 kg/m²          Physical Exam  Vitals reviewed.   Constitutional:       Appearance: She is obese.   Cardiovascular:      Pulses: Normal pulses.   Musculoskeletal:         General: Tenderness (palpable mass on medial band of plantar fascia right foot) and deformity (pes planus) present.   Skin:     Findings: No lesion.   Neurological:      Mental Status: She is oriented to person, place, and time.           "

## 2024-01-08 NOTE — PATIENT INSTRUCTIONS
Ejercicios para la fascitis plantar   LO QUE NECESITA SABER:   Los ejercicios para la fascitis plantar ayudan a estirar la fascia plantar, los músculos de la pantorrilla y el tendón de Ashu. También ayudan a fortalecer los músculos que dan soporte a los talones y los pies. Los ejercicios y el estiramiento pueden ayudar a evitar que la fascitis plantar empeore o regrese.       INSTRUCCIONES SOBRE EL JOSE HOSPITALARIA:   Llame a ordaz médico o fisioterapeuta si:  Ordaz dolor e inflamación aumentan    Usted desarrolla un nuevo dolor en la rodilla, cadera o espalda.    Usted tiene preguntas o inquietudes acerca de ordaz condición o cuidado.    Cómo hacer ejercicios para la fascitis plantar: Pregunte a ordaz médico cuándo debe comenzar a hacer estos ejercicios y con qué frecuencia debe realizarlos.  Estiramiento en tabla inclinada: Párese sobre amie tabla inclinada con los dedos de los pies más elevados que ordaz talón. Presione el talón contra la tabla. Mantenga ordaz rodilla ligeramente flexionada. Mantenga esta posición leta 1 minuto. Repita 5 veces.         Estiramiento del talón: Párese derecho con las ankur sobre la pared. Coloque la pierna lesionada ligeramente detrás de la otra pierna. Mantenga los talones sobre el piso, inclínese hacia adelante y doble ambas rodillas. Sostenga está posición por 30 segundos.         Estiramiento de la pantorrilla: Párese derecho con las ankur sobre la pared. Wilkinson un paso hacia adelante de forma que ordaz pie no lastimado se encuentre en frente de ordaz pie lastimado. Mantenga la pierna de adelante doblada y la pierna de atrás derecha. Cuidadosamente inclínese hacia adelante hasta que sienta ordaz pantorrilla estirarse. Sostenga la tensión por 30 segundos y luego afloje.         Estiramiento de la fascia plantar sentado: Siéntese en amie superficie firme, raciel el piso o amie alfombra. Extienda las piernas hacia adelante. Eleve el pie lesionado unas pocas pulgadas por encima del suelo. Mantenga ordaz pierna  derecha. Agárrese los dedos del pie lesionado y llévelos hacia usted. Con la otra mano, tóquese la fascia plantar. Debe sentir que hace presión hacia afuera. Sostenga está posición por 30 segundos. Si no puede llegar a los dedos, coloque amie toalla o amie corbata alrededor de ordaz pie. Tire suavemente de la toalla o la corbata y flexione los dedos hacia usted.         Levantamiento de talones: Párese sobre la pierna lesionada. Levante la otra pierna del suelo. Agárrese de amie baranda o amie pared para mantener el equilibrio. Levántese lentamente sobre los dedos de la pierna lesionada. Sostenga está posición por 5 segundos. Baje el talón despacio hacia el piso.         Enrollar los dedos del pie: Coloque amie toalla sobre el piso. Ponga ordaz pie plano sobre la toalla. Agarre la toalla con los dedos del pie. Levante la toalla con los dedos del pie.         Golpecitos con el dedo anne del pie: Siéntese y coloque michael pies en posición plana sobre el piso. Mantenga ordaz talón en el suelo. Apunte con los dedos de ordaz pie hacia el techo. Con michael 4 dedos más pequeños apuntando hacia arriba, doble ordaz dedo anne hacia abajo y dé golpecitos suaves contra el piso. Dé entre 10 y 50 golpecitos. Apunte todos los 5 dedos del pie hacia el techo nuevamente. Esta vez, deje el dedo anne apuntando hacia arriba y dé golpecitos en el piso con los 4 dedos más pequeños. Dé entre carlos y cincuenta golpecitos cada vez.       Acuda a michael consultas de control con ordaz médico o fisioterapeuta según le indicaron: Anote michael preguntas para que se acuerde de hacerlas leta michael visitas.  © Copyright [employer] 2023 Information is for End User's use only and may not be sold, redistributed or otherwise used for commercial purposes.  Esta información es sólo para uso en educación. Ordaz intención no es darle un consejo médico sobre enfermedades o tratamientos. Colsulte con ordaz médico, enfermera o farmacéutico antes de seguir cualquier régimen médico para saber si es  seguro y efectivo para usted.

## 2024-01-11 ENCOUNTER — TELEPHONE (OUTPATIENT)
Dept: BARIATRICS | Facility: CLINIC | Age: 59
End: 2024-01-11

## 2024-01-19 ENCOUNTER — OFFICE VISIT (OUTPATIENT)
Dept: URGENT CARE | Age: 59
End: 2024-01-19
Payer: COMMERCIAL

## 2024-01-19 VITALS
SYSTOLIC BLOOD PRESSURE: 154 MMHG | RESPIRATION RATE: 18 BRPM | DIASTOLIC BLOOD PRESSURE: 100 MMHG | TEMPERATURE: 97.5 F | OXYGEN SATURATION: 95 % | HEART RATE: 85 BPM

## 2024-01-19 DIAGNOSIS — T78.40XA ALLERGIC REACTION, INITIAL ENCOUNTER: Primary | ICD-10-CM

## 2024-01-19 PROCEDURE — 99213 OFFICE O/P EST LOW 20 MIN: CPT

## 2024-01-19 RX ORDER — METHYLPREDNISOLONE 4 MG/1
TABLET ORAL
Qty: 21 TABLET | Refills: 0 | Status: SHIPPED | OUTPATIENT
Start: 2024-01-19

## 2024-01-19 NOTE — PROGRESS NOTES
[facility]        NAME: Veronica Fairbanks is a 58 y.o. female  : 1965    MRN: [id number]  DATE: 2024  TIME: 6:38 PM    Assessment and Plan   Allergic reaction, initial encounter [T78.40XA]  1. Allergic reaction, initial encounter  methylPREDNISolone 4 MG tablet therapy pack        Presents with upper left eyelid swelling and itching, believes she was biten by an insect at a home she was cleaning. No changes to vision, no discharge, swelling noted to upper eyelid    Patient Instructions       Follow up with PCP as needed    Chief Complaint     Chief Complaint   Patient presents with    Eye Problem     Sx started this afternoon. Puffy, swollen shut, red left eye.          History of Present Illness       Presents with upper left eyelid swelling and itching, believes she was biten by an insect at a home she was cleaning. No changes to vision, no discharge, swelling noted to upper eyelid    Eye Problem   Associated symptoms include itching. Pertinent negatives include no eye discharge, eye redness or photophobia.       Review of Systems   Review of Systems   Eyes:  Positive for itching. Negative for photophobia, discharge, redness and visual disturbance.   All other systems reviewed and are negative.        Current Medications       Current Outpatient Medications:     amLODIPine (NORVASC) 5 mg tablet, TAKE 1 TABLET (5 MG TOTAL) BY MOUTH DAILY., Disp: 90 tablet, Rfl: 0    Cholecalciferol (VITAMIN D3) 50 MCG ( UT) CHEW, , Disp: , Rfl:     famotidine (PEPCID) 40 MG tablet, Take 1 tablet (40 mg total) by mouth daily, Disp: 90 tablet, Rfl: 3    lidocaine (XYLOCAINE) 5 % ointment, Apply topically as needed for mild pain, Disp: 50 g, Rfl: 1    lisinopril (ZESTRIL) 40 mg tablet, TAKE 1 TABLET BY MOUTH EVERY DAY, Disp: 90 tablet, Rfl: 0    melatonin 1 mg, Take 10 tablets by mouth daily at bedtime , Disp: , Rfl:     methylPREDNISolone 4 MG tablet therapy pack, Use as directed on package,  Disp: 21 tablet, Rfl: 0    omeprazole (PriLOSEC) 40 MG capsule, TAKE 1 CAPSULE BY MOUTH EVERY DAY IN THE MORNING AS NEEDED 30 MINUTES BEFORE EATING, Disp: 90 capsule, Rfl: 0    salicylic acid 17 % gel, Apply topically daily, Disp: 14 g, Rfl: 0    tirzepatide (Zepbound) 2.5 mg/0.5 mL auto-injector, Inject 0.5 mL (2.5 mg total) under the skin once a week for 28 days, Disp: 2 mL, Rfl: 0    triamcinolone (KENALOG) 0.5 % cream, Apply to affected areas bid for up to 10 days prn ; avoid face/genitals., Disp: 30 g, Rfl: 0    Current Allergies     Allergies as of 2024 - Reviewed 2024   Allergen Reaction Noted    Prednisone Shortness Of Breath 2018    Dog epithelium Throat Swelling 2022    Alprazolam  2019    Duloxetine  2019    Escitalopram  2019    Latex Rash 12/15/2017    Omnipaque [iohexol]  2019            The following portions of the patient's history were reviewed and updated as appropriate: allergies, current medications, past family history, past medical history, past social history, past surgical history and problem list.     Past Medical History:   Diagnosis Date    Acid reflux     Anxiety     CPAP (continuous positive airway pressure) dependence     GERD (gastroesophageal reflux disease)     Hypertension     Obesity     Palpable abdominal mass     LAST ASSESSED: 17    Sleep apnea        Past Surgical History:   Procedure Laterality Date    ABDOMINOPLASTY      AUGMENTATION MAMMAPLASTY Bilateral     Bi retro pectoral saline    BLEPHAROPLASTY      BLEPHAROPLASTY       in [facility]    BREAST SURGERY      REDUCTION PROCEDURE     SECTION      CHOLECYSTECTOMY LAPAROSCOPIC      LIVER SURGERY      repair of laceration    NECK SURGERY      liposuction - neck lift    LA LAPS [facility] RSTRICTIV PX LONGITUDINAL GASTRECTOMY N/A 2021    Procedure: Diagnostic Lap, Extensive Lysis of Adhesions;  Surgeon: Dimas López MD;  Location: [facility];  Service:  Bariatrics    TONSILLECTOMY         Family History   Problem Relation Age of Onset    Pancreatic cancer Mother 72    Prostate cancer Father 60    No Known Problems Sister     No Known Problems Daughter     No Known Problems Maternal Grandmother     No Known Problems Maternal Grandfather     No Known Problems Paternal Grandmother     No Known Problems Paternal Grandfather     No Known Problems Sister     No Known Problems Daughter     No Known Problems Maternal Aunt     No Known Problems Maternal Aunt     No Known Problems Paternal Aunt     No Known Problems Paternal Aunt     No Known Problems Paternal Aunt          Medications have been verified.        Objective   /100   Pulse 85   Temp 97.5 °F (36.4 °C) (Tympanic)   Resp 18   LMP 08/03/2018 (Approximate)   SpO2 95%   Patient's last menstrual period was 08/03/2018 (approximate).       Physical Exam     Physical Exam  Vitals reviewed.   Constitutional:       Appearance: Normal appearance.   Eyes:     Neurological:      Mental Status: She is alert.

## 2024-01-27 ENCOUNTER — PATIENT MESSAGE (OUTPATIENT)
Dept: OBGYN CLINIC | Facility: MEDICAL CENTER | Age: 59
End: 2024-01-27

## 2024-01-27 DIAGNOSIS — Z01.419 ENCOUNTER FOR GYNECOLOGICAL EXAMINATION: Primary | ICD-10-CM

## 2024-01-27 DIAGNOSIS — Z12.31 ENCOUNTER FOR SCREENING MAMMOGRAM FOR BREAST CANCER: ICD-10-CM

## 2024-01-29 ENCOUNTER — TELEPHONE (OUTPATIENT)
Dept: OBGYN CLINIC | Facility: MEDICAL CENTER | Age: 59
End: 2024-01-29

## 2024-01-29 NOTE — TELEPHONE ENCOUNTER
Please call pt she is Sami speaking, she has to RS yearly she scheduled through my chart for 8/1 because her yearly last year was on 8/3 so it has to be a year and a day after.

## 2024-01-30 ENCOUNTER — APPOINTMENT (OUTPATIENT)
Dept: LAB | Age: 59
End: 2024-01-30
Payer: COMMERCIAL

## 2024-01-30 ENCOUNTER — TELEPHONE (OUTPATIENT)
Dept: BARIATRICS | Facility: CLINIC | Age: 59
End: 2024-01-30

## 2024-01-30 DIAGNOSIS — E66.01 OBESITY, CLASS III, BMI 40-49.9 (MORBID OBESITY) (HCC): ICD-10-CM

## 2024-01-30 DIAGNOSIS — K76.0 NAFLD (NONALCOHOLIC FATTY LIVER DISEASE): ICD-10-CM

## 2024-01-30 DIAGNOSIS — R73.01 ELEVATED FASTING GLUCOSE: ICD-10-CM

## 2024-01-30 LAB
EST. AVERAGE GLUCOSE BLD GHB EST-MCNC: 143 MG/DL
HBA1C MFR BLD: 6.6 %
INSULIN SERPL-ACNC: 28.65 UIU/ML (ref 1.9–23)

## 2024-01-30 PROCEDURE — 36415 COLL VENOUS BLD VENIPUNCTURE: CPT

## 2024-01-30 PROCEDURE — 3044F HG A1C LEVEL LT 7.0%: CPT | Performed by: PODIATRIST

## 2024-01-30 PROCEDURE — 83036 HEMOGLOBIN GLYCOSYLATED A1C: CPT

## 2024-01-30 PROCEDURE — 83525 ASSAY OF INSULIN: CPT

## 2024-01-30 NOTE — TELEPHONE ENCOUNTER
Patient was informed of the provider message via Moldovan. Patient stated that she will go for the labs asap.

## 2024-01-31 ENCOUNTER — TELEPHONE (OUTPATIENT)
Dept: BARIATRICS | Facility: CLINIC | Age: 59
End: 2024-01-31

## 2024-01-31 DIAGNOSIS — E11.69 DIABETES MELLITUS TYPE 2 IN OBESE: Primary | ICD-10-CM

## 2024-01-31 DIAGNOSIS — E66.9 DIABETES MELLITUS TYPE 2 IN OBESE: Primary | ICD-10-CM

## 2024-01-31 NOTE — TELEPHONE ENCOUNTER
Please let the patient know I reviewed her A1C and fasting insulin. Both were elevated and A1C is in range for diabetes. This will likely improve with weight loss. I have submitted Mounjaro to her pharmacy.     - Start Mounjaro 2.5 mg subcutaneously weekly. After you have taken the second pen, please give me an update, as we will likely increase the dose the next month if you are tolerating it well.    - Side effects of Mounjaro include nausea, vomiting, diarrhea, or constipation. Keep an eye on your heart rate while on Mounjaro. If you resting heart rate is greater than 100 beats per minutes, please notify me. If you develop severe abdominal pain, stop Mounjaro and go to the emergency room, as that could be a sign of pancreatitis.     - Please notify me if you have surgery, upper endoscopy, or colonoscopy scheduled, as we typically hold Mounjaro for one week prior to the procedure.

## 2024-02-01 ENCOUNTER — TELEPHONE (OUTPATIENT)
Age: 59
End: 2024-02-01

## 2024-02-01 NOTE — TELEPHONE ENCOUNTER
PA for mounjaro    Submitted via  []CMM-KEY    [x]Chrisripts-Case ID # [id number]   []Faxed to plan   []Other website    []Phone call Case ID #      Office notes sent, clinical questions answered. Awaiting determination

## 2024-02-01 NOTE — TELEPHONE ENCOUNTER
Patient phoned access center and with the help of a Nepali speaking , she requested that she be prescribed Monjaro, she is also requesting an insurance authorization.

## 2024-02-02 NOTE — TELEPHONE ENCOUNTER
PA for mounjaro Approved   Date(s) approved 2/1/24-2/1/27  Case #     Patient advised by [x] [employer] Message                      [] Phone call       Approval letter scanned into Media Yes

## 2024-02-15 ENCOUNTER — PATIENT MESSAGE (OUTPATIENT)
Dept: BARIATRICS | Facility: CLINIC | Age: 59
End: 2024-02-15

## 2024-02-15 ENCOUNTER — TELEPHONE (OUTPATIENT)
Age: 59
End: 2024-02-15

## 2024-02-15 DIAGNOSIS — E66.9 DIABETES MELLITUS TYPE 2 IN OBESE: Primary | ICD-10-CM

## 2024-02-15 DIAGNOSIS — E11.69 DIABETES MELLITUS TYPE 2 IN OBESE: Primary | ICD-10-CM

## 2024-02-21 ENCOUNTER — TELEPHONE (OUTPATIENT)
Age: 59
End: 2024-02-21

## 2024-02-21 DIAGNOSIS — I10 ESSENTIAL HYPERTENSION: ICD-10-CM

## 2024-02-21 PROBLEM — Z12.11 SCREENING FOR COLON CANCER: Status: RESOLVED | Noted: 2019-04-11 | Resolved: 2024-02-21

## 2024-02-21 RX ORDER — LISINOPRIL 40 MG/1
40 TABLET ORAL DAILY
Qty: 90 TABLET | Refills: 0 | Status: SHIPPED | OUTPATIENT
Start: 2024-02-21

## 2024-02-21 NOTE — TELEPHONE ENCOUNTER
Patient called in to reschedule her 3/5 appt as she is traveling, attempted to warm transfer, no staff available. Advised patient someone would reach out (will need an )

## 2024-02-22 ENCOUNTER — OFFICE VISIT (OUTPATIENT)
Dept: FAMILY MEDICINE CLINIC | Facility: CLINIC | Age: 59
End: 2024-02-22
Payer: COMMERCIAL

## 2024-02-22 VITALS
BODY MASS INDEX: 42.2 KG/M2 | SYSTOLIC BLOOD PRESSURE: 132 MMHG | HEART RATE: 91 BPM | DIASTOLIC BLOOD PRESSURE: 70 MMHG | WEIGHT: 238.2 LBS | OXYGEN SATURATION: 96 %

## 2024-02-22 DIAGNOSIS — F41.9 ANXIETY: Primary | ICD-10-CM

## 2024-02-22 DIAGNOSIS — E11.9 TYPE 2 DIABETES MELLITUS WITHOUT COMPLICATION, WITHOUT LONG-TERM CURRENT USE OF INSULIN (HCC): ICD-10-CM

## 2024-02-22 PROCEDURE — 99214 OFFICE O/P EST MOD 30 MIN: CPT | Performed by: INTERNAL MEDICINE

## 2024-02-22 NOTE — ASSESSMENT & PLAN NOTE
She has history of anxiety but now looks like she would like to hold off on medications.  She has her dog that helps her out with her symptoms a lot.

## 2024-02-22 NOTE — PROGRESS NOTES
Assessment/Plan:    Type 2 diabetes mellitus without complication, without long-term current use of insulin (HCC)  Continue Mounjaro current dose, she will follow-up with weight management taper up her dose.  Recheck in 3 months.  Lab Results   Component Value Date    HGBA1C 6.6 (H) 01/30/2024       Anxiety  She has history of anxiety but now looks like she would like to hold off on medications.  She has her dog that helps her out with her symptoms a lot.       Diagnoses and all orders for this visit:    Anxiety    Type 2 diabetes mellitus without complication, without long-term current use of insulin (HCC)          Subjective:      Patient ID: Veronica Fairbanks is a 58 y.o. female.    Patient came today to discuss blood work results that she recently had, she was diagnosed with new type 2 diabetes.  She was started on Mounjaro 5 mg weekly.  Tolerates medication well.  She also came to discuss her anxiety.    Diabetes  Hypoglycemia symptoms include nervousness/anxiousness. Pertinent negatives for hypoglycemia include no confusion.       The following portions of the patient's history were reviewed and updated as appropriate: allergies, current medications, past family history, past medical history, past social history, past surgical history, and problem list.    Review of Systems   Constitutional:  Negative for chills and fever.   Psychiatric/Behavioral:  Negative for agitation, confusion and dysphoric mood. The patient is nervous/anxious and is hyperactive.          Objective:      /70 (BP Location: Right arm, Patient Position: Sitting, Cuff Size: Large)   Pulse 91   Wt 108 kg (238 lb 3.2 oz)   LMP 08/03/2018 (Approximate)   SpO2 96%   BMI 42.20 kg/m²     Allergies   Allergen Reactions    Prednisone Shortness Of Breath    Dog Epithelium Throat Swelling    Alprazolam     Duloxetine     Escitalopram     Latex Rash    Omnipaque [Iohexol]      Pt erupted with rash all over body          Current Outpatient  Medications:     amLODIPine (NORVASC) 5 mg tablet, TAKE 1 TABLET (5 MG TOTAL) BY MOUTH DAILY., Disp: 90 tablet, Rfl: 0    Cholecalciferol (VITAMIN D3) 50 MCG (2000 UT) CHEW, , Disp: , Rfl:     famotidine (PEPCID) 40 MG tablet, Take 1 tablet (40 mg total) by mouth daily, Disp: 90 tablet, Rfl: 3    lidocaine (XYLOCAINE) 5 % ointment, Apply topically as needed for mild pain, Disp: 50 g, Rfl: 1    lisinopril (ZESTRIL) 40 mg tablet, TAKE 1 TABLET BY MOUTH EVERY DAY, Disp: 90 tablet, Rfl: 0    melatonin 1 mg, Take 10 tablets by mouth daily at bedtime , Disp: , Rfl:     omeprazole (PriLOSEC) 40 MG capsule, TAKE 1 CAPSULE BY MOUTH EVERY DAY IN THE MORNING AS NEEDED 30 MINUTES BEFORE EATING, Disp: 90 capsule, Rfl: 0    tirzepatide (Mounjaro) 5 MG/0.5ML, Inject 0.5 mL (5 mg total) under the skin every 7 days, Disp: 2 mL, Rfl: 0    methylPREDNISolone 4 MG tablet therapy pack, Use as directed on package (Patient not taking: Reported on 2/22/2024), Disp: 21 tablet, Rfl: 0    salicylic acid 17 % gel, Apply topically daily (Patient not taking: Reported on 2/22/2024), Disp: 14 g, Rfl: 0    triamcinolone (KENALOG) 0.5 % cream, Apply to affected areas bid for up to 10 days prn ; avoid face/genitals. (Patient not taking: Reported on 2/22/2024), Disp: 30 g, Rfl: 0     There are no Patient Instructions on file for this visit.        Physical Exam  Vitals reviewed.   Constitutional:       General: She is not in acute distress.     Appearance: She is not ill-appearing or toxic-appearing.   Cardiovascular:      Rate and Rhythm: Normal rate.   Pulmonary:      Effort: Pulmonary effort is normal.

## 2024-02-22 NOTE — LETTER
February 22, 2024     Patient: Veronica Fairbanks  YOB: 1965  Date of Visit: 2/22/2024      To Whom it May Concern:    Veronica Fairbanks is under my professional care. Veronica was seen in my office on 2/22/2024. Veronica has history of anxiety and she will benefit from emotional support/service dog.     If you have any questions or concerns, please don't hesitate to call.         Sincerely,          Candelario Castano MD        CC: No Recipients

## 2024-02-22 NOTE — ASSESSMENT & PLAN NOTE
Continue Mounjaro current dose, she will follow-up with weight management taper up her dose.  Recheck in 3 months.  Lab Results   Component Value Date    HGBA1C 6.6 (H) 01/30/2024

## 2024-02-27 ENCOUNTER — CLINICAL SUPPORT (OUTPATIENT)
Dept: BARIATRICS | Facility: CLINIC | Age: 59
End: 2024-02-27

## 2024-02-27 ENCOUNTER — TELEPHONE (OUTPATIENT)
Dept: BARIATRICS | Facility: CLINIC | Age: 59
End: 2024-02-27

## 2024-02-27 VITALS
TEMPERATURE: 98.2 F | SYSTOLIC BLOOD PRESSURE: 120 MMHG | HEIGHT: 63 IN | HEART RATE: 91 BPM | RESPIRATION RATE: 16 BRPM | BODY MASS INDEX: 42.06 KG/M2 | DIASTOLIC BLOOD PRESSURE: 80 MMHG | WEIGHT: 237.4 LBS

## 2024-02-27 DIAGNOSIS — E11.69 DIABETES MELLITUS TYPE 2 IN OBESE: ICD-10-CM

## 2024-02-27 DIAGNOSIS — E66.9 DIABETES MELLITUS TYPE 2 IN OBESE: ICD-10-CM

## 2024-02-27 DIAGNOSIS — R63.5 ABNORMAL WEIGHT GAIN: Primary | ICD-10-CM

## 2024-02-27 PROCEDURE — RECHECK

## 2024-02-27 NOTE — TELEPHONE ENCOUNTER
Patient came in for a nurse visit today. Patient states that [place] will be out of the [place] 3 mths. Patient stated that she has to go to [place] to take care of her dying aunt. Patient is requesting to see if the provider can send in a 3 mth of mounjaro to her [facility] pharmacies. Patient mentioned that she would like to take medication with her.

## 2024-02-27 NOTE — PROGRESS NOTES
Patient last visit weight:  Patient current visit weight:    If you are taking phentermine or other oral weight loss medications, are you experiencing any of the following symptoms:  Headache:   Blurred Vision:   Chest Pain:   Palpitations:  Insomnia:   SPECIFY ORAL MEDICATION AND DOSAGE:     If you are taking an injectable medication,  are you experiencing any of the following symptoms:  Bloating: NO  Nausea:NO  Vomiting: NO  Constipation: NO  Diarrhea:NO  SPECIFY INJECTABLE MEDICATION AND CURRENT DOSAGE: MOUNJARO      Vitals:    Is BP less than 100/60?NO  Is BP greater than 140/90?NO  Is HR greater than 100?NO  **If yes to any of the above, have patient relax and repeat in 5-10 minutes**    Repeat values:    Is BP less than 100/60?  Is BP greater than 140/90?  Is HR greater than 100?  **If values remain outside of ranges above, please consult provider for next steps**

## 2024-03-19 ENCOUNTER — TELEPHONE (OUTPATIENT)
Dept: FAMILY MEDICINE CLINIC | Facility: CLINIC | Age: 59
End: 2024-03-19

## 2024-03-28 ENCOUNTER — TELEPHONE (OUTPATIENT)
Age: 59
End: 2024-03-28

## 2024-03-28 ENCOUNTER — TELEPHONE (OUTPATIENT)
Dept: FAMILY MEDICINE CLINIC | Facility: CLINIC | Age: 59
End: 2024-03-28

## 2024-03-28 NOTE — TELEPHONE ENCOUNTER
Appointment scheduled with provider.    Reason: Office Visit    Symptoms: 4 month follow up for diabetes, medication review, patient will be leaving for [place].     Provider: Rachel Hinds PA-C     Date/Time: Friday 3/29/2024 at 11:40 am

## 2024-03-29 ENCOUNTER — OFFICE VISIT (OUTPATIENT)
Dept: FAMILY MEDICINE CLINIC | Facility: CLINIC | Age: 59
End: 2024-03-29
Payer: COMMERCIAL

## 2024-03-29 VITALS
TEMPERATURE: 97.3 F | OXYGEN SATURATION: 95 % | SYSTOLIC BLOOD PRESSURE: 120 MMHG | HEART RATE: 79 BPM | BODY MASS INDEX: 41.36 KG/M2 | RESPIRATION RATE: 16 BRPM | DIASTOLIC BLOOD PRESSURE: 80 MMHG | HEIGHT: 63 IN | WEIGHT: 233.4 LBS

## 2024-03-29 DIAGNOSIS — I10 PRIMARY HYPERTENSION: ICD-10-CM

## 2024-03-29 DIAGNOSIS — L30.9 ECZEMA, UNSPECIFIED TYPE: ICD-10-CM

## 2024-03-29 DIAGNOSIS — E11.9 TYPE 2 DIABETES MELLITUS WITHOUT COMPLICATION, WITHOUT LONG-TERM CURRENT USE OF INSULIN (HCC): Primary | ICD-10-CM

## 2024-03-29 DIAGNOSIS — E66.9 OBESITY (BMI 30-39.9): ICD-10-CM

## 2024-03-29 DIAGNOSIS — K76.0 NAFLD (NONALCOHOLIC FATTY LIVER DISEASE): ICD-10-CM

## 2024-03-29 DIAGNOSIS — E66.01 OBESITY, CLASS III, BMI 40-49.9 (MORBID OBESITY) (HCC): ICD-10-CM

## 2024-03-29 DIAGNOSIS — E78.2 MIXED HYPERLIPIDEMIA: ICD-10-CM

## 2024-03-29 PROCEDURE — 99214 OFFICE O/P EST MOD 30 MIN: CPT | Performed by: PHYSICIAN ASSISTANT

## 2024-03-29 RX ORDER — TRIAMCINOLONE ACETONIDE 5 MG/G
CREAM TOPICAL
Qty: 30 G | Refills: 0 | Status: SHIPPED | OUTPATIENT
Start: 2024-03-29

## 2024-03-29 RX ORDER — TRIAMCINOLONE ACETONIDE 5 MG/G
OINTMENT TOPICAL 2 TIMES DAILY PRN
Qty: 15 G | Refills: 0 | Status: SHIPPED | OUTPATIENT
Start: 2024-03-29

## 2024-03-29 NOTE — PROGRESS NOTES
FAMILY PRACTICE OFFICE VISIT  [facility] - [facility]       NAME: Veronica Fairbanks  AGE: 58 y.o. SEX: female       : 1965        MRN: [id number]    DATE: 2024  TIME: 1:05 PM    Assessment and Plan     Problem List Items Addressed This Visit          Cardiovascular and Mediastinum    Hypertension     Controlled.  Continue amlodipine 5 mg daily and lisinopril 40 mg daily.         Relevant Orders    CBC and differential    Comprehensive metabolic panel    Hemoglobin A1C    Lipid Panel with Direct LDL reflex    TSH, 3rd generation with Free T4 reflex    Albumin / creatinine urine ratio       Digestive    NAFLD (nonalcoholic fatty liver disease)     Working on weight loss with weight management currently.            Endocrine    Type 2 diabetes mellitus without complication, without long-term current use of insulin ([facility]) - Primary     Continue on Mounjaro 5 mg weekly.  Continue with weight management.  Follow-up in 3 months.  Lab Results   Component Value Date    HGBA1C 6.6 (H) 2024            Relevant Orders    CBC and differential    Comprehensive metabolic panel    Hemoglobin A1C    Lipid Panel with Direct LDL reflex    TSH, 3rd generation with Free T4 reflex    Albumin / creatinine urine ratio       Other    Obesity (BMI 30-39.9)    Relevant Orders    CBC and differential    Comprehensive metabolic panel    Hemoglobin A1C    Lipid Panel with Direct LDL reflex    TSH, 3rd generation with Free T4 reflex    Albumin / creatinine urine ratio    Obesity, Class III, BMI 40-49.9 (morbid obesity) ([facility])     Working with weight management, who is currently prescribing Mounjaro.  Continue to monitor.         Mixed hyperlipidemia     Not currently on a statin.  Last LDL about a year ago was 88.  Recheck with labs as ordered.         Relevant Orders    CBC and differential    Comprehensive metabolic panel    Hemoglobin A1C    Lipid Panel with Direct LDL reflex     TSH, 3rd generation with Free T4 reflex    Albumin / creatinine urine ratio     Other Visit Diagnoses       Eczema, unspecified type        Given refill on triamcinolone topicals to use as needed while away in [place].    Relevant Medications    triamcinolone (KENALOG) 0.5 % cream    triamcinolone (KENALOG) 0.5 % ointment            Hypertension  Controlled.  Continue amlodipine 5 mg daily and lisinopril 40 mg daily.    NAFLD (nonalcoholic fatty liver disease)  Working on weight loss with weight management currently.    Type 2 diabetes mellitus without complication, without long-term current use of insulin (HCC)  Continue on Mounjaro 5 mg weekly.  Continue with weight management.  Follow-up in 3 months.  Lab Results   Component Value Date    HGBA1C 6.6 (H) 01/30/2024       Obesity, Class III, BMI 40-49.9 (morbid obesity) (HCC)  Working with weight management, who is currently prescribing Mounjaro.  Continue to monitor.    Mixed hyperlipidemia  Not currently on a statin.  Last LDL about a year ago was 88.  Recheck with labs as ordered.      Patient Instructions   The patient was encouraged to check glucose fasting in the morning prior to eating.  We discussed that this blood sugar in the morning should be below 120.  If it can be kept below 100, that would be ideal.  I also encouraged to check blood sugar 2 hours after starting a meal.  The patient can vary which meal is checked after from day-to-day.  This number should remain below 180, but would ideally be below 140.            Chief Complaint     Chief Complaint   Patient presents with    Follow-up     Medication review.       History of Present Illness   Veronica Fairbanks is a 58 y.o.-year-old female who presents for follow-up on chronic conditions.     DM2 - on Mounjaro 5 mg weekly - Ac on 1/30/24 was 6.6% - notes that a lot of her last month has been difficult with stress from trying to help her aunts in [place] who have been transitioned to nursing homes  and seem to have been robbed by the government per patient - wants to know if she needs other medication for glucose    Hypertension - on amlodipine 5 mg daily and lisinopril 40 mg daily    GERD - on omeprazole 40 mg daily and Pepcid 40 mg daily.    Anxiety - not currently on any meds    NAFLD/obesity - working with weight management    Hyperlipidemia - last LDL was 88 in 4/2023 - not on a statin    Notes that she needs refills on triamcinolone cream and ointment that help when she is in [place] and rashes appear from allergies - notes that these have been helpful and she anticipates leaving soon for [facility] again          Review of Systems   Review of Systems   Respiratory:  Negative for shortness of breath.    Cardiovascular:  Negative for chest pain.   Endocrine: Negative for polydipsia and polyuria.   Genitourinary:  Negative for frequency.       Active Problem List     Patient Active Problem List   Diagnosis    Adrenal mass (HCC)    Hypertension    Obesity (BMI 30-39.9)    Acid reflux    NAFLD (nonalcoholic fatty liver disease)    LFT elevation    Obesity, Class III, BMI 40-49.9 (morbid obesity) (HCC)    NITISH (obstructive sleep apnea)    Kidney stones    Class 2 obesity due to excess calories with body mass index (BMI) of 39.0 to 39.9 in adult    Primary osteoarthritis of knee    Hyperproteinemia    Mixed hyperlipidemia    Epistaxis    Personal history of tobacco use, presenting hazards to health    Anxiety    Type 2 diabetes mellitus without complication, without long-term current use of insulin (HCC)         Past Medical History:  Past Medical History:   Diagnosis Date    Acid reflux     Anxiety     CPAP (continuous positive airway pressure) dependence     GERD (gastroesophageal reflux disease)     Hypertension     Obesity     Palpable abdominal mass     LAST ASSESSED: 12/11/17    Sleep apnea        Past Surgical History:  Past Surgical History:   Procedure Laterality Date    ABDOMINOPLASTY      AUGMENTATION  MAMMAPLASTY Bilateral     Bi retro pectoral saline    BLEPHAROPLASTY      BLEPHAROPLASTY       in [facility]    BREAST SURGERY      REDUCTION PROCEDURE     SECTION      CHOLECYSTECTOMY LAPAROSCOPIC      LIVER SURGERY      repair of laceration    NECK SURGERY      liposuction - neck lift    ND LAPS GSTRC RSTRICTIV PX LONGITUDINAL GASTRECTOMY N/A 2021    Procedure: Diagnostic Lap, Extensive Lysis of Adhesions;  Surgeon: Dimas López MD;  Location: [facility];  Service: Bariatrics    TONSILLECTOMY         Family History:  Family History   Problem Relation Age of Onset    Pancreatic cancer Mother 72    Prostate cancer Father 60    No Known Problems Sister     No Known Problems Daughter     No Known Problems Maternal Grandmother     No Known Problems Maternal Grandfather     No Known Problems Paternal Grandmother     No Known Problems Paternal Grandfather     No Known Problems Sister     No Known Problems Daughter     No Known Problems Maternal Aunt     No Known Problems Maternal Aunt     No Known Problems Paternal Aunt     No Known Problems Paternal Aunt     No Known Problems Paternal Aunt        Social History:  Social History     Socioeconomic History    Marital status: /Civil Union     Spouse name: Not on file    Number of children: Not on file    Years of education: Not on file    Highest education level: Not on file   Occupational History    Not on file   Tobacco Use    Smoking status: Former     Current packs/day: 0.00     Types: Cigarettes     Quit date: 2013     Years since quittin.2    Smokeless tobacco: Never    Tobacco comments:     uses e cigs vaps quit 7 yrs ago  uses 0 nicotine   Vaping Use    Vaping status: Some Days   Substance and Sexual Activity    Alcohol use: No    Drug use: No    Sexual activity: Yes     Partners: Male     Birth control/protection: Post-menopausal   Other Topics Concern    Not on file   Social History Narrative    Not on file     Social  "Determinants of Health     Financial Resource Strain: Not on file   Food Insecurity: Not on file   Transportation Needs: Not on file   Physical Activity: Not on file   Stress: Not on file   Social Connections: Not on file   Intimate Partner Violence: Not on file   Housing Stability: Not on file       Objective     Vitals:    03/29/24 1100   BP: 120/80   BP Location: Left arm   Cuff Size: Large   Pulse: 79   Resp: 16   Temp: (!) 97.3 °F (36.3 °C)   TempSrc: Tympanic   SpO2: 95%   Weight: 106 kg (233 lb 6.4 oz)   Height: 5' 2.5\" (1.588 m)     Wt Readings from Last 3 Encounters:   03/29/24 106 kg (233 lb 6.4 oz)   02/27/24 108 kg (237 lb 6.4 oz)   02/22/24 108 kg (238 lb 3.2 oz)       Physical Exam  Vitals reviewed.   Constitutional:       General: She is not in acute distress.     Appearance: Normal appearance. She is well-developed. She is obese. She is not ill-appearing.   HENT:      Head: Normocephalic and atraumatic.   Neck:      Thyroid: No thyromegaly.      Vascular: No carotid bruit.   Cardiovascular:      Rate and Rhythm: Normal rate and regular rhythm.      Pulses: Normal pulses.      Heart sounds: Normal heart sounds. No murmur heard.  Pulmonary:      Effort: Pulmonary effort is normal.      Breath sounds: Normal breath sounds. No wheezing, rhonchi or rales.   Musculoskeletal:      Cervical back: Neck supple.      Right lower leg: No edema.      Left lower leg: No edema.   Lymphadenopathy:      Cervical: No cervical adenopathy.   Skin:     General: Skin is warm and dry.   Neurological:      Mental Status: She is alert.   Psychiatric:         Mood and Affect: Mood normal.         Behavior: Behavior normal.         Thought Content: Thought content normal.         Judgment: Judgment normal.         Pertinent Laboratory/Diagnostic Studies:  Lab Results   Component Value Date    BUN 16 04/13/2023    CREATININE 0.84 04/13/2023    CALCIUM 9.4 04/13/2023    K 4.8 04/13/2023    CO2 27 04/13/2023     04/13/2023 " "    Lab Results   Component Value Date    ALT 62 04/13/2023    AST 38 04/13/2023    GGT 55 08/03/2018    ALKPHOS 124 (H) 04/13/2023       Lab Results   Component Value Date    WBC 7.85 11/02/2022    HGB 14.2 11/02/2022    HCT 44.6 11/02/2022    MCV 94 11/02/2022     11/02/2022       No results found for: \"TSH\"    No results found for: \"CHOL\"  Lab Results   Component Value Date    TRIG 278 (H) 04/13/2023     Lab Results   Component Value Date    HDL 51 04/13/2023     Lab Results   Component Value Date    LDLCALC 88 04/13/2023     Lab Results   Component Value Date    HGBA1C 6.6 (H) 01/30/2024       Results for orders placed or performed in visit on 01/30/24   Hemoglobin A1C   Result Value Ref Range    Hemoglobin A1C 6.6 (H) Normal 4.0-5.6%; PreDiabetic 5.7-6.4%; Diabetic >=6.5%; Glycemic control for adults with diabetes <7.0% %     mg/dl   Insulin, fasting   Result Value Ref Range    Insulin, Fasting 28.65 (H) 1.90 - 23.00 uIU/mL       Orders Placed This Encounter   Procedures    CBC and differential    Comprehensive metabolic panel    Hemoglobin A1C    Lipid Panel with Direct LDL reflex    TSH, 3rd generation with Free T4 reflex    Albumin / creatinine urine ratio       ALLERGIES:  Allergies   Allergen Reactions    Prednisone Shortness Of Breath    Dog Epithelium Throat Swelling    Alprazolam     Duloxetine     Escitalopram     Latex Rash    Omnipaque [Iohexol]      Pt erupted with rash all over body       Current Medications     Current Outpatient Medications   Medication Sig Dispense Refill    triamcinolone (KENALOG) 0.5 % cream Apply to affected areas bid for up to 10 days prn ; avoid face/genitals. 30 g 0    triamcinolone (KENALOG) 0.5 % ointment Apply topically 2 (two) times a day as needed for rash Avoid use on face and genitals. Do not use for over 2 weeks at a time. 15 g 0    amLODIPine (NORVASC) 5 mg tablet TAKE 1 TABLET (5 MG TOTAL) BY MOUTH DAILY. 90 tablet 0    Cholecalciferol (VITAMIN D3) " 50 MCG (2000 UT) CHEW       famotidine (PEPCID) 40 MG tablet Take 1 tablet (40 mg total) by mouth daily 90 tablet 3    lidocaine (XYLOCAINE) 5 % ointment Apply topically as needed for mild pain 50 g 1    lisinopril (ZESTRIL) 40 mg tablet TAKE 1 TABLET BY MOUTH EVERY DAY 90 tablet 0    melatonin 1 mg Take 10 tablets by mouth as needed      omeprazole (PriLOSEC) 40 MG capsule TAKE 1 CAPSULE BY MOUTH EVERY DAY IN THE MORNING AS NEEDED 30 MINUTES BEFORE EATING 90 capsule 0    salicylic acid 17 % gel Apply topically daily (Patient not taking: Reported on 2/22/2024) 14 g 0    tirzepatide (Mounjaro) 5 MG/0.5ML Inject 0.5 mL (5 mg total) under the skin every 7 days 6 mL 0     No current facility-administered medications for this visit.         Health Maintenance     Health Maintenance   Topic Date Due    Kidney Health Evaluation: Albumin/Creatinine Ratio  Never done    Pneumococcal Vaccine: Pediatrics (0 to 5 Years) and At-Risk Patients (6 to 64 Years) (1 of 2 - PCV) Never done    Diabetic Foot Exam  Never done    DM Eye Exam  Never done    Zoster Vaccine (1 of 2) Never done    COVID-19 Vaccine (1 - 2023-24 season) Never done    Kidney Health Evaluation: GFR  04/13/2024    Breast Cancer Screening: Mammogram  07/24/2024    HEMOGLOBIN A1C  07/30/2024    Depression Screening  11/08/2024    Annual Physical  11/08/2024    Colorectal Cancer Screening  02/09/2025    Cervical Cancer Screening  11/04/2025    DTaP,Tdap,and Td Vaccines (2 - Td or Tdap) 11/04/2031    HIV Screening  Completed    Hepatitis C Screening  Completed    Influenza Vaccine  Completed    HIB Vaccine  Aged Out    IPV Vaccine  Aged Out    Hepatitis A Vaccine  Aged Out    Meningococcal ACWY Vaccine  Aged Out    HPV Vaccine  Aged Out     Immunization History   Administered Date(s) Administered    INFLUENZA 11/09/2018, 01/10/2023    Influenza Quadrivalent, 6-35 Months IM 11/21/2017    Influenza, injectable, quadrivalent, preservative free 0.5 mL 11/08/2023     Influenza, recombinant, quadrivalent,injectable, preservative free 11/09/2018, 10/22/2019, 10/07/2020, 11/04/2021, 01/10/2023    Tdap 11/04/2021       Rachel Hinds PA-C  4/4/2024 1:05 PM  [facility]

## 2024-03-29 NOTE — PATIENT INSTRUCTIONS
The patient was encouraged to check glucose fasting in the morning prior to eating.  We discussed that this blood sugar in the morning should be below 120.  If it can be kept below 100, that would be ideal.  I also encouraged to check blood sugar 2 hours after starting a meal.  The patient can vary which meal is checked after from day-to-day.  This number should remain below 180, but would ideally be below 140.

## 2024-03-30 DIAGNOSIS — I10 ESSENTIAL HYPERTENSION: ICD-10-CM

## 2024-03-30 DIAGNOSIS — K21.9 GASTROESOPHAGEAL REFLUX DISEASE, UNSPECIFIED WHETHER ESOPHAGITIS PRESENT: ICD-10-CM

## 2024-03-30 RX ORDER — OMEPRAZOLE 40 MG/1
CAPSULE, DELAYED RELEASE ORAL
Qty: 90 CAPSULE | Refills: 0 | Status: SHIPPED | OUTPATIENT
Start: 2024-03-30

## 2024-03-30 RX ORDER — AMLODIPINE BESYLATE 5 MG/1
5 TABLET ORAL DAILY
Qty: 90 TABLET | Refills: 0 | Status: SHIPPED | OUTPATIENT
Start: 2024-03-30

## 2024-04-04 NOTE — ASSESSMENT & PLAN NOTE
Continue on Mounjaro 5 mg weekly.  Continue with weight management.  Follow-up in 3 months.  Lab Results   Component Value Date    HGBA1C 6.6 (H) 01/30/2024

## 2024-04-22 ENCOUNTER — TELEPHONE (OUTPATIENT)
Dept: HEMATOLOGY ONCOLOGY | Facility: CLINIC | Age: 59
End: 2024-04-22

## 2024-04-22 DIAGNOSIS — E27.8 ADRENAL MASS (HCC): Primary | ICD-10-CM

## 2024-04-22 NOTE — TELEPHONE ENCOUNTER
Patient Call    Who are you speaking with? Patient    If it is not the patient, are they listed on an active communication consent form? N/A   What is the reason for this call? Patient calling to request a new order for a CT abdomen wo contrast; she was last seen by Dr. Rivera 8/23/22 and was informed to have imaging done and schedule a follow.   Does this require a call back? Yes- requesting an  when office calls her.  won't be available    If a call back is required, please list best call back number [phone]- please call once order is placed in chart   If a call back is required, advise that a message will be forwarded to their care team and someone will return their call as soon as possible.   Did you relay this information to the patient? Yes

## 2024-04-23 ENCOUNTER — TELEPHONE (OUTPATIENT)
Dept: HEMATOLOGY ONCOLOGY | Facility: CLINIC | Age: 59
End: 2024-04-23

## 2024-04-23 NOTE — TELEPHONE ENCOUNTER
Appointment Schedule   Who are you speaking with? Patient   If it is not the patient, are they listed on an active communication consent form? Yes   Which provider is the appointment scheduled with? Dr. Rivera   At which location is the appointment scheduled for? [place]   When is the appointment scheduled?  Please list date and time 5/28/24   What is the reason for this appointment? CT fu   Did patient voice understanding of the details of this appointment? Yes   Was the no show policy reviewed with patient? Yes

## 2024-05-15 ENCOUNTER — OFFICE VISIT (OUTPATIENT)
Dept: BARIATRICS | Facility: CLINIC | Age: 59
End: 2024-05-15
Payer: COMMERCIAL

## 2024-05-15 VITALS
BODY MASS INDEX: 41.25 KG/M2 | WEIGHT: 232.8 LBS | DIASTOLIC BLOOD PRESSURE: 74 MMHG | SYSTOLIC BLOOD PRESSURE: 116 MMHG | HEIGHT: 63 IN | HEART RATE: 77 BPM | TEMPERATURE: 98.6 F

## 2024-05-15 DIAGNOSIS — K76.0 NAFLD (NONALCOHOLIC FATTY LIVER DISEASE): ICD-10-CM

## 2024-05-15 DIAGNOSIS — K21.9 GASTROESOPHAGEAL REFLUX DISEASE, UNSPECIFIED WHETHER ESOPHAGITIS PRESENT: ICD-10-CM

## 2024-05-15 DIAGNOSIS — G47.33 OSA (OBSTRUCTIVE SLEEP APNEA): ICD-10-CM

## 2024-05-15 DIAGNOSIS — B37.2 CANDIDAL SKIN INFECTION: ICD-10-CM

## 2024-05-15 DIAGNOSIS — I10 PRIMARY HYPERTENSION: ICD-10-CM

## 2024-05-15 DIAGNOSIS — E11.9 TYPE 2 DIABETES MELLITUS WITHOUT COMPLICATION, WITHOUT LONG-TERM CURRENT USE OF INSULIN (HCC): ICD-10-CM

## 2024-05-15 DIAGNOSIS — E66.01 OBESITY, CLASS III, BMI 40-49.9 (MORBID OBESITY) (HCC): Primary | ICD-10-CM

## 2024-05-15 PROCEDURE — 99214 OFFICE O/P EST MOD 30 MIN: CPT | Performed by: NURSE PRACTITIONER

## 2024-05-15 RX ORDER — NYSTATIN 100000 U/G
CREAM TOPICAL 2 TIMES DAILY PRN
Qty: 30 G | Refills: 0 | Status: SHIPPED | OUTPATIENT
Start: 2024-05-15

## 2024-05-15 NOTE — ASSESSMENT & PLAN NOTE
- Taking norvasc and lisinopril. May improve with weight loss and lifestyle modification. Continue management with prescribing provider.

## 2024-05-15 NOTE — ASSESSMENT & PLAN NOTE
- Taking Mounjaro. May improve with weight loss and lifestyle modification. Continue management with prescribing provider.     Lab Results   Component Value Date    HGBA1C 6.6 (H) 01/30/2024

## 2024-05-15 NOTE — ASSESSMENT & PLAN NOTE
Not currently using CPAP. Referral to sleep medicine placed and patient cancelled appointment.   May improve with weight loss.

## 2024-05-15 NOTE — PROGRESS NOTES
Assessment/Plan:     Obesity, Class III, BMI 40-49.9 (morbid obesity) (HCC)  - Patient is pursuing Conservative Program  - Initial weight loss goal of 5-10% weight loss for improved health  - Previously following with the surgical program. Attempted and aborted sleeve gastrectomy.  S/P diagnostic laparoscopy, extensive lysis of adhesions on 5/11/2021 with Dr. Dimas López.  - Contrave 2 tablets twice a day tried in past, but was not helpful.   - Not a candidate for Qsymia or Topamax due to history of kidney stones.   - Was taking compounded semaglutide through another weight management program. This was helpful, but became too costly and needed to be discontinued.   - Patient denies personal history of pancreatitis. Patient also denies personal and family history of thyroid cancer and multiple endocrine neoplasia type 2 (MEN 2 tumor).   - Postmenopausal.  - Zepbound was denied by insurance, but A1C was in diabetes range at 6.6 and therefore Mounjaro started Feb 2024 at weight of 243.8 lbs. Taking Mounjaro 5 mg weekly. Having constipation, helping with appetite after dose, but then starts to lose efficacy. She wants to increase dose. Increase to 7.5 mg weekly. Advised to use Dulcolax only as needed rather than nightly. Increase water intake to at least 64 oz daily, get 5-10 servings of fruit and vegetables daily and try over-the-counter fiber gummies. She will let me know if constipation continues to be an issue. Also discussed cost of Mounjaro, but she would like to continue. She is using the coupon. Reviewed patient assistance program.   - Labs reviewed: Fasting insulin and A1C 1/30/2024. Fasting insulin elevated and A1C in diabetes range at 6.6, which will all likely improve with weight loss.       Initial: 243.8 lbs BMI 43.19  Current: 232.8 lbs BMI 41.90  Change: -11 lbs  Goal: wants to be healthy and feel well and sleep well      Goals:  Do not skip meals. Try a protein bar or shake for breakfast. Handouts  given.  Start food logging.  1400 calories per day.  Increase plain water to goal of at least 64 oz daily.   Get 5-10 servings of fruit and vegetables daily.   Try over-the-counter fiber gummies instead of Dulcolax daily.  Increase Mounjaro.      Hypertension  - Taking norvasc and lisinopril. May improve with weight loss and lifestyle modification. Continue management with prescribing provider.       Acid reflux  - Taking pepcid and prilosec. May improve with weight loss and lifestyle modification. Continue management with prescribing provider.       NITISH (obstructive sleep apnea)  Not currently using CPAP. Referral to sleep medicine placed and patient cancelled appointment.   May improve with weight loss.     NAFLD (nonalcoholic fatty liver disease)  Will likely improve with weight loss.     Type 2 diabetes mellitus without complication, without long-term current use of insulin (HCC)  - Taking Mounjaro. May improve with weight loss and lifestyle modification. Continue management with prescribing provider.     Lab Results   Component Value Date    HGBA1C 6.6 (H) 01/30/2024          Veronica was seen today for follow-up.    Diagnoses and all orders for this visit:    Obesity, Class III, BMI 40-49.9 (morbid obesity) (HCC)    Candidal skin infection  -     nystatin (MYCOSTATIN) cream; Apply topically 2 (two) times a day as needed (Redness under breasts.) Redness under breasts.    Type 2 diabetes mellitus without complication, without long-term current use of insulin (HCC)  -     Discontinue: tirzepatide (Mounjaro) 7.5 MG/0.5ML; Inject 0.5 mL (7.5 mg total) under the skin every 7 days  -     tirzepatide (Mounjaro) 7.5 MG/0.5ML; Inject 0.5 mL (7.5 mg total) under the skin every 7 days    Primary hypertension    Gastroesophageal reflux disease, unspecified whether esophagitis present    NITISH (obstructive sleep apnea)    NAFLD (nonalcoholic fatty liver disease)               Follow up in approximately  2 month nurse visit and 5  months  with Non-Surgical Physician/Advanced Practitioner.    Subjective:   Chief Complaint   Patient presents with    Follow-up     MWM 4 MNTH F/U       Patient ID: Veronica Fairbanks  is a 59 y.o. female with excess weight/obesity here to pursue weight management.  Patient is pursuing Conservative Program. [employer]  declined by patient as she did not feel it was necessary.   Most recent notes and records were reviewed.    HPI    Wt Readings from Last 10 Encounters:   05/15/24 106 kg (232 lb 12.8 oz)   03/29/24 106 kg (233 lb 6.4 oz)   02/27/24 108 kg (237 lb 6.4 oz)   02/22/24 108 kg (238 lb 3.2 oz)   01/08/24 111 kg (245 lb)   01/05/24 111 kg (243 lb 12.8 oz)   01/03/24 111 kg (243 lb 12.8 oz)   12/15/23 99.3 kg (219 lb)   11/08/23 111 kg (244 lb 9.6 oz)   08/03/23 108 kg (237 lb)     Previously following with the surgical program. Attempted and aborted sleeve gastrectomy.  S/P diagnostic laparoscopy, extensive lysis of adhesions on 5/11/2021 with Dr. Dimas López. Had prior abdominal injury several years ago.    Previously following with [email or web address] for weight loss. Tried Contrave in the past 2 tablets twice a day for 3 months, but was not helpful. Was then switched to compounded semaglutide for 6 months, was helpful but became too expensive. Appetite has increased since being off of the compounded semaglutide.     Got Rybelsus 14 mg in [place], but never started it.     Taking Mounjaro 5 mg weekly, having difficulty with constipation. Taking Dulcolax nightly. Helping with appetite after taking the injection, but then tends to wear off. Mounjaro has been costly, especially when she needed a 3 month supply when traveling to [facility].     Not food logging.     B: skips  S: none  L: soup or chicken and veggies and portioned starch  S: none  D: protein and veggie and portioned starch  S: none        Hydration- at least 5 glass of water, herbal tea with splenda  Alcohol- none  Tobacco-  "denies  Exercise- walks dog daily   Sleep- Has NITISH, not currently using CPAP        Colonoscopy: UTD, due 2025  Mammogram: UTD, scheduled July 2024      The following portions of the patient's history were reviewed and updated as appropriate: allergies, current medications, past family history, past medical history, past social history, past surgical history, and problem list.    Family History   Problem Relation Age of Onset    Pancreatic cancer Mother 72    Prostate cancer Father 60    No Known Problems Sister     No Known Problems Daughter     No Known Problems Maternal Grandmother     No Known Problems Maternal Grandfather     No Known Problems Paternal Grandmother     No Known Problems Paternal Grandfather     No Known Problems Sister     No Known Problems Daughter     No Known Problems Maternal Aunt     No Known Problems Maternal Aunt     No Known Problems Paternal Aunt     No Known Problems Paternal Aunt     No Known Problems Paternal Aunt         Review of Systems   HENT:  Negative for sore throat.    Respiratory:  Negative for cough and shortness of breath.    Cardiovascular:  Negative for chest pain and palpitations.   Gastrointestinal:  Positive for abdominal pain (intermittent - thinks it may be from not eating in the morning) and constipation. Negative for diarrhea, nausea and vomiting.        Denies GERD    Musculoskeletal:  Positive for arthralgias (intermittent shoulder - responds to tylenol, advised to follow-up with PCP if it doesn't improve). Negative for back pain.   Skin:  Positive for rash (under breasts).   Psychiatric/Behavioral:          Denies depression and anxiety.       Objective:  /74   Pulse 77   Temp 98.6 °F (37 °C) (Tympanic)   Ht 5' 2.5\" (1.588 m)   Wt 106 kg (232 lb 12.8 oz)   LMP 08/03/2018 (Approximate)   BMI 41.90 kg/m²     Physical Exam  Vitals and nursing note reviewed.        Constitutional   General appearance: Abnormal.  well developed and morbidly obese.   Eyes " No conjunctival injection.   Ears, Nose, Mouth, and Throat Oral mucosa moist.   Pulmonary   Respiratory effort: No increased work of breathing or signs of respiratory distress.    Cardiovascular    Examination of extremities for edema and/or varicosities: Normal.  no edema.   Abdomen   Abdomen: Abnormal.  The abdomen was obese.    Musculoskeletal   Normal range of motion  Neurological   Gait and station: Normal.   Psychiatric   Orientation to person, place and time: Normal.    Affect: appropriate

## 2024-05-15 NOTE — PATIENT INSTRUCTIONS
Try taking Fiber Gummies instead of the Dulcolax regularly. Fiber gummies can be found over-the-counter at local pharmacies. If constipation continues to be an issue, please let me know.     Stay well hydrated with at least 64 oz of water daily.    Get 5-10 servings of fruit and vegetables per day.

## 2024-05-15 NOTE — ASSESSMENT & PLAN NOTE
- Patient is pursuing Conservative Program  - Initial weight loss goal of 5-10% weight loss for improved health  - Previously following with the surgical program. Attempted and aborted sleeve gastrectomy.  S/P diagnostic laparoscopy, extensive lysis of adhesions on 5/11/2021 with Dr. Dimas López.  - Contrave 2 tablets twice a day tried in past, but was not helpful.   - Not a candidate for Qsymia or Topamax due to history of kidney stones.   - Was taking compounded semaglutide through another weight management program. This was helpful, but became too costly and needed to be discontinued.   - Patient denies personal history of pancreatitis. Patient also denies personal and family history of thyroid cancer and multiple endocrine neoplasia type 2 (MEN 2 tumor).   - Postmenopausal.  - Zepbound was denied by insurance, but A1C was in diabetes range at 6.6 and therefore Mounjaro started Feb 2024 at weight of 243.8 lbs. Taking Mounjaro 5 mg weekly. Having constipation, helping with appetite after dose, but then starts to lose efficacy. She wants to increase dose. Increase to 7.5 mg weekly. Advised to use Dulcolax only as needed rather than nightly. Increase water intake to at least 64 oz daily, get 5-10 servings of fruit and vegetables daily and try over-the-counter fiber gummies. She will let me know if constipation continues to be an issue. Also discussed cost of Mounjaro, but she would like to continue. She is using the coupon. Reviewed patient assistance program.   - Labs reviewed: Fasting insulin and A1C 1/30/2024. Fasting insulin elevated and A1C in diabetes range at 6.6, which will all likely improve with weight loss.       Initial: 243.8 lbs BMI 43.19  Current: 232.8 lbs BMI 41.90  Change: -11 lbs  Goal: wants to be healthy and feel well and sleep well      Goals:  Do not skip meals. Try a protein bar or shake for breakfast. Handouts given.  Start food logging.  1400 calories per day.  Increase plain water to  goal of at least 64 oz daily.   Get 5-10 servings of fruit and vegetables daily.   Try over-the-counter fiber gummies instead of Dulcolax daily.  Increase Mounjaro.

## 2024-05-17 ENCOUNTER — HOSPITAL ENCOUNTER (OUTPATIENT)
Dept: CT IMAGING | Facility: HOSPITAL | Age: 59
Discharge: HOME | End: 2024-05-17
Attending: SURGERY
Payer: COMMERCIAL

## 2024-05-17 DIAGNOSIS — E27.8 ADRENAL MASS (HCC): ICD-10-CM

## 2024-05-17 PROCEDURE — 74150 CT ABDOMEN W/O CONTRAST: CPT

## 2024-05-21 ENCOUNTER — PATIENT MESSAGE (OUTPATIENT)
Dept: FAMILY MEDICINE CLINIC | Facility: CLINIC | Age: 59
End: 2024-05-21

## 2024-05-28 ENCOUNTER — OFFICE VISIT (OUTPATIENT)
Dept: SURGICAL ONCOLOGY | Facility: CLINIC | Age: 59
End: 2024-05-28
Payer: COMMERCIAL

## 2024-05-28 VITALS
HEART RATE: 75 BPM | BODY MASS INDEX: 40.93 KG/M2 | SYSTOLIC BLOOD PRESSURE: 110 MMHG | OXYGEN SATURATION: 97 % | TEMPERATURE: 97.5 F | HEIGHT: 63 IN | DIASTOLIC BLOOD PRESSURE: 82 MMHG | RESPIRATION RATE: 18 BRPM | WEIGHT: 231 LBS

## 2024-05-28 DIAGNOSIS — E27.8 ADRENAL MASS (HCC): Primary | ICD-10-CM

## 2024-05-28 PROCEDURE — 99213 OFFICE O/P EST LOW 20 MIN: CPT | Performed by: SURGERY

## 2024-05-28 NOTE — PROGRESS NOTES
Surgical Oncology Follow Up       [facility] [place]  [address] [facility] [address]  [phone]    Veronica Fairbanks  1965  [id number]  [facility] [place]  [address] [facility] [address]  [phone]    Diagnoses and all orders for this visit:    Adrenal mass (HCC)  -     CT abdomen wo contrast; Future        Chief Complaint   Patient presents with    Follow-up       Return in about 2 years (around 5/28/2026) for Office Visit, Imaging - See orders, with Stephanie.      Oncology History    No history exists.           History of Present Illness: Patient returns in follow-up of her adrenal mass.  She is feeling relatively well.  She no longer has the anxiety that she had previously.  She denies any abdominal pain, nausea or vomiting.  Her main complaint is her weight.  CT from May 17, 2024 reveals a stable 3.8 x 3.7 cm left adrenal nodule.  There is a nonobstructing right renal lower pole calculus.  There is also a left renal midpole calculus that is 1 mm in size.  I reviewed the films.  Her biochemical workup in the past has been negative.    Review of Systems  Complete ROS Surg Onc:   Complete ROS Surg Onc:   Constitutional: The patient denies new or recent history of general fatigue, no recent weight loss, no change in appetite.   Eyes: No complaints of visual problems, no scleral icterus.   ENT: no complaints of ear pain, no hoarseness, no difficulty swallowing,  no tinnitus and no new masses in head, oral cavity, or neck.   Cardiovascular: No complaints of chest pain, no palpitations, no ankle edema.   Respiratory: No complaints of shortness of breath, no cough.   Gastrointestinal: No complaints of jaundice, no bloody stools, no pale stools.   Genitourinary: No complaints of dysuria, no hematuria, no nocturia, no frequent urination, no urethral  discharge.   Musculoskeletal: No complaints of weakness, paralysis, joint stiffness or arthralgias.  Integumentary: No complaints of rash, no new lesions.   Neurological: No complaints of convulsions, no seizures, no dizziness.   Hematologic/Lymphatic: No complaints of easy bruising.   Endocrine:  No hot or cold intolerance.  No polydipsia, polyphagia, or polyuria.  Allergy/immunology:  No environmental allergies.  No food allergies.  Not immunocompromised.  Skin:  No pallor or rash.  No wound.        Patient Active Problem List   Diagnosis    Adrenal mass (HCC)    Hypertension    Obesity (BMI 30-39.9)    Acid reflux    NAFLD (nonalcoholic fatty liver disease)    LFT elevation    Obesity, Class III, BMI 40-49.9 (morbid obesity) (HCC)    NITISH (obstructive sleep apnea)    Kidney stones    Class 2 obesity due to excess calories with body mass index (BMI) of 39.0 to 39.9 in adult    Primary osteoarthritis of knee    Hyperproteinemia    Mixed hyperlipidemia    Epistaxis    Personal history of tobacco use, presenting hazards to health    Anxiety    Type 2 diabetes mellitus without complication, without long-term current use of insulin (HCC)     Past Medical History:   Diagnosis Date    Acid reflux     Anxiety     CPAP (continuous positive airway pressure) dependence     GERD (gastroesophageal reflux disease)     Hypertension     Obesity     Palpable abdominal mass     LAST ASSESSED: 17    Sleep apnea      Past Surgical History:   Procedure Laterality Date    ABDOMINOPLASTY      AUGMENTATION MAMMAPLASTY Bilateral     Bi retro pectoral saline    BLEPHAROPLASTY      BLEPHAROPLASTY       in [facility]    BREAST SURGERY      REDUCTION PROCEDURE     SECTION      CHOLECYSTECTOMY LAPAROSCOPIC      LIVER SURGERY      repair of laceration    NECK SURGERY      liposuction - neck lift    OH LAPS GSTRC RSTRICTIV PX LONGITUDINAL GASTRECTOMY N/A 2021    Procedure: Diagnostic Lap, Extensive Lysis of Adhesions;   Surgeon: Dimas López MD;  [facility] OR;  Service: Bariatrics    TONSILLECTOMY       Family History   Problem Relation Age of Onset    Pancreatic cancer Mother 72    Prostate cancer Father 60    No Known Problems Sister     No Known Problems Daughter     No Known Problems Maternal Grandmother     No Known Problems Maternal Grandfather     No Known Problems Paternal Grandmother     No Known Problems Paternal Grandfather     No Known Problems Sister     No Known Problems Daughter     No Known Problems Maternal Aunt     No Known Problems Maternal Aunt     No Known Problems Paternal Aunt     No Known Problems Paternal Aunt     No Known Problems Paternal Aunt      Social History     Socioeconomic History    Marital status: /Civil Union     Spouse name: Not on file    Number of children: Not on file    Years of education: Not on file    Highest education level: Not on file   Occupational History    Not on file   Tobacco Use    Smoking status: Former     Current packs/day: 0.00     Types: Cigarettes     Quit date: 2013     Years since quittin.3    Smokeless tobacco: Never    Tobacco comments:     uses e cigs vaps quit 7 yrs ago  uses 0 nicotine   Vaping Use    Vaping status: Some Days   Substance and Sexual Activity    Alcohol use: No    Drug use: No    Sexual activity: Yes     Partners: Male     Birth control/protection: Post-menopausal   Other Topics Concern    Not on file   Social History Narrative    Not on file     Social Determinants of Health     Financial Resource Strain: Not on file   Food Insecurity: Not on file   Transportation Needs: Not on file   Physical Activity: Not on file   Stress: Not on file   Social Connections: Not on file   Intimate Partner Violence: Not on file   Housing Stability: Not on file       Current Outpatient Medications:     amLODIPine (NORVASC) 5 mg tablet, TAKE 1 TABLET (5 MG TOTAL) BY MOUTH DAILY., Disp: 90 tablet, Rfl: 0    Cholecalciferol (VITAMIN D3) 50  MCG (2000 UT) CHEW, , Disp: , Rfl:     famotidine (PEPCID) 40 MG tablet, Take 1 tablet (40 mg total) by mouth daily, Disp: 90 tablet, Rfl: 0    lidocaine (XYLOCAINE) 5 % ointment, Apply topically as needed for mild pain, Disp: 50 g, Rfl: 1    lisinopril (ZESTRIL) 40 mg tablet, TAKE 1 TABLET BY MOUTH EVERY DAY, Disp: 90 tablet, Rfl: 0    melatonin 1 mg, Take 10 tablets by mouth as needed, Disp: , Rfl:     nystatin (MYCOSTATIN) cream, Apply topically 2 (two) times a day as needed (Redness under breasts.) Redness under breasts., Disp: 30 g, Rfl: 0    omeprazole (PriLOSEC) 40 MG capsule, TAKE 1 CAPSULE BY MOUTH EVERY DAY IN THE MORNING AS NEEDED 30 MINUTES BEFORE EATING, Disp: 90 capsule, Rfl: 0    tirzepatide (Mounjaro) 7.5 MG/0.5ML, Inject 0.5 mL (7.5 mg total) under the skin every 7 days, Disp: 2 mL, Rfl: 1    triamcinolone (KENALOG) 0.5 % cream, Apply to affected areas bid for up to 10 days prn ; avoid face/genitals., Disp: 30 g, Rfl: 0    triamcinolone (KENALOG) 0.5 % ointment, Apply topically 2 (two) times a day as needed for rash Avoid use on face and genitals. Do not use for over 2 weeks at a time., Disp: 15 g, Rfl: 0    salicylic acid 17 % gel, Apply topically daily (Patient not taking: Reported on 2/22/2024), Disp: 14 g, Rfl: 0  Allergies   Allergen Reactions    Prednisone Shortness Of Breath    Dog Epithelium Throat Swelling    Alprazolam     Duloxetine     Escitalopram     Latex Rash    Omnipaque [Iohexol]      Pt erupted with rash all over body     Vitals:    05/28/24 1107   BP: 110/82   Pulse: 75   Resp: 18   Temp: 97.5 °F (36.4 °C)   SpO2: 97%       Physical Exam  Constitutional: General appearance: The Patient is well-developed and well-nourished who appears the stated age in no acute distress. Patient is pleasant and talkative.     HEENT:  Normocephalic.  Sclerae are anicteric. Mucous membranes are moist.   Abdomen: Abdomen is soft, non-tender, non-distended and without masses.     Extremities: There is  no clubbing or cyanosis. There is no edema.  Symmetric.  Neuro: Grossly nonfocal. Gait is normal.     Lymphatic: No evidence of cervical adenopathy bilaterally.  Skin: Warm, anicteric.    Psych:  Patient is pleasant and talkative.  Breasts:        Pathology:  [unfilled]    Labs:      Imaging  CT abdomen wo contrast    Result Date: 5/22/2024  Narrative: CT - ABDOMEN WITHOUT IV CONTRAST INDICATION: E27.8: Other specified disorders of adrenal gland. COMPARISON: CT abdomen without contrast dated August 18, 2022. CT abdomen dated August 18, 2022. CT abdomen/pelvis dated July 5, 2019. CT abdomen pelvis dated November 28, 2017. TECHNIQUE: CT examination of the abdomen was performed without intravenous contrast. Multiplanar 2D reformatted images were created from the source data. This examination, like all CT scans performed in the [facility], was performed utilizing techniques to minimize radiation dose exposure, including the use of iterative reconstruction and automated exposure control. Radiation dose length product (DLP) for this visit: 557 mGy-cm Enteric Contrast: Not administered. FINDINGS: LOWER CHEST: No clinically significant abnormality in the visualized lower chest. LIVER/BILIARY TREE: Unremarkable. GALLBLADDER: Post cholecystectomy. SPLEEN: Unremarkable. PANCREAS: Unremarkable. ADRENAL GLANDS: There is continued stability in size of a left adrenal nodule measuring 3.8 x 3.7 cm. The right adrenal gland is unremarkable. KIDNEYS/VISUALIZED URETERS: No hydronephrosis. There is a 3 mm nonobstructing right renal lower pole calculus. A nonobstructing left renal midpole calculus measures 1 mm. Subcentimeter hypoattenuating renal lesion(s), too small to characterize but statistically likely benign, which do not warrant follow-up (Radiology June 2019). STOMACH AND VISUALIZED BOWEL: Unremarkable. ABDOMINAL CAVITY: No ascites. No pneumoperitoneum. No lymphadenopathy. VESSELS: Unremarkable for patient's  age. ABDOMINAL WALL: Unremarkable. BONES: No acute fracture or suspicious osseous lesion.     Impression: Continued stability in size of a left adrenal nodule dating back to a CT abdomen/pelvis performed on November 28, 2017 and compatible with a benign adrenal adenoma. No acute intra-abdominal abnormality. Bilateral subcentimeter nonobstructing intrarenal calculi. No hydronephrosis. Workstation performed: [id number]     I personally reviewed and interpreted the above laboratory and imaging data.    Discussion/Summary: 59-year-old female with a stable left adrenal mass.  This is not functioning.  I recommended continued observation to make sure that this does not increase in size.  We will plan on repeating her imaging in 2 years.  I will see her again at that time for another clinical exam.  She is agreeable to this.  All her questions were answered.

## 2024-05-28 NOTE — LETTER
May 28, 2024     Rachel Hinds PA-C  [address]   [address]  [facility] [address]    Patient: Veronica Fairbanks   YOB: 1965   Date of Visit: 5/28/2024       Dear Dr. Hinds:    Thank you for referring Veronica Fairbanks to me for evaluation. Below are my notes for this consultation.    If you have questions, please do not hesitate to call me. I look forward to following your patient along with you.         Sincerely,        Lazaro Rivera MD        CC: MD Lazaro Ross MD  5/28/2024 11:30 AM  Sign when Signing Visit               Surgical Oncology Follow Up       [facility] [address] [address]  [phone]    Veronica Fairbanks  1965  [id number]  [facility] [address] [address]  [phone]    Diagnoses and all orders for this visit:    Adrenal mass (HCC)  -     CT abdomen wo contrast; Future        Chief Complaint   Patient presents with   • Follow-up       Return in about 2 years (around 5/28/2026) for Office Visit, Imaging - See orders, with Stephanie.      Oncology History    No history exists.           History of Present Illness: Patient returns in follow-up of her adrenal mass.  She is feeling relatively well.  She no longer has the anxiety that she had previously.  She denies any abdominal pain, nausea or vomiting.  Her main complaint is her weight.  CT from May 17, 2024 reveals a stable 3.8 x 3.7 cm left adrenal nodule.  There is a nonobstructing right renal lower pole calculus.  There is also a left renal midpole calculus that is 1 mm in size.  I reviewed the films.  Her biochemical workup in the past has been negative.    Review of Systems  Complete ROS Surg Onc:   Complete ROS Surg Onc:   Constitutional: The patient denies new or recent history of general fatigue, no recent  weight loss, no change in appetite.   Eyes: No complaints of visual problems, no scleral icterus.   ENT: no complaints of ear pain, no hoarseness, no difficulty swallowing,  no tinnitus and no new masses in head, oral cavity, or neck.   Cardiovascular: No complaints of chest pain, no palpitations, no ankle edema.   Respiratory: No complaints of shortness of breath, no cough.   Gastrointestinal: No complaints of jaundice, no bloody stools, no pale stools.   Genitourinary: No complaints of dysuria, no hematuria, no nocturia, no frequent urination, no urethral discharge.   Musculoskeletal: No complaints of weakness, paralysis, joint stiffness or arthralgias.  Integumentary: No complaints of rash, no new lesions.   Neurological: No complaints of convulsions, no seizures, no dizziness.   Hematologic/Lymphatic: No complaints of easy bruising.   Endocrine:  No hot or cold intolerance.  No polydipsia, polyphagia, or polyuria.  Allergy/immunology:  No environmental allergies.  No food allergies.  Not immunocompromised.  Skin:  No pallor or rash.  No wound.        Patient Active Problem List   Diagnosis   • Adrenal mass (HCC)   • Hypertension   • Obesity (BMI 30-39.9)   • Acid reflux   • NAFLD (nonalcoholic fatty liver disease)   • LFT elevation   • Obesity, Class III, BMI 40-49.9 (morbid obesity) (HCC)   • NITISH (obstructive sleep apnea)   • Kidney stones   • Class 2 obesity due to excess calories with body mass index (BMI) of 39.0 to 39.9 in adult   • Primary osteoarthritis of knee   • Hyperproteinemia   • Mixed hyperlipidemia   • Epistaxis   • Personal history of tobacco use, presenting hazards to health   • Anxiety   • Type 2 diabetes mellitus without complication, without long-term current use of insulin (HCC)     Past Medical History:   Diagnosis Date   • Acid reflux    • Anxiety    • CPAP (continuous positive airway pressure) dependence    • GERD (gastroesophageal reflux disease)    • Hypertension    • Obesity    •  Palpable abdominal mass     LAST ASSESSED: 17   • Sleep apnea      Past Surgical History:   Procedure Laterality Date   • ABDOMINOPLASTY     • AUGMENTATION MAMMAPLASTY Bilateral     Bi retro pectoral saline   • BLEPHAROPLASTY     • BLEPHAROPLASTY       in [facility]   • BREAST SURGERY      REDUCTION PROCEDURE   •  SECTION     • CHOLECYSTECTOMY LAPAROSCOPIC     • LIVER SURGERY      repair of laceration   • NECK SURGERY      liposuction - neck lift   • NC LAPS GSTRC RSTRICTIV PX LONGITUDINAL GASTRECTOMY N/A 2021    Procedure: Diagnostic Lap, Extensive Lysis of Adhesions;  Surgeon: Dimas López MD;  Location: [facility];  Service: Bariatrics   • TONSILLECTOMY       Family History   Problem Relation Age of Onset   • Pancreatic cancer Mother 72   • Prostate cancer Father 60   • No Known Problems Sister    • No Known Problems Daughter    • No Known Problems Maternal Grandmother    • No Known Problems Maternal Grandfather    • No Known Problems Paternal Grandmother    • No Known Problems Paternal Grandfather    • No Known Problems Sister    • No Known Problems Daughter    • No Known Problems Maternal Aunt    • No Known Problems Maternal Aunt    • No Known Problems Paternal Aunt    • No Known Problems Paternal Aunt    • No Known Problems Paternal Aunt      Social History     Socioeconomic History   • Marital status: /Civil Union     Spouse name: Not on file   • Number of children: Not on file   • Years of education: Not on file   • Highest education level: Not on file   Occupational History   • Not on file   Tobacco Use   • Smoking status: Former     Current packs/day: 0.00     Types: Cigarettes     Quit date: 2013     Years since quittin.3   • Smokeless tobacco: Never   • Tobacco comments:     uses e cigs vaps quit 7 yrs ago  uses 0 nicotine   Vaping Use   • Vaping status: Some Days   Substance and Sexual Activity   • Alcohol use: No   • Drug use: No   • Sexual activity: Yes      Partners: Male     Birth control/protection: Post-menopausal   Other Topics Concern   • Not on file   Social History Narrative   • Not on file     Social Determinants of Health     Financial Resource Strain: Not on file   Food Insecurity: Not on file   Transportation Needs: Not on file   Physical Activity: Not on file   Stress: Not on file   Social Connections: Not on file   Intimate Partner Violence: Not on file   Housing Stability: Not on file       Current Outpatient Medications:   •  amLODIPine (NORVASC) 5 mg tablet, TAKE 1 TABLET (5 MG TOTAL) BY MOUTH DAILY., Disp: 90 tablet, Rfl: 0  •  Cholecalciferol (VITAMIN D3) 50 MCG (2000 UT) CHEW, , Disp: , Rfl:   •  famotidine (PEPCID) 40 MG tablet, Take 1 tablet (40 mg total) by mouth daily, Disp: 90 tablet, Rfl: 0  •  lidocaine (XYLOCAINE) 5 % ointment, Apply topically as needed for mild pain, Disp: 50 g, Rfl: 1  •  lisinopril (ZESTRIL) 40 mg tablet, TAKE 1 TABLET BY MOUTH EVERY DAY, Disp: 90 tablet, Rfl: 0  •  melatonin 1 mg, Take 10 tablets by mouth as needed, Disp: , Rfl:   •  nystatin (MYCOSTATIN) cream, Apply topically 2 (two) times a day as needed (Redness under breasts.) Redness under breasts., Disp: 30 g, Rfl: 0  •  omeprazole (PriLOSEC) 40 MG capsule, TAKE 1 CAPSULE BY MOUTH EVERY DAY IN THE MORNING AS NEEDED 30 MINUTES BEFORE EATING, Disp: 90 capsule, Rfl: 0  •  tirzepatide (Mounjaro) 7.5 MG/0.5ML, Inject 0.5 mL (7.5 mg total) under the skin every 7 days, Disp: 2 mL, Rfl: 1  •  triamcinolone (KENALOG) 0.5 % cream, Apply to affected areas bid for up to 10 days prn ; avoid face/genitals., Disp: 30 g, Rfl: 0  •  triamcinolone (KENALOG) 0.5 % ointment, Apply topically 2 (two) times a day as needed for rash Avoid use on face and genitals. Do not use for over 2 weeks at a time., Disp: 15 g, Rfl: 0  •  salicylic acid 17 % gel, Apply topically daily (Patient not taking: Reported on 2/22/2024), Disp: 14 g, Rfl: 0  Allergies   Allergen Reactions   • Prednisone  Shortness Of Breath   • Dog Epithelium Throat Swelling   • Alprazolam    • Duloxetine    • Escitalopram    • Latex Rash   • Omnipaque [Iohexol]      Pt erupted with rash all over body     Vitals:    05/28/24 1107   BP: 110/82   Pulse: 75   Resp: 18   Temp: 97.5 °F (36.4 °C)   SpO2: 97%       Physical Exam  Constitutional: General appearance: The Patient is well-developed and well-nourished who appears the stated age in no acute distress. Patient is pleasant and talkative.     HEENT:  Normocephalic.  Sclerae are anicteric. Mucous membranes are moist.   Abdomen: Abdomen is soft, non-tender, non-distended and without masses.     Extremities: There is no clubbing or cyanosis. There is no edema.  Symmetric.  Neuro: Grossly nonfocal. Gait is normal.     Lymphatic: No evidence of cervical adenopathy bilaterally.  Skin: Warm, anicteric.    Psych:  Patient is pleasant and talkative.  Breasts:        Pathology:  [unfilled]    Labs:      Imaging  CT abdomen wo contrast    Result Date: 5/22/2024  Narrative: CT - ABDOMEN WITHOUT IV CONTRAST INDICATION: E27.8: Other specified disorders of adrenal gland. COMPARISON: CT abdomen without contrast dated August 18, 2022. CT abdomen dated August 18, 2022. CT abdomen/pelvis dated July 5, 2019. CT abdomen pelvis dated November 28, 2017. TECHNIQUE: CT examination of the abdomen was performed without intravenous contrast. Multiplanar 2D reformatted images were created from the source data. This examination, like all CT scans performed in the [facility], was performed utilizing techniques to minimize radiation dose exposure, including the use of iterative reconstruction and automated exposure control. Radiation dose length product (DLP) for this visit: 557 mGy-cm Enteric Contrast: Not administered. FINDINGS: LOWER CHEST: No clinically significant abnormality in the visualized lower chest. LIVER/BILIARY TREE: Unremarkable. GALLBLADDER: Post cholecystectomy. SPLEEN:  Unremarkable. PANCREAS: Unremarkable. ADRENAL GLANDS: There is continued stability in size of a left adrenal nodule measuring 3.8 x 3.7 cm. The right adrenal gland is unremarkable. KIDNEYS/VISUALIZED URETERS: No hydronephrosis. There is a 3 mm nonobstructing right renal lower pole calculus. A nonobstructing left renal midpole calculus measures 1 mm. Subcentimeter hypoattenuating renal lesion(s), too small to characterize but statistically likely benign, which do not warrant follow-up (Radiology June 2019). STOMACH AND VISUALIZED BOWEL: Unremarkable. ABDOMINAL CAVITY: No ascites. No pneumoperitoneum. No lymphadenopathy. VESSELS: Unremarkable for patient's age. ABDOMINAL WALL: Unremarkable. BONES: No acute fracture or suspicious osseous lesion.     Impression: Continued stability in size of a left adrenal nodule dating back to a CT abdomen/pelvis performed on November 28, 2017 and compatible with a benign adrenal adenoma. No acute intra-abdominal abnormality. Bilateral subcentimeter nonobstructing intrarenal calculi. No hydronephrosis. Workstation performed: [id number]     I personally reviewed and interpreted the above laboratory and imaging data.    Discussion/Summary: 59-year-old female with a stable left adrenal mass.  This is not functioning.  I recommended continued observation to make sure that this does not increase in size.  We will plan on repeating her imaging in 2 years.  I will see her again at that time for another clinical exam.  She is agreeable to this.  All her questions were answered.

## 2024-05-30 DIAGNOSIS — E11.9 TYPE 2 DIABETES MELLITUS WITHOUT COMPLICATION, WITHOUT LONG-TERM CURRENT USE OF INSULIN (HCC): ICD-10-CM

## 2024-06-05 DIAGNOSIS — F41.9 ANXIETY: Primary | ICD-10-CM

## 2024-06-05 DIAGNOSIS — E11.9 TYPE 2 DIABETES MELLITUS WITHOUT COMPLICATION, WITHOUT LONG-TERM CURRENT USE OF INSULIN (HCC): ICD-10-CM

## 2024-06-05 RX ORDER — HYDROXYZINE HYDROCHLORIDE 25 MG/1
25 TABLET, FILM COATED ORAL EVERY 8 HOURS PRN
Qty: 30 TABLET | Refills: 0 | Status: SHIPPED | OUTPATIENT
Start: 2024-06-05

## 2024-06-24 DIAGNOSIS — K21.9 GASTROESOPHAGEAL REFLUX DISEASE, UNSPECIFIED WHETHER ESOPHAGITIS PRESENT: ICD-10-CM

## 2024-06-24 DIAGNOSIS — I10 ESSENTIAL HYPERTENSION: ICD-10-CM

## 2024-06-24 RX ORDER — LISINOPRIL 40 MG/1
40 TABLET ORAL DAILY
Qty: 30 TABLET | Refills: 0 | Status: SHIPPED | OUTPATIENT
Start: 2024-06-24

## 2024-06-24 RX ORDER — OMEPRAZOLE 40 MG/1
CAPSULE, DELAYED RELEASE ORAL
Qty: 90 CAPSULE | Refills: 1 | Status: SHIPPED | OUTPATIENT
Start: 2024-06-24

## 2024-07-08 DIAGNOSIS — I10 ESSENTIAL HYPERTENSION: ICD-10-CM

## 2024-07-09 DIAGNOSIS — I10 ESSENTIAL HYPERTENSION: ICD-10-CM

## 2024-07-09 RX ORDER — LISINOPRIL 40 MG/1
TABLET ORAL
Qty: 90 TABLET | Refills: 1 | Status: SHIPPED | OUTPATIENT
Start: 2024-07-09

## 2024-07-10 RX ORDER — AMLODIPINE BESYLATE 5 MG/1
5 TABLET ORAL DAILY
Qty: 100 TABLET | Refills: 1 | Status: SHIPPED | OUTPATIENT
Start: 2024-07-10

## 2024-07-22 ENCOUNTER — TELEPHONE (OUTPATIENT)
Dept: LAB | Facility: HOSPITAL | Age: 59
End: 2024-07-22

## 2024-08-05 ENCOUNTER — CLINICAL SUPPORT (OUTPATIENT)
Dept: BARIATRICS | Facility: CLINIC | Age: 59
End: 2024-08-05

## 2024-08-05 VITALS
HEIGHT: 63 IN | RESPIRATION RATE: 16 BRPM | TEMPERATURE: 97.4 F | DIASTOLIC BLOOD PRESSURE: 80 MMHG | WEIGHT: 233.8 LBS | HEART RATE: 84 BPM | BODY MASS INDEX: 41.43 KG/M2 | SYSTOLIC BLOOD PRESSURE: 138 MMHG

## 2024-08-05 DIAGNOSIS — R63.5 ABNORMAL WEIGHT GAIN: Primary | ICD-10-CM

## 2024-08-05 DIAGNOSIS — E11.9 TYPE 2 DIABETES MELLITUS WITHOUT COMPLICATION, WITHOUT LONG-TERM CURRENT USE OF INSULIN (HCC): Primary | ICD-10-CM

## 2024-08-05 PROCEDURE — RECHECK

## 2024-08-05 NOTE — PROGRESS NOTES
Patient last visit weight: 232 lb 12.8oz  Patient current visit weight: 233.8lb    If you are taking phentermine or other oral weight loss medications, are you experiencing any of the following symptoms:  Headache:   Blurred Vision:   Chest Pain:   Palpitations:  Insomnia:   SPECIFY ORAL MEDICATION AND DOSAGE:     If you are taking an injectable medication,  are you experiencing any of the following symptoms:  Bloating: NO  Nausea:NO  Vomiting: NO  Constipation: NO  Diarrhea:NO  SPECIFY INJECTABLE MEDICATION AND CURRENT DOSAGE: MOUNJARO 7.5MG PATIENT TOLERATED DOSE WITHOUT ANY INCIDENT- PATIENT REQUESTING A DOSE INCREASE      Vitals:    Is BP less than 100/60?NO  Is BP greater than 140/90?NO  Is HR greater than 100?NO  **If yes to any of the above, have patient relax and repeat in 5-10 minutes**    Repeat values:    Is BP less than 100/60?  Is BP greater than 140/90?  Is HR greater than 100?  **If values remain outside of ranges above, please consult provider for next steps**

## 2024-08-09 ENCOUNTER — TELEPHONE (OUTPATIENT)
Age: 59
End: 2024-08-09

## 2024-08-09 NOTE — TELEPHONE ENCOUNTER
Pt calling to inform called [facility] to get Mounjaro 10 mg and pt was told the medication is back order. Pt is wondering if there is any substitute that she can take instead. Pt has 3 pen left of Mounjaro 7.5 mg. Pt would not like to stop the medication. Pt was advised to call around other pharmacies and pt agreed.

## 2024-08-30 ENCOUNTER — TELEPHONE (OUTPATIENT)
Age: 59
End: 2024-08-30

## 2024-08-30 DIAGNOSIS — E11.9 TYPE 2 DIABETES MELLITUS WITHOUT COMPLICATION, WITHOUT LONG-TERM CURRENT USE OF INSULIN (HCC): ICD-10-CM

## 2024-08-30 NOTE — TELEPHONE ENCOUNTER
Pt is calling to see if she can have a sooner appt. Pt is getting a month supply on 9/2. She's saying her appt is too far away she will need her injection before 10/31/2024. Please contact pt if she's able to be seen sooner in October.

## 2024-09-27 DIAGNOSIS — E11.9 TYPE 2 DIABETES MELLITUS WITHOUT COMPLICATION, WITHOUT LONG-TERM CURRENT USE OF INSULIN (HCC): ICD-10-CM

## 2024-09-30 ENCOUNTER — TELEPHONE (OUTPATIENT)
Age: 59
End: 2024-09-30

## 2024-09-30 ENCOUNTER — PREP FOR PROCEDURE (OUTPATIENT)
Age: 59
End: 2024-09-30

## 2024-09-30 DIAGNOSIS — K21.9 GASTROESOPHAGEAL REFLUX DISEASE WITHOUT ESOPHAGITIS: Primary | ICD-10-CM

## 2024-09-30 DIAGNOSIS — Z86.0100 HISTORY OF COLON POLYPS: ICD-10-CM

## 2024-09-30 NOTE — TELEPHONE ENCOUNTER
Patient is very confuse on which dose she should get at the pharmacy. I explain to patient she as both the 7.5mg and 10mg of Mounjaro. Patient requesting to cancel the 7.5mg and only refill the 10mg. Patient is also asking if someone can call the pharmacy and tell them know to refill the 10mg.

## 2024-09-30 NOTE — TELEPHONE ENCOUNTER
09/30/24  Screened by: Tg Riggs MA    Referring Provider Rachel Hinds    Pre- Screening:     There is no height or weight on file to calculate BMI.42.08  Has patient been referred for a routine screening Colonoscopy? yes  Is the patient between 45-75 years old? yes      Previous Colonoscopy yes   If yes:    Date: 2020    Facility:     Reason:       Does the patient want to see a Gastroenterologist prior to their procedure OR are they having any GI symptoms? no    Has the patient been hospitalized or had abdominal surgery in the past 6 months? no    Does the patient use supplemental oxygen? no    Does the patient take Coumadin, Lovenox, Plavix, Elliquis, Xarelto, or other blood thinning medication? no    Has the patient had a stroke, cardiac event, or stent placed in the past year? no      If patient is between 45yrs - 49yrs, please advise patient that we will have to confirm benefits & coverage with their insurance company for a routine screening colonoscopy.

## 2024-09-30 NOTE — LETTER
Jorge Luis Martin,     Por favor daniella instrucciones para ordaz preparación de ordaz procedimiento él 2/11/25 con Dr. Jaiyeola.  Si tiene alguna pregunta o evie por favor Llámenos al [phone].        Freddie.  [facility] Gastroenter[facility] , Colon & Rectal Cirugia                                                                            Instrucciones de medicamentos para adultos con diabetes   que deben someterse a amie preparación intestinal.   Medicine Instructions for Adults with Diabetes who Need a Bowel Prep    Siga estas instrucciones cuando se requiera amie PREPARACIÓN INTESTINAL para ordaz procedimiento médico o cirugía.    NOTA:   Agonistas del receptor del GLP-1 que se jameson de forma semanal: no los tome por 7 días antes del procedimiento médico.   Inhibidores del SGLT-2: no los tome por 4 días antes del procedimiento médico.     El día previo a la cirugía o al procedimiento médico  Si va a someterse a un procedimiento médico (p. ej., amie colonoscopia) o amie cirugía que requiera amie preparación intestinal y puede adoptar amie dieta de líquidos yvonne, siga las instrucciones que se indican a continuación según el tipo de medicamento que stephanie para la diabetes.     Tipo de medicamento que stephanie Ejemplos Qué hacer   Insulina premezclada de acción intermedia Humalog® 75/25, Humulin® 70/30, NovoLog® 70/30, insulina normal. [place] la mitad de ordaz dosis normal la noche previa al procedimiento médico.   Insulina de acción rápida Humalog® U200, NovoLog®, Apidra®, Fiasp®. [place] la mitad de ordaz dosis normal la noche previa al procedimiento médico.   Insulina de acción prolongada Lantus®, Levemir®, Tresiba®, Toujeo®, Basaglar®. [place] ordaz dosis normal COMPLETA el día previo al procedimiento médico.   Sulfonilurea por vía oral Glipizida/Glimepirida/Glucotrol®. [place] la mitad de ordaz dosis normal la noche previa al procedimiento médico.   Otros medicamentos orales para la diabetes Metformin®, Glucophage®, Glucophage XR®, Riomet®, Glumetza®,  Actose®, Avandia®, Glyset®, Prandin®. Sunwest ordaz dosis normal con la rene la noche previa al procedimiento médico.   Agonistas del receptor del GLP-1 AdlyxinÒ, ByettaÒ, BydureonÒ, OzempicÒ, SoliquaÒ, TanzeumÒ, TrulicityÒ, VictozaÒ, Saxenda®, Rybelsus®, Wegovy® Si lo stephanie a diario, tómelo raciel normalmente.  Si lo stephanie de forma semanal, no tome donna medicamento leta 7 días antes del procedimiento, incluyendo joy mismo día (reanude la stephanie después del procedimiento).   Inhibidores del SGLT-2 Jardiance®, Invokana®, Farxiga®,   Steglatro®, Brenzavvy®, Qtern®, Segluromet®, Glyxambi®, Synjardy®, Synjardy XR®, Invokamet®, Invokamet XR®, Trijary XR®, Xigduo XR®, Steglujan®. No los tome leta 4 días antes del procedimiento médico, incluyendo joy mismo día (reanude la stephanie después del procedimiento).   En la parte de atrás, puede encontrar más información sobre “El día de la cirugía o el procedimiento médico”      Instrucciones de medicamentos para adultos con diabetes  que deben someterse a amie preparación intestinal.     Página 2      El día de la cirugía o el procedimiento médico  Siga las indicaciones que se encuentran a continuación según el tipo de medicamento que stephanie para tratar la diabetes.     Tipo de medicamento que stephanie Ejemplos Qué hacer   Insulina de acción prolongada Lantus®, Levemir®, Tresiba®, Toujeo®, Basaglar®, Semglee®.   Si normalmente stephanie insulina de acción prolongada a la mañana, tome la dosis completa según lo planificado.   Agonistas del receptor del GLP-1 AdlyxinÒ, ByettaÒ, BydureonÒ, OzempicÒ, SoliquaÒ, TanzeumÒ, TrulicityÒ, VictozaÒ, Saxenda®, Rybelsus®. NO tome donna medicamento el día del procedimiento médico (reanude la stephanie después).     A excepción de la stephanie matutina de la insulina de acción prolongada, NO tome NINGÚN medicamento para la diabetes el día del procedimiento médico, a menos que se lo haya indicado el médico que los administra.    Continúe controlando michael niveles de azúcar en  jazlyn.  Si tiene amie bomba de insulina, hable con ordaz endocrinólogo al menos 3 días antes del procedimiento médico y solicítele instrucciones. NOTA: Si no puede hablar con ordaz endocrinólogo con antelación, el día de la intervención ajuste ordaz bomba de insulina solo a ordaz ritmo basal. Traiga los suministros de la bomba de insulina al hospital.     Si tiene alguna evie sobre cómo linda michael medicamentos para la diabetes antes del procedimiento médico, comuníquese con el médico que los administra.                                                                  Colonoscopía -  Dulcolax/Miralax       FECHA DE PROCEDIMIENTO: __________________HORA DE PROCEDIMIENTO: ____ am / pm    El laboratorio OR/GI lo contactará  24 - 72 horas antes de ordaz procedimiento con la hora exacta en que debe llegar    Nuestra oficina requiere un aviso de 1 semana antes para cualquier cancelación o hacer nueva linn.  Nosotros amablemente le pedimos que nos comunique inmediatamente de cualquier cambio.  Freddie por ordaz cooperación.    COMPRAR: Todos los productos pueden ser comprados en  cualquier farmacia sin receta médica  Dos tabletas de Dulcolax (bisacodyl) de 5 mg   Miralax en polvo 238 gramos  Dos botellas de Gatorade de 32 onzas - Nada nguyen, anaranjado o girish  SEMANA ANTES DEL PROCEDIMIENTO:  No tome las tabletas de long.  5 días antes de ordaz linn EVITE vegetales y frutas con cáscara o semillas, nueces, maíz, palomitas de maíz, y pan de granos enteros.     D?A ANTES DEL PROCEDIMIENTO:   Solamente líquidos EBONY leta  todo el día anterior. Perkins nguyen, anaranjado o girish  Usted puede beber:  Soda  Agua  Caldo Gatorade  Gelatina  Paletas de hielo Café/té sin leche/crema   EVITE:  Comidas sólidas   Leche y productos lácteos    Jugo con la pulpa de la fruta    PREPARACION (Por la tarde antes del procedimiento):  ?? A las 5pm: Kinta dos tabletas de 5 mg del laxante Dulcolax.  ?? A las 6pm: Diluir el envase completo de Miralax con 64 onzas de  Gatorade.  Tómese  8 onzas cada 15                    minutos hasta que termine las primeras 32 onzas.    ?? Tómese  las restantes 32onzas de la preparación de  Miralax:  ?Empieze a las 3am para completar a las 4am el día del procedimiento. (AM procedimiento)  ?Empieze a las 7am para completar las 8am el día del procedimiento (PM procedimiento)  Asegúrese de completar por menos 3 horas antes de salir de ordaz casa.  FREDERIC DEL PROCEDIMIENTO:  Debe cepillarse los dientes.  Deje todas michael joyas (Prendas) en la casa.  Por favor llegue a ordaz procedimiento raciel se lo indicó el  OR / GI Lab / Endoscopy Unit  Asegúrese de que usted caal hecho arreglos con anticipación para que un adulto responsable (18 años o más) lo lleve de regreso a ordaz casa después del procedimiento.  No se permite regresar a ordaz casa en taxi o transporte público.  Los efectos de la anestesia pueden  perdurar hasta por 24 horas.  Después de recibir la sedación, tiene que ser cauteloso antes de que se comprometa en cualquier actividad que pueda dañarlo a usted o a otros (tales raciel manejar un ely). No aixa ninguna decisión importante  o no tome bebidas alcohólicas leta donna período de tiempo.  Después del procedimiento, usted puede comer o beber cualquier cosa que le guste.  Probablemente quiera comenzar con algo ligero.  Por favor incluya muchos líquidos.  Evite alimentos que le causen gases raciel sodas o ensaladas.      INSTRUCCIONES ESPECIALES:  Adelgazador de Thong (i.e. - Coumadin, Pradaxa, Lovenox, Xarelto, Eliquis)  ?  Continúe (No pare)  ? Pare____________________________por_____________días antes del procedimiento.  ?  Por favor discútalo con ordaz Médico Primario o Cardiólogo y envíe las instrucciones a nuestra oficina    Antiplaqueta (i.e. - Plavix, Aggrenox, Effient, Brilinta)  ?  Continúe (No pare)  ? Pare________________________________por_____________días antes del procedimiento.  ?  Por favor discútalo con el Médico que le receta el medicamento y  envíe instrucciones a nuestra oficina     Diabetes:   Si usted es diabético por favor stephanie la hoja separada de instrucciones para diabéticos          Medicamentos recetados:  No pare de linda ordaz aspirina, o cualquier otro de michael medicamentos.  Barnard el kary de michael medicamentos recetados con sorbos de agua por lo menos dos horas antes de ordaz linn.         [place] DE COMER O BEBER INCLUYENDO GOMA DE MASCAR (Excepto la preparación si se le indica) DESPUÉS DE MEDIANOCHE ANTES DEL PROCEDIMIENTO.       Instrucciones Adicionales:    Revision 9.19.17

## 2024-09-30 NOTE — TELEPHONE ENCOUNTER
Scheduled date of EGD/colonoscopy (as of today): 2/11/25    Physician performing EGD/colonoscopy: Dr. Jaiyeola    Location of EGD/colonoscopy: [place]

## 2024-10-04 DIAGNOSIS — K21.9 GASTROESOPHAGEAL REFLUX DISEASE, UNSPECIFIED WHETHER ESOPHAGITIS PRESENT: ICD-10-CM

## 2024-10-04 RX ORDER — OMEPRAZOLE 40 MG/1
CAPSULE, DELAYED RELEASE ORAL
Qty: 90 CAPSULE | Refills: 1 | Status: SHIPPED | OUTPATIENT
Start: 2024-10-04

## 2024-10-19 ENCOUNTER — APPOINTMENT (OUTPATIENT)
Dept: LAB | Facility: HOSPITAL | Age: 59
End: 2024-10-19
Payer: COMMERCIAL

## 2024-10-19 ENCOUNTER — HOSPITAL ENCOUNTER (OUTPATIENT)
Dept: MAMMOGRAPHY | Facility: CLINIC | Age: 59
Discharge: HOME | End: 2024-10-19
Payer: COMMERCIAL

## 2024-10-19 VITALS — HEIGHT: 63 IN | BODY MASS INDEX: 42.08 KG/M2

## 2024-10-19 DIAGNOSIS — Z12.31 ENCOUNTER FOR SCREENING MAMMOGRAM FOR BREAST CANCER: ICD-10-CM

## 2024-10-19 DIAGNOSIS — E66.9 OBESITY (BMI 30-39.9): ICD-10-CM

## 2024-10-19 DIAGNOSIS — E11.9 TYPE 2 DIABETES MELLITUS WITHOUT COMPLICATION, WITHOUT LONG-TERM CURRENT USE OF INSULIN (HCC): ICD-10-CM

## 2024-10-19 DIAGNOSIS — E78.2 MIXED HYPERLIPIDEMIA: ICD-10-CM

## 2024-10-19 DIAGNOSIS — I10 PRIMARY HYPERTENSION: ICD-10-CM

## 2024-10-19 LAB
ALBUMIN SERPL BCG-MCNC: 4.4 G/DL (ref 3.5–5)
ALP SERPL-CCNC: 114 U/L (ref 34–104)
ALT SERPL W P-5'-P-CCNC: 42 U/L (ref 7–52)
ANION GAP SERPL CALCULATED.3IONS-SCNC: 10 MMOL/L (ref 4–13)
AST SERPL W P-5'-P-CCNC: 31 U/L (ref 13–39)
BASOPHILS # BLD AUTO: 0.02 THOUSANDS/ΜL (ref 0–0.1)
BASOPHILS NFR BLD AUTO: 0 % (ref 0–1)
BILIRUB SERPL-MCNC: 0.55 MG/DL (ref 0.2–1)
BUN SERPL-MCNC: 19 MG/DL (ref 5–25)
CALCIUM SERPL-MCNC: 9.4 MG/DL (ref 8.4–10.2)
CHLORIDE SERPL-SCNC: 101 MMOL/L (ref 96–108)
CHOLEST SERPL-MCNC: 185 MG/DL
CO2 SERPL-SCNC: 27 MMOL/L (ref 21–32)
CREAT SERPL-MCNC: 0.96 MG/DL (ref 0.6–1.3)
CREAT UR-MCNC: 196.7 MG/DL
EOSINOPHIL # BLD AUTO: 0.2 THOUSAND/ΜL (ref 0–0.61)
EOSINOPHIL NFR BLD AUTO: 3 % (ref 0–6)
ERYTHROCYTE [DISTWIDTH] IN BLOOD BY AUTOMATED COUNT: 12.6 % (ref 11.6–15.1)
EST. AVERAGE GLUCOSE BLD GHB EST-MCNC: 111 MG/DL
GFR SERPL CREATININE-BSD FRML MDRD: 64 ML/MIN/1.73SQ M
GLUCOSE P FAST SERPL-MCNC: 105 MG/DL (ref 65–99)
HBA1C MFR BLD: 5.5 %
HCT VFR BLD AUTO: 45.5 % (ref 34.8–46.1)
HDLC SERPL-MCNC: 43 MG/DL
HGB BLD-MCNC: 15.6 G/DL (ref 11.5–15.4)
IMM GRANULOCYTES # BLD AUTO: 0.03 THOUSAND/UL (ref 0–0.2)
IMM GRANULOCYTES NFR BLD AUTO: 0 % (ref 0–2)
LDLC SERPL DIRECT ASSAY-MCNC: 102 MG/DL (ref 0–100)
LYMPHOCYTES # BLD AUTO: 1.79 THOUSANDS/ΜL (ref 0.6–4.47)
LYMPHOCYTES NFR BLD AUTO: 26 % (ref 14–44)
MCH RBC QN AUTO: 30.4 PG (ref 26.8–34.3)
MCHC RBC AUTO-ENTMCNC: 34.3 G/DL (ref 31.4–37.4)
MCV RBC AUTO: 89 FL (ref 82–98)
MICROALBUMIN UR-MCNC: 9.9 MG/L
MICROALBUMIN/CREAT 24H UR: 5 MG/G CREATININE (ref 0–30)
MONOCYTES # BLD AUTO: 0.49 THOUSAND/ΜL (ref 0.17–1.22)
MONOCYTES NFR BLD AUTO: 7 % (ref 4–12)
NEUTROPHILS # BLD AUTO: 4.45 THOUSANDS/ΜL (ref 1.85–7.62)
NEUTS SEG NFR BLD AUTO: 64 % (ref 43–75)
NRBC BLD AUTO-RTO: 0 /100 WBCS
PLATELET # BLD AUTO: 208 THOUSANDS/UL (ref 149–390)
PMV BLD AUTO: 11.8 FL (ref 8.9–12.7)
POTASSIUM SERPL-SCNC: 4.1 MMOL/L (ref 3.5–5.3)
PROT SERPL-MCNC: 7.6 G/DL (ref 6.4–8.4)
RBC # BLD AUTO: 5.13 MILLION/UL (ref 3.81–5.12)
SODIUM SERPL-SCNC: 138 MMOL/L (ref 135–147)
TRIGL SERPL-MCNC: 403 MG/DL
TSH SERPL DL<=0.05 MIU/L-ACNC: 3.32 UIU/ML (ref 0.45–4.5)
WBC # BLD AUTO: 6.98 THOUSAND/UL (ref 4.31–10.16)

## 2024-10-19 PROCEDURE — 77063 BREAST TOMOSYNTHESIS BI: CPT

## 2024-10-19 PROCEDURE — 82570 ASSAY OF URINE CREATININE: CPT

## 2024-10-19 PROCEDURE — 80053 COMPREHEN METABOLIC PANEL: CPT

## 2024-10-19 PROCEDURE — 36415 COLL VENOUS BLD VENIPUNCTURE: CPT

## 2024-10-19 PROCEDURE — 77067 SCR MAMMO BI INCL CAD: CPT

## 2024-10-19 PROCEDURE — 83036 HEMOGLOBIN GLYCOSYLATED A1C: CPT

## 2024-10-19 PROCEDURE — 82043 UR ALBUMIN QUANTITATIVE: CPT

## 2024-10-19 PROCEDURE — 80061 LIPID PANEL: CPT

## 2024-10-19 PROCEDURE — 84443 ASSAY THYROID STIM HORMONE: CPT

## 2024-10-19 PROCEDURE — 83721 ASSAY OF BLOOD LIPOPROTEIN: CPT

## 2024-10-19 PROCEDURE — 85025 COMPLETE CBC W/AUTO DIFF WBC: CPT

## 2024-10-29 DIAGNOSIS — E11.9 TYPE 2 DIABETES MELLITUS WITHOUT COMPLICATION, WITHOUT LONG-TERM CURRENT USE OF INSULIN (HCC): ICD-10-CM

## 2024-10-30 ENCOUNTER — NURSE TRIAGE (OUTPATIENT)
Dept: OTHER | Facility: OTHER | Age: 59
End: 2024-10-30

## 2024-10-30 RX ORDER — TIRZEPATIDE 10 MG/.5ML
INJECTION, SOLUTION SUBCUTANEOUS
Qty: 2 ML | Refills: 0 | Status: SHIPPED | OUTPATIENT
Start: 2024-10-30 | End: 2024-10-31

## 2024-10-31 ENCOUNTER — OFFICE VISIT (OUTPATIENT)
Dept: FAMILY MEDICINE CLINIC | Facility: CLINIC | Age: 59
End: 2024-10-31
Payer: COMMERCIAL

## 2024-10-31 ENCOUNTER — OFFICE VISIT (OUTPATIENT)
Dept: BARIATRICS | Facility: CLINIC | Age: 59
End: 2024-10-31
Payer: COMMERCIAL

## 2024-10-31 VITALS
DIASTOLIC BLOOD PRESSURE: 76 MMHG | RESPIRATION RATE: 16 BRPM | BODY MASS INDEX: 41.82 KG/M2 | WEIGHT: 236 LBS | HEART RATE: 100 BPM | SYSTOLIC BLOOD PRESSURE: 130 MMHG | TEMPERATURE: 98.6 F | HEIGHT: 63 IN

## 2024-10-31 VITALS
WEIGHT: 234 LBS | SYSTOLIC BLOOD PRESSURE: 136 MMHG | BODY MASS INDEX: 42.12 KG/M2 | HEART RATE: 76 BPM | OXYGEN SATURATION: 98 % | DIASTOLIC BLOOD PRESSURE: 82 MMHG

## 2024-10-31 DIAGNOSIS — K76.0 NAFLD (NONALCOHOLIC FATTY LIVER DISEASE): ICD-10-CM

## 2024-10-31 DIAGNOSIS — E11.9 TYPE 2 DIABETES MELLITUS WITHOUT COMPLICATION, WITHOUT LONG-TERM CURRENT USE OF INSULIN (HCC): ICD-10-CM

## 2024-10-31 DIAGNOSIS — L30.9 ECZEMA, UNSPECIFIED TYPE: Primary | ICD-10-CM

## 2024-10-31 DIAGNOSIS — R92.30 DENSE BREAST TISSUE ON MAMMOGRAM, UNSPECIFIED TYPE: Primary | ICD-10-CM

## 2024-10-31 DIAGNOSIS — T78.40XA ALLERGIC REACTION, INITIAL ENCOUNTER: ICD-10-CM

## 2024-10-31 DIAGNOSIS — E66.813 CLASS 3 SEVERE OBESITY DUE TO EXCESS CALORIES WITH SERIOUS COMORBIDITY AND BODY MASS INDEX (BMI) OF 40.0 TO 44.9 IN ADULT (HCC): Primary | ICD-10-CM

## 2024-10-31 DIAGNOSIS — I10 PRIMARY HYPERTENSION: ICD-10-CM

## 2024-10-31 DIAGNOSIS — E66.01 CLASS 3 SEVERE OBESITY DUE TO EXCESS CALORIES WITH SERIOUS COMORBIDITY AND BODY MASS INDEX (BMI) OF 40.0 TO 44.9 IN ADULT (HCC): Primary | ICD-10-CM

## 2024-10-31 PROCEDURE — 99214 OFFICE O/P EST MOD 30 MIN: CPT | Performed by: FAMILY MEDICINE

## 2024-10-31 PROCEDURE — 99214 OFFICE O/P EST MOD 30 MIN: CPT | Performed by: PHYSICIAN ASSISTANT

## 2024-10-31 RX ORDER — TRIAMCINOLONE ACETONIDE 1 MG/G
CREAM TOPICAL 2 TIMES DAILY
Qty: 45 G | Refills: 0 | Status: SHIPPED | OUTPATIENT
Start: 2024-10-31

## 2024-10-31 RX ORDER — TIRZEPATIDE 12.5 MG/.5ML
INJECTION, SOLUTION SUBCUTANEOUS
Qty: 6 ML | Refills: 0 | Status: SHIPPED | OUTPATIENT
Start: 2024-10-31

## 2024-10-31 NOTE — TELEPHONE ENCOUNTER
Pt made appointment for tomorrow morning. Itching that was corrected with allegra and  wants to narrow down cause of itching.

## 2024-10-31 NOTE — PROGRESS NOTES
Assessment/Plan:    Class 3 severe obesity due to excess calories with serious comorbidity and body mass index (BMI) of 40.0 to 44.9 in adult ([facility])  - Patient is pursuing Conservative Program  - Initial weight loss goal of 5-10% weight loss for improved health  - Previously following with the surgical program. Attempted and aborted sleeve gastrectomy.  S/P diagnostic laparoscopy, extensive lysis of adhesions on 5/11/2021 with Dr. Dimas López.    Currently on 10mg mounjaro.  Mounjaro started Feb 2024 at weight of 243.8 lbs.  A1C has improved from 6.6 . Denies any side effects  Past Medications: Contrave 2 tablets twice a day tried in past, but was not helpful. Compounded semaglutide was helpful  - Not a candidate for Qsymia or Topamax due to history of kidney stones.       Initial: 243.8 lbs BMI 43.19  Current: 236  Change: -7.8 lbs  Goal: wants to be healthy and feel well and sleep well      Discussed trying to increase activity and start regular walking with the dog    Hypertension  On lisinopril.  -should improve with weight loss, dietary, and lifestyle changes      NAFLD (nonalcoholic fatty liver disease)  -should improve with weight loss, dietary, and lifestyle changes      Type 2 diabetes mellitus without complication, without long-term current use of insulin ([facility])    Lab Results   Component Value Date    HGBA1C 5.5 10/19/2024   On mounjaro 10mg and will increase to 12.5mg to help more with appetite control        Return in about 6 months (around 4/30/2025) for 3 month weight check insurance.medical provider followup       Diagnoses and all orders for this visit:    Class 3 severe obesity due to excess calories with serious comorbidity and body mass index (BMI) of 40.0 to 44.9 in adult ([facility])  -     Tirzepatide (Mounjaro) 12.5 MG/0.5ML SOAJ; Inject 0.5mL  once a week    NAFLD (nonalcoholic fatty liver disease)  -     Tirzepatide (Mounjaro) 12.5 MG/0.5ML SOAJ; Inject 0.5mL  once a week    Type 2 diabetes  mellitus without complication, without long-term current use of insulin ([facility])  -     Tirzepatide (Mounjaro) 12.5 MG/0.5ML SOAJ; Inject 0.5mL  once a week    Primary hypertension          Subjective:   Chief Complaint   Patient presents with    Follow-up     MWM- 5 mo F/u; Waist-45in        Patient ID: Veronica Fairbanks  is a 59 y.o. female with excess weight/obesity here to pursue weight managment.  Patient is pursuing Conservative Program.     HPI  She is taking mounjaro 10mg.  When she cahnges the dose she is not hungry but then it does not seem to help.  She is trying to cut back on sugary foods.  She has been on this dose for 3 months.  She does spend  a lot of time in [place] and her father is in [facility].      She does desk work. She gained weight when moving to [place].  She does eat homemade bread and thinks that may be a protblem  Wt Readings from Last 10 Encounters:   10/31/24 107 kg (236 lb)   10/31/24 106 kg (234 lb)   08/05/24 106 kg (233 lb 12.8 oz)   05/28/24 105 kg (231 lb)   05/15/24 106 kg (232 lb 12.8 oz)   03/29/24 106 kg (233 lb 6.4 oz)   02/27/24 108 kg (237 lb 6.4 oz)   02/22/24 108 kg (238 lb 3.2 oz)   01/08/24 111 kg (245 lb)   01/05/24 111 kg (243 lb 12.8 oz)       Food logging:  Increased appetite/cravings:  Exercise:just got a dog and will be walking  Hydration:      The following portions of the patient's history were reviewed and updated as appropriate: She  has a past medical history of Acid reflux, Anxiety, CPAP (continuous positive airway pressure) dependence, GERD (gastroesophageal reflux disease), Hypertension, Obesity, Palpable abdominal mass, and Sleep apnea.  She   Patient Active Problem List    Diagnosis Date Noted    Anxiety 02/22/2024    Type 2 diabetes mellitus without complication, without long-term current use of insulin ([facility]) 02/22/2024    Epistaxis 02/21/2023    Personal history of tobacco use, presenting hazards to health 02/21/2023    Class 3 severe obesity due to  excess calories with serious comorbidity and body mass index (BMI) of 40.0 to 44.9 in adult ([facility]) 2022    Primary osteoarthritis of knee 2022    Hyperproteinemia 2022    Mixed hyperlipidemia 2022    Kidney stones 2021    NITISH (obstructive sleep apnea)     Obesity, Class III, BMI 40-49.9 (morbid obesity) ([facility]) 2021    LFT elevation 2019    NAFLD (nonalcoholic fatty liver disease) 2018    Adrenal mass ([facility]) 2017    Obesity (BMI 30-39.9) 2017    Acid reflux 2017    Hypertension 10/31/2017     She  has a past surgical history that includes Breast surgery; CHOLECYSTECTOMY LAPAROSCOPIC; Liver surgery; Augmentation mammaplasty (Bilateral, );  section; pr laps gstrc rstrictiv px longitudinal gastrectomy (N/A, 2021); Abdominoplasty; Tonsillectomy; Neck surgery; Blepharoplasty; and Blepharoplasty.  Her family history includes No Known Problems in her daughter, daughter, maternal aunt, maternal aunt, maternal grandfather, maternal grandmother, paternal aunt, paternal aunt, paternal aunt, paternal grandfather, paternal grandmother, sister, and sister; Pancreatic cancer (age of onset: 72) in her mother; Prostate cancer (age of onset: 60) in her father.  She  reports that she quit smoking about 11 years ago. Her smoking use included cigarettes. She has never used smokeless tobacco. She reports that she does not drink alcohol and does not use drugs.  Current Outpatient Medications   Medication Sig Dispense Refill    famotidine (PEPCID) 40 MG tablet Take 1 tablet (40 mg total) by mouth daily at bedtime as needed for heartburn (Patient taking differently: Take 40 mg by mouth if needed for heartburn) 90 tablet 0    lisinopril (ZESTRIL) 40 mg tablet TOME 1 TABLETA POR VIA ORAL TODOS LOS GONSALES 90 tablet 1    nystatin (MYCOSTATIN) cream Apply topically 2 (two) times a day as needed (Redness under breasts.) Redness under breasts. 30 g 0    omeprazole  (PriLOSEC) 40 MG capsule TAKE 1 CAPSULE BY MOUTH EVERY DAY IN THE MORNING AS NEEDED 30 MINUTES BEFORE EATING (Patient taking differently: as needed TAKE 1 CAPSULE BY MOUTH EVERY DAY IN THE MORNING AS NEEDED 30 MINUTES BEFORE EATING) 90 capsule 1    Tirzepatide (Mounjaro) 12.5 MG/0.5ML SOAJ Inject 0.5mL  once a week 6 mL 0    triamcinolone (KENALOG) 0.1 % cream Apply topically 2 (two) times a day 45 g 0    amLODIPine (NORVASC) 5 mg tablet TAKE 1 TABLET (5 MG TOTAL) BY MOUTH DAILY. 100 tablet 1    Cholecalciferol (VITAMIN D3) 50 MCG (2000 UT) CHEW       hydrOXYzine HCL (ATARAX) 25 mg tablet Take 1 tablet (25 mg total) by mouth every 8 (eight) hours as needed for anxiety (Patient taking differently: Take 25 mg by mouth if needed for anxiety) 30 tablet 0    lidocaine (XYLOCAINE) 5 % ointment Apply topically as needed for mild pain (Patient taking differently: Apply topically if needed for mild pain) 50 g 1    melatonin 1 mg Take 10 tablets by mouth if needed      salicylic acid 17 % gel Apply topically daily 14 g 0    triamcinolone (KENALOG) 0.5 % ointment Apply topically 2 (two) times a day as needed for rash Avoid use on face and genitals. Do not use for over 2 weeks at a time. 15 g 0     No current facility-administered medications for this visit.     Current Outpatient Medications on File Prior to Visit   Medication Sig    famotidine (PEPCID) 40 MG tablet Take 1 tablet (40 mg total) by mouth daily at bedtime as needed for heartburn (Patient taking differently: Take 40 mg by mouth if needed for heartburn)    lisinopril (ZESTRIL) 40 mg tablet TOME 1 TABLETA POR VIA ORAL TODOS LOS GONSALES    nystatin (MYCOSTATIN) cream Apply topically 2 (two) times a day as needed (Redness under breasts.) Redness under breasts.    omeprazole (PriLOSEC) 40 MG capsule TAKE 1 CAPSULE BY MOUTH EVERY DAY IN THE MORNING AS NEEDED 30 MINUTES BEFORE EATING (Patient taking differently: as needed TAKE 1 CAPSULE BY MOUTH EVERY DAY IN THE MORNING AS  "NEEDED 30 MINUTES BEFORE EATING)    [DISCONTINUED] Tirzepatide (Mounjaro) 10 MG/0.5ML SOAJ INYECTE 0.5 ML (10 MG TOTAL) UNDER THE SKIN EVERY 7 DAYS    amLODIPine (NORVASC) 5 mg tablet TAKE 1 TABLET (5 MG TOTAL) BY MOUTH DAILY.    Cholecalciferol (VITAMIN D3) 50 MCG (2000 UT) CHEW     hydrOXYzine HCL (ATARAX) 25 mg tablet Take 1 tablet (25 mg total) by mouth every 8 (eight) hours as needed for anxiety (Patient taking differently: Take 25 mg by mouth if needed for anxiety)    lidocaine (XYLOCAINE) 5 % ointment Apply topically as needed for mild pain (Patient taking differently: Apply topically if needed for mild pain)    melatonin 1 mg Take 10 tablets by mouth if needed    salicylic acid 17 % gel Apply topically daily    triamcinolone (KENALOG) 0.5 % ointment Apply topically 2 (two) times a day as needed for rash Avoid use on face and genitals. Do not use for over 2 weeks at a time.    [DISCONTINUED] tirzepatide 7.5 MG/0.5ML INYECTE 0.5 ML (7.5 MG TOTAL) UNDER THE SKIN EVERY 7 DAYS    [DISCONTINUED] triamcinolone (KENALOG) 0.5 % cream Apply to affected areas bid for up to 10 days prn ; avoid face/genitals.     No current facility-administered medications on file prior to visit.     She is allergic to prednisone, dog epithelium, alprazolam, duloxetine, escitalopram, latex, and omnipaque [iohexol]..    Review of Systems   Constitutional:  Negative for fever.   Respiratory:  Negative for shortness of breath.    Cardiovascular:  Negative for chest pain and palpitations.   Gastrointestinal:  Negative for abdominal pain, constipation, diarrhea and vomiting.   Genitourinary:  Negative for difficulty urinating.   Skin:  Negative for rash.   Neurological:  Negative for headaches.   Psychiatric/Behavioral:  Negative for dysphoric mood. The patient is not nervous/anxious.        Objective:    /76 (BP Location: Left arm, Patient Position: Sitting)   Pulse 100   Temp 98.6 °F (37 °C) (Tympanic)   Resp 16   Ht 5' 2.5\" " (1.588 m)   Wt 107 kg (236 lb)   LMP 08/03/2018 (Approximate)   BMI 42.48 kg/m²      Physical Exam  Vitals and nursing note reviewed.   Constitutional:       General: She is not in acute distress.     Appearance: She is well-developed. She is obese.   HENT:      Head: Normocephalic and atraumatic.   Eyes:      Conjunctiva/sclera: Conjunctivae normal.   Neck:      Thyroid: No thyromegaly.   Pulmonary:      Effort: Pulmonary effort is normal. No respiratory distress.   Skin:     Findings: No rash (visible).   Neurological:      Mental Status: She is alert and oriented to person, place, and time.   Psychiatric:         Behavior: Behavior normal.

## 2024-10-31 NOTE — ASSESSMENT & PLAN NOTE
Lab Results   Component Value Date    HGBA1C 5.5 10/19/2024   On mounjaro 10mg and will increase to 12.5mg to help more with appetite control

## 2024-10-31 NOTE — TELEPHONE ENCOUNTER
"Regarding: red rash  ----- Message from Mónica SR sent at 10/30/2024  8:37 PM EDT -----  \"I have a red rash all over and I'd like the doctor to see it, my  gave me allegra and calamine lotion. I don't know if it is from a spider or animal allergy\"  Pt states no rash but was very itchy widespread.  "

## 2024-10-31 NOTE — PATIENT INSTRUCTIONS
1. Allergic reaction, initial encounter  2. Type 2 diabetes mellitus without complication, without long-term current use of insulin ([facility])  -     IRIS Diabetic eye exam     Use benadryl for rash.

## 2024-10-31 NOTE — PROGRESS NOTES
Ambulatory Visit  Name: Veronica Fairbanks      : 1965      MRN: [id number]  Encounter Provider: Frank Us MD  Encounter Date: 10/31/2024   Encounter department: [facility] PRIMARY CARE    Assessment & Plan  Type 2 diabetes mellitus without complication, without long-term current use of insulin ([facility])    Lab Results   Component Value Date    HGBA1C 5.5 10/19/2024     Very well controlled, continue in follow up as planned         Allergic reaction, initial encounter    Resolved with allegra, will use benadryl in future as took two days for relief,       Eczema, unspecified type  Eczema vs psoriasis try triamcinolone follow up if not resolved, has listed allergy to prednisone patient does not know why has tolerated triamcinolone in past.  Orders:    triamcinolone (KENALOG) 0.1 % cream; Apply topically 2 (two) times a day    Primary hypertension    Remains well controlled          History of Present Illness     Rash        59 year old presenting for facial rash after possible spider bite,    Has happened twice before, in [place] given a medication that relieved it, did have bottle of antihistamine from [place].      Review of Systems   Constitutional:  Negative for activity change and appetite change.   Respiratory:  Negative for apnea and chest tightness.    Cardiovascular:  Negative for chest pain and palpitations.   Gastrointestinal:  Negative for abdominal distention and abdominal pain.   Musculoskeletal:  Negative for arthralgias and back pain.   Skin:  Positive for rash.           Objective     /82   Pulse 76   Wt 106 kg (234 lb)   LMP 2018 (Approximate)   SpO2 98%   BMI 42.12 kg/m²     Physical Exam  Constitutional:       Appearance: Normal appearance.   Cardiovascular:      Rate and Rhythm: Normal rate and regular rhythm.      Pulses: Normal pulses.      Heart sounds: Normal heart sounds.   Musculoskeletal:         General: Normal range of motion.   Skin:      General: Skin is warm.      Capillary Refill: Capillary refill takes less than 2 seconds.      Comments: Erythemetous skin plaques on flank   Neurological:      Mental Status: She is alert.

## 2024-10-31 NOTE — ASSESSMENT & PLAN NOTE
- Patient is pursuing Conservative Program  - Initial weight loss goal of 5-10% weight loss for improved health  - Previously following with the surgical program. Attempted and aborted sleeve gastrectomy.  S/P diagnostic laparoscopy, extensive lysis of adhesions on 5/11/2021 with Dr. Dimas López.    Currently on 10mg mounjaro.  Mounjaro started Feb 2024 at weight of 243.8 lbs.  A1C has improved from 6.6 . Denies any side effects  Past Medications: Contrave 2 tablets twice a day tried in past, but was not helpful. Compounded semaglutide was helpful  - Not a candidate for Qsymia or Topamax due to history of kidney stones.       Initial: 243.8 lbs BMI 43.19  Current: 236  Change: -7.8 lbs  Goal: wants to be healthy and feel well and sleep well      Discussed trying to increase activity and start regular walking with the dog

## 2024-10-31 NOTE — TELEPHONE ENCOUNTER
"Reason for Disposition  • [1] Widespread itching AND [2] cause unknown AND [3] present > 48 hours  (Exception: Caller knows the cause and can eliminate it.)    Answer Assessment - Initial Assessment Questions  1. DESCRIPTION: \"Describe the itching you are having.\"      Feel like she was having allergy to animal fur or environment.  2. SEVERITY: \"How bad is it?\"       She was very itchy before she took allegra and now feels better.  3. SCRATCHING: \"Are there any scratch marks? Bleeding?\"      Yes she was scratching when the itch started.  4. ONSET: \"When did this begin?\"       Started last week once and then again today  5. CAUSE: \"What do you think is causing the itching?\" (ask about swimming pools, pollen, animals, soaps, etc.)      Two times per year she gets these symptoms. Fall and Spring  6. OTHER SYMPTOMS: \"Do you have any other symptoms?\"       No rash but her skin was very itchy and eyes were red.    Protocols used: Itching - Widespread-Adult-    "

## 2024-10-31 NOTE — ASSESSMENT & PLAN NOTE
Lab Results   Component Value Date    HGBA1C 5.5 10/19/2024     Very well controlled, continue in follow up as planned

## 2024-11-03 DIAGNOSIS — L30.9 ECZEMA, UNSPECIFIED TYPE: ICD-10-CM

## 2024-11-05 RX ORDER — TRIAMCINOLONE ACETONIDE 5 MG/G
OINTMENT TOPICAL
Qty: 15 G | Refills: 0 | Status: SHIPPED | OUTPATIENT
Start: 2024-11-05

## 2024-11-06 ENCOUNTER — HOSPITAL ENCOUNTER (OUTPATIENT)
Dept: ULTRASOUND IMAGING | Facility: CLINIC | Age: 59
Discharge: HOME | End: 2024-11-06
Payer: COMMERCIAL

## 2024-11-06 DIAGNOSIS — R92.30 DENSE BREAST TISSUE ON MAMMOGRAM, UNSPECIFIED TYPE: ICD-10-CM

## 2024-11-06 PROCEDURE — 76641 ULTRASOUND BREAST COMPLETE: CPT

## 2024-11-27 ENCOUNTER — OFFICE VISIT (OUTPATIENT)
Dept: FAMILY MEDICINE CLINIC | Facility: CLINIC | Age: 59
End: 2024-11-27
Payer: COMMERCIAL

## 2024-11-27 VITALS
WEIGHT: 237 LBS | BODY MASS INDEX: 41.99 KG/M2 | TEMPERATURE: 97.2 F | HEIGHT: 63 IN | SYSTOLIC BLOOD PRESSURE: 124 MMHG | OXYGEN SATURATION: 96 % | DIASTOLIC BLOOD PRESSURE: 84 MMHG | HEART RATE: 88 BPM

## 2024-11-27 DIAGNOSIS — E11.9 TYPE 2 DIABETES MELLITUS WITHOUT COMPLICATION, WITHOUT LONG-TERM CURRENT USE OF INSULIN (HCC): ICD-10-CM

## 2024-11-27 DIAGNOSIS — R04.0 EPISTAXIS: ICD-10-CM

## 2024-11-27 DIAGNOSIS — I10 PRIMARY HYPERTENSION: ICD-10-CM

## 2024-11-27 DIAGNOSIS — Z23 ENCOUNTER FOR IMMUNIZATION: ICD-10-CM

## 2024-11-27 DIAGNOSIS — Z00.00 ANNUAL PHYSICAL EXAM: Primary | ICD-10-CM

## 2024-11-27 DIAGNOSIS — K21.9 GASTROESOPHAGEAL REFLUX DISEASE, UNSPECIFIED WHETHER ESOPHAGITIS PRESENT: ICD-10-CM

## 2024-11-27 LAB
LEFT EYE DIABETIC RETINOPATHY: NORMAL
LEFT EYE IMAGE QUALITY: NORMAL
LEFT EYE MACULAR EDEMA: NORMAL
LEFT EYE OTHER RETINOPATHY: NORMAL
RIGHT EYE DIABETIC RETINOPATHY: NORMAL
RIGHT EYE IMAGE QUALITY: NORMAL
RIGHT EYE MACULAR EDEMA: NORMAL
RIGHT EYE OTHER RETINOPATHY: NORMAL
SEVERITY (EYE EXAM): NORMAL

## 2024-11-27 PROCEDURE — 90673 RIV3 VACCINE NO PRESERV IM: CPT

## 2024-11-27 PROCEDURE — 90677 PCV20 VACCINE IM: CPT

## 2024-11-27 PROCEDURE — 99396 PREV VISIT EST AGE 40-64: CPT | Performed by: PHYSICIAN ASSISTANT

## 2024-11-27 PROCEDURE — 90471 IMMUNIZATION ADMIN: CPT

## 2024-11-27 PROCEDURE — 90472 IMMUNIZATION ADMIN EACH ADD: CPT

## 2024-11-27 RX ORDER — TIRZEPATIDE 15 MG/.5ML
15 INJECTION, SOLUTION SUBCUTANEOUS WEEKLY
Qty: 6 ML | Refills: 1 | Status: SHIPPED | OUTPATIENT
Start: 2024-11-27

## 2024-11-27 NOTE — ASSESSMENT & PLAN NOTE
[employer]-limited.  Notes that these usually last about 5 minutes.  Encourage patient to try nasal saline.  Consider return to ENT if worsens or persists.        with patient

## 2024-11-27 NOTE — PROGRESS NOTES
Diabetic Foot Exam    Patient's shoes and socks removed.    Right Foot/Ankle   Right Foot Inspection  Skin Exam: skin normal and skin intact. No dry skin, no warmth, no callus, no erythema, no maceration, no abnormal color, no pre-ulcer, no ulcer and no callus.     Toe Exam: ROM and strength within normal limits.     Sensory   Monofilament testing: intact    Vascular  Capillary refills: < 3 seconds  The right DP pulse is 2+. The right PT pulse is 2+.     Left Foot/Ankle  Left Foot Inspection  Skin Exam: skin normal and skin intact. No dry skin, no warmth, no erythema, no maceration, normal color, no pre-ulcer, no ulcer and no callus.     Toe Exam: ROM and strength within normal limits.     Sensory   Monofilament testing: intact    Vascular  Capillary refills: < 3 seconds  The left DP pulse is 2+. The left PT pulse is 2+.     Assign Risk Category  No deformity present  No loss of protective sensation  No weak pulses  Risk: 0        Adult Annual Physical  Name: Veronica Fairbanks      : 1965      MRN: [id number]  Encounter Provider: Rachel Hinds PA-C  Encounter Date: 2024   Encounter department: [facility]    Assessment & Plan  Annual physical exam         Epistaxis  Self-limited.  Notes that these usually last about 5 minutes.  Encourage patient to try nasal saline.  Consider return to ENT if worsens or persists.       Type 2 diabetes mellitus without complication, without long-term current use of insulin ([facility]CC)  Well-controlled.  Continue Mounjaro -will increase to 15 mg weekly.  Recheck in 3 months.  Lab Results   Component Value Date    HGBA1C 5.5 10/19/2024     Orders:    IRIS Diabetic eye exam    Tirzepatide (Mounjaro) 15 MG/0.5ML SOAJ; Inject 15 mg under the skin once a week    Ambulatory Referral to Ophthalmology; Future    Encounter for immunization    Orders:    influenza vaccine, recombinant, PF, 0.5 mL IM (Flublok)    Pneumococcal Conjugate Vaccine 20-valent  (Pcv20)    Primary hypertension  Patient reports cardiology discontinued her lisinopril about 2 months ago.  Blood pressure appears controlled today.  Will continue to monitor.       Gastroesophageal reflux disease, unspecified whether esophagitis present  Controlled.  Continue omeprazole and famotidine.         Immunizations and preventive care screenings were discussed with patient today. Appropriate education was printed on patient's after visit summary.    Counseling:  Exercise: the importance of regular exercise/physical activity was discussed. Recommend exercise 3-5 times per week for at least 30 minutes.          History of Present Illness     Adult Annual Physical:  Patient presents for annual physical.     Notes that she was seen a few weeks ago for skin rash and then she had swelling of lip and eye - resolved    Notes that she is on the Mounjaro 12.5 mg weekly - notes that she is noticing a decrease in her appetite    Notes that she has 1-2 times a weekly - lasts about 5 minutes when it happens - notes that is started after gaving nasal endoscopy    GERD - takes famotidine and does very well - also takes omeprazole    Notes that her BP med was stopped by Cardio about 2 months ago .     Diet and Physical Activity:  - Diet/Nutrition: well balanced diet and consuming 3-5 servings of fruits/vegetables daily.  - Exercise: walking. short - about 15 minutes with her dog a day    Depression Screening:  - PHQ-2 Score: 0  - PHQ-9 Score: 0    General Health:  - Sleep: sleeps well and 7-8 hours of sleep on average.  - Hearing: normal hearing bilateral ears.  - Vision: wears glasses and most recent eye exam > 1 year ago.  - Dental: regular dental visits.    /GYN Health:  - Follows with GYN: yes.     Advanced Care Planning:  - Has an advanced directive?: no    - Has a durable medical POA?: no    - ACP document given to patient?: yes      Review of Systems   Constitutional:  Negative for chills and fever.   HENT:   "Negative for congestion, rhinorrhea and sore throat.    Eyes:  Negative for visual disturbance.   Respiratory:  Negative for cough, shortness of breath and wheezing.    Cardiovascular:  Negative for chest pain, palpitations and leg swelling.   Gastrointestinal:  Negative for abdominal pain, constipation, diarrhea, nausea and vomiting.   Endocrine: Negative for polydipsia and polyuria.   Genitourinary:  Negative for dysuria and frequency.   Musculoskeletal:  Negative for arthralgias and myalgias.   Skin:  Negative for rash.   Neurological:  Negative for dizziness, syncope and headaches.   Hematological:  Does not bruise/bleed easily.   Psychiatric/Behavioral:  Negative for dysphoric mood. The patient is not nervous/anxious.      Medical History Reviewed by provider this encounter:     .    Objective   /84   Pulse 88   Temp (!) 97.2 °F (36.2 °C)   Ht 5' 2.5\" (1.588 m)   Wt 108 kg (237 lb)   LMP 08/03/2018 (Approximate)   SpO2 96%   BMI 42.66 kg/m²     Physical Exam  Vitals reviewed.   Constitutional:       General: She is not in acute distress.     Appearance: Normal appearance. She is well-developed. She is obese. She is not ill-appearing.   HENT:      Head: Normocephalic and atraumatic.      Right Ear: Tympanic membrane, ear canal and external ear normal. There is no impacted cerumen.      Left Ear: Tympanic membrane, ear canal and external ear normal. There is no impacted cerumen.      Nose: Nose normal.      Mouth/Throat:      Mouth: Mucous membranes are moist.      Pharynx: No oropharyngeal exudate or posterior oropharyngeal erythema.   Eyes:      Conjunctiva/sclera: Conjunctivae normal.      Pupils: Pupils are equal, round, and reactive to light.   Neck:      Thyroid: No thyromegaly.      Vascular: No carotid bruit.   Cardiovascular:      Rate and Rhythm: Normal rate and regular rhythm.      Pulses: Normal pulses. no weak pulses.           Dorsalis pedis pulses are 2+ on the right side and 2+ on the " left side.        Posterior tibial pulses are 2+ on the right side and 2+ on the left side.      Heart sounds: Normal heart sounds. No murmur heard.  Pulmonary:      Effort: Pulmonary effort is normal.      Breath sounds: Normal breath sounds. No wheezing, rhonchi or rales.   Abdominal:      General: Bowel sounds are normal. There is no distension.      Palpations: Abdomen is soft. There is no mass.      Tenderness: There is no abdominal tenderness. There is no guarding.   Musculoskeletal:      Cervical back: Normal range of motion and neck supple.      Right lower leg: No edema.      Left lower leg: No edema.   Feet:      Right foot:      Skin integrity: No ulcer, skin breakdown, erythema, warmth, callus or dry skin.      Left foot:      Skin integrity: No ulcer, skin breakdown, erythema, warmth, callus or dry skin.   Lymphadenopathy:      Cervical: No cervical adenopathy.   Skin:     General: Skin is warm and dry.      Findings: No rash.   Neurological:      Mental Status: She is alert.      Sensory: No sensory deficit.      Motor: No weakness.      Comments: 5/5 strength in UE and LE   Psychiatric:         Mood and Affect: Mood normal.         Behavior: Behavior normal.         Thought Content: Thought content normal.         Judgment: Judgment normal.

## 2024-11-27 NOTE — ASSESSMENT & PLAN NOTE
Well-controlled.  Continue Mounjaro -will increase to 15 mg weekly.  Recheck in 3 months.  Lab Results   Component Value Date    HGBA1C 5.5 10/19/2024     Orders:    IRIS Diabetic eye exam    Tirzepatide (Mounjaro) 15 MG/0.5ML SOAJ; Inject 15 mg under the skin once a week    Ambulatory Referral to Ophthalmology; Future

## 2024-11-27 NOTE — PATIENT INSTRUCTIONS
"Try [place]ble Nasal Saline spray or [place] nasal saline gel daily in the nose to try to help prevent the nosebleeds. She   Patient Education     Routine physical for adults   The Basics   Written by the doctors and editors at [facility]   What is a physical? -- A physical is a routine visit, or \"check-up,\" with your doctor. You might also hear it called a \"wellness visit\" or \"preventive visit.\"  During each visit, the doctor will:   Ask about your physical and mental health   Ask about your habits, behaviors, and lifestyle   Do an exam   Give you vaccines if needed   Talk to you about any medicines you take   Give advice about your health   Answer your questions  Getting regular check-ups is an important part of taking care of your health. It can help your doctor find and treat any problems you have. But it's also important for preventing health problems.  A routine physical is different from a \"sick visit.\" A sick visit is when you see a doctor because of a health concern or problem. Since physicals are scheduled ahead of time, you can think about what you want to ask the doctor.  How often should I get a physical? -- It depends on your age and health. In general, for people age 21 years and older:   If you are younger than 50 years, you might be able to get a physical every 3 years.   If you are 50 years or older, your doctor might recommend a physical every year.  If you have an ongoing health condition, like diabetes or high blood pressure, your doctor will probably want to see you more often.  What happens during a physical? -- In general, each visit will include:   Physical exam - The doctor or nurse will check your height, weight, heart rate, and blood pressure. They will also look at your eyes and ears. They will ask about how you are feeling and whether you have any symptoms that bother you.   Medicines - It's a good idea to bring a list of all the medicines you take to each doctor visit. Your doctor will talk to " "you about your medicines and answer any questions. Tell them if you are having any side effects that bother you. You should also tell them if you are having trouble paying for any of your medicines.   Habits and behaviors - This includes:   Your diet   Your exercise habits   Whether you smoke, drink alcohol, or use drugs   Whether you are sexually active   Whether you feel safe at home  Your doctor will talk to you about things you can do to improve your health and lower your risk of health problems. They will also offer help and support. For example, if you want to quit smoking, they can give you advice and might prescribe medicines. If you want to improve your diet or get more physical activity, they can help you with this, too.   Lab tests, if needed - The tests you get will depend on your age and situation. For example, your doctor might want to check your:   Cholesterol   Blood sugar   Iron level   Vaccines - The recommended vaccines will depend on your age, health, and what vaccines you already had. Vaccines are very important because they can prevent certain serious or deadly infections.   Discussion of screening - \"Screening\" means checking for diseases or other health problems before they cause symptoms. Your doctor can recommend screening based on your age, risk, and preferences. This might include tests to check for:   Cancer, such as breast, prostate, cervical, ovarian, colorectal, prostate, lung, or skin cancer   Sexually transmitted infections, such as chlamydia and gonorrhea   Mental health conditions like depression and anxiety  Your doctor will talk to you about the different types of screening tests. They can help you decide which screenings to have. They can also explain what the results might mean.   Answering questions - The physical is a good time to ask the doctor or nurse questions about your health. If needed, they can refer you to other doctors or specialists, too.  Adults older than 65 " years often need other care, too. As you get older, your doctor will talk to you about:   How to prevent falling at home   Hearing or vision tests   Memory testing   How to take your medicines safely   Making sure that you have the help and support you need at home  All topics are updated as new evidence becomes available and our peer review process is complete.  This topic retrieved from [employer] on: May 02, 2024.  Topic 072228 Version 1.0  Release: 32.4.3 - C32.122  © 2024 UpToDate, Inc. and/or its affiliates. All rights reserved.  Consumer Information Use and Disclaimer   Disclaimer: This generalized information is a limited summary of diagnosis, treatment, and/or medication information. It is not meant to be comprehensive and should be used as a tool to help the user understand and/or assess potential diagnostic and treatment options. It does NOT include all information about conditions, treatments, medications, side effects, or risks that may apply to a specific patient. It is not intended to be medical advice or a substitute for the medical advice, diagnosis, or treatment of a health care provider based on the health care provider's examination and assessment of a patient's specific and unique circumstances. Patients must speak with a health care provider for complete information about their health, medical questions, and treatment options, including any risks or benefits regarding use of medications. This information does not endorse any treatments or medications as safe, effective, or approved for treating a specific patient. UpToDate, Inc. and its affiliates disclaim any warranty or liability relating to this information or the use thereof.The use of this information is governed by the Terms of Use, available at https://www.wolterskluwer.com/en/know/clinical-effectiveness-terms. 2024© UpToDate, Inc. and its affiliates and/or licensors. All rights reserved.  Copyright   © 2024 UpToDate, Inc. and/or its  affiliates. All rights reserved.

## 2024-12-02 NOTE — ASSESSMENT & PLAN NOTE
Patient reports cardiology discontinued her lisinopril about 2 months ago.  Blood pressure appears controlled today.  Will continue to monitor.

## 2024-12-03 ENCOUNTER — RESULTS FOLLOW-UP (OUTPATIENT)
Dept: FAMILY MEDICINE CLINIC | Facility: CLINIC | Age: 59
End: 2024-12-03

## 2024-12-06 ENCOUNTER — ANNUAL EXAM (OUTPATIENT)
Age: 59
End: 2024-12-06
Payer: COMMERCIAL

## 2024-12-06 VITALS
BODY MASS INDEX: 43.61 KG/M2 | SYSTOLIC BLOOD PRESSURE: 124 MMHG | WEIGHT: 237 LBS | DIASTOLIC BLOOD PRESSURE: 76 MMHG | HEIGHT: 62 IN

## 2024-12-06 DIAGNOSIS — Z01.419 ENCOUNTER FOR WELL WOMAN EXAM WITH ROUTINE GYNECOLOGICAL EXAM: ICD-10-CM

## 2024-12-06 DIAGNOSIS — N94.10 DYSPAREUNIA IN FEMALE: ICD-10-CM

## 2024-12-06 DIAGNOSIS — Z01.419 ENCOUNTER FOR GYNECOLOGICAL EXAMINATION WITHOUT ABNORMAL FINDING: Primary | ICD-10-CM

## 2024-12-06 PROCEDURE — G0476 HPV COMBO ASSAY CA SCREEN: HCPCS | Performed by: OBSTETRICS & GYNECOLOGY

## 2024-12-06 PROCEDURE — G0145 SCR C/V CYTO,THINLAYER,RESCR: HCPCS | Performed by: OBSTETRICS & GYNECOLOGY

## 2024-12-06 PROCEDURE — S0612 ANNUAL GYNECOLOGICAL EXAMINA: HCPCS | Performed by: OBSTETRICS & GYNECOLOGY

## 2024-12-06 RX ORDER — ESTRADIOL 0.1 MG/G
0.5 CREAM VAGINAL 2 TIMES WEEKLY
Qty: 42.5 G | Refills: 1 | Status: SHIPPED | OUTPATIENT
Start: 2024-12-09

## 2024-12-06 NOTE — PROGRESS NOTES
ASSESSMENT & PLAN: Veronica Fairbanks is a 59 y.o.  with normal gynecologic exam.    1.  Routine well woman exam done today  2.  Pap and HPV:  The patient's last pap and hpv was .    It was normal.    Pap with cotesting was done today.    Current ASCCP Guidelines reviewed.   3.  Mammogram up to date   4.  Colorectal cancer screening was not ordered.  5. The following were reviewed in today's visit: breast self exam, use and side effects of HRT, menopause, exercise, and healthy diet  6. Dspareunia  - we discussed  trial of estrogen cream  , risks and benefits reviewed as well as proper use      CC:  Annual Gynecologic Examination    HPI: Veronica Fairbanks is a 59 y.o.  who presents for annual gynecologic examination.  She has the following concerns:  pain with intercourse      Health Maintenance:    She wears her seatbelt routinely.    She does perform regular monthly self breast exams.    She feels safe at home.     Past Medical History:   Diagnosis Date    Acid reflux     Anxiety     CPAP (continuous positive airway pressure) dependence     GERD (gastroesophageal reflux disease)     Hypertension     Obesity     Palpable abdominal mass     LAST ASSESSED: 17    Sleep apnea        Past Surgical History:   Procedure Laterality Date    ABDOMINOPLASTY      AUGMENTATION MAMMAPLASTY Bilateral     Bi retro pectoral saline    BLEPHAROPLASTY      BLEPHAROPLASTY       in [facility]    BREAST SURGERY      REDUCTION PROCEDURE     SECTION      CHOLECYSTECTOMY LAPAROSCOPIC      LIVER SURGERY      repair of laceration    NECK SURGERY      liposuction - neck lift    ME LAPS GSTRC RSTRICTIV PX LONGITUDINAL GASTRECTOMY N/A 2021    Procedure: Diagnostic Lap, Extensive Lysis of Adhesions;  Surgeon: Dimas López MD;  Location: [facility];  Service: Bariatrics    TONSILLECTOMY         Past OB/Gyn History:  OB History          2    Para   2    Term   2            AB         Living   2         SAB        IAB        Ectopic        Multiple        Live Births   2                 Family History   Problem Relation Age of Onset    Pancreatic cancer Mother 72    Prostate cancer Father 60    No Known Problems Sister     No Known Problems Sister     No Known Problems Daughter     No Known Problems Daughter     No Known Problems Maternal Grandmother     No Known Problems Maternal Grandfather     No Known Problems Paternal Grandmother     No Known Problems Paternal Grandfather     No Known Problems Maternal Aunt     No Known Problems Maternal Aunt     No Known Problems Paternal Aunt     No Known Problems Paternal Aunt     No Known Problems Paternal Aunt     Breast cancer Neg Hx        Social History:  Social History     Socioeconomic History    Marital status: /Civil Union     Spouse name: Not on file    Number of children: Not on file    Years of education: Not on file    Highest education level: Not on file   Occupational History    Not on file   Tobacco Use    Smoking status: Former     Current packs/day: 0.00     Types: Cigarettes     Quit date: 2013     Years since quittin.9    Smokeless tobacco: Never    Tobacco comments:     uses e cigs vaps quit 7 yrs ago  uses 0 nicotine   Vaping Use    Vaping status: Some Days   Substance and Sexual Activity    Alcohol use: No    Drug use: No    Sexual activity: Yes     Partners: Male     Birth control/protection: Post-menopausal   Other Topics Concern    Not on file   Social History Narrative    Not on file     Social Drivers of Health     Financial Resource Strain: Not on file   Food Insecurity: Not on file   Transportation Needs: Not on file   Physical Activity: Not on file   Stress: Not on file   Social Connections: Not on file   Intimate Partner Violence: Not on file   Housing Stability: Not on file       Allergies   Allergen Reactions    Prednisone Shortness Of Breath    Alprazolam     Duloxetine     Escitalopram     Latex Rash     Omnipaque [Iohexol]      Pt erupted with rash all over body       Current Outpatient Medications:     [START ON 12/9/2024] estradiol (ESTRACE VAGINAL) 0.1 mg/g vaginal cream, Insert 0.5 g into the vagina 2 (two) times a week, Disp: 42.5 g, Rfl: 1    famotidine (PEPCID) 40 MG tablet, Take 1 tablet (40 mg total) by mouth daily at bedtime as needed for heartburn, Disp: 90 tablet, Rfl: 0    FIBER SELECT GUMMIES PO, Take 2-3 each by mouth daily, Disp: , Rfl:     lisinopril (ZESTRIL) 40 mg tablet, TOME 1 TABLETA POR VIA ORAL TODOS LOS GONSALES, Disp: 90 tablet, Rfl: 1    Lysine HCl 500 MG CAPS, Take 1 capsule by mouth daily, Disp: , Rfl:     NON FORMULARY, Take 1 capsule by mouth daily Resevatrol, Disp: , Rfl:     omeprazole (PriLOSEC) 40 MG capsule, TAKE 1 CAPSULE BY MOUTH EVERY DAY IN THE MORNING AS NEEDED 30 MINUTES BEFORE EATING, Disp: 90 capsule, Rfl: 1    Tirzepatide (Mounjaro) 15 MG/0.5ML SOAJ, Inject 15 mg under the skin once a week, Disp: 6 mL, Rfl: 1    triamcinolone (KENALOG) 0.1 % cream, Apply topically 2 (two) times a day, Disp: 45 g, Rfl: 0    triamcinolone (KENALOG) 0.5 % ointment, APPLY TOPICALLY 2 TIMES A DAY AS NEEDED FOR RASH AVOID USE ON FACE AND GENITALS. DO NOT USE FOR OVER 2 WEEKS AT A TIME., Disp: 15 g, Rfl: 0    TURMERIC CURCUMIN PO, Take 1 capsule by mouth daily, Disp: , Rfl:       Review of Systems  Constitutional :no fever, feels well, no tiredness, no recent weight gain or loss  ENT: no ear ache, no loss of hearing, no nosebleeds or nasal discharge, no sore throat or hoarseness.  Cardiovascular: no complaints of slow or fast heart beat, no chest pain, no palpitations, no leg claudication or lower extremity edema.  Respiratory: no complaints of shortness of shortness of breath, no MARTINEZ  Breasts:no complaints of breast pain, breast lump, or nipple discharge  Gastrointestinal: no complaints of abdominal pain, constipation, nausea, vomiting, or diarrhea or bloody stools  Genitourinary : no complaints  "of dysuria, incontinence, pelvic pain, no dysmenorrhea, vaginal discharge or abnormal vaginal bleeding and as noted in HPI.  Musculoskeletal: no complaints of arthralgia, no myalgia, no joint swelling or stiffness, no limb pain or swelling.  Integumentary: no complaints of skin rash or lesion, itching or dry skin  Neurological: no complaints of headache, no confusion, no numbness or tingling, no dizziness or fainting    Objective      /76   Ht 5' 2\" (1.575 m)   Wt 108 kg (237 lb)   LMP 08/03/2018 (Approximate)   BMI 43.35 kg/m²     General:   appears stated age, cooperative, alert normal mood and affect   Lungs: Unlabored     Breasts: normal appearance, no masses or tenderness, Inspection negative, No nipple retraction or dimpling, No nipple discharge or bleeding, No axillary or supraclavicular adenopathy, Normal to palpation without dominant masses   Abdomen: soft, non-tender, without masses or organomegaly   Vulva: normal, normal female genitalia, Bartholin's, Urethra, [place] normal, no lesions, normal female hair distribution, no clitoral enlargement   Vagina: normal vagina, no discharge, exudate, lesion, or erythema   Urethra: normal   Cervix: Normal, no discharge. Nontender.   Uterus: normal size, contour, position, consistency, mobility, non-tender   Adnexa: no mass, fullness, tenderness   Psychiatric orientation to person, place, and time: normal. mood and affect: normal            "

## 2024-12-12 LAB
LAB AP GYN PRIMARY INTERPRETATION: NORMAL
Lab: NORMAL

## 2024-12-13 ENCOUNTER — RESULTS FOLLOW-UP (OUTPATIENT)
Dept: OBGYN CLINIC | Facility: MEDICAL CENTER | Age: 59
End: 2024-12-13

## 2024-12-29 DIAGNOSIS — I10 ESSENTIAL HYPERTENSION: ICD-10-CM

## 2024-12-30 RX ORDER — LISINOPRIL 40 MG/1
TABLET ORAL
Qty: 90 TABLET | Refills: 1 | Status: SHIPPED | OUTPATIENT
Start: 2024-12-30

## 2025-01-22 PROCEDURE — 88304 TISSUE EXAM BY PATHOLOGIST: CPT | Performed by: PATHOLOGY

## 2025-01-24 ENCOUNTER — LAB REQUISITION (OUTPATIENT)
Dept: LAB | Facility: HOSPITAL | Age: 60
End: 2025-01-24
Payer: COMMERCIAL

## 2025-01-24 DIAGNOSIS — D48.19 OTHER SPECIFIED NEOPLASM OF UNCERTAIN BEHAVIOR OF CONNECTIVE AND OTHER SOFT TISSUE: ICD-10-CM

## 2025-01-29 PROCEDURE — 88304 TISSUE EXAM BY PATHOLOGIST: CPT | Performed by: PATHOLOGY

## 2025-02-03 ENCOUNTER — CLINICAL SUPPORT (OUTPATIENT)
Dept: BARIATRICS | Facility: CLINIC | Age: 60
End: 2025-02-03

## 2025-02-03 VITALS
RESPIRATION RATE: 16 BRPM | TEMPERATURE: 98.8 F | HEIGHT: 62 IN | HEART RATE: 111 BPM | BODY MASS INDEX: 43.36 KG/M2 | WEIGHT: 235.6 LBS | DIASTOLIC BLOOD PRESSURE: 72 MMHG | SYSTOLIC BLOOD PRESSURE: 142 MMHG

## 2025-02-03 DIAGNOSIS — E11.9 TYPE 2 DIABETES MELLITUS WITHOUT COMPLICATION, WITHOUT LONG-TERM CURRENT USE OF INSULIN (HCC): ICD-10-CM

## 2025-02-03 DIAGNOSIS — R63.5 ABNORMAL WEIGHT GAIN: Primary | ICD-10-CM

## 2025-02-03 PROCEDURE — RECHECK

## 2025-02-03 RX ORDER — TIRZEPATIDE 15 MG/.5ML
15 INJECTION, SOLUTION SUBCUTANEOUS WEEKLY
Qty: 6 ML | Refills: 1 | Status: SHIPPED | OUTPATIENT
Start: 2025-02-03

## 2025-02-03 NOTE — PROGRESS NOTES
Patient last visit weight:236lb  Patient current visit weight:    If you are taking phentermine or other oral weight loss medications, are you experiencing any of the following symptoms:  Headache:   Blurred Vision:   Chest Pain:   Palpitations:  Insomnia:   SPECIFY ORAL MEDICATION AND DOSAGE:     If you are taking an injectable medication,  are you experiencing any of the following symptoms:  Bloating: no  Nausea:no  Vomiting: no  Constipation:no  Diarrhea:no  SPECIFY INJECTABLE MEDICATION AND CURRENT DOSAGE:mounjaro 15mg  Pt is doing well , she said that this medication is working , no constipation   Vitals:    Is BP less than 100/60?  Is BP greater than 140/90?  Is HR greater than 100?  **If yes to any of the above, have patient relax and repeat in 5-10 minutes**    Repeat values:    Is BP less than 100/60?  Is BP greater than 140/90?  Is HR greater than 100?  **If values remain outside of ranges above, please consult provider for next steps**      Date of last injection: 02/03/2025    How many injections do you have left: 3

## 2025-02-10 RX ORDER — SODIUM CHLORIDE, SODIUM LACTATE, POTASSIUM CHLORIDE, CALCIUM CHLORIDE 600; 310; 30; 20 MG/100ML; MG/100ML; MG/100ML; MG/100ML
50 INJECTION, SOLUTION INTRAVENOUS CONTINUOUS
Status: CANCELLED | OUTPATIENT
Start: 2025-02-10

## 2025-02-11 ENCOUNTER — ANESTHESIA (OUTPATIENT)
Dept: GASTROENTEROLOGY | Facility: HOSPITAL | Age: 60
End: 2025-02-11
Payer: COMMERCIAL

## 2025-02-11 ENCOUNTER — HOSPITAL ENCOUNTER (OUTPATIENT)
Dept: GASTROENTEROLOGY | Facility: HOSPITAL | Age: 60
Setting detail: OUTPATIENT SURGERY
Discharge: HOME | End: 2025-02-11
Attending: INTERNAL MEDICINE
Payer: COMMERCIAL

## 2025-02-11 ENCOUNTER — ANESTHESIA EVENT (OUTPATIENT)
Dept: GASTROENTEROLOGY | Facility: HOSPITAL | Age: 60
End: 2025-02-11
Payer: COMMERCIAL

## 2025-02-11 VITALS
DIASTOLIC BLOOD PRESSURE: 67 MMHG | RESPIRATION RATE: 16 BRPM | BODY MASS INDEX: 43.43 KG/M2 | OXYGEN SATURATION: 98 % | HEIGHT: 62 IN | WEIGHT: 236 LBS | TEMPERATURE: 97.6 F | SYSTOLIC BLOOD PRESSURE: 109 MMHG | HEART RATE: 71 BPM

## 2025-02-11 DIAGNOSIS — K21.9 GASTROESOPHAGEAL REFLUX DISEASE WITHOUT ESOPHAGITIS: ICD-10-CM

## 2025-02-11 DIAGNOSIS — Z86.0100 HISTORY OF COLON POLYPS: ICD-10-CM

## 2025-02-11 LAB — GLUCOSE SERPL-MCNC: 106 MG/DL (ref 65–140)

## 2025-02-11 PROCEDURE — 45385 COLONOSCOPY W/LESION REMOVAL: CPT | Performed by: INTERNAL MEDICINE

## 2025-02-11 PROCEDURE — 82948 REAGENT STRIP/BLOOD GLUCOSE: CPT

## 2025-02-11 PROCEDURE — 43239 EGD BIOPSY SINGLE/MULTIPLE: CPT | Performed by: INTERNAL MEDICINE

## 2025-02-11 PROCEDURE — 88305 TISSUE EXAM BY PATHOLOGIST: CPT | Performed by: PATHOLOGY

## 2025-02-11 RX ORDER — SODIUM CHLORIDE, SODIUM LACTATE, POTASSIUM CHLORIDE, CALCIUM CHLORIDE 600; 310; 30; 20 MG/100ML; MG/100ML; MG/100ML; MG/100ML
50 INJECTION, SOLUTION INTRAVENOUS CONTINUOUS
Status: DISCONTINUED | OUTPATIENT
Start: 2025-02-11 | End: 2025-02-15 | Stop reason: HOSPADM

## 2025-02-11 RX ORDER — LIDOCAINE HYDROCHLORIDE 20 MG/ML
INJECTION, SOLUTION EPIDURAL; INFILTRATION; INTRACAUDAL; PERINEURAL AS NEEDED
Status: DISCONTINUED | OUTPATIENT
Start: 2025-02-11 | End: 2025-02-11

## 2025-02-11 RX ORDER — PROPOFOL 10 MG/ML
INJECTION, EMULSION INTRAVENOUS AS NEEDED
Status: DISCONTINUED | OUTPATIENT
Start: 2025-02-11 | End: 2025-02-11

## 2025-02-11 RX ADMIN — PROPOFOL 40 MG: 10 INJECTION, EMULSION INTRAVENOUS at 07:40

## 2025-02-11 RX ADMIN — PROPOFOL 150 MG: 10 INJECTION, EMULSION INTRAVENOUS at 07:25

## 2025-02-11 RX ADMIN — PROPOFOL 20 MG: 10 INJECTION, EMULSION INTRAVENOUS at 07:45

## 2025-02-11 RX ADMIN — LIDOCAINE HYDROCHLORIDE 100 MG: 20 INJECTION, SOLUTION EPIDURAL; INFILTRATION; INTRACAUDAL at 07:25

## 2025-02-11 RX ADMIN — PROPOFOL 20 MG: 10 INJECTION, EMULSION INTRAVENOUS at 07:46

## 2025-02-11 RX ADMIN — PROPOFOL 30 MG: 10 INJECTION, EMULSION INTRAVENOUS at 07:28

## 2025-02-11 RX ADMIN — PROPOFOL 20 MG: 10 INJECTION, EMULSION INTRAVENOUS at 07:33

## 2025-02-11 RX ADMIN — PROPOFOL 30 MG: 10 INJECTION, EMULSION INTRAVENOUS at 07:32

## 2025-02-11 RX ADMIN — PROPOFOL 20 MG: 10 INJECTION, EMULSION INTRAVENOUS at 07:51

## 2025-02-11 RX ADMIN — PROPOFOL 30 MG: 10 INJECTION, EMULSION INTRAVENOUS at 07:49

## 2025-02-11 RX ADMIN — SODIUM CHLORIDE, SODIUM LACTATE, POTASSIUM CHLORIDE, AND CALCIUM CHLORIDE 50 ML/HR: .6; .31; .03; .02 INJECTION, SOLUTION INTRAVENOUS at 07:15

## 2025-02-11 RX ADMIN — PROPOFOL 30 MG: 10 INJECTION, EMULSION INTRAVENOUS at 07:35

## 2025-02-11 RX ADMIN — PROPOFOL 20 MG: 10 INJECTION, EMULSION INTRAVENOUS at 07:30

## 2025-02-11 RX ADMIN — PROPOFOL 40 MG: 10 INJECTION, EMULSION INTRAVENOUS at 07:43

## 2025-02-11 RX ADMIN — Medication 40 MG: at 07:42

## 2025-02-11 RX ADMIN — PROPOFOL 40 MG: 10 INJECTION, EMULSION INTRAVENOUS at 07:37

## 2025-02-11 NOTE — H&P
H&P - Gastroenterology   Name: Veronica Fairbanks 59 y.o. female I MRN: [id number]  Unit/Bed#:  I Date of Admission: 2025   Date of Service: 2025 I Hospital Day: 0     Assessment & Plan   This is a 59 y.o. year old female here for egd/colonoscopy, and she is stable and optimized for her procedure.    History of Present Illness    Veronica Fairbanks is a 59 y.o. year old female who presents for gerd, history of colon polyps    REVIEW OF SYSTEMS: Per the HPI, and otherwise unremarkable.    Historical Information   Past Medical History:   Diagnosis Date    Acid reflux     Anxiety     CPAP (continuous positive airway pressure) dependence     GERD (gastroesophageal reflux disease)     Hypertension     Obesity     Palpable abdominal mass     LAST ASSESSED: 17    Sleep apnea     doesnt wear device     Past Surgical History:   Procedure Laterality Date    ABDOMINOPLASTY      AUGMENTATION MAMMAPLASTY Bilateral     Bi retro pectoral saline    BLEPHAROPLASTY      BLEPHAROPLASTY       in [facility]    BREAST SURGERY      REDUCTION PROCEDURE     SECTION      CHOLECYSTECTOMY LAPAROSCOPIC      LIVER SURGERY      repair of laceration    NECK SURGERY      liposuction - neck lift    WA LAPS GSTRC RSTRICTIV PX LONGITUDINAL GASTRECTOMY N/A 2021    Procedure: Diagnostic Lap, Extensive Lysis of Adhesions;  Surgeon: Dimas López MD;  Location: [facility];  Service: Bariatrics    TONSILLECTOMY       Social History     Tobacco Use    Smoking status: Former     Current packs/day: 0.00     Types: Cigarettes     Quit date: 2013     Years since quittin.0    Smokeless tobacco: Never    Tobacco comments:     uses e cigs vaps quit 7 yrs ago  uses 0 nicotine   Vaping Use    Vaping status: Some Days   Substance and Sexual Activity    Alcohol use: No    Drug use: No    Sexual activity: Yes     Partners: Male     Birth control/protection: Post-menopausal     E-Cigarette/Vaping    E-Cigarette Use  Current Some Day User      E-Cigarette/Vaping Substances    Nicotine No     THC No     CBD No     Flavoring No     Other No     Unknown No      Family history non-contributory    Meds/Allergies     Current Outpatient Medications:     famotidine (PEPCID) 40 MG tablet    lisinopril (ZESTRIL) 40 mg tablet    estradiol (ESTRACE VAGINAL) 0.1 mg/g vaginal cream    FIBER SELECT GUMMIES PO    Lysine HCl 500 MG CAPS    NON FORMULARY    omeprazole (PriLOSEC) 40 MG capsule    Tirzepatide (Mounjaro) 15 MG/0.5ML SOAJ    triamcinolone (KENALOG) 0.1 % cream    triamcinolone (KENALOG) 0.5 % ointment    TURMERIC CURCUMIN PO  Allergies   Allergen Reactions    Prednisone Shortness Of Breath    Alprazolam     Duloxetine     Escitalopram     Latex Rash    Omnipaque [Iohexol]      Pt erupted with rash all over body       Objective :       Physical Exam  Gen: NAD  Head: NCAT  CV: RRR  CHEST: Clear  ABD: soft, NT/ND  EXT: no edema

## 2025-02-11 NOTE — ANESTHESIA PREPROCEDURE EVALUATION
Procedure:  COLONOSCOPY  EGD    Relevant Problems   CARDIO   (+) Hypertension   (+) Mixed hyperlipidemia      ENDO   (+) Type 2 diabetes mellitus without complication, without long-term current use of insulin (HCC)      GI/HEPATIC   (+) Acid reflux   (+) NAFLD (nonalcoholic fatty liver disease)      /RENAL   (+) Kidney stones      MUSCULOSKELETAL   (+) Primary osteoarthritis of knee      NEURO/PSYCH   (+) Anxiety      PULMONARY   (+) NITISH (obstructive sleep apnea)      Behavioral Health   (+) Personal history of tobacco use, presenting hazards to health      Other   (+) Class 3 severe obesity due to excess calories with serious comorbidity and body mass index (BMI) of 40.0 to 44.9 in adult (HCC)   (+) LFT elevation   (+) Obesity, Class III, BMI 40-49.9 (morbid obesity) ([facility])        Physical Exam    Airway    Mallampati score: III  TM Distance: >3 FB  Neck ROM: full     Dental   No notable dental hx     Cardiovascular  Cardiovascular exam normal    Pulmonary  Pulmonary exam normal     Other Findings  post-pubertal.      Anesthesia Plan  ASA Score- 3     Anesthesia Type- IV sedation with anesthesia with ASA Monitors.         Additional Monitors:     Airway Plan:            Plan Factors-Exercise tolerance (METS): >4 METS.    Chart reviewed. EKG reviewed.  Existing labs reviewed. Patient summary reviewed.    Patient is not a current smoker. Patient not instructed to abstain from smoking on day of procedure. Patient did not smoke on day of surgery.            Induction-     Postoperative Plan-     Perioperative Resuscitation Plan - Level 1 - Full Code.       Informed Consent- Anesthetic plan and risks discussed with patient and spouse.  I personally reviewed this patient with the CRNA. Discussed and agreed on the Anesthesia Plan with the CRNA..      NPO Status:  No vitals data found for the desired time range.      Lab Results   Component Value Date    HGBA1C 5.5 10/19/2024       Lab Results   Component Value Date    K  4.1 10/19/2024     10/19/2024    CO2 27 10/19/2024    BUN 19 10/19/2024    CREATININE 0.96 10/19/2024    GLUF 105 (H) 10/19/2024    CALCIUM 9.4 10/19/2024    CORRECTEDCA 9.5 11/02/2022    AST 31 10/19/2024    ALT 42 10/19/2024    ALKPHOS 114 (H) 10/19/2024    EGFR 64 10/19/2024       Lab Results   Component Value Date    WBC 6.98 10/19/2024    HGB 15.6 (H) 10/19/2024    HCT 45.5 10/19/2024    MCV 89 10/19/2024     10/19/2024     EKG NSR

## 2025-02-11 NOTE — ANESTHESIA POSTPROCEDURE EVALUATION
Post-Op Assessment Note    CV Status:  Stable    Pain management: satisfactory to patient       Mental Status:  Alert and awake   Hydration Status:  Stable   PONV Controlled:  None   Airway Patency:  Patent     Post Op Vitals Reviewed: Yes    No anethesia notable event occurred.    Staff: CRNA           Last Filed PACU Vitals:  Vitals Value Taken Time   Temp 97.6 °F (36.4 °C) 02/11/25 0758   Pulse 87 02/11/25 0758   BP 97/60 02/11/25 0758   Resp 16 02/11/25 0758   SpO2 97 % 02/11/25 0758       Modified Jonny:     Vitals Value Taken Time   Activity 2 02/11/25 0758   Respiration 2 02/11/25 0758   Circulation 2 02/11/25 0758   Consciousness 2 02/11/25 0758   Oxygen Saturation 2 02/11/25 0758     Modified Jonny Score: 10

## 2025-02-12 ENCOUNTER — RESULTS FOLLOW-UP (OUTPATIENT)
Dept: GASTROENTEROLOGY | Facility: MEDICAL CENTER | Age: 60
End: 2025-02-12

## 2025-02-12 PROCEDURE — 88305 TISSUE EXAM BY PATHOLOGIST: CPT | Performed by: PATHOLOGY

## 2025-02-12 NOTE — RESULT ENCOUNTER NOTE
Please call the patient with the results    The  colon polyp removed was called an adenoma. This is a pre-cancerous lesion and was completely removed. There was no evidence of cancer in the polyp.     She should have the colonoscopy repeated in 5 years due to a history of colon polyps.    The rest of the biopsies were otherwise normal

## 2025-02-13 NOTE — RESULT ENCOUNTER NOTE
Called the patient and went over the result report and recommendation. Patient verbalized understanding, had no questions, and thanked us.     5 year colonoscopy recall was placed.

## 2025-02-15 DIAGNOSIS — L30.9 ECZEMA, UNSPECIFIED TYPE: ICD-10-CM

## 2025-02-15 DIAGNOSIS — M72.2 PLANTAR FASCIAL FIBROMATOSIS OF RIGHT FOOT: ICD-10-CM

## 2025-02-16 RX ORDER — TRIAMCINOLONE ACETONIDE 5 MG/G
OINTMENT TOPICAL
Qty: 15 G | Refills: 0 | Status: SHIPPED | OUTPATIENT
Start: 2025-02-16

## 2025-02-17 RX ORDER — LIDOCAINE 50 MG/G
OINTMENT TOPICAL AS NEEDED
Qty: 50 G | Refills: 1 | OUTPATIENT
Start: 2025-02-17

## 2025-03-17 DIAGNOSIS — E11.9 TYPE 2 DIABETES MELLITUS WITHOUT COMPLICATION, WITHOUT LONG-TERM CURRENT USE OF INSULIN (HCC): ICD-10-CM

## 2025-03-18 RX ORDER — TIRZEPATIDE 15 MG/.5ML
15 INJECTION, SOLUTION SUBCUTANEOUS WEEKLY
Qty: 6 ML | Refills: 0 | Status: SHIPPED | OUTPATIENT
Start: 2025-03-18 | End: 2025-03-23 | Stop reason: SDUPTHER

## 2025-03-23 DIAGNOSIS — I10 ESSENTIAL HYPERTENSION: ICD-10-CM

## 2025-03-23 DIAGNOSIS — E11.9 TYPE 2 DIABETES MELLITUS WITHOUT COMPLICATION, WITHOUT LONG-TERM CURRENT USE OF INSULIN (HCC): ICD-10-CM

## 2025-03-23 DIAGNOSIS — L30.9 ECZEMA, UNSPECIFIED TYPE: ICD-10-CM

## 2025-03-24 RX ORDER — TRIAMCINOLONE ACETONIDE 1 MG/G
CREAM TOPICAL 2 TIMES DAILY
Qty: 45 G | Refills: 1 | Status: SHIPPED | OUTPATIENT
Start: 2025-03-24

## 2025-03-24 RX ORDER — LISINOPRIL 40 MG/1
40 TABLET ORAL
Qty: 90 TABLET | Refills: 1 | Status: SHIPPED | OUTPATIENT
Start: 2025-03-24

## 2025-03-25 RX ORDER — TIRZEPATIDE 15 MG/.5ML
15 INJECTION, SOLUTION SUBCUTANEOUS WEEKLY
Qty: 12 ML | Refills: 0 | Status: SHIPPED | OUTPATIENT
Start: 2025-03-25

## 2025-06-20 DIAGNOSIS — K21.9 GASTROESOPHAGEAL REFLUX DISEASE, UNSPECIFIED WHETHER ESOPHAGITIS PRESENT: ICD-10-CM

## 2025-06-20 RX ORDER — OMEPRAZOLE 40 MG/1
CAPSULE, DELAYED RELEASE ORAL
Qty: 90 CAPSULE | Refills: 1 | Status: SHIPPED | OUTPATIENT
Start: 2025-06-20
